# Patient Record
Sex: FEMALE | Race: WHITE | Employment: FULL TIME | URBAN - METROPOLITAN AREA
[De-identification: names, ages, dates, MRNs, and addresses within clinical notes are randomized per-mention and may not be internally consistent; named-entity substitution may affect disease eponyms.]

---

## 2018-04-04 ENCOUNTER — HOSPITAL ENCOUNTER (EMERGENCY)
Age: 28
Discharge: HOME | End: 2018-04-04
Attending: EMERGENCY MEDICINE
Payer: COMMERCIAL

## 2018-04-04 VITALS
BODY MASS INDEX: 33.2 KG/M2 | OXYGEN SATURATION: 100 % | RESPIRATION RATE: 14 BRPM | SYSTOLIC BLOOD PRESSURE: 127 MMHG | WEIGHT: 187.39 LBS | TEMPERATURE: 97.9 F | HEIGHT: 63 IN | DIASTOLIC BLOOD PRESSURE: 78 MMHG | HEART RATE: 69 BPM

## 2018-04-04 DIAGNOSIS — K29.90 GASTRITIS AND DUODENITIS: Primary | ICD-10-CM

## 2018-04-04 LAB
ALBUMIN SERPL-MCNC: 4.3 G/DL (ref 3.5–5)
ALBUMIN/GLOB SERPL: 1.2 {RATIO} (ref 1.1–2.2)
ALP SERPL-CCNC: 74 U/L (ref 45–117)
ALT SERPL-CCNC: 57 U/L (ref 12–78)
ANION GAP SERPL CALC-SCNC: 4 MMOL/L (ref 5–15)
APPEARANCE UR: ABNORMAL
AST SERPL-CCNC: 28 U/L (ref 15–37)
BACTERIA URNS QL MICRO: ABNORMAL /HPF
BASOPHILS # BLD: 0.1 K/UL (ref 0–0.1)
BASOPHILS NFR BLD: 1 % (ref 0–1)
BILIRUB SERPL-MCNC: 0.5 MG/DL (ref 0.2–1)
BILIRUB UR QL: NEGATIVE
BUN SERPL-MCNC: 10 MG/DL (ref 6–20)
BUN/CREAT SERPL: 12 (ref 12–20)
CALCIUM SERPL-MCNC: 9 MG/DL (ref 8.5–10.1)
CHLORIDE SERPL-SCNC: 109 MMOL/L (ref 97–108)
CO2 SERPL-SCNC: 28 MMOL/L (ref 21–32)
COLOR UR: ABNORMAL
CREAT SERPL-MCNC: 0.82 MG/DL (ref 0.55–1.02)
DIFFERENTIAL METHOD BLD: NORMAL
EOSINOPHIL # BLD: 0.2 K/UL (ref 0–0.4)
EOSINOPHIL NFR BLD: 3 % (ref 0–7)
EPITH CASTS URNS QL MICRO: ABNORMAL /LPF
ERYTHROCYTE [DISTWIDTH] IN BLOOD BY AUTOMATED COUNT: 11.8 % (ref 11.5–14.5)
GLOBULIN SER CALC-MCNC: 3.5 G/DL (ref 2–4)
GLUCOSE SERPL-MCNC: 97 MG/DL (ref 65–100)
GLUCOSE UR STRIP.AUTO-MCNC: NEGATIVE MG/DL
HCG UR QL: NEGATIVE
HCT VFR BLD AUTO: 41.3 % (ref 35–47)
HGB BLD-MCNC: 13.9 G/DL (ref 11.5–16)
HGB UR QL STRIP: NEGATIVE
HYALINE CASTS URNS QL MICRO: ABNORMAL /LPF (ref 0–5)
IMM GRANULOCYTES # BLD: 0 K/UL (ref 0–0.04)
IMM GRANULOCYTES NFR BLD AUTO: 0 % (ref 0–0.5)
KETONES UR QL STRIP.AUTO: NEGATIVE MG/DL
LACTATE SERPL-SCNC: 0.8 MMOL/L (ref 0.4–2)
LEUKOCYTE ESTERASE UR QL STRIP.AUTO: NEGATIVE
LIPASE SERPL-CCNC: 161 U/L (ref 73–393)
LYMPHOCYTES # BLD: 2.1 K/UL (ref 0.8–3.5)
LYMPHOCYTES NFR BLD: 32 % (ref 12–49)
MCH RBC QN AUTO: 31.5 PG (ref 26–34)
MCHC RBC AUTO-ENTMCNC: 33.7 G/DL (ref 30–36.5)
MCV RBC AUTO: 93.7 FL (ref 80–99)
MONOCYTES # BLD: 0.5 K/UL (ref 0–1)
MONOCYTES NFR BLD: 7 % (ref 5–13)
NEUTS SEG # BLD: 3.8 K/UL (ref 1.8–8)
NEUTS SEG NFR BLD: 57 % (ref 32–75)
NITRITE UR QL STRIP.AUTO: NEGATIVE
NRBC # BLD: 0 K/UL (ref 0–0.01)
NRBC BLD-RTO: 0 PER 100 WBC
PH UR STRIP: 7.5 [PH] (ref 5–8)
PLATELET # BLD AUTO: 276 K/UL (ref 150–400)
PMV BLD AUTO: 11 FL (ref 8.9–12.9)
POTASSIUM SERPL-SCNC: 4 MMOL/L (ref 3.5–5.1)
PROT SERPL-MCNC: 7.8 G/DL (ref 6.4–8.2)
PROT UR STRIP-MCNC: NEGATIVE MG/DL
RBC # BLD AUTO: 4.41 M/UL (ref 3.8–5.2)
RBC #/AREA URNS HPF: ABNORMAL /HPF (ref 0–5)
SODIUM SERPL-SCNC: 141 MMOL/L (ref 136–145)
SP GR UR REFRACTOMETRY: 1.02 (ref 1–1.03)
UA: UC IF INDICATED,UAUC: ABNORMAL
UROBILINOGEN UR QL STRIP.AUTO: 0.2 EU/DL (ref 0.2–1)
WBC # BLD AUTO: 6.7 K/UL (ref 3.6–11)
WBC URNS QL MICRO: ABNORMAL /HPF (ref 0–4)

## 2018-04-04 PROCEDURE — 96374 THER/PROPH/DIAG INJ IV PUSH: CPT

## 2018-04-04 PROCEDURE — 96376 TX/PRO/DX INJ SAME DRUG ADON: CPT

## 2018-04-04 PROCEDURE — 74011250637 HC RX REV CODE- 250/637: Performed by: PHYSICIAN ASSISTANT

## 2018-04-04 PROCEDURE — 74011250636 HC RX REV CODE- 250/636: Performed by: EMERGENCY MEDICINE

## 2018-04-04 PROCEDURE — 85025 COMPLETE CBC W/AUTO DIFF WBC: CPT | Performed by: PHYSICIAN ASSISTANT

## 2018-04-04 PROCEDURE — 74011250637 HC RX REV CODE- 250/637: Performed by: EMERGENCY MEDICINE

## 2018-04-04 PROCEDURE — 80053 COMPREHEN METABOLIC PANEL: CPT | Performed by: PHYSICIAN ASSISTANT

## 2018-04-04 PROCEDURE — 99284 EMERGENCY DEPT VISIT MOD MDM: CPT

## 2018-04-04 PROCEDURE — 83605 ASSAY OF LACTIC ACID: CPT | Performed by: PHYSICIAN ASSISTANT

## 2018-04-04 PROCEDURE — 96361 HYDRATE IV INFUSION ADD-ON: CPT

## 2018-04-04 PROCEDURE — 87086 URINE CULTURE/COLONY COUNT: CPT | Performed by: PHYSICIAN ASSISTANT

## 2018-04-04 PROCEDURE — 74011000250 HC RX REV CODE- 250: Performed by: EMERGENCY MEDICINE

## 2018-04-04 PROCEDURE — 81001 URINALYSIS AUTO W/SCOPE: CPT | Performed by: PHYSICIAN ASSISTANT

## 2018-04-04 PROCEDURE — 81025 URINE PREGNANCY TEST: CPT | Performed by: PHYSICIAN ASSISTANT

## 2018-04-04 PROCEDURE — 36415 COLL VENOUS BLD VENIPUNCTURE: CPT | Performed by: PHYSICIAN ASSISTANT

## 2018-04-04 PROCEDURE — 96375 TX/PRO/DX INJ NEW DRUG ADDON: CPT

## 2018-04-04 PROCEDURE — 83690 ASSAY OF LIPASE: CPT | Performed by: PHYSICIAN ASSISTANT

## 2018-04-04 RX ORDER — HYDROCODONE BITARTRATE AND ACETAMINOPHEN 5; 325 MG/1; MG/1
1 TABLET ORAL
Qty: 8 TAB | Refills: 0 | Status: SHIPPED | OUTPATIENT
Start: 2018-04-04 | End: 2019-02-22

## 2018-04-04 RX ORDER — FENTANYL CITRATE 50 UG/ML
50 INJECTION, SOLUTION INTRAMUSCULAR; INTRAVENOUS
Status: COMPLETED | OUTPATIENT
Start: 2018-04-04 | End: 2018-04-04

## 2018-04-04 RX ORDER — ONDANSETRON 2 MG/ML
4 INJECTION INTRAMUSCULAR; INTRAVENOUS
Status: COMPLETED | OUTPATIENT
Start: 2018-04-04 | End: 2018-04-04

## 2018-04-04 RX ORDER — ONDANSETRON 4 MG/1
4 TABLET, ORALLY DISINTEGRATING ORAL
Status: COMPLETED | OUTPATIENT
Start: 2018-04-04 | End: 2018-04-04

## 2018-04-04 RX ADMIN — LIDOCAINE HYDROCHLORIDE 40 ML: 20 SOLUTION ORAL; TOPICAL at 14:06

## 2018-04-04 RX ADMIN — FENTANYL CITRATE 50 MCG: 50 INJECTION, SOLUTION INTRAMUSCULAR; INTRAVENOUS at 14:39

## 2018-04-04 RX ADMIN — FENTANYL CITRATE 50 MCG: 50 INJECTION, SOLUTION INTRAMUSCULAR; INTRAVENOUS at 15:42

## 2018-04-04 RX ADMIN — ONDANSETRON 4 MG: 4 TABLET, ORALLY DISINTEGRATING ORAL at 14:06

## 2018-04-04 RX ADMIN — SODIUM CHLORIDE 1000 ML: 900 INJECTION, SOLUTION INTRAVENOUS at 14:37

## 2018-04-04 RX ADMIN — ONDANSETRON 4 MG: 2 INJECTION INTRAMUSCULAR; INTRAVENOUS at 14:40

## 2018-04-04 NOTE — DISCHARGE INSTRUCTIONS
Gastritis: Care Instructions  Your Care Instructions    Gastritis is a sore and upset stomach. It happens when something irritates the stomach lining. Many things can cause it. These include an infection such as the flu or something you ate or drank. Medicines or a sore on the lining of the stomach (ulcer) also can cause it. Your belly may bloat and ache. You may belch, vomit, and feel sick to your stomach. You should be able to relieve the problem by taking medicine. And it may help to change your diet. If gastritis lasts, your doctor may prescribe medicine. Follow-up care is a key part of your treatment and safety. Be sure to make and go to all appointments, and call your doctor if you are having problems. It's also a good idea to know your test results and keep a list of the medicines you take. How can you care for yourself at home? · If your doctor prescribed antibiotics, take them as directed. Do not stop taking them just because you feel better. You need to take the full course of antibiotics. · Be safe with medicines. If your doctor prescribed medicine to decrease stomach acid, take it as directed. Call your doctor if you think you are having a problem with your medicine. · Do not take any other medicine, including over-the-counter pain relievers, without talking to your doctor first.  · If your doctor recommends over-the-counter medicine to reduce stomach acid, such as Pepcid AC, Prilosec, Tagamet HB, or Zantac 75, follow the directions on the label. · Drink plenty of fluids (enough so that your urine is light yellow or clear like water) to prevent dehydration. Choose water and other caffeine-free clear liquids. If you have kidney, heart, or liver disease and have to limit fluids, talk with your doctor before you increase the amount of fluids you drink. · Limit how much alcohol you drink. · Avoid coffee, tea, cola drinks, chocolate, and other foods with caffeine.  They increase stomach acid.  When should you call for help? Call 911 anytime you think you may need emergency care. For example, call if:  ? · You vomit blood or what looks like coffee grounds. ? · You pass maroon or very bloody stools. ?Call your doctor now or seek immediate medical care if:  ? · You start breathing fast and have not produced urine in the last 8 hours. ? · You cannot keep fluids down. ? Watch closely for changes in your health, and be sure to contact your doctor if:  ? · You do not get better as expected. Where can you learn more? Go to http://marcelino-jordin.info/. Enter [id number] in the search box to learn more about \"Gastritis: Care Instructions. \"  Current as of: May 12, 2017  Content Version: 11.4  © 7404-3575 [employer]. Care instructions adapted under license by [employer] (which disclaims liability or warranty for this information). If you have questions about a medical condition or this instruction, always ask your healthcare professional. [address] any warranty or liability for your use of this information.

## 2018-04-04 NOTE — ED PROVIDER NOTES
EMERGENCY DEPARTMENT HISTORY AND PHYSICAL EXAM      Date: 4/4/2018  Patient Name: Omega Ocampo     I have seen and evaluated this patient in the [facility] portion of triage for ABD pain and NV. Upper GI scope scheduled for tomorrow. The patients care will begin now and orders have been placed. This patient will be seen and provided further care in the Emergency Room. Written by Bakari Staton, a scribe for [facility].    History of Presenting Illness     Chief Complaint   Patient presents with    Abdominal Pain     mid upper abdominal pain onset 10 am ; pt reports vomiting x1 ; pt reports she is a patient of MD Janeth Gupta and is scheduled for an upper GI scope tommorrow w/ a history of gastritis        History Provided By: Patient    HPI: Omega Ocampo, 33 y.o. female with PMHx significant for PCOS, GERD, and gastritis, presents ambulatory to the ED with cc of gradually worsening 10/10 mid-upper abdominal pain with nausea and one episode of emesis onset 1000 after eating. Pt states she felt asymptomatic upon waking this morning and took her Carafate as usual. She reports her pain is consistent with gastritis and is scheduled for an EGD tomorrow. Pt denies any recent dietary changes or spicy foods. Pt specifically denies any diarrhea, constipation, fever, and chest pain. PCP: Renetta Bernard MD   GI: Richard Juan MD    There are no other complaints, changes, or physical findings at this time. Current Outpatient Prescriptions   Medication Sig Dispense Refill    HYDROcodone-acetaminophen (NORCO) 5-325 mg per tablet Take 1 Tab by mouth every six (6) hours as needed for Pain. Max Daily Amount: 4 Tabs. 8 Tab 0    rizatriptan (MAXALT-MLT) 10 mg disintegrating tablet Take 1 Tab by mouth once as needed for Migraine for up to 1 dose. 9 Tab 5    SUMAtriptan (IMITREX) 100 mg tablet Take 1 Tab by mouth once as needed for headache . Max daily dose 2 9 Tab 5    loratadine (CLARITIN) 10 mg tablet Take 10 mg by mouth daily.  pantoprazole (PROTONIX) 40 mg tablet Take 40 mg by mouth daily.  sucralfate (CARAFATE) 100 mg/mL suspension Take  by mouth four (4) times daily.  butalbital-acetaminophen-caffeine (FIORICET, ESGIC) -40 mg per tablet Take 1 Tab by mouth every six (6) hours as needed for Pain. Max Daily Amount: 4 Tabs. 20 Tab 5    albuterol (PROVENTIL VENTOLIN) 2.5 mg /3 mL (0.083 %) nebulizer solution 2.5 mg by Nebulization route once.  mometasone (NASONEX) 50 mcg/actuation nasal spray 2 Sprays daily. Past History     Past Medical History:  Past Medical History:   Diagnosis Date    Acid reflux disease with ulcer     Constipation     Headache     Other ill-defined conditions(889.89)     PCOS    PCOS (polycystic ovarian syndrome)     Seasonal asthma     Stomach pain     Vertigo        Past Surgical History:  Past Surgical History:   Procedure Laterality Date    HX ENDOSCOPY         Family History:  History reviewed. No pertinent family history. Social History:  Social History   Substance Use Topics    Smoking status: Never Smoker    Smokeless tobacco: Never Used    Alcohol use Yes      Comment: Occ       Allergies:  No Known Allergies      Review of Systems   Review of Systems   Constitutional: Negative for chills and fever. Respiratory: Negative for cough and shortness of breath. Cardiovascular: Negative for chest pain. Gastrointestinal: Positive for abdominal pain, nausea and vomiting. Negative for constipation and diarrhea. Neurological: Negative for weakness and numbness. All other systems reviewed and are negative. Physical Exam   Physical Exam   Constitutional: She is oriented to person, place, and time. She appears well-developed and well-nourished. HENT:   Head: Normocephalic and atraumatic. Eyes: Conjunctivae and EOM are normal.   Neck: Normal range of motion. Neck supple. Cardiovascular: Normal rate and regular rhythm. Pulmonary/Chest: Effort normal and breath sounds normal. No respiratory distress. Abdominal: Soft. She exhibits no distension. There is tenderness in the epigastric area. Musculoskeletal: Normal range of motion. Neurological: She is alert and oriented to person, place, and time. Skin: Skin is warm and dry. Psychiatric: She has a normal mood and affect. Nursing note and vitals reviewed. Diagnostic Study Results     Labs -     Recent Results (from the past 12 hour(s))   CBC WITH AUTOMATED DIFF    Collection Time: 04/04/18 12:50 PM   Result Value Ref Range    WBC 6.7 3.6 - 11.0 K/uL    RBC 4.41 3.80 - 5.20 M/uL    HGB 13.9 11.5 - 16.0 g/dL    HCT 41.3 35.0 - 47.0 %    MCV 93.7 80.0 - 99.0 FL    MCH 31.5 26.0 - 34.0 PG    MCHC 33.7 30.0 - 36.5 g/dL    RDW 11.8 11.5 - 14.5 %    PLATELET 131 519 - 885 K/uL    MPV 11.0 8.9 - 12.9 FL    NRBC 0.0 0  WBC    ABSOLUTE NRBC 0.00 0.00 - 0.01 K/uL    NEUTROPHILS 57 32 - 75 %    LYMPHOCYTES 32 12 - 49 %    MONOCYTES 7 5 - 13 %    EOSINOPHILS 3 0 - 7 %    BASOPHILS 1 0 - 1 %    IMMATURE GRANULOCYTES 0 0.0 - 0.5 %    ABS. NEUTROPHILS 3.8 1.8 - 8.0 K/UL    ABS. LYMPHOCYTES 2.1 0.8 - 3.5 K/UL    ABS. MONOCYTES 0.5 0.0 - 1.0 K/UL    ABS. EOSINOPHILS 0.2 0.0 - 0.4 K/UL    ABS. BASOPHILS 0.1 0.0 - 0.1 K/UL    ABS. IMM. GRANS. 0.0 0.00 - 0.04 K/UL    DF AUTOMATED     METABOLIC PANEL, COMPREHENSIVE    Collection Time: 04/04/18 12:50 PM   Result Value Ref Range    Sodium 141 136 - 145 mmol/L    Potassium 4.0 3.5 - 5.1 mmol/L    Chloride 109 (H) 97 - 108 mmol/L    CO2 28 21 - 32 mmol/L    Anion gap 4 (L) 5 - 15 mmol/L    Glucose 97 65 - 100 mg/dL    BUN 10 6 - 20 MG/DL    Creatinine 0.82 0.55 - 1.02 MG/DL    BUN/Creatinine ratio 12 12 - 20      GFR est AA >60 >60 ml/min/1.73m2    GFR est non-AA >60 >60 ml/min/1.73m2    Calcium 9.0 8.5 - 10.1 MG/DL    Bilirubin, total 0.5 0.2 - 1.0 MG/DL    ALT (SGPT) 57 12 - 78 U/L    AST (SGOT) 28 15 - 37 U/L    Alk.  phosphatase 74 45 - 117 U/L    Protein, total 7.8 6.4 - 8.2 g/dL    Albumin 4.3 3.5 - 5.0 g/dL    Globulin 3.5 2.0 - 4.0 g/dL    A-G Ratio 1.2 1.1 - 2.2     LIPASE    Collection Time: 04/04/18 12:50 PM   Result Value Ref Range    Lipase 161 73 - 393 U/L   LACTIC ACID    Collection Time: 04/04/18 12:50 PM   Result Value Ref Range    Lactic acid 0.8 0.4 - 2.0 MMOL/L   URINALYSIS W/ REFLEX CULTURE    Collection Time: 04/04/18 12:51 PM   Result Value Ref Range    Color YELLOW/STRAW      Appearance CLOUDY (A) CLEAR      Specific gravity 1.023 1.003 - 1.030      pH (UA) 7.5 5.0 - 8.0      Protein NEGATIVE  NEG mg/dL    Glucose NEGATIVE  NEG mg/dL    Ketone NEGATIVE  NEG mg/dL    Bilirubin NEGATIVE  NEG      Blood NEGATIVE  NEG      Urobilinogen 0.2 0.2 - 1.0 EU/dL    Nitrites NEGATIVE  NEG      Leukocyte Esterase NEGATIVE  NEG      WBC 0-4 0 - 4 /hpf    RBC 0-5 0 - 5 /hpf    Epithelial cells MANY (A) FEW /lpf    Bacteria 4+ (A) NEG /hpf    UA:UC IF INDICATED URINE CULTURE ORDERED (A) CNI      Hyaline cast 2-5 0 - 5 /lpf   HCG URINE, QL    Collection Time: 04/04/18 12:51 PM   Result Value Ref Range    HCG urine, QL NEGATIVE  NEG           Medical Decision Making   I am the first provider for this patient. I reviewed the vital signs, available nursing notes, past medical history, past surgical history, family history and social history. Vital Signs-Reviewed the patient's vital signs. Patient Vitals for the past 12 hrs:   Temp Pulse Resp BP SpO2   04/04/18 1552 97.9 °F (36.6 °C) 69 14 127/78 100 %   04/04/18 1244 98 °F (36.7 °C) 81 16 126/85 100 %       Records Reviewed: Nursing Notes and Old Medical Records    Provider Notes (Medical Decision Making):   Patient presents with epigastric abdominal pain. Most likely gastritis. Differential includes gastritis, pancreatitis cholelithiasis, cholecystitis, hepatitis, muscular strain, renal pathology, gastroenteritis. Less likely ACS. Will obtain labs and possibly US.  Will give fluids, analgesics and antiemetics PRN. ED Course:   Initial assessment performed. The patients presenting problems have been discussed, and they are in agreement with the care plan formulated and outlined with them. I have encouraged them to ask questions as they arise throughout their visit. PROGRESS NOTE:  2:28 PM  Pt reports she is feeling worse after the GI cocktail. Written by Henry Castro ED Scribe, as dictated by Gutierrez Newsome M.D. PROGRESS NOTE:  3:24 PM  Pt reports feeling better after Zofran and Fetanyl. She states liquid Carafate used to work better for her, however due to her insurance she had to switch to the oral form. She is currently taking Protonix and Zantac. Given that pt is already on proper management for gastritis with EGD tomorrow, will prescribe a couple of pain medications until she can f/u tomorrow. Advised pt to avoid anything that can trigger her gastritis. Written by Henry Castro ED Scribe, as dictated by Gutierrez Newsome M.D. Disposition:    DISCHARGE NOTE:  3:26 PM  The patient is ready for discharge. The patients signs, symptoms, diagnosis, and instructions for discharge have been discussed and the pt has conveyed their understanding. The patient is to follow up as recommended with GI or return to the ER should their symptoms worsen. Plan has been discussed and patient has conveyed their agreement. PLAN:  1. Discharge home   Discharge Medication List as of 4/4/2018  3:25 PM      START taking these medications    Details   HYDROcodone-acetaminophen (NORCO) 5-325 mg per tablet Take 1 Tab by mouth every six (6) hours as needed for Pain.  Max Daily Amount: 4 Tabs., Print, Disp-8 Tab, R-0         CONTINUE these medications which have NOT CHANGED    Details   rizatriptan (MAXALT-MLT) 10 mg disintegrating tablet Take 1 Tab by mouth once as needed for Migraine for up to 1 dose., Normal, Disp-9 Tab, R-5      SUMAtriptan (IMITREX) 100 mg tablet Take 1 Tab by mouth once as needed for headache . Max daily dose 2, Normal, Disp-9 Tab, R-5      loratadine (CLARITIN) 10 mg tablet Take 10 mg by mouth daily. , Historical Med      pantoprazole (PROTONIX) 40 mg tablet Take 40 mg by mouth daily. , Historical Med      sucralfate (CARAFATE) 100 mg/mL suspension Take  by mouth four (4) times daily. , Historical Med      butalbital-acetaminophen-caffeine (FIORICET, ESGIC) -40 mg per tablet Take 1 Tab by mouth every six (6) hours as needed for Pain. Max Daily Amount: 4 Tabs., Print, Disp-20 Tab, R-5      albuterol (PROVENTIL VENTOLIN) 2.5 mg /3 mL (0.083 %) nebulizer solution 2.5 mg by Nebulization route once., Historical Med      mometasone (NASONEX) 50 mcg/actuation nasal spray 2 Sprays daily. , Historical Med           2. Follow-up Information     Follow up With Details Comments Contact Info    Mica Fisher MD In 1 day  [address] [place] [address] Rd  [phone]          Return to ED if worse     Diagnosis     Clinical Impression:   1. Gastritis and duodenitis        Attestations: This note is prepared by Gregor Mendoza, acting as Scribe for Danika Holbrook M.D. Danika Holbrook M.D: The scribe's documentation has been prepared under my direction and personally reviewed by me in its entirety. I confirm that the note above accurately reflects all work, treatment, procedures, and medical decision making performed by me. This note will not be viewable in [address]

## 2018-04-04 NOTE — ED TRIAGE NOTES
Assumed care of pt ambulatory from triage. Pt reports CC of mid-epigastric pain since 1000 this morning. Pt has a hx of gastritis and is scheduled for a Endo tomorrow. Pt reports N with vomiting earlier that has resolved. Pain remains. Pt sitting on side of stretcher in POC with family at bedside.

## 2018-04-04 NOTE — ED NOTES
Bedside and Verbal shift change report given to Tiffany Huynh RN (oncoming nurse) by Hermenia Galeazzi, RN (offgoing nurse). Report included the following information SBAR, Kardex, ED Summary, [facility] and Recent Results.

## 2018-04-04 NOTE — ED NOTES
Bedside and Verbal shift change report given to [address] (oncoming nurse) by Alberto Mary (offgoing nurse). Report included the following information SBAR, ED Summary and Recent Results.

## 2018-04-04 NOTE — ED NOTES
Pt discharged by MD Lois Gaspar. Pt provided with discharge instructions Rx and instructions on follow up care. Pt out of ED in stable condition accompanied by .

## 2018-04-04 NOTE — ED NOTES
Pt reports the medicine we just gave her has made the pain worse. Pt hunched over and tearful at this time.   Notified MD

## 2018-04-04 NOTE — LETTER
[facility] EM[facility] 
[address] [place] [address] [address] [id number] [phone] Work/School Note Date: 4/4/2018 To Whom It May concern: 
 
Ildefonso Monroe was seen and treated today in the emergency room by the following provider(s): 
Attending Provider: Jake Lion MD.   
 
Ildefonso Monroe may return to work on 4/6/18. Sincerely, Nicolas Castro RN

## 2018-04-06 LAB
BACTERIA SPEC CULT: NORMAL
CC UR VC: NORMAL
SERVICE CMNT-IMP: NORMAL

## 2018-05-07 ENCOUNTER — APPOINTMENT (OUTPATIENT)
Dept: ULTRASOUND IMAGING | Age: 28
End: 2018-05-07
Attending: EMERGENCY MEDICINE
Payer: COMMERCIAL

## 2018-05-07 ENCOUNTER — APPOINTMENT (OUTPATIENT)
Dept: CT IMAGING | Age: 28
End: 2018-05-07
Attending: EMERGENCY MEDICINE
Payer: COMMERCIAL

## 2018-05-07 ENCOUNTER — APPOINTMENT (OUTPATIENT)
Dept: CT IMAGING | Age: 28
End: 2018-05-07
Attending: PHYSICIAN ASSISTANT
Payer: COMMERCIAL

## 2018-05-07 ENCOUNTER — HOSPITAL ENCOUNTER (EMERGENCY)
Age: 28
Discharge: HOME | End: 2018-05-07
Attending: EMERGENCY MEDICINE
Payer: COMMERCIAL

## 2018-05-07 VITALS
WEIGHT: 185.19 LBS | DIASTOLIC BLOOD PRESSURE: 95 MMHG | BODY MASS INDEX: 32.81 KG/M2 | HEIGHT: 63 IN | SYSTOLIC BLOOD PRESSURE: 138 MMHG | HEART RATE: 77 BPM | TEMPERATURE: 98.3 F | RESPIRATION RATE: 18 BRPM | OXYGEN SATURATION: 100 %

## 2018-05-07 DIAGNOSIS — R10.84 ABDOMINAL PAIN, GENERALIZED: Primary | ICD-10-CM

## 2018-05-07 LAB
ALBUMIN SERPL-MCNC: 4.1 G/DL (ref 3.5–5)
ALBUMIN/GLOB SERPL: 1.2 {RATIO} (ref 1.1–2.2)
ALP SERPL-CCNC: 75 U/L (ref 45–117)
ALT SERPL-CCNC: 57 U/L (ref 12–78)
ANION GAP SERPL CALC-SCNC: 7 MMOL/L (ref 5–15)
APPEARANCE UR: CLEAR
AST SERPL-CCNC: 26 U/L (ref 15–37)
BACTERIA URNS QL MICRO: NEGATIVE /HPF
BASOPHILS # BLD: 0 K/UL (ref 0–0.1)
BASOPHILS NFR BLD: 1 % (ref 0–1)
BILIRUB SERPL-MCNC: 0.6 MG/DL (ref 0.2–1)
BILIRUB UR QL: NEGATIVE
BUN SERPL-MCNC: 9 MG/DL (ref 6–20)
BUN/CREAT SERPL: 11 (ref 12–20)
CALCIUM SERPL-MCNC: 9.2 MG/DL (ref 8.5–10.1)
CHLORIDE SERPL-SCNC: 106 MMOL/L (ref 97–108)
CO2 SERPL-SCNC: 26 MMOL/L (ref 21–32)
COLOR UR: NORMAL
CREAT SERPL-MCNC: 0.8 MG/DL (ref 0.55–1.02)
DIFFERENTIAL METHOD BLD: ABNORMAL
EOSINOPHIL # BLD: 0.2 K/UL (ref 0–0.4)
EOSINOPHIL NFR BLD: 2 % (ref 0–7)
EPITH CASTS URNS QL MICRO: NORMAL /LPF
ERYTHROCYTE [DISTWIDTH] IN BLOOD BY AUTOMATED COUNT: 11.4 % (ref 11.5–14.5)
GLOBULIN SER CALC-MCNC: 3.3 G/DL (ref 2–4)
GLUCOSE SERPL-MCNC: 85 MG/DL (ref 65–100)
GLUCOSE UR STRIP.AUTO-MCNC: NEGATIVE MG/DL
HCG UR QL: NEGATIVE
HCT VFR BLD AUTO: 39.8 % (ref 35–47)
HGB BLD-MCNC: 13.9 G/DL (ref 11.5–16)
HGB UR QL STRIP: NEGATIVE
HYALINE CASTS URNS QL MICRO: NORMAL /LPF (ref 0–5)
IMM GRANULOCYTES # BLD: 0 K/UL (ref 0–0.04)
IMM GRANULOCYTES NFR BLD AUTO: 0 % (ref 0–0.5)
KETONES UR QL STRIP.AUTO: NEGATIVE MG/DL
LEUKOCYTE ESTERASE UR QL STRIP.AUTO: NEGATIVE
LIPASE SERPL-CCNC: 129 U/L (ref 73–393)
LYMPHOCYTES # BLD: 2.2 K/UL (ref 0.8–3.5)
LYMPHOCYTES NFR BLD: 27 % (ref 12–49)
MCH RBC QN AUTO: 32 PG (ref 26–34)
MCHC RBC AUTO-ENTMCNC: 34.9 G/DL (ref 30–36.5)
MCV RBC AUTO: 91.5 FL (ref 80–99)
MONOCYTES # BLD: 0.5 K/UL (ref 0–1)
MONOCYTES NFR BLD: 6 % (ref 5–13)
NEUTS SEG # BLD: 5.3 K/UL (ref 1.8–8)
NEUTS SEG NFR BLD: 65 % (ref 32–75)
NITRITE UR QL STRIP.AUTO: NEGATIVE
NRBC # BLD: 0 K/UL (ref 0–0.01)
NRBC BLD-RTO: 0 PER 100 WBC
PH UR STRIP: 6.5 [PH] (ref 5–8)
PLATELET # BLD AUTO: 234 K/UL (ref 150–400)
PMV BLD AUTO: 11.3 FL (ref 8.9–12.9)
POTASSIUM SERPL-SCNC: 3.7 MMOL/L (ref 3.5–5.1)
PROT SERPL-MCNC: 7.4 G/DL (ref 6.4–8.2)
PROT UR STRIP-MCNC: NEGATIVE MG/DL
RBC # BLD AUTO: 4.35 M/UL (ref 3.8–5.2)
RBC #/AREA URNS HPF: NORMAL /HPF (ref 0–5)
SODIUM SERPL-SCNC: 139 MMOL/L (ref 136–145)
SP GR UR REFRACTOMETRY: 1.01 (ref 1–1.03)
UA: UC IF INDICATED,UAUC: NORMAL
UROBILINOGEN UR QL STRIP.AUTO: 0.2 EU/DL (ref 0.2–1)
WBC # BLD AUTO: 8.2 K/UL (ref 3.6–11)
WBC URNS QL MICRO: NORMAL /HPF (ref 0–4)

## 2018-05-07 PROCEDURE — 36415 COLL VENOUS BLD VENIPUNCTURE: CPT | Performed by: PHYSICIAN ASSISTANT

## 2018-05-07 PROCEDURE — 81001 URINALYSIS AUTO W/SCOPE: CPT | Performed by: EMERGENCY MEDICINE

## 2018-05-07 PROCEDURE — 96375 TX/PRO/DX INJ NEW DRUG ADDON: CPT

## 2018-05-07 PROCEDURE — 74011250636 HC RX REV CODE- 250/636: Performed by: EMERGENCY MEDICINE

## 2018-05-07 PROCEDURE — 96374 THER/PROPH/DIAG INJ IV PUSH: CPT

## 2018-05-07 PROCEDURE — 74177 CT ABD & PELVIS W/CONTRAST: CPT

## 2018-05-07 PROCEDURE — 96361 HYDRATE IV INFUSION ADD-ON: CPT

## 2018-05-07 PROCEDURE — 83690 ASSAY OF LIPASE: CPT | Performed by: PHYSICIAN ASSISTANT

## 2018-05-07 PROCEDURE — 74011250636 HC RX REV CODE- 250/636: Performed by: PHYSICIAN ASSISTANT

## 2018-05-07 PROCEDURE — 99283 EMERGENCY DEPT VISIT LOW MDM: CPT

## 2018-05-07 PROCEDURE — 81025 URINE PREGNANCY TEST: CPT | Performed by: EMERGENCY MEDICINE

## 2018-05-07 PROCEDURE — 74011636320 HC RX REV CODE- 636/320: Performed by: PHYSICIAN ASSISTANT

## 2018-05-07 PROCEDURE — 80053 COMPREHEN METABOLIC PANEL: CPT | Performed by: PHYSICIAN ASSISTANT

## 2018-05-07 PROCEDURE — 85025 COMPLETE CBC W/AUTO DIFF WBC: CPT | Performed by: PHYSICIAN ASSISTANT

## 2018-05-07 PROCEDURE — 74011636320 HC RX REV CODE- 636/320: Performed by: EMERGENCY MEDICINE

## 2018-05-07 PROCEDURE — 76705 ECHO EXAM OF ABDOMEN: CPT

## 2018-05-07 RX ORDER — SODIUM CHLORIDE 9 MG/ML
50 INJECTION, SOLUTION INTRAVENOUS
Status: COMPLETED | OUTPATIENT
Start: 2018-05-07 | End: 2018-05-07

## 2018-05-07 RX ORDER — ONDANSETRON 2 MG/ML
4 INJECTION INTRAMUSCULAR; INTRAVENOUS
Status: COMPLETED | OUTPATIENT
Start: 2018-05-07 | End: 2018-05-07

## 2018-05-07 RX ORDER — DICYCLOMINE HYDROCHLORIDE 10 MG/1
10 CAPSULE ORAL 4 TIMES DAILY
Qty: 20 CAP | Refills: 0 | Status: SHIPPED | OUTPATIENT
Start: 2018-05-07 | End: 2018-05-12

## 2018-05-07 RX ORDER — MORPHINE SULFATE 4 MG/ML
4 INJECTION INTRAVENOUS ONCE
Status: COMPLETED | OUTPATIENT
Start: 2018-05-07 | End: 2018-05-07

## 2018-05-07 RX ORDER — SODIUM CHLORIDE 0.9 % (FLUSH) 0.9 %
10 SYRINGE (ML) INJECTION
Status: COMPLETED | OUTPATIENT
Start: 2018-05-07 | End: 2018-05-07

## 2018-05-07 RX ADMIN — Medication 10 ML: at 19:22

## 2018-05-07 RX ADMIN — MORPHINE SULFATE 4 MG: 4 INJECTION INTRAVENOUS at 17:27

## 2018-05-07 RX ADMIN — SODIUM CHLORIDE 1000 ML: 900 INJECTION, SOLUTION INTRAVENOUS at 17:28

## 2018-05-07 RX ADMIN — DIATRIZOATE MEGLUMINE AND DIATRIZOATE SODIUM 30 ML: 660; 100 LIQUID ORAL; RECTAL at 17:27

## 2018-05-07 RX ADMIN — SODIUM CHLORIDE 50 ML/HR: 900 INJECTION, SOLUTION INTRAVENOUS at 19:22

## 2018-05-07 RX ADMIN — IOPAMIDOL 100 ML: 755 INJECTION, SOLUTION INTRAVENOUS at 19:22

## 2018-05-07 RX ADMIN — ONDANSETRON 4 MG: 2 INJECTION INTRAMUSCULAR; INTRAVENOUS at 17:28

## 2018-05-07 NOTE — LETTER
[address] [facility] 
[facility] EMERGENCY DEPT 
[address] [place] [address] [address] [id number] [phone] Work/School Note Date: 5/7/2018 To Whom It May concern: 
 
Edilberto Akins was seen and treated today in the emergency room by the following provider(s): 
Attending Provider: Mya Pierce MD 
Resident: Jackie Catalan MD.   
 
Edilberto Akins may return to school on 05/09/2018. Sincerely, Jackie Catalan MD

## 2018-05-07 NOTE — ED NOTES
Spoke with Dr. Apple Baltazar. Patient to have total of 2L fluid at this time. Pt. Has now completed 2L. 3rd L not give.

## 2018-05-07 NOTE — ED PROVIDER NOTES
I have seen and evaluated this patient in the Express [facility] portion of triage for abd pain. Pt was sent here after seeing Dr. Makayla Murillo earlier today for CT. The patients care will begin now and orders have been placed. This patient will be seen and provided further care in the Emergency Room. Written by Lucrecia Navas, a scribe for [facility].    3:46 PM  Spoke with representative from Dr. Frannie Garcia office wants CT IV NPO     EMERGENCY DEPARTMENT HISTORY AND PHYSICAL EXAM      Date: 5/7/2018  Patient Name: Valinda Dubin    History of Presenting Illness     Chief Complaint   Patient presents with    Abdominal Pain     diffuse mid abdominal pain x two days with nausea and diarrhea; sent by GI to [facility]. Patient came from outpatient lab. Family member states insurance won't approve a CT scan for three days       History Provided By: Patient with mother at bedside    HPI: Valinda Dubin, 33 y.o. female with PMHx significant for GERD (followed by GI) and PCOS, presents via private vehicle to the ED with cc of acute on chronic abdominal pain. Patient recently seen by Dr. Makayla Murillo with GI for GERD like symptoms and EGD performed; per patient report only had signs of reflux that was well healing; unable to find EGD report at this time. Patient presents today due to the above abdominal pain which she reports is 8/10, acute on chronic, not responding to carafate as it typically has int he past, pain reportedly across the upper abdomen from the right to the middle. Endorsing nausea but no vomiting, no diarrhea, making appropriate BMs most recently yesterday without any blood. Not taking any new medications, denies ETOH, tobacco, or illicit drug use.     Chief Complaint: Abdominal Pain  Duration: 6 Hours  Timing:  Gradual  Location: Abdomen  Quality: Burning  Severity: 8 out of 10  Modifying Factors: Not responding to carafate  Associated Symptoms: Nausea    There are no other complaints, changes, or physical findings at this time. PCP: Belinda Dickson MD    Current Facility-Administered Medications   Medication Dose Route Frequency Provider Last Rate Last Dose    diatrizoate meglumine-d.sodium (MD-GASTROVIEW,GASTROGRAFIN) 66-10 % contrast solution 30 mL  30 mL Oral NOW JERE Villalobos        sodium chloride 0.9 % bolus infusion 1,000 mL  1,000 mL IntraVENous NOW JERE Villalobos         Current Outpatient Prescriptions   Medication Sig Dispense Refill    HYDROcodone-acetaminophen (NORCO) 5-325 mg per tablet Take 1 Tab by mouth every six (6) hours as needed for Pain. Max Daily Amount: 4 Tabs. 8 Tab 0    rizatriptan (MAXALT-MLT) 10 mg disintegrating tablet Take 1 Tab by mouth once as needed for Migraine for up to 1 dose. 9 Tab 5    SUMAtriptan (IMITREX) 100 mg tablet Take 1 Tab by mouth once as needed for headache . Max daily dose 2 9 Tab 5    loratadine (CLARITIN) 10 mg tablet Take 10 mg by mouth daily.  pantoprazole (PROTONIX) 40 mg tablet Take 40 mg by mouth daily.  sucralfate (CARAFATE) 100 mg/mL suspension Take  by mouth four (4) times daily.  butalbital-acetaminophen-caffeine (FIORICET, ESGIC) -40 mg per tablet Take 1 Tab by mouth every six (6) hours as needed for Pain. Max Daily Amount: 4 Tabs. 20 Tab 5    albuterol (PROVENTIL VENTOLIN) 2.5 mg /3 mL (0.083 %) nebulizer solution 2.5 mg by Nebulization route once.  mometasone (NASONEX) 50 mcg/actuation nasal spray 2 Sprays daily. Past History     Past Medical History:  Past Medical History:   Diagnosis Date    Acid reflux disease with ulcer     Constipation     Headache     Other ill-defined conditions(982.29)     PCOS    PCOS (polycystic ovarian syndrome)     Seasonal asthma     Stomach pain     Vertigo        Past Surgical History:  Past Surgical History:   Procedure Laterality Date    HX ENDOSCOPY         Family History:  No family history on file.     Social History:  Social History   Substance Use Topics    Smoking status: Never Smoker    Smokeless tobacco: Never Used    Alcohol use Yes      Comment: Occ       Allergies:  No Known Allergies      Review of Systems   Review of Systems   Constitutional: Negative for activity change, chills and fever. HENT: Negative for congestion and sore throat. Eyes: Negative for pain and redness. Respiratory: Negative for cough, chest tightness and shortness of breath. Cardiovascular: Negative for chest pain and palpitations. Gastrointestinal: Positive for abdominal distention, abdominal pain and nausea. Negative for blood in stool, diarrhea and vomiting. Genitourinary: Positive for menstrual problem. Negative for difficulty urinating, dysuria, flank pain, frequency, genital sores, pelvic pain, urgency, vaginal bleeding, vaginal discharge and vaginal pain. Musculoskeletal: Negative for back pain and neck pain. Skin: Negative for rash. Neurological: Negative for syncope, light-headedness and headaches. Psychiatric/Behavioral: Negative for confusion. All other systems reviewed and are negative. Physical Exam   Physical Exam   Constitutional: She is oriented to person, place, and time. She appears well-developed. No distress. HENT:   Head: Normocephalic. Eyes: Pupils are equal, round, and reactive to light. Neck: Neck supple. Cardiovascular: Normal rate and intact distal pulses. Pulmonary/Chest: Effort normal and breath sounds normal.   Abdominal: Soft. Bowel sounds are normal. She exhibits no distension and no mass. There is tenderness (Diffuse tenderness, non focal). There is guarding (voluntary). There is no rebound. Musculoskeletal: Normal range of motion. She exhibits no tenderness. Lymphadenopathy:     She has no cervical adenopathy (No suprclavicular adenopathy). Neurological: She is alert and oriented to person, place, and time. She has normal reflexes. Skin: Skin is warm and dry. No rash noted.    Vitals reviewed. Diagnostic Study Results     Labs -   No results found for this or any previous visit (from the past 12 hour(s)). Radiologic Studies -   CT ABD PELV W CONT    (Results Pending)     CT Results  (Last 48 hours)    None        CXR Results  (Last 48 hours)    None            Medical Decision Making   I am the first provider for this patient. I reviewed the vital signs, available nursing notes, past medical history, past surgical history, family history and social history. Vital Signs-Reviewed the patient's vital signs. Patient Vitals for the past 12 hrs:   Temp Pulse Resp BP SpO2   05/07/18 1451 98.3 °F (36.8 °C) 77 18 (!) 138/95 100 %       Pulse Oximetry Analysis - 100% on RA    Cardiac Monitor:   Rate: 77 bpm  Rhythm: Normal Sinus Rhythm        Records Reviewed: Nursing Notes    Provider Notes (Medical Decision Making): This is a 33year old female with the above history and physical exam findings who presents to the ER with acute on chronic abdominal pain. Patient followed by GI here; recent EGD with reported GE junction ulcer and possible eosinophilic esophagitis. PE largely benign without any peritoneal signs, history consistent with chronic pain but did not respond to Carafate today as it typically does. Broad differential due to non specific symptomotology but consists of pain from known ulcer vs. Eosinophilic esophagitis, continued GERD, possible gastric or duodenal ulcer, ZE syndrome (if positive must consider MEN), large stool burden, cancer, hepatobiliary disease, pancreatitis. Will gain labs, imaging, and provide pain relief in way of morphine/zofran for nausea and continue to evaluate. ED Course:   Initial assessment performed. The patients presenting problems have been discussed, and they are in agreement with the care plan formulated and outlined with them. I have encouraged them to ask questions as they arise throughout their visit.     Imaging and labs without any concerning findings this time; will continue forward with CT abd/pelvis with IV contrast only due to low suspicion for obstruction. 8:41 PM - Imaging and labs completed without any concerning findings. Patient now with minimal abdominal pain but no N/V/D since arrival.  Discussed discharge instruction and return precautions all of which patient verbally agreeable and understanding. I personally saw and examined the patient. I found the following on physical exam:    Recurrent abdominal closely followed by GI. No surgical signs. IF CT and U/S normal follow up Dr Balwinder Perrin    I have reviewed and agree with the Resident's findings, including all diagnostic interpretations, and plans as written. I was present during the key portions of separately billed procedures. Dusty Ormond, MD      Critical Care Time:       Disposition:  Discharge    PLAN:  1. Current Discharge Medication List        2. Follow-up Information     None        Return to ED if worse     Diagnosis     Clinical Impression:   1.  Abdominal pain, generalized

## 2018-05-08 NOTE — DISCHARGE INSTRUCTIONS
Abdominal Pain: Care Instructions  Your Care Instructions    Abdominal pain has many possible causes. Some aren't serious and get better on their own in a few days. Others need more testing and treatment. If your pain continues or gets worse, you need to be rechecked and may need more tests to find out what is wrong. You may need surgery to correct the problem. Don't ignore new symptoms, such as fever, nausea and vomiting, urination problems, pain that gets worse, and dizziness. These may be signs of a more serious problem. Your doctor may have recommended a follow-up visit in the next 8 to 12 hours. If you are not getting better, you may need more tests or treatment. The doctor has checked you carefully, but problems can develop later. If you notice any problems or new symptoms, get medical treatment right away. Follow-up care is a key part of your treatment and safety. Be sure to make and go to all appointments, and call your doctor if you are having problems. It's also a good idea to know your test results and keep a list of the medicines you take. How can you care for yourself at home? · Rest until you feel better. · To prevent dehydration, drink plenty of fluids, enough so that your urine is light yellow or clear like water. Choose water and other caffeine-free clear liquids until you feel better. If you have kidney, heart, or liver disease and have to limit fluids, talk with your doctor before you increase the amount of fluids you drink. · If your stomach is upset, eat mild foods, such as rice, dry toast or crackers, bananas, and applesauce. Try eating several small meals instead of two or three large ones. · Wait until 48 hours after all symptoms have gone away before you have spicy foods, alcohol, and drinks that contain caffeine. · Do not eat foods that are high in fat. · Avoid anti-inflammatory medicines such as aspirin, ibuprofen (Advil, Motrin), and naproxen (Aleve).  These can cause stomach upset. Talk to your doctor if you take daily aspirin for another health problem. When should you call for help? Call 911 anytime you think you may need emergency care. For example, call if:  ? · You passed out (lost consciousness). ? · You pass maroon or very bloody stools. ? · You vomit blood or what looks like coffee grounds. ? · You have new, severe belly pain. ?Call your doctor now or seek immediate medical care if:  ? · Your pain gets worse, especially if it becomes focused in one area of your belly. ? · You have a new or higher fever. ? · Your stools are black and look like tar, or they have streaks of blood. ? · You have unexpected vaginal bleeding. ? · You have symptoms of a urinary tract infection. These may include:  ¨ Pain when you urinate. ¨ Urinating more often than usual.  ¨ Blood in your urine. ? · You are dizzy or lightheaded, or you feel like you may faint. ? Watch closely for changes in your health, and be sure to contact your doctor if:  ? · You are not getting better after 1 day (24 hours). Where can you learn more? Go to http://marcelino-jordin.info/. Enter U714 in the search box to learn more about \"Abdominal Pain: Care Instructions. \"  Current as of: March 20, 2017  Content Version: 11.4  © 7742-9756 [employer]. Care instructions adapted under license by [employer] (which disclaims liability or warranty for this information). If you have questions about a medical condition or this instruction, always ask your healthcare professional. [place] [address] any warranty or liability for your use of this information.

## 2018-05-16 ENCOUNTER — HOSPITAL ENCOUNTER (OUTPATIENT)
Dept: NUCLEAR MEDICINE | Age: 28
Discharge: HOME | End: 2018-05-16
Attending: PHYSICIAN ASSISTANT
Payer: COMMERCIAL

## 2018-05-16 DIAGNOSIS — R10.826 EPIGASTRIC REBOUND ABDOMINAL TENDERNESS: ICD-10-CM

## 2018-05-16 PROCEDURE — 78264 GASTRIC EMPTYING IMG STUDY: CPT

## 2019-02-17 ENCOUNTER — APPOINTMENT (OUTPATIENT)
Dept: CT IMAGING | Age: 29
End: 2019-02-17
Attending: EMERGENCY MEDICINE
Payer: COMMERCIAL

## 2019-02-17 ENCOUNTER — HOSPITAL ENCOUNTER (OUTPATIENT)
Age: 29
Setting detail: OBSERVATION
Discharge: HOME | End: 2019-02-18
Attending: EMERGENCY MEDICINE | Admitting: UROLOGY
Payer: COMMERCIAL

## 2019-02-17 ENCOUNTER — APPOINTMENT (OUTPATIENT)
Dept: ULTRASOUND IMAGING | Age: 29
End: 2019-02-17
Attending: EMERGENCY MEDICINE
Payer: COMMERCIAL

## 2019-02-17 DIAGNOSIS — N23 RENAL COLIC: Primary | ICD-10-CM

## 2019-02-17 DIAGNOSIS — R52 INTRACTABLE PAIN: ICD-10-CM

## 2019-02-17 LAB
ALBUMIN SERPL-MCNC: 4.3 G/DL (ref 3.5–5)
ALBUMIN/GLOB SERPL: 1.2 {RATIO} (ref 1.1–2.2)
ALP SERPL-CCNC: 78 U/L (ref 45–117)
ALT SERPL-CCNC: 83 U/L (ref 12–78)
ANION GAP BLD CALC-SCNC: 16 MMOL/L (ref 10–20)
ANION GAP SERPL CALC-SCNC: 8 MMOL/L (ref 5–15)
APPEARANCE UR: CLEAR
AST SERPL-CCNC: 37 U/L (ref 15–37)
BACTERIA URNS QL MICRO: NEGATIVE /HPF
BASOPHILS # BLD: 0 K/UL (ref 0–0.1)
BASOPHILS NFR BLD: 1 % (ref 0–1)
BILIRUB SERPL-MCNC: 0.4 MG/DL (ref 0.2–1)
BILIRUB UR QL: NEGATIVE
BUN BLD-MCNC: 14 MG/DL (ref 9–20)
BUN SERPL-MCNC: 13 MG/DL (ref 6–20)
BUN/CREAT SERPL: 13 (ref 12–20)
CA-I BLD-MCNC: 1.1 MMOL/L (ref 1.12–1.32)
CALCIUM SERPL-MCNC: 9.1 MG/DL (ref 8.5–10.1)
CHLORIDE BLD-SCNC: 104 MMOL/L (ref 98–107)
CHLORIDE SERPL-SCNC: 107 MMOL/L (ref 97–108)
CO2 BLD-SCNC: 25 MMOL/L (ref 21–32)
CO2 SERPL-SCNC: 24 MMOL/L (ref 21–32)
COLOR UR: ABNORMAL
CREAT BLD-MCNC: 0.9 MG/DL (ref 0.6–1.3)
CREAT SERPL-MCNC: 0.98 MG/DL (ref 0.55–1.02)
DIFFERENTIAL METHOD BLD: NORMAL
EOSINOPHIL # BLD: 0.2 K/UL (ref 0–0.4)
EOSINOPHIL NFR BLD: 3 % (ref 0–7)
EPITH CASTS URNS QL MICRO: ABNORMAL /LPF
ERYTHROCYTE [DISTWIDTH] IN BLOOD BY AUTOMATED COUNT: 11.8 % (ref 11.5–14.5)
GLOBULIN SER CALC-MCNC: 3.7 G/DL (ref 2–4)
GLUCOSE BLD-MCNC: 110 MG/DL (ref 65–100)
GLUCOSE SERPL-MCNC: 102 MG/DL (ref 65–100)
GLUCOSE UR STRIP.AUTO-MCNC: NEGATIVE MG/DL
HCT VFR BLD AUTO: 42.2 % (ref 35–47)
HCT VFR BLD CALC: 39 % (ref 35–47)
HGB BLD-MCNC: 14.4 G/DL (ref 11.5–16)
HGB UR QL STRIP: ABNORMAL
HYALINE CASTS URNS QL MICRO: ABNORMAL /LPF (ref 0–5)
IMM GRANULOCYTES # BLD AUTO: 0 K/UL (ref 0–0.04)
IMM GRANULOCYTES NFR BLD AUTO: 0 % (ref 0–0.5)
KETONES UR QL STRIP.AUTO: NEGATIVE MG/DL
LEUKOCYTE ESTERASE UR QL STRIP.AUTO: NEGATIVE
LYMPHOCYTES # BLD: 1.9 K/UL (ref 0.8–3.5)
LYMPHOCYTES NFR BLD: 22 % (ref 12–49)
MCH RBC QN AUTO: 31.6 PG (ref 26–34)
MCHC RBC AUTO-ENTMCNC: 34.1 G/DL (ref 30–36.5)
MCV RBC AUTO: 92.5 FL (ref 80–99)
MONOCYTES # BLD: 0.5 K/UL (ref 0–1)
MONOCYTES NFR BLD: 6 % (ref 5–13)
NEUTS SEG # BLD: 5.9 K/UL (ref 1.8–8)
NEUTS SEG NFR BLD: 69 % (ref 32–75)
NITRITE UR QL STRIP.AUTO: NEGATIVE
NRBC # BLD: 0 K/UL (ref 0–0.01)
NRBC BLD-RTO: 0 PER 100 WBC
PH UR STRIP: 5.5 [PH] (ref 5–8)
PLATELET # BLD AUTO: 278 K/UL (ref 150–400)
PMV BLD AUTO: 11.1 FL (ref 8.9–12.9)
POTASSIUM BLD-SCNC: 4.1 MMOL/L (ref 3.5–5.1)
POTASSIUM SERPL-SCNC: 4 MMOL/L (ref 3.5–5.1)
PROT SERPL-MCNC: 8 G/DL (ref 6.4–8.2)
PROT UR STRIP-MCNC: NEGATIVE MG/DL
RBC # BLD AUTO: 4.56 M/UL (ref 3.8–5.2)
RBC #/AREA URNS HPF: ABNORMAL /HPF (ref 0–5)
SERVICE CMNT-IMP: ABNORMAL
SODIUM BLD-SCNC: 140 MMOL/L (ref 136–145)
SODIUM SERPL-SCNC: 139 MMOL/L (ref 136–145)
SP GR UR REFRACTOMETRY: 1.02 (ref 1–1.03)
UROBILINOGEN UR QL STRIP.AUTO: 0.2 EU/DL (ref 0.2–1)
WBC # BLD AUTO: 8.5 K/UL (ref 3.6–11)
WBC URNS QL MICRO: ABNORMAL /HPF (ref 0–4)

## 2019-02-17 PROCEDURE — 81001 URINALYSIS AUTO W/SCOPE: CPT

## 2019-02-17 PROCEDURE — 96376 TX/PRO/DX INJ SAME DRUG ADON: CPT

## 2019-02-17 PROCEDURE — 96361 HYDRATE IV INFUSION ADD-ON: CPT

## 2019-02-17 PROCEDURE — 96375 TX/PRO/DX INJ NEW DRUG ADDON: CPT

## 2019-02-17 PROCEDURE — 74011000250 HC RX REV CODE- 250: Performed by: EMERGENCY MEDICINE

## 2019-02-17 PROCEDURE — 74011250636 HC RX REV CODE- 250/636: Performed by: UROLOGY

## 2019-02-17 PROCEDURE — 80047 BASIC METABLC PNL IONIZED CA: CPT

## 2019-02-17 PROCEDURE — 74011250636 HC RX REV CODE- 250/636

## 2019-02-17 PROCEDURE — 74011250637 HC RX REV CODE- 250/637: Performed by: EMERGENCY MEDICINE

## 2019-02-17 PROCEDURE — 74177 CT ABD & PELVIS W/CONTRAST: CPT

## 2019-02-17 PROCEDURE — 99284 EMERGENCY DEPT VISIT MOD MDM: CPT

## 2019-02-17 PROCEDURE — 74011250636 HC RX REV CODE- 250/636: Performed by: EMERGENCY MEDICINE

## 2019-02-17 PROCEDURE — 99218 HC RM OBSERVATION: CPT

## 2019-02-17 PROCEDURE — 85025 COMPLETE CBC W/AUTO DIFF WBC: CPT

## 2019-02-17 PROCEDURE — 96374 THER/PROPH/DIAG INJ IV PUSH: CPT

## 2019-02-17 PROCEDURE — 80053 COMPREHEN METABOLIC PANEL: CPT

## 2019-02-17 PROCEDURE — 36415 COLL VENOUS BLD VENIPUNCTURE: CPT

## 2019-02-17 PROCEDURE — 74011636320 HC RX REV CODE- 636/320: Performed by: EMERGENCY MEDICINE

## 2019-02-17 RX ORDER — SUMATRIPTAN 25 MG/1
100 TABLET, FILM COATED ORAL
Status: DISPENSED | OUTPATIENT
Start: 2019-02-17 | End: 2019-02-18

## 2019-02-17 RX ORDER — KETOROLAC TROMETHAMINE 30 MG/ML
15 INJECTION, SOLUTION INTRAMUSCULAR; INTRAVENOUS
Status: COMPLETED | OUTPATIENT
Start: 2019-02-17 | End: 2019-02-17

## 2019-02-17 RX ORDER — MORPHINE SULFATE IN 0.9 % NACL 150MG/30ML
PATIENT CONTROLLED ANALGESIA SYRINGE INTRAVENOUS CONTINUOUS
Status: DISCONTINUED | OUTPATIENT
Start: 2019-02-17 | End: 2019-02-18 | Stop reason: HOSPADM

## 2019-02-17 RX ORDER — SODIUM CHLORIDE 0.9 % (FLUSH) 0.9 %
10 SYRINGE (ML) INJECTION
Status: COMPLETED | OUTPATIENT
Start: 2019-02-17 | End: 2019-02-17

## 2019-02-17 RX ORDER — CEFAZOLIN SODIUM/WATER 2 G/20 ML
2 SYRINGE (ML) INTRAVENOUS
Status: DISCONTINUED | OUTPATIENT
Start: 2019-02-17 | End: 2019-02-17

## 2019-02-17 RX ORDER — MORPHINE SULFATE 4 MG/ML
4 INJECTION, SOLUTION INTRAMUSCULAR; INTRAVENOUS ONCE
Status: COMPLETED | OUTPATIENT
Start: 2019-02-17 | End: 2019-02-17

## 2019-02-17 RX ORDER — SODIUM CHLORIDE 0.9 % (FLUSH) 0.9 %
5-40 SYRINGE (ML) INJECTION AS NEEDED
Status: DISCONTINUED | OUTPATIENT
Start: 2019-02-17 | End: 2019-02-18 | Stop reason: HOSPADM

## 2019-02-17 RX ORDER — LORATADINE 10 MG/1
10 TABLET ORAL
Status: DISCONTINUED | OUTPATIENT
Start: 2019-02-17 | End: 2019-02-18 | Stop reason: HOSPADM

## 2019-02-17 RX ORDER — SODIUM CHLORIDE 0.9 % (FLUSH) 0.9 %
5-40 SYRINGE (ML) INJECTION EVERY 8 HOURS
Status: DISCONTINUED | OUTPATIENT
Start: 2019-02-17 | End: 2019-02-18 | Stop reason: HOSPADM

## 2019-02-17 RX ORDER — OXYCODONE AND ACETAMINOPHEN 5; 325 MG/1; MG/1
1-2 TABLET ORAL
Status: DISCONTINUED | OUTPATIENT
Start: 2019-02-17 | End: 2019-02-18 | Stop reason: HOSPADM

## 2019-02-17 RX ORDER — PANTOPRAZOLE SODIUM 40 MG/1
40 TABLET, DELAYED RELEASE ORAL DAILY
Status: DISCONTINUED | OUTPATIENT
Start: 2019-02-18 | End: 2019-02-18 | Stop reason: HOSPADM

## 2019-02-17 RX ORDER — MORPHINE SULFATE 4 MG/ML
INJECTION INTRAVENOUS
Status: COMPLETED
Start: 2019-02-17 | End: 2019-02-17

## 2019-02-17 RX ORDER — RIZATRIPTAN BENZOATE 10 MG/1
10 TABLET, ORALLY DISINTEGRATING ORAL
Status: DISCONTINUED | OUTPATIENT
Start: 2019-02-17 | End: 2019-02-17

## 2019-02-17 RX ORDER — SODIUM CHLORIDE, SODIUM LACTATE, POTASSIUM CHLORIDE, CALCIUM CHLORIDE 600; 310; 30; 20 MG/100ML; MG/100ML; MG/100ML; MG/100ML
150 INJECTION, SOLUTION INTRAVENOUS CONTINUOUS
Status: DISCONTINUED | OUTPATIENT
Start: 2019-02-17 | End: 2019-02-18 | Stop reason: HOSPADM

## 2019-02-17 RX ORDER — TAMSULOSIN HYDROCHLORIDE 0.4 MG/1
0.4 CAPSULE ORAL
Status: COMPLETED | OUTPATIENT
Start: 2019-02-17 | End: 2019-02-17

## 2019-02-17 RX ORDER — FENTANYL CITRATE 50 UG/ML
INJECTION, SOLUTION INTRAMUSCULAR; INTRAVENOUS
Status: COMPLETED
Start: 2019-02-17 | End: 2019-02-17

## 2019-02-17 RX ORDER — TAMSULOSIN HYDROCHLORIDE 0.4 MG/1
0.4 CAPSULE ORAL
Status: DISCONTINUED | OUTPATIENT
Start: 2019-02-18 | End: 2019-02-17

## 2019-02-17 RX ORDER — SUCRALFATE 1 G/1
1 TABLET ORAL
Status: DISCONTINUED | OUTPATIENT
Start: 2019-02-17 | End: 2019-02-18 | Stop reason: HOSPADM

## 2019-02-17 RX ORDER — ONDANSETRON 2 MG/ML
4 INJECTION INTRAMUSCULAR; INTRAVENOUS
Status: COMPLETED | OUTPATIENT
Start: 2019-02-17 | End: 2019-02-17

## 2019-02-17 RX ORDER — TAMSULOSIN HYDROCHLORIDE 0.4 MG/1
0.4 CAPSULE ORAL DAILY
Status: DISCONTINUED | OUTPATIENT
Start: 2019-02-18 | End: 2019-02-18 | Stop reason: HOSPADM

## 2019-02-17 RX ORDER — IPRATROPIUM BROMIDE AND ALBUTEROL SULFATE 2.5; .5 MG/3ML; MG/3ML
3 SOLUTION RESPIRATORY (INHALATION)
Status: DISCONTINUED | OUTPATIENT
Start: 2019-02-17 | End: 2019-02-18 | Stop reason: HOSPADM

## 2019-02-17 RX ORDER — RIZATRIPTAN BENZOATE 10 MG/1
10 TABLET ORAL
Status: DISCONTINUED | OUTPATIENT
Start: 2019-02-17 | End: 2019-02-18 | Stop reason: HOSPADM

## 2019-02-17 RX ORDER — KETOROLAC TROMETHAMINE 30 MG/ML
30 INJECTION, SOLUTION INTRAMUSCULAR; INTRAVENOUS
Status: DISCONTINUED | OUTPATIENT
Start: 2019-02-17 | End: 2019-02-18 | Stop reason: HOSPADM

## 2019-02-17 RX ORDER — ONDANSETRON 2 MG/ML
INJECTION INTRAMUSCULAR; INTRAVENOUS
Status: COMPLETED
Start: 2019-02-17 | End: 2019-02-17

## 2019-02-17 RX ORDER — FENTANYL CITRATE 50 UG/ML
75 INJECTION, SOLUTION INTRAMUSCULAR; INTRAVENOUS
Status: COMPLETED | OUTPATIENT
Start: 2019-02-17 | End: 2019-02-17

## 2019-02-17 RX ORDER — KETAMINE HYDROCHLORIDE 50 MG/ML
20 INJECTION, SOLUTION INTRAMUSCULAR; INTRAVENOUS ONCE
Status: COMPLETED | OUTPATIENT
Start: 2019-02-17 | End: 2019-02-17

## 2019-02-17 RX ORDER — KETOROLAC TROMETHAMINE 30 MG/ML
10 INJECTION, SOLUTION INTRAMUSCULAR; INTRAVENOUS
Status: COMPLETED | OUTPATIENT
Start: 2019-02-17 | End: 2019-02-17

## 2019-02-17 RX ADMIN — FENTANYL CITRATE 75 MCG: 50 INJECTION, SOLUTION INTRAMUSCULAR; INTRAVENOUS at 19:14

## 2019-02-17 RX ADMIN — ONDANSETRON 4 MG: 2 INJECTION INTRAMUSCULAR; INTRAVENOUS at 15:39

## 2019-02-17 RX ADMIN — KETOROLAC TROMETHAMINE 15 MG: 30 INJECTION, SOLUTION INTRAMUSCULAR at 16:00

## 2019-02-17 RX ADMIN — MORPHINE SULFATE 4 MG: 4 INJECTION INTRAVENOUS at 16:26

## 2019-02-17 RX ADMIN — FENTANYL CITRATE 75 MCG: 50 INJECTION, SOLUTION INTRAMUSCULAR; INTRAVENOUS at 15:39

## 2019-02-17 RX ADMIN — SODIUM CHLORIDE, SODIUM LACTATE, POTASSIUM CHLORIDE, AND CALCIUM CHLORIDE 150 ML/HR: 600; 310; 30; 20 INJECTION, SOLUTION INTRAVENOUS at 23:46

## 2019-02-17 RX ADMIN — IOPAMIDOL 100 ML: 755 INJECTION, SOLUTION INTRAVENOUS at 19:01

## 2019-02-17 RX ADMIN — KETAMINE HYDROCHLORIDE 20 MG: 50 INJECTION INTRAMUSCULAR; INTRAVENOUS at 17:14

## 2019-02-17 RX ADMIN — MORPHINE SULFATE 4 MG: 4 INJECTION, SOLUTION INTRAMUSCULAR; INTRAVENOUS at 16:26

## 2019-02-17 RX ADMIN — Medication 10 ML: at 19:01

## 2019-02-17 RX ADMIN — TAMSULOSIN HYDROCHLORIDE 0.4 MG: 0.4 CAPSULE ORAL at 22:40

## 2019-02-17 RX ADMIN — KETOROLAC TROMETHAMINE 10 MG: 30 INJECTION, SOLUTION INTRAMUSCULAR at 19:01

## 2019-02-17 NOTE — ED NOTES
Patient rang out stating that she felt as though her heart was racing and her bilateral hands were tingling. Patient's HR 88 at that time.

## 2019-02-17 NOTE — ED PROVIDER NOTES
EMERGENCY DEPARTMENT HISTORY AND PHYSICAL EXAM 
 
 
Date: 2/17/2019 Patient Name: Prei Coon History of Presenting Illness Chief Complaint Patient presents with  Kidney Stone The patient presents to the ED with complaints of left flank pain, states that she was seen last night at [facility] and diagnosed with a 2mm kidney stone. States that pain in unbearable. History Provided By: Patient and Patient's Mother HPI: Peri Coon, 33 y.o. female with PMHx significant for PCOS, reflux, presents ambulatory to the ED with cc of a severe L flank pain at 2 PM today. She reports associated symptom of nausea and notes she also has dysuria, but was dx'd with a UTI. She was seen at [facility] yesterday and dx'd with a 2 mm kidney stone, to which mother states the pt had thought she had passed. At work today, she had a sudden L flank pain at 2 PM and had taken her hydrocodone without any relief. She endorses feeling better after she had left the hospital yesterday. Mother denies the pt having a h/o kidney stones. Pt denies any vomiting or diarrhea. FHx: kidney stone There are no other complaints, changes, or physical findings at this time. PCP: Johana Ellison MD 
 
No current facility-administered medications on file prior to encounter. Current Outpatient Medications on File Prior to Encounter Medication Sig Dispense Refill  
 HYDROcodone-acetaminophen (NORCO) 5-325 mg per tablet Take 1 Tab by mouth every six (6) hours as needed for Pain. Max Daily Amount: 4 Tabs. 8 Tab 0  
 rizatriptan (MAXALT-MLT) 10 mg disintegrating tablet Take 1 Tab by mouth once as needed for Migraine for up to 1 dose. 9 Tab 5  SUMAtriptan (IMITREX) 100 mg tablet Take 1 Tab by mouth once as needed for headache . Max daily dose 2 9 Tab 5  loratadine (CLARITIN) 10 mg tablet Take 10 mg by mouth daily.  pantoprazole (PROTONIX) 40 mg tablet Take 40 mg by mouth daily.  sucralfate (CARAFATE) 100 mg/mL suspension Take  by mouth four (4) times daily.  butalbital-acetaminophen-caffeine (FIORICET, ESGIC) -40 mg per tablet Take 1 Tab by mouth every six (6) hours as needed for Pain. Max Daily Amount: 4 Tabs. 20 Tab 5  
 albuterol (PROVENTIL VENTOLIN) 2.5 mg /3 mL (0.083 %) nebulizer solution 2.5 mg by Nebulization route once.  mometasone (NASONEX) 50 mcg/actuation nasal spray 2 Sprays daily. Past History Past Medical History: 
Past Medical History:  
Diagnosis Date  Acid reflux disease with ulcer  Constipation  Headache  Other ill-defined conditions(619.89) PCOS  PCOS (polycystic ovarian syndrome)  Seasonal asthma  Stomach pain  Vertigo Past Surgical History: 
Past Surgical History:  
Procedure Laterality Date  HX ENDOSCOPY Family History: No family history on file. Social History: 
Social History Tobacco Use  Smoking status: Never Smoker  Smokeless tobacco: Never Used Substance Use Topics  Alcohol use: Yes Comment: Occ  Drug use: No  
 
 
Allergies: 
No Known Allergies Review of Systems Review of Systems Constitutional: Negative for chills and fever. HENT: Negative for congestion. Eyes: Negative for visual disturbance. Respiratory: Negative for chest tightness. Cardiovascular: Negative for chest pain and leg swelling. Gastrointestinal: Positive for nausea. Negative for abdominal pain, diarrhea and vomiting. Endocrine: Negative for polyuria. Genitourinary: Positive for dysuria and flank pain. Negative for frequency. Musculoskeletal: Negative for myalgias. Skin: Negative for color change. Allergic/Immunologic: Negative for immunocompromised state. Neurological: Negative for numbness. Physical Exam  
Physical Exam  
Nursing note and vitals reviewed. General appearance: non-toxic, distressed, crying, uncomfortable Eyes: PERRL, EOMI, conjunctiva normal, anicteric sclera HEENT: mucous membranes moist, oropharynx is clear Pulmonary: clear to auscultation bilaterally Cardiac: normal rate and regular rhythm, no murmurs, gallops, or rubs, 2+DP pulses, 2+ radial pulses Abdomen: soft, TTP LLQ and L flank, no rebound, no guarding, nondistended, bowel sounds present MSK: no pre-tibial edema Neuro: Alert, answers questions appropriately Skin: capillary refill brisk Diagnostic Study Results Labs - Recent Results (from the past 12 hour(s)) URINALYSIS W/ RFLX MICROSCOPIC Collection Time: 02/17/19  3:28 PM  
Result Value Ref Range Color YELLOW/STRAW Appearance CLEAR CLEAR Specific gravity 1.021 1.003 - 1.030    
 pH (UA) 5.5 5.0 - 8.0 Protein NEGATIVE  NEG mg/dL Glucose NEGATIVE  NEG mg/dL Ketone NEGATIVE  NEG mg/dL Bilirubin NEGATIVE  NEG Blood TRACE (A) NEG Urobilinogen 0.2 0.2 - 1.0 EU/dL Nitrites NEGATIVE  NEG Leukocyte Esterase NEGATIVE  NEG    
 WBC 0-4 0 - 4 /hpf  
 RBC 10-20 0 - 5 /hpf Epithelial cells FEW FEW /lpf Bacteria NEGATIVE  NEG /hpf Hyaline cast 0-2 0 - 5 /lpf  
CBC WITH AUTOMATED DIFF Collection Time: 02/17/19  3:34 PM  
Result Value Ref Range WBC 8.5 3.6 - 11.0 K/uL  
 RBC 4.56 3.80 - 5.20 M/uL  
 HGB 14.4 11.5 - 16.0 g/dL HCT 42.2 35.0 - 47.0 % MCV 92.5 80.0 - 99.0 FL  
 MCH 31.6 26.0 - 34.0 PG  
 MCHC 34.1 30.0 - 36.5 g/dL  
 RDW 11.8 11.5 - 14.5 % PLATELET 154 273 - 326 K/uL MPV 11.1 8.9 - 12.9 FL  
 NRBC 0.0 0  WBC ABSOLUTE NRBC 0.00 0.00 - 0.01 K/uL NEUTROPHILS 69 32 - 75 % LYMPHOCYTES 22 12 - 49 % MONOCYTES 6 5 - 13 % EOSINOPHILS 3 0 - 7 % BASOPHILS 1 0 - 1 % IMMATURE GRANULOCYTES 0 0.0 - 0.5 % ABS. NEUTROPHILS 5.9 1.8 - 8.0 K/UL  
 ABS. LYMPHOCYTES 1.9 0.8 - 3.5 K/UL  
 ABS. MONOCYTES 0.5 0.0 - 1.0 K/UL  
 ABS. EOSINOPHILS 0.2 0.0 - 0.4 K/UL  
 ABS. BASOPHILS 0.0 0.0 - 0.1 K/UL ABS. IMM. GRANS. 0.0 0.00 - 0.04 K/UL  
 DF AUTOMATED METABOLIC PANEL, COMPREHENSIVE Collection Time: 02/17/19  3:34 PM  
Result Value Ref Range Sodium 139 136 - 145 mmol/L Potassium 4.0 3.5 - 5.1 mmol/L Chloride 107 97 - 108 mmol/L  
 CO2 24 21 - 32 mmol/L Anion gap 8 5 - 15 mmol/L Glucose 102 (H) 65 - 100 mg/dL BUN 13 6 - 20 MG/DL Creatinine 0.98 0.55 - 1.02 MG/DL  
 BUN/Creatinine ratio 13 12 - 20 GFR est AA >60 >60 ml/min/1.73m2 GFR est non-AA >60 >60 ml/min/1.73m2 Calcium 9.1 8.5 - 10.1 MG/DL Bilirubin, total 0.4 0.2 - 1.0 MG/DL  
 ALT (SGPT) 83 (H) 12 - 78 U/L  
 AST (SGOT) 37 15 - 37 U/L Alk. phosphatase 78 45 - 117 U/L Protein, total 8.0 6.4 - 8.2 g/dL Albumin 4.3 3.5 - 5.0 g/dL Globulin 3.7 2.0 - 4.0 g/dL A-G Ratio 1.2 1.1 - 2.2 POC CHEM8 Collection Time: 02/17/19  4:05 PM  
Result Value Ref Range Calcium, ionized (POC) 1.10 (L) 1.12 - 1.32 mmol/L Sodium (POC) 140 136 - 145 mmol/L Potassium (POC) 4.1 3.5 - 5.1 mmol/L Chloride (POC) 104 98 - 107 mmol/L  
 CO2 (POC) 25 21 - 32 mmol/L Anion gap (POC) 16 10 - 20 mmol/L Glucose (POC) 110 (H) 65 - 100 mg/dL BUN (POC) 14 9 - 20 mg/dL Creatinine (POC) 0.9 0.6 - 1.3 mg/dL GFRAA, POC >60 >60 ml/min/1.73m2 GFRNA, POC >60 >60 ml/min/1.73m2 Hematocrit (POC) 39 35.0 - 47.0 % Comment Comment Not Indicated. Radiologic Studies -  
CT Results  (Last 48 hours) 02/17/19 1901  CT ABD PELV W CONT Final result Impression:  IMPRESSION:  
   
1. Obstruction of the left ureter at the ureterovesical junction by a 4.5 mm  
calculus. Tiny calcification in the right mid kidney without obstruction. 2. Generalized fatty infiltration of the liver. Narrative:  INDICATION: Abdominal pain; LLQ/L flank pain; renal stone vs cyst vs  
pyelonephritis COMPARISON: 5/7/2018 TECHNIQUE:   
 Following the uneventful intravenous administration of 100 cc Isovue-370, thin  
axial images were obtained through the abdomen and pelvis. Coronal and sagittal  
reconstructions were generated. Oral contrast was not administered. CT dose  
reduction was achieved through use of a standardized protocol tailored for this  
examination and automatic exposure control for dose modulation. FINDINGS:   
LUNG BASES: Clear. LIVER: Generalized fatty infiltration of the liver is noted without focal  
abnormality or biliary dilatation. GALLBLADDER: Unremarkable. SPLEEN: No mass. PANCREAS: No mass or ductal dilatation. ADRENALS: Unremarkable. KIDNEYS: The right kidney demonstrates no hydronephrosis. There is a tiny 1-2 mm  
calculus in the central portion of the right kidney which is nonobstructing. The  
left kidney demonstrates decrease in cortical opacification when compared to the  
right kidney, however no focal abnormality is identified within the left kidney. There is obstruction of the left ureter at the ureterovesical junction by a 4.5  
mm calculus. Moderate hydronephrosis and hydroureter noted. GI: No dilatation or wall thickening. APPENDIX: Unremarkable. PERITONEUM: No ascites or pneumoperitoneum. RETROPERITONEUM: No lymphadenopathy or aortic aneurysm. URINARY BLADDER: No mass or calculus. PELVIS: Prominent follicle noted in the right ovary. BONES: No destructive bone lesion. ADDITIONAL COMMENTS: N/A Medical Decision Making I am the first provider for this patient. I reviewed the vital signs, available nursing notes, past medical history, past surgical history, family history and social history. Vital Signs-Reviewed the patient's vital signs. Patient Vitals for the past 12 hrs: 
 Temp Pulse Resp BP SpO2  
02/17/19 1800  83 17 130/80 100 % 02/17/19 1754  81 18 130/85 100 % 02/17/19 1550    150/82 94 % 02/17/19 1545    132/58 96 % 02/17/19 1530    (!) 143/100 99 % 02/17/19 1430 97.5 °F (36.4 °C) 97 18 (!) 144/122 98 % Pulse Oximetry Analysis - 95% on RA Cardiac Monitor:  
Rate: 102 bpm 
Rhythm: Sinus Tachycardia Records Reviewed: Nursing Notes, Old Medical Records, Previous Radiology Studies and Previous Laboratory Studies Provider Notes (Medical Decision Making): Reviewed OSH records with CT showing 2x3 mm stone on left. She had a negative Hcg at [facility] yesterday. Suspect uncontrolled pain in setting of renal colic, really doubt lower GI pathology, or ovarian cyst. 
 
ED Course:  
Initial assessment performed. The patients presenting problems have been discussed, and they are in agreement with the care plan formulated and outlined with them. I have encouraged them to ask questions as they arise throughout their visit. PROGRESS NOTE: 
4:05 PM 
RN states the pt is requesting for another pain medication. PROGRESS NOTE: 
4:21 PM 
CT from yesterday shows R ovarian cyst, no cyst present on the left. PROGRESS NOTE: 
4:30 PM 
RN informs pt's pain had returned and is requesting for more pain medication. PROGRESS NOTE: 
4:57 PM 
RN informs the pt is complaining of pain once more. PROGRESS NOTE: 
6:00 PM 
Pt has been re-evaluated. She states she is feeling better after the ketamine. CONSULT NOTE: 
7:55 PM 
Tejal Hackett MD spoke with Dr. Herberth Camacho, Specialty: Urology Discussed patient's hx, disposition, and available diagnostic and imaging results. Reviewed care plans. Consultant agrees with plans as outlined. He states he will admit the pt. Critical Care Time:  
none Disposition: PLAN: 
1. Admit ADMIT NOTE: 
7:55 PM 
Patient is being admitted to the hospital by Dr. Herberth Camacho. The results of their tests and reasons for their admission have been discussed with them and/or available family.   They convey agreement and understanding for the need to be admitted and for their admission diagnosis. Consultation has been made with the inpatient physician specialist for hospitalization. Diagnosis Clinical Impression: 1. Renal colic 2. Intractable pain Attestations: This note is prepared by Romy Haddad, acting as Scribe for [employer]. Meghana Soto MD. [employer]. Meghana Soto MD: The scribe's documentation has been prepared under my direction and personally reviewed by me in its entirety. I confirm that the note above accurately reflects all work, treatment, procedures, and medical decision making performed by me. This note will not be viewable in [address]

## 2019-02-18 ENCOUNTER — ANESTHESIA EVENT (OUTPATIENT)
Dept: SURGERY | Age: 29
End: 2019-02-18
Payer: COMMERCIAL

## 2019-02-18 ENCOUNTER — ANESTHESIA (OUTPATIENT)
Dept: SURGERY | Age: 29
End: 2019-02-18
Payer: COMMERCIAL

## 2019-02-18 ENCOUNTER — APPOINTMENT (OUTPATIENT)
Dept: GENERAL RADIOLOGY | Age: 29
End: 2019-02-18
Attending: UROLOGY
Payer: COMMERCIAL

## 2019-02-18 VITALS
HEIGHT: 64 IN | TEMPERATURE: 98.2 F | HEART RATE: 75 BPM | OXYGEN SATURATION: 96 % | DIASTOLIC BLOOD PRESSURE: 78 MMHG | RESPIRATION RATE: 16 BRPM | BODY MASS INDEX: 33.12 KG/M2 | SYSTOLIC BLOOD PRESSURE: 118 MMHG | WEIGHT: 194 LBS

## 2019-02-18 LAB — HCG UR QL: NEGATIVE

## 2019-02-18 PROCEDURE — 77030019948 HC FBR LSR HOLM DISP OCOA -E: Performed by: UROLOGY

## 2019-02-18 PROCEDURE — C1769 GUIDE WIRE: HCPCS | Performed by: UROLOGY

## 2019-02-18 PROCEDURE — 74011250636 HC RX REV CODE- 250/636

## 2019-02-18 PROCEDURE — 74011250636 HC RX REV CODE- 250/636: Performed by: UROLOGY

## 2019-02-18 PROCEDURE — 76210000016 HC OR PH I REC 1 TO 1.5 HR: Performed by: UROLOGY

## 2019-02-18 PROCEDURE — C2617 STENT, NON-COR, TEM W/O DEL: HCPCS | Performed by: UROLOGY

## 2019-02-18 PROCEDURE — 99218 HC RM OBSERVATION: CPT

## 2019-02-18 PROCEDURE — C1758 CATHETER, URETERAL: HCPCS | Performed by: UROLOGY

## 2019-02-18 PROCEDURE — 77030012961 HC IRR KT CYSTO/TUR ICUM -A: Performed by: UROLOGY

## 2019-02-18 PROCEDURE — 74011000250 HC RX REV CODE- 250

## 2019-02-18 PROCEDURE — 81025 URINE PREGNANCY TEST: CPT

## 2019-02-18 PROCEDURE — 74011250636 HC RX REV CODE- 250/636: Performed by: ANESTHESIOLOGY

## 2019-02-18 PROCEDURE — 77030018832 HC SOL IRR H20 ICUM -A: Performed by: UROLOGY

## 2019-02-18 PROCEDURE — 77030019908 HC STETH ESOPH SIMS -A: Performed by: NURSE ANESTHETIST, CERTIFIED REGISTERED

## 2019-02-18 PROCEDURE — 74011250637 HC RX REV CODE- 250/637: Performed by: UROLOGY

## 2019-02-18 PROCEDURE — 77030026438 HC STYL ET INTUB CARD -A: Performed by: NURSE ANESTHETIST, CERTIFIED REGISTERED

## 2019-02-18 PROCEDURE — 96361 HYDRATE IV INFUSION ADD-ON: CPT

## 2019-02-18 PROCEDURE — 76000 FLUOROSCOPY <1 HR PHYS/QHP: CPT

## 2019-02-18 PROCEDURE — 74011000250 HC RX REV CODE- 250: Performed by: UROLOGY

## 2019-02-18 PROCEDURE — 76010000161 HC OR TIME 1 TO 1.5 HR INTENSV-TIER 1: Performed by: UROLOGY

## 2019-02-18 PROCEDURE — 77030005537 HC CATH URETH BARD -A: Performed by: UROLOGY

## 2019-02-18 PROCEDURE — 77030018846 HC SOL IRR STRL H20 ICUM -A: Performed by: UROLOGY

## 2019-02-18 PROCEDURE — 77030008684 HC TU ET CUF COVD -B: Performed by: NURSE ANESTHETIST, CERTIFIED REGISTERED

## 2019-02-18 PROCEDURE — 77030032490 HC SLV COMPR SCD KNE COVD -B: Performed by: UROLOGY

## 2019-02-18 PROCEDURE — 74420 UROGRAPHY RTRGR +-KUB: CPT

## 2019-02-18 PROCEDURE — 76060000033 HC ANESTHESIA 1 TO 1.5 HR: Performed by: UROLOGY

## 2019-02-18 DEVICE — URETERAL STENT
Type: IMPLANTABLE DEVICE | Site: URETER | Status: FUNCTIONAL
Brand: POLARIS™ ULTRA

## 2019-02-18 RX ORDER — SODIUM CHLORIDE 0.9 % (FLUSH) 0.9 %
5-40 SYRINGE (ML) INJECTION AS NEEDED
Status: DISCONTINUED | OUTPATIENT
Start: 2019-02-18 | End: 2019-02-18 | Stop reason: HOSPADM

## 2019-02-18 RX ORDER — DIPHENHYDRAMINE HYDROCHLORIDE 50 MG/ML
12.5 INJECTION, SOLUTION INTRAMUSCULAR; INTRAVENOUS
Status: DISCONTINUED | OUTPATIENT
Start: 2019-02-18 | End: 2019-02-18 | Stop reason: HOSPADM

## 2019-02-18 RX ORDER — DEXAMETHASONE SODIUM PHOSPHATE 4 MG/ML
INJECTION, SOLUTION INTRA-ARTICULAR; INTRALESIONAL; INTRAMUSCULAR; INTRAVENOUS; SOFT TISSUE AS NEEDED
Status: DISCONTINUED | OUTPATIENT
Start: 2019-02-18 | End: 2019-02-18 | Stop reason: HOSPADM

## 2019-02-18 RX ORDER — FENTANYL CITRATE 50 UG/ML
25 INJECTION, SOLUTION INTRAMUSCULAR; INTRAVENOUS
Status: DISCONTINUED | OUTPATIENT
Start: 2019-02-18 | End: 2019-02-18 | Stop reason: HOSPADM

## 2019-02-18 RX ORDER — SODIUM CHLORIDE, SODIUM LACTATE, POTASSIUM CHLORIDE, CALCIUM CHLORIDE 600; 310; 30; 20 MG/100ML; MG/100ML; MG/100ML; MG/100ML
25 INJECTION, SOLUTION INTRAVENOUS CONTINUOUS
Status: DISCONTINUED | OUTPATIENT
Start: 2019-02-18 | End: 2019-02-18 | Stop reason: HOSPADM

## 2019-02-18 RX ORDER — LIDOCAINE HYDROCHLORIDE 10 MG/ML
0.1 INJECTION, SOLUTION EPIDURAL; INFILTRATION; INTRACAUDAL; PERINEURAL AS NEEDED
Status: DISCONTINUED | OUTPATIENT
Start: 2019-02-18 | End: 2019-02-18 | Stop reason: HOSPADM

## 2019-02-18 RX ORDER — MORPHINE SULFATE 10 MG/ML
2 INJECTION, SOLUTION INTRAMUSCULAR; INTRAVENOUS
Status: DISCONTINUED | OUTPATIENT
Start: 2019-02-18 | End: 2019-02-18 | Stop reason: HOSPADM

## 2019-02-18 RX ORDER — SODIUM CHLORIDE 0.9 % (FLUSH) 0.9 %
5-40 SYRINGE (ML) INJECTION EVERY 8 HOURS
Status: DISCONTINUED | OUTPATIENT
Start: 2019-02-18 | End: 2019-02-18 | Stop reason: HOSPADM

## 2019-02-18 RX ORDER — MIDAZOLAM HYDROCHLORIDE 1 MG/ML
INJECTION, SOLUTION INTRAMUSCULAR; INTRAVENOUS AS NEEDED
Status: DISCONTINUED | OUTPATIENT
Start: 2019-02-18 | End: 2019-02-18 | Stop reason: HOSPADM

## 2019-02-18 RX ORDER — NEOSTIGMINE METHYLSULFATE 1 MG/ML
INJECTION INTRAVENOUS AS NEEDED
Status: DISCONTINUED | OUTPATIENT
Start: 2019-02-18 | End: 2019-02-18 | Stop reason: HOSPADM

## 2019-02-18 RX ORDER — LIDOCAINE HYDROCHLORIDE 20 MG/ML
INJECTION, SOLUTION EPIDURAL; INFILTRATION; INTRACAUDAL; PERINEURAL AS NEEDED
Status: DISCONTINUED | OUTPATIENT
Start: 2019-02-18 | End: 2019-02-18 | Stop reason: HOSPADM

## 2019-02-18 RX ORDER — SUCCINYLCHOLINE CHLORIDE 20 MG/ML
INJECTION INTRAMUSCULAR; INTRAVENOUS AS NEEDED
Status: DISCONTINUED | OUTPATIENT
Start: 2019-02-18 | End: 2019-02-18 | Stop reason: HOSPADM

## 2019-02-18 RX ORDER — CEFAZOLIN SODIUM/WATER 2 G/20 ML
2 SYRINGE (ML) INTRAVENOUS
Status: CANCELLED | OUTPATIENT
Start: 2019-02-18 | End: 2019-02-18

## 2019-02-18 RX ORDER — CEFAZOLIN SODIUM/WATER 2 G/20 ML
2 SYRINGE (ML) INTRAVENOUS
Status: DISCONTINUED | OUTPATIENT
Start: 2019-02-18 | End: 2019-02-18

## 2019-02-18 RX ORDER — ONDANSETRON 2 MG/ML
INJECTION INTRAMUSCULAR; INTRAVENOUS AS NEEDED
Status: DISCONTINUED | OUTPATIENT
Start: 2019-02-18 | End: 2019-02-18 | Stop reason: HOSPADM

## 2019-02-18 RX ORDER — DIPHENHYDRAMINE HYDROCHLORIDE 50 MG/ML
12.5 INJECTION, SOLUTION INTRAMUSCULAR; INTRAVENOUS AS NEEDED
Status: DISCONTINUED | OUTPATIENT
Start: 2019-02-18 | End: 2019-02-18 | Stop reason: HOSPADM

## 2019-02-18 RX ORDER — FENTANYL CITRATE 50 UG/ML
INJECTION, SOLUTION INTRAMUSCULAR; INTRAVENOUS AS NEEDED
Status: DISCONTINUED | OUTPATIENT
Start: 2019-02-18 | End: 2019-02-18 | Stop reason: HOSPADM

## 2019-02-18 RX ORDER — CEFAZOLIN SODIUM 1 G/3ML
INJECTION, POWDER, FOR SOLUTION INTRAMUSCULAR; INTRAVENOUS AS NEEDED
Status: DISCONTINUED | OUTPATIENT
Start: 2019-02-18 | End: 2019-02-18 | Stop reason: HOSPADM

## 2019-02-18 RX ORDER — ROCURONIUM BROMIDE 10 MG/ML
INJECTION, SOLUTION INTRAVENOUS AS NEEDED
Status: DISCONTINUED | OUTPATIENT
Start: 2019-02-18 | End: 2019-02-18 | Stop reason: HOSPADM

## 2019-02-18 RX ORDER — PROPOFOL 10 MG/ML
INJECTION, EMULSION INTRAVENOUS AS NEEDED
Status: DISCONTINUED | OUTPATIENT
Start: 2019-02-18 | End: 2019-02-18 | Stop reason: HOSPADM

## 2019-02-18 RX ORDER — ONDANSETRON 2 MG/ML
4 INJECTION INTRAMUSCULAR; INTRAVENOUS AS NEEDED
Status: DISCONTINUED | OUTPATIENT
Start: 2019-02-18 | End: 2019-02-18 | Stop reason: HOSPADM

## 2019-02-18 RX ORDER — GLYCOPYRROLATE 0.2 MG/ML
INJECTION INTRAMUSCULAR; INTRAVENOUS AS NEEDED
Status: DISCONTINUED | OUTPATIENT
Start: 2019-02-18 | End: 2019-02-18 | Stop reason: HOSPADM

## 2019-02-18 RX ORDER — HYDROMORPHONE HYDROCHLORIDE 1 MG/ML
0.2 INJECTION, SOLUTION INTRAMUSCULAR; INTRAVENOUS; SUBCUTANEOUS
Status: DISCONTINUED | OUTPATIENT
Start: 2019-02-18 | End: 2019-02-18 | Stop reason: HOSPADM

## 2019-02-18 RX ADMIN — PROCHLORPERAZINE EDISYLATE 5 MG: 5 INJECTION INTRAMUSCULAR; INTRAVENOUS at 11:35

## 2019-02-18 RX ADMIN — Medication 10 ML: at 00:32

## 2019-02-18 RX ADMIN — FENTANYL CITRATE 50 MCG: 50 INJECTION, SOLUTION INTRAMUSCULAR; INTRAVENOUS at 13:16

## 2019-02-18 RX ADMIN — Medication: at 00:30

## 2019-02-18 RX ADMIN — MIDAZOLAM HYDROCHLORIDE 3 MG: 1 INJECTION, SOLUTION INTRAMUSCULAR; INTRAVENOUS at 13:08

## 2019-02-18 RX ADMIN — Medication 10 ML: at 11:40

## 2019-02-18 RX ADMIN — FENTANYL CITRATE 50 MCG: 50 INJECTION, SOLUTION INTRAMUSCULAR; INTRAVENOUS at 13:27

## 2019-02-18 RX ADMIN — LIDOCAINE HYDROCHLORIDE 60 MG: 20 INJECTION, SOLUTION EPIDURAL; INFILTRATION; INTRACAUDAL; PERINEURAL at 13:16

## 2019-02-18 RX ADMIN — ROCURONIUM BROMIDE 10 MG: 10 INJECTION, SOLUTION INTRAVENOUS at 13:16

## 2019-02-18 RX ADMIN — ONDANSETRON 4 MG: 2 INJECTION INTRAMUSCULAR; INTRAVENOUS at 13:40

## 2019-02-18 RX ADMIN — KETOROLAC TROMETHAMINE 30 MG: 30 INJECTION, SOLUTION INTRAMUSCULAR at 14:45

## 2019-02-18 RX ADMIN — PROPOFOL 200 MG: 10 INJECTION, EMULSION INTRAVENOUS at 13:16

## 2019-02-18 RX ADMIN — SODIUM CHLORIDE, SODIUM LACTATE, POTASSIUM CHLORIDE, AND CALCIUM CHLORIDE 150 ML/HR: 600; 310; 30; 20 INJECTION, SOLUTION INTRAVENOUS at 00:30

## 2019-02-18 RX ADMIN — GLYCOPYRROLATE 0.2 MG: 0.2 INJECTION INTRAMUSCULAR; INTRAVENOUS at 13:56

## 2019-02-18 RX ADMIN — SODIUM CHLORIDE, SODIUM LACTATE, POTASSIUM CHLORIDE, AND CALCIUM CHLORIDE 25 ML/HR: 600; 310; 30; 20 INJECTION, SOLUTION INTRAVENOUS at 12:34

## 2019-02-18 RX ADMIN — MEPERIDINE HYDROCHLORIDE 12.5 MG: 25 INJECTION, SOLUTION INTRAMUSCULAR; INTRAVENOUS; SUBCUTANEOUS at 14:40

## 2019-02-18 RX ADMIN — SODIUM CHLORIDE, SODIUM LACTATE, POTASSIUM CHLORIDE, AND CALCIUM CHLORIDE 150 ML/HR: 600; 310; 30; 20 INJECTION, SOLUTION INTRAVENOUS at 06:33

## 2019-02-18 RX ADMIN — DEXAMETHASONE SODIUM PHOSPHATE 8 MG: 4 INJECTION, SOLUTION INTRA-ARTICULAR; INTRALESIONAL; INTRAMUSCULAR; INTRAVENOUS; SOFT TISSUE at 13:40

## 2019-02-18 RX ADMIN — SUCCINYLCHOLINE CHLORIDE 180 MG: 20 INJECTION INTRAMUSCULAR; INTRAVENOUS at 13:16

## 2019-02-18 RX ADMIN — NEOSTIGMINE METHYLSULFATE 2 MG: 1 INJECTION INTRAVENOUS at 13:56

## 2019-02-18 RX ADMIN — CEFAZOLIN SODIUM 2 G: 1 INJECTION, POWDER, FOR SOLUTION INTRAMUSCULAR; INTRAVENOUS at 13:24

## 2019-02-18 RX ADMIN — ROCURONIUM BROMIDE 10 MG: 10 INJECTION, SOLUTION INTRAVENOUS at 13:25

## 2019-02-18 RX ADMIN — PANTOPRAZOLE SODIUM 40 MG: 40 TABLET, DELAYED RELEASE ORAL at 08:41

## 2019-02-18 NOTE — PROGRESS NOTES
I have reviewed discharge instructions with the patient and spouse. The patient and spouse verbalized understanding. Patient left with all her belongings.

## 2019-02-18 NOTE — ANESTHESIA PREPROCEDURE EVALUATION
Anesthetic History No history of anesthetic complications Review of Systems / Medical History Patient summary reviewed, nursing notes reviewed and pertinent labs reviewed Pulmonary Asthma : well controlled Comments: Seasonal asthma Neuro/Psych Headaches Comments: Vertigo Cardiovascular Within defined limits Exercise tolerance: >4 METS 
  
GI/Hepatic/Renal 
  
GERD: well controlled Renal disease: stones PUD Endo/Other Obesity Other Findings Comments: PCOS (polycystic ovarian syndrome) Renal colic on left side 
 
hcg - Physical Exam 
 
Airway Mallampati: III 
TM Distance: 4 - 6 cm Neck ROM: normal range of motion, short neck Mouth opening: Normal 
 
 Cardiovascular Rhythm: regular Rate: normal 
 
 
 
 Dental 
 
Dentition: Upper dentition intact and Lower dentition intact Pulmonary Breath sounds clear to auscultation Abdominal 
GI exam deferred Other Findings Anesthetic Plan ASA: 2 Anesthesia type: general 
 
Monitoring Plan: BIS Induction: Intravenous Anesthetic plan and risks discussed with: Patient Patient is tearful and exceedingly anxious

## 2019-02-18 NOTE — H&P
[address] HISTORY AND PHYSICAL Name:  Milton Casanova 
MR#:  [id number] :  1990 ACCOUNT #:  [de-identified] ADMIT DATE:  2019 CHIEF COMPLAINT:  Left ureteral stone. HISTORY OF PRESENT ILLNESS:  This is a 20-year-old white female admitted through the 
ER last night with 1 week history of left renal colic. She was seen at [facility] on  and discharged on MET but did not take her Flomax and quit 
taking her pain medications because she saw something in her urine strainer which was 
mushy and thought she had passed a stone. She presented to the ER here with 
significant colic and nausea with dysuria after taking her pain medications but not 
achieving relief. She had no vomiting or fever. She was found to have a 4.5 mm left UVJ stone with hydronephrosis. In the emergency room, her pain was very difficult to 
control, although Toradol IV 10 mg only was used. She was also given Fentanyl and 
eventually ketamine with good control of her pain and has had very good control on a 
basal rate of 1 mg per hour without need for bolus PCA overnight. PAST MEDICAL HISTORY:  Esophagitis, GERD, migraine headaches, polycystic ovarian 
syndrome, seasonal asthma. ALLERGIES:  SHE HAS NO KNOWN ALLERGIES. CURRENT MEDICATIONS:  Medications are on the chart and were reviewed. They include 
albuterol inhaler, Nasonex, Fioricet p.r.n., Carafate, Imitrex, Claritin, Protonix 
and Maxalt. SOCIAL HISTORY:  Includes nonsmoker, occasional alcohol. FAMILY HISTORY:  Noncontributory. REVIEW OF SYSTEMS:  As above. PHYSICAL EXAMINATION: 
GENERAL:  She is alert and oriented x3. No apparent distress. SKIN:  Cool to touch. No jaundice. NECK:  Supple. LUNGS:  Breathing easily. ABDOMEN:  Obese with minimal left lower quadrant tenderness, no CVAT. RECTAL AND GENITALIA:  Not examined. EXTREMITIES:   Without edema.  
NEUROLOGIC:  Grossly, nonfocal. 
 PSYCHIATRIC:  Normal. 
 
IMAGING:  As above. LABORATORY DATA:  Including white count and renal function are normal.  Her 
urinalysis does have 10-20 red cells and no evidence of infection. IMPRESSION:  A 4.5 mm left ureterovesical junction stone with inadequate pain control 
as an outpatient, on suboptimal management schedule. Now well-controlled, no 
evidence of infection. PLAN:  I thoroughly discussed her choices including continued medical expulsive 
therapy which is MET, short-term as an inpatient progressing to outpatient if 
symptoms controlled versus left CRULS this morning or tomorrow. She is not a good 
candidate for lithotripsy because of the stone location and logistics. All of this 
and more was completely discussed. She will make a discussion ASAP and will find me 
through the office with contact number given. Ros Solano MD 
 
 
[id number] 
D:  02/18/2019 7:52 
T:  02/18/2019 13:44 JOB #:  [id number]

## 2019-02-18 NOTE — PERIOP NOTES
1416-Handoff Report from Operating Room to PACU Report received from [address] [place] [address] Ave and Oc Alvarado CRNA regarding Kang Das. Surgeon(s): 
Markus Salazar MD  And Procedure(s) (LRB): LEFT CYSTOSCOPY WITH RETROGRADES URETEROSCOPY LASER (Left)  confirmed  
with allergies and dressings discussed. Anesthesia type, drugs, patient history, complications, estimated blood loss, vital signs, intake and output, and last pain medication, lines and temperature were reviewed. 1420- Report given to Larisa Morrow RN for lunch relief. 1450- Resumed pt care. 1520- Family updated. 1528- TRANSFER - OUT REPORT: 
 
Verbal report given to Allie Nelson RN(name) on Kang Das  being transferred to gen surg(unit) for routine post - op Report consisted of patients Situation, Background, Assessment and  
Recommendations(SBAR). Information from the following report(s) SBAR, Kardex, OR Summary, Procedure Summary, Intake/Output, MAR and Recent Results was reviewed with the receiving nurse. Opportunity for questions and clarification was provided. Patient transported with: 
 O2 @ 2 liters Tech

## 2019-02-18 NOTE — ANESTHESIA POSTPROCEDURE EVALUATION
Procedure(s): LEFT CYSTOSCOPY WITH RETROGRADES URETEROSCOPY LASER. Anesthesia Post Evaluation Patient location during evaluation: PACU Note status: Adequate. Level of consciousness: responsive to verbal stimuli and sleepy but conscious Pain management: satisfactory to patient Airway patency: patent Anesthetic complications: no 
Cardiovascular status: acceptable Respiratory status: acceptable Hydration status: acceptable Comments: +Post-Anesthesia Evaluation and Assessment Patient: Edilberto Akins MRN: [id number]  SSN: [id number] YOB: 1990  Age: 33 y.o. Sex: female Cardiovascular Function/Vital Signs /59   Pulse 64   Temp 36.8 °C (98.3 °F)   Resp 19   Ht 5' 3.5\" (1.613 m)   Wt 88 kg (194 lb)   SpO2 98%   BMI 33.83 kg/m² Patient is status post Procedure(s): LEFT CYSTOSCOPY WITH RETROGRADES URETEROSCOPY LASER. Nausea/Vomiting: Controlled. Postoperative hydration reviewed and adequate. Pain: 
Pain Scale 1: Numeric (0 - 10) (02/18/19 1445) Pain Intensity 1: 5 (02/18/19 1445) Managed. Neurological Status:  
Neuro (WDL): Exceptions to WDL (02/18/19 1425) At baseline. Mental Status and Level of Consciousness: Arousable. Pulmonary Status:  
O2 Device: Nasal cannula (02/18/19 1425) Adequate oxygenation and airway patent. Complications related to anesthesia: None Post-anesthesia assessment completed. No concerns. Signed By: Dhiraj Forbes MD  
 2/18/2019 Post anesthesia nausea and vomiting:  controlled Visit Vitals /59 Pulse 64 Temp 36.8 °C (98.3 °F) Resp 19 Ht 5' 3.5\" (1.613 m) Wt 88 kg (194 lb) SpO2 98% BMI 33.83 kg/m²

## 2019-02-18 NOTE — PROGRESS NOTES
Oemga Ocampo was under the care of Dr. Elvira Melo 2/17/19-2/18/19. She should not return to work before Wednesday, 2/20/19.   
 
 
Brandy Laguna RN

## 2019-02-18 NOTE — PERIOP NOTES
TRANSFER - IN REPORT: 
 
[facility]  being received from 2115 for ordered procedure Report consisted of patients Situation, Background, Assessment and  
Recommendations(SBAR). Information from the following report(s) SBAR, Kardex, Intake/Output, MAR and Recent Results was reviewed with the receiving nurse. Opportunity for questions and clarification was provided. Assessment completed upon patients arrival to unit and care assumed. 1230:  Dr. Mery Quinteros at bedside to discus anesthesia plan of care

## 2019-02-18 NOTE — PROGRESS NOTES
Bedside shift change report given to Latrell Alston RN (oncoming nurse) by Adore Ellison RN (offgoing nurse). Report included the following information SBAR, Kardex, ED Summary, Intake/Output, MAR and Recent Results.

## 2019-02-18 NOTE — H&P
To be seen in AM 
Left 4.5 mm ureteral stone with poor pain control and no evidence of infection. Will admit for symptom control-see orders. NPO in case Add:  H&P dictated. She is trying to decide on op vs observation

## 2019-02-18 NOTE — ED NOTES
TRANSFER - OUT REPORT: 
 
Verbal report given to Doctor Burton 89 (name) on John Reyna  being transferred to Gen Surg (unit) for routine progression of care Report consisted of patients Situation, Background, Assessment and  
Recommendations(SBAR). Information from the following report(s) SBAR, ED Summary, [facility] and Recent Results was reviewed with the receiving nurse. Lines:  
Peripheral IV 02/17/19 Left Antecubital (Active) Site Assessment Clean, dry, & intact 2/17/2019  3:30 PM  
Phlebitis Assessment 0 2/17/2019  3:30 PM  
Infiltration Assessment 0 2/17/2019  3:30 PM  
Dressing Status Clean, dry, & intact 2/17/2019  3:30 PM  
Hub Color/Line Status Pink 2/17/2019  3:30 PM  
  
 
Opportunity for questions and clarification was provided. Patient transported with: 
 wireLawyer

## 2019-02-18 NOTE — PROGRESS NOTES
Reached out to Dr. Pastor Bundy as pt was requesting to eat and drink, but still ordered NPO. Per Dr. Gaona Hogan should have been discharged. \"  Per Dr. Pastor Bundy, pt already has prescriptions and she may eat and then be discharged. Pt updated, Regular diet order entered

## 2019-02-19 NOTE — OP NOTES
[address]  OPERATIVE REPORT    Name:  Darwin Rondon  MR#:  [id number]  :  1990  ACCOUNT #:  [de-identified]  DATE OF SERVICE:  2019    PREOPERATIVE DIAGNOSIS:  Left ureteral stone. POSTOPERATIVE DIAGNOSIS:  Left ureteral stone. PROCEDURE PERFORMED:  Left CRULS    SURGEON:  Vijay Hair MD    ANESTHESIA:  General.    IMPLANTS:  6 x 24 stent. COMPLICATIONS:  None. ESTIMATED BLOOD LOSS:  None. SPECIMENS REMOVED:  None. INDICATIONS:  A 43-year-old white female with symptomatic left UVJ stone measuring  4.5 cm and poorly controlled pain. She did receive preoperative IV Ancef. PROCEDURE IN DETAIL:  She underwent a general anesthetic and was placed in dorsal  lithotomy position. She was prepped and draped in a sterile fashion. A time-out was  called confirming the planned procedure. The 21-Estonian cystoscope with 30-degree  lens and video camera was used. The bladder was noted to be normal.  The left  ureteral orifice was intubated with TigerTail catheter and contrast was injected  demonstrating a filling defect in the lower ureter with small hydronephrosis above. A #35 Glidewire was placed through the cystoscope, passed the stone up into the left  renal pelvis under fluoroscopic guidance and a semi-rigid ureteroscope with a #25  wire and high-pressure irrigant was used to intubate the left ureteral orifice and  advanced the second wire up to the kidney. The intramural ureter was tight, then was  scope dilated, then the stone was encountered. The #25 wire was taken out and 400  micron holmium laser fiber was then used at mostly settings 8/8, see laser log for  detail. The stone was somewhat movable and migrated upwards a little bit, but below  the vessels. It was medium hard and fragmented fairly easily into sub-0.5 mm pieces  on complete inspection.   Because of the tightness of the intramural ureter and the  dilation required, I did decide to leave the stent as previously discussed. The #35  wire was left in. A cystoscope was backloaded onto the wire and a 6-Australian 24 cm  double J stent with a long string was placed through the cystoscope over the wire  under fluoroscopy, confirmed to be in good position both fluoroscopically and  cystoscopically. The bladder was emptied, the scope was removed, and the string was  tucked into the vagina. Operative findings were discussed with her . She was  stable on my departure. POSTOPERATIVE PLAN:  Plan is to let her go home. She has antibiotics already as well  as pain medicine and these were standard.   She will follow up in the office in  approximately 4 days for string and stent removal.      Donta Huang MD      [id number]  D:  02/18/2019 14:17  T:  02/19/2019 2:33  JOB #:  [id number]

## 2019-02-21 ENCOUNTER — APPOINTMENT (OUTPATIENT)
Dept: CT IMAGING | Age: 29
End: 2019-02-21
Attending: EMERGENCY MEDICINE
Payer: COMMERCIAL

## 2019-02-21 ENCOUNTER — HOSPITAL ENCOUNTER (EMERGENCY)
Age: 29
Discharge: HOME | End: 2019-02-21
Attending: EMERGENCY MEDICINE
Payer: COMMERCIAL

## 2019-02-21 VITALS
BODY MASS INDEX: 34.55 KG/M2 | RESPIRATION RATE: 20 BRPM | DIASTOLIC BLOOD PRESSURE: 94 MMHG | HEIGHT: 63 IN | OXYGEN SATURATION: 96 % | TEMPERATURE: 98 F | HEART RATE: 103 BPM | WEIGHT: 195 LBS | SYSTOLIC BLOOD PRESSURE: 121 MMHG

## 2019-02-21 DIAGNOSIS — N23 RENAL COLIC: Primary | ICD-10-CM

## 2019-02-21 LAB
ALBUMIN SERPL-MCNC: 4.2 G/DL (ref 3.5–5)
ALBUMIN/GLOB SERPL: 1.1 {RATIO} (ref 1.1–2.2)
ALP SERPL-CCNC: 68 U/L (ref 45–117)
ALT SERPL-CCNC: 57 U/L (ref 12–78)
ANION GAP SERPL CALC-SCNC: 7 MMOL/L (ref 5–15)
APPEARANCE UR: CLEAR
AST SERPL-CCNC: 25 U/L (ref 15–37)
BACTERIA URNS QL MICRO: ABNORMAL /HPF
BASOPHILS # BLD: 0.1 K/UL (ref 0–0.1)
BASOPHILS NFR BLD: 1 % (ref 0–1)
BILIRUB SERPL-MCNC: 0.5 MG/DL (ref 0.2–1)
BILIRUB UR QL: NEGATIVE
BUN SERPL-MCNC: 14 MG/DL (ref 6–20)
BUN/CREAT SERPL: 17 (ref 12–20)
CALCIUM SERPL-MCNC: 9 MG/DL (ref 8.5–10.1)
CHLORIDE SERPL-SCNC: 103 MMOL/L (ref 97–108)
CO2 SERPL-SCNC: 27 MMOL/L (ref 21–32)
COLOR UR: ABNORMAL
CREAT SERPL-MCNC: 0.82 MG/DL (ref 0.55–1.02)
DIFFERENTIAL METHOD BLD: ABNORMAL
EOSINOPHIL # BLD: 0.1 K/UL (ref 0–0.4)
EOSINOPHIL NFR BLD: 1 % (ref 0–7)
EPITH CASTS URNS QL MICRO: ABNORMAL /LPF
ERYTHROCYTE [DISTWIDTH] IN BLOOD BY AUTOMATED COUNT: 11.7 % (ref 11.5–14.5)
GLOBULIN SER CALC-MCNC: 3.7 G/DL (ref 2–4)
GLUCOSE SERPL-MCNC: 98 MG/DL (ref 65–100)
GLUCOSE UR STRIP.AUTO-MCNC: NEGATIVE MG/DL
HCT VFR BLD AUTO: 39.8 % (ref 35–47)
HGB BLD-MCNC: 13.6 G/DL (ref 11.5–16)
HGB UR QL STRIP: ABNORMAL
HYALINE CASTS URNS QL MICRO: ABNORMAL /LPF (ref 0–5)
IMM GRANULOCYTES # BLD AUTO: 0.1 K/UL (ref 0–0.04)
IMM GRANULOCYTES NFR BLD AUTO: 1 % (ref 0–0.5)
KETONES UR QL STRIP.AUTO: ABNORMAL MG/DL
LEUKOCYTE ESTERASE UR QL STRIP.AUTO: ABNORMAL
LYMPHOCYTES # BLD: 1.5 K/UL (ref 0.8–3.5)
LYMPHOCYTES NFR BLD: 13 % (ref 12–49)
MCH RBC QN AUTO: 31.6 PG (ref 26–34)
MCHC RBC AUTO-ENTMCNC: 34.2 G/DL (ref 30–36.5)
MCV RBC AUTO: 92.6 FL (ref 80–99)
MONOCYTES # BLD: 0.6 K/UL (ref 0–1)
MONOCYTES NFR BLD: 6 % (ref 5–13)
NEUTS SEG # BLD: 8.7 K/UL (ref 1.8–8)
NEUTS SEG NFR BLD: 79 % (ref 32–75)
NITRITE UR QL STRIP.AUTO: POSITIVE
NRBC # BLD: 0 K/UL (ref 0–0.01)
NRBC BLD-RTO: 0 PER 100 WBC
PH UR STRIP: 6 [PH] (ref 5–8)
PLATELET # BLD AUTO: 252 K/UL (ref 150–400)
PMV BLD AUTO: 11.2 FL (ref 8.9–12.9)
POTASSIUM SERPL-SCNC: 4 MMOL/L (ref 3.5–5.1)
PROT SERPL-MCNC: 7.9 G/DL (ref 6.4–8.2)
PROT UR STRIP-MCNC: 30 MG/DL
RBC # BLD AUTO: 4.3 M/UL (ref 3.8–5.2)
RBC #/AREA URNS HPF: ABNORMAL /HPF (ref 0–5)
SODIUM SERPL-SCNC: 137 MMOL/L (ref 136–145)
SP GR UR REFRACTOMETRY: 1.01 (ref 1–1.03)
UR CULT HOLD, URHOLD: NORMAL
UROBILINOGEN UR QL STRIP.AUTO: 1 EU/DL (ref 0.2–1)
WBC # BLD AUTO: 11.1 K/UL (ref 3.6–11)
WBC URNS QL MICRO: ABNORMAL /HPF (ref 0–4)

## 2019-02-21 PROCEDURE — 96375 TX/PRO/DX INJ NEW DRUG ADDON: CPT

## 2019-02-21 PROCEDURE — 80053 COMPREHEN METABOLIC PANEL: CPT

## 2019-02-21 PROCEDURE — 74176 CT ABD & PELVIS W/O CONTRAST: CPT

## 2019-02-21 PROCEDURE — 81001 URINALYSIS AUTO W/SCOPE: CPT

## 2019-02-21 PROCEDURE — 36415 COLL VENOUS BLD VENIPUNCTURE: CPT

## 2019-02-21 PROCEDURE — 96374 THER/PROPH/DIAG INJ IV PUSH: CPT

## 2019-02-21 PROCEDURE — 74011250636 HC RX REV CODE- 250/636: Performed by: EMERGENCY MEDICINE

## 2019-02-21 PROCEDURE — 99283 EMERGENCY DEPT VISIT LOW MDM: CPT

## 2019-02-21 PROCEDURE — 85025 COMPLETE CBC W/AUTO DIFF WBC: CPT

## 2019-02-21 RX ORDER — MORPHINE SULFATE 2 MG/ML
4 INJECTION, SOLUTION INTRAMUSCULAR; INTRAVENOUS
Status: COMPLETED | OUTPATIENT
Start: 2019-02-21 | End: 2019-02-21

## 2019-02-21 RX ORDER — MORPHINE SULFATE 2 MG/ML
4 INJECTION, SOLUTION INTRAMUSCULAR; INTRAVENOUS
Status: DISCONTINUED | OUTPATIENT
Start: 2019-02-21 | End: 2019-02-21

## 2019-02-21 RX ORDER — LORAZEPAM 2 MG/ML
0.5 INJECTION INTRAMUSCULAR
Status: COMPLETED | OUTPATIENT
Start: 2019-02-21 | End: 2019-02-21

## 2019-02-21 RX ORDER — DIAZEPAM 5 MG/1
5 TABLET ORAL
Qty: 15 TAB | Refills: 0 | Status: SHIPPED | OUTPATIENT
Start: 2019-02-21 | End: 2019-02-22

## 2019-02-21 RX ORDER — ONDANSETRON 2 MG/ML
4 INJECTION INTRAMUSCULAR; INTRAVENOUS
Status: COMPLETED | OUTPATIENT
Start: 2019-02-21 | End: 2019-02-21

## 2019-02-21 RX ADMIN — LORAZEPAM 0.5 MG: 2 INJECTION INTRAMUSCULAR; INTRAVENOUS at 18:07

## 2019-02-21 RX ADMIN — MORPHINE SULFATE 4 MG: 2 INJECTION, SOLUTION INTRAMUSCULAR; INTRAVENOUS at 16:44

## 2019-02-21 RX ADMIN — ONDANSETRON HYDROCHLORIDE 4 MG: 2 INJECTION, SOLUTION INTRAMUSCULAR; INTRAVENOUS at 16:44

## 2019-02-21 NOTE — ED PROVIDER NOTES
EMERGENCY DEPARTMENT HISTORY AND PHYSICAL EXAM 
 
 
Date: 2/21/2019 Patient Name: Te Flower History of Presenting Illness Chief Complaint Patient presents with  Flank Pain  
  pt stated she had kidney stone balsted on monday and a stent removed on tuesday, pt stated she has been having constant pain since and was told to come to the ER by her doctor History Provided By: Patient HPI: Te Flower, 33 y.o. female with PMHx significant for asthma, PCOS, HA, vertigo, presents via private vehicle to the ED with cc of sharp L sided flank pain x 3 days. Pt endorses associated chills and nausea. Pt describes her pain as 10/10 Pt notes she had a kidney stone blasted on Monday, a stent removed on Tuesday, and has been having constant pain since. She called her doctor and he referred her to the ER for evaluation. She specifically denies any fevers, vomiting, chest pain, shortness of breath, headache, rash, diarrhea, sweating or weight loss. There are no other complaints, changes, or physical findings at this time. PCP: Kevyn Jimenez MD 
 
No current facility-administered medications on file prior to encounter. Current Outpatient Medications on File Prior to Encounter Medication Sig Dispense Refill  
 HYDROcodone-acetaminophen (NORCO) 5-325 mg per tablet Take 1 Tab by mouth every six (6) hours as needed for Pain. Max Daily Amount: 4 Tabs. 8 Tab 0  
 rizatriptan (MAXALT-MLT) 10 mg disintegrating tablet Take 1 Tab by mouth once as needed for Migraine for up to 1 dose. 9 Tab 5  SUMAtriptan (IMITREX) 100 mg tablet Take 1 Tab by mouth once as needed for headache . Max daily dose 2 9 Tab 5  loratadine (CLARITIN) 10 mg tablet Take 10 mg by mouth daily.  pantoprazole (PROTONIX) 40 mg tablet Take 40 mg by mouth daily.  sucralfate (CARAFATE) 100 mg/mL suspension Take  by mouth four (4) times daily.     
 butalbital-acetaminophen-caffeine (FIORICET, ESGIC) -40 mg per tablet Take 1 Tab by mouth every six (6) hours as needed for Pain. Max Daily Amount: 4 Tabs. 20 Tab 5  
 albuterol (PROVENTIL VENTOLIN) 2.5 mg /3 mL (0.083 %) nebulizer solution 2.5 mg by Nebulization route once.  mometasone (NASONEX) 50 mcg/actuation nasal spray 2 Sprays daily. Past History Past Medical History: 
Past Medical History:  
Diagnosis Date  Acid reflux disease with ulcer  Constipation  Headache  PCOS (polycystic ovarian syndrome)  Seasonal asthma  Stomach pain  Vertigo Past Surgical History: 
Past Surgical History:  
Procedure Laterality Date  HX ENDOSCOPY Family History: 
History reviewed. No pertinent family history. Social History: 
Social History Tobacco Use  Smoking status: Never Smoker  Smokeless tobacco: Never Used Substance Use Topics  Alcohol use: No  
  Frequency: Never  Drug use: No  
 
 
Allergies: 
No Known Allergies Review of Systems Review of Systems Constitutional: Positive for chills. Negative for activity change, appetite change, fatigue, fever and unexpected weight change. HENT: Negative. Negative for congestion, hearing loss, rhinorrhea, sneezing and voice change. Eyes: Negative. Negative for pain and visual disturbance. Respiratory: Negative. Negative for apnea, cough, choking, chest tightness and shortness of breath. Cardiovascular: Negative. Negative for chest pain and palpitations. Gastrointestinal: Positive for nausea. Negative for abdominal distention, abdominal pain, blood in stool, diarrhea and vomiting. Genitourinary: Positive for flank pain (Left). Negative for difficulty urinating, frequency and urgency. No discharge Musculoskeletal: Negative for arthralgias, back pain, myalgias and neck stiffness. Skin: Negative. Negative for color change and rash.   
Neurological: Negative for dizziness, seizures, syncope, speech difficulty, weakness, numbness and headaches. Hematological: Negative for adenopathy. Psychiatric/Behavioral: Negative. Negative for agitation, behavioral problems, dysphoric mood and suicidal ideas. The patient is not nervous/anxious. Physical Exam  
Physical Exam  
Constitutional: She is oriented to person, place, and time. She appears well-developed and well-nourished. No distress. HENT:  
Head: Normocephalic and atraumatic. Mouth/Throat: Oropharynx is clear and moist. No oropharyngeal exudate. Eyes: Conjunctivae and EOM are normal. Pupils are equal, round, and reactive to light. Right eye exhibits no discharge. Left eye exhibits no discharge. Neck: Normal range of motion. Neck supple. Cardiovascular: Normal rate, regular rhythm and intact distal pulses. Exam reveals no gallop and no friction rub. No murmur heard. Pulmonary/Chest: Effort normal and breath sounds normal. No respiratory distress. She has no wheezes. She has no rales. She exhibits no tenderness. Abdominal: Soft. Bowel sounds are normal. She exhibits no distension and no mass. There is no tenderness. There is no rebound and no guarding. Musculoskeletal: Normal range of motion. She exhibits no edema. Lymphadenopathy:  
  She has no cervical adenopathy. Neurological: She is alert and oriented to person, place, and time. No cranial nerve deficit. Coordination normal.  
Skin: Skin is warm and dry. No rash noted. No erythema. Psychiatric: She has a normal mood and affect. Nursing note and vitals reviewed. Diagnostic Study Results Labs - Recent Results (from the past 12 hour(s)) URINALYSIS W/MICROSCOPIC Collection Time: 02/21/19  2:13 PM  
Result Value Ref Range Color ORANGE Appearance CLEAR CLEAR Specific gravity 1.013 1.003 - 1.030    
 pH (UA) 6.0 5.0 - 8.0 Protein 30 (A) NEG mg/dL Glucose NEGATIVE  NEG mg/dL Ketone TRACE (A) NEG mg/dL  Bilirubin NEGATIVE  NEG    
 Blood TRACE (A) NEG Urobilinogen 1.0 0.2 - 1.0 EU/dL Nitrites POSITIVE (A) NEG Leukocyte Esterase TRACE (A) NEG    
 WBC 0-4 0 - 4 /hpf  
 RBC 5-10 0 - 5 /hpf Epithelial cells FEW FEW /lpf Bacteria 1+ (A) NEG /hpf Hyaline cast 0-2 0 - 5 /lpf URINE CULTURE HOLD SAMPLE Collection Time: 02/21/19  2:13 PM  
Result Value Ref Range Urine culture hold URINE ON HOLD IN MICROBIOLOGY DEPT FOR 3 DAYS. IF UNPRESERVED URINE IS SUBMITTED, IT CANNOT BE USED FOR ADDITIONAL TESTING AFTER 24 HRS, RECOLLECTION WILL BE REQUIRED. CBC WITH AUTOMATED DIFF Collection Time: 02/21/19  5:05 PM  
Result Value Ref Range WBC 11.1 (H) 3.6 - 11.0 K/uL  
 RBC 4.30 3.80 - 5.20 M/uL  
 HGB 13.6 11.5 - 16.0 g/dL HCT 39.8 35.0 - 47.0 % MCV 92.6 80.0 - 99.0 FL  
 MCH 31.6 26.0 - 34.0 PG  
 MCHC 34.2 30.0 - 36.5 g/dL  
 RDW 11.7 11.5 - 14.5 % PLATELET 318 763 - 786 K/uL MPV 11.2 8.9 - 12.9 FL  
 NRBC 0.0 0  WBC ABSOLUTE NRBC 0.00 0.00 - 0.01 K/uL NEUTROPHILS 79 (H) 32 - 75 % LYMPHOCYTES 13 12 - 49 % MONOCYTES 6 5 - 13 % EOSINOPHILS 1 0 - 7 % BASOPHILS 1 0 - 1 % IMMATURE GRANULOCYTES 1 (H) 0.0 - 0.5 % ABS. NEUTROPHILS 8.7 (H) 1.8 - 8.0 K/UL  
 ABS. LYMPHOCYTES 1.5 0.8 - 3.5 K/UL  
 ABS. MONOCYTES 0.6 0.0 - 1.0 K/UL  
 ABS. EOSINOPHILS 0.1 0.0 - 0.4 K/UL  
 ABS. BASOPHILS 0.1 0.0 - 0.1 K/UL  
 ABS. IMM. GRANS. 0.1 (H) 0.00 - 0.04 K/UL  
 DF AUTOMATED METABOLIC PANEL, COMPREHENSIVE Collection Time: 02/21/19  5:05 PM  
Result Value Ref Range Sodium 137 136 - 145 mmol/L Potassium 4.0 3.5 - 5.1 mmol/L Chloride 103 97 - 108 mmol/L  
 CO2 27 21 - 32 mmol/L Anion gap 7 5 - 15 mmol/L Glucose 98 65 - 100 mg/dL BUN 14 6 - 20 MG/DL Creatinine 0.82 0.55 - 1.02 MG/DL  
 BUN/Creatinine ratio 17 12 - 20 GFR est AA >60 >60 ml/min/1.73m2 GFR est non-AA >60 >60 ml/min/1.73m2 Calcium 9.0 8.5 - 10.1 MG/DL  Bilirubin, total 0.5 0.2 - 1.0 MG/DL  
 ALT (SGPT) 57 12 - 78 U/L  
 AST (SGOT) 25 15 - 37 U/L Alk. phosphatase 68 45 - 117 U/L Protein, total 7.9 6.4 - 8.2 g/dL Albumin 4.2 3.5 - 5.0 g/dL Globulin 3.7 2.0 - 4.0 g/dL A-G Ratio 1.1 1.1 - 2.2 Radiologic Studies -  
CT ABD PELV WO CONT Final Result IMPRESSION:  
  
1. There is no further evidence of a ureteral stone, and there are no bladder  
calculi. There is also no left hydroureter. 2. There is mild left hydronephrosis, similar to prior study. 3. Single punctate nonobstructing right renal stone. CT Results  (Last 48 hours) 02/21/19 1722  CT ABD PELV WO CONT Final result Impression:  IMPRESSION:  
   
1. There is no further evidence of a ureteral stone, and there are no bladder  
calculi. There is also no left hydroureter. 2. There is mild left hydronephrosis, similar to prior study. 3. Single punctate nonobstructing right renal stone. Narrative:  EXAM: CT ABD PELV WO CONT INDICATION: Flank pain, history of renal stone treatment on Monday, stent  
removed on Tuesday, constant pain COMPARISON: CT 2/17/2019 CONTRAST:  None. TECHNIQUE:   
Thin axial images were obtained through the abdomen and pelvis. Coronal and  
sagittal reconstructions were generated. Oral contrast was not administered. CT  
dose reduction was achieved through use of a standardized protocol tailored for  
this examination and automatic exposure control for dose modulation. The absence of intravenous contrast material reduces the sensitivity for  
evaluation of the solid parenchymal organs of the abdomen. FINDINGS:   
LUNG BASES: Clear. INCIDENTALLY IMAGED HEART AND MEDIASTINUM: Unremarkable. LIVER: No mass or biliary dilatation. GALLBLADDER: Unremarkable. SPLEEN: No mass. PANCREAS: No mass or ductal dilatation. ADRENALS: Unremarkable. KIDNEYS/URETERS: There is mild left hydronephrosis, similar to prior study. There is no right hydronephrosis. No intrarenal left calculi are noted. There is  
a punctate intrarenal right stone. STOMACH: There is a small hiatal hernia. SMALL BOWEL: No dilatation or wall thickening. COLON: No dilatation or wall thickening. APPENDIX: Unremarkable. PERITONEUM: No ascites or pneumoperitoneum. RETROPERITONEUM: No lymphadenopathy or aortic aneurysm. REPRODUCTIVE ORGANS: The uterus is noted. URINARY BLADDER: There is no evidence of ureteral or bladder stone. There is  
also no hydroureter appreciated. BONES: No destructive bone lesion. ADDITIONAL COMMENTS: N/A Medical Decision Making I am the first provider for this patient. I reviewed the vital signs, available nursing notes, past medical history, past surgical history, family history and social history. Vital Signs-Reviewed the patient's vital signs. Patient Vitals for the past 12 hrs: 
 Temp Pulse Resp BP SpO2  
02/21/19 1404 98 °F (36.7 °C) (!) 103 20 (!) 121/94 96 % Pulse Oximetry Analysis - 96% on RA Cardiac Monitor:  
Rate: 103 bpm 
 
Records Reviewed: Nursing Notes, Old Medical Records, Previous electrocardiograms, Previous Radiology Studies and Previous Laboratory Studies Provider Notes (Medical Decision Making): DDx: UTI, Pyelo, Ureter obstruction ED Course:  
Initial assessment performed. The patients presenting problems have been discussed, and they are in agreement with the care plan formulated and outlined with them. I have encouraged them to ask questions as they arise throughout their visit. ED Course as of Feb 21 1927 Thu Feb 21, 2019  
1742 5:42 PM 
The patient states that their symptoms have resolved and they feel much better. There are no other new complaints at this time. Her questions have been answered. We are awaiting final results and those will be reviewed with them when they become available.  [AR] ED Course User Index [AR] Tmamy Helm  
 
 Critical Care Time:  
0 Disposition: 
7:27 PM 
Dina Both  results have been reviewed with her. She has been counseled regarding her diagnosis. She verbally conveys understanding and agreement of the signs, symptoms, diagnosis, treatment and prognosis and additionally agrees to follow up as recommended with Dr. Neyda Akins MD in 24 - 48 hours. She also agrees with the care-plan and conveys that all of her questions have been answered. I have also put together some discharge instructions for her that include: 1) educational information regarding their diagnosis, 2) how to care for their diagnosis at home, as well a 3) list of reasons why they would want to return to the ED prior to their follow-up appointment, should their condition change. PLAN: 
1. Current Discharge Medication List  
  
START taking these medications Details  
diazePAM (VALIUM) 5 mg tablet Take 1 Tab by mouth every eight (8) hours as needed (spasm). Max Daily Amount: 15 mg. 
Qty: 15 Tab, Refills: 0 Associated Diagnoses: Renal colic 2. Follow-up Information None Return to ED if worse Diagnosis Clinical Impression: 1. Renal colic Attestations: This note is prepared by Shaun Escalona, acting as Scribe for [employer] Ngozi Jennings 79 Declan Mann MD: The scribe's documentation has been prepared under my direction and personally reviewed by me in its entirety. I confirm that the note above accurately reflects all work, treatment, procedures, and medical decision making performed by me.

## 2019-02-22 ENCOUNTER — HOSPITAL ENCOUNTER (EMERGENCY)
Age: 29
Discharge: HOME | End: 2019-02-22
Attending: EMERGENCY MEDICINE
Payer: COMMERCIAL

## 2019-02-22 VITALS
BODY MASS INDEX: 34.57 KG/M2 | SYSTOLIC BLOOD PRESSURE: 109 MMHG | TEMPERATURE: 98 F | HEART RATE: 75 BPM | DIASTOLIC BLOOD PRESSURE: 72 MMHG | WEIGHT: 195.11 LBS | HEIGHT: 63 IN | OXYGEN SATURATION: 96 % | RESPIRATION RATE: 18 BRPM

## 2019-02-22 DIAGNOSIS — R10.9 ACUTE LEFT FLANK PAIN: Primary | ICD-10-CM

## 2019-02-22 DIAGNOSIS — N39.0 URINARY TRACT INFECTION WITHOUT HEMATURIA, SITE UNSPECIFIED: ICD-10-CM

## 2019-02-22 DIAGNOSIS — R11.0 NAUSEA WITHOUT VOMITING: ICD-10-CM

## 2019-02-22 DIAGNOSIS — N23 RENAL COLIC ON LEFT SIDE: ICD-10-CM

## 2019-02-22 PROCEDURE — 96374 THER/PROPH/DIAG INJ IV PUSH: CPT

## 2019-02-22 PROCEDURE — 74011250637 HC RX REV CODE- 250/637: Performed by: EMERGENCY MEDICINE

## 2019-02-22 PROCEDURE — 74011250636 HC RX REV CODE- 250/636: Performed by: EMERGENCY MEDICINE

## 2019-02-22 PROCEDURE — 99283 EMERGENCY DEPT VISIT LOW MDM: CPT

## 2019-02-22 PROCEDURE — 74011250636 HC RX REV CODE- 250/636

## 2019-02-22 PROCEDURE — 96375 TX/PRO/DX INJ NEW DRUG ADDON: CPT

## 2019-02-22 RX ORDER — KETOROLAC TROMETHAMINE 10 MG/1
10 TABLET, FILM COATED ORAL
Qty: 20 TAB | Refills: 0 | Status: SHIPPED | OUTPATIENT
Start: 2019-02-22 | End: 2021-05-04 | Stop reason: CLARIF

## 2019-02-22 RX ORDER — MORPHINE SULFATE 2 MG/ML
4 INJECTION, SOLUTION INTRAMUSCULAR; INTRAVENOUS
Status: COMPLETED | OUTPATIENT
Start: 2019-02-22 | End: 2019-02-22

## 2019-02-22 RX ORDER — LORAZEPAM 2 MG/ML
1 INJECTION INTRAMUSCULAR ONCE
Status: COMPLETED | OUTPATIENT
Start: 2019-02-22 | End: 2019-02-22

## 2019-02-22 RX ORDER — TRAMADOL HYDROCHLORIDE 50 MG/1
50 TABLET ORAL
Qty: 10 TAB | Refills: 0 | Status: SHIPPED | OUTPATIENT
Start: 2019-02-22 | End: 2021-04-16

## 2019-02-22 RX ORDER — TRAMADOL HYDROCHLORIDE 50 MG/1
50 TABLET ORAL
Status: COMPLETED | OUTPATIENT
Start: 2019-02-22 | End: 2019-02-22

## 2019-02-22 RX ORDER — NITROFURANTOIN 25; 75 MG/1; MG/1
100 CAPSULE ORAL 2 TIMES DAILY
Qty: 14 CAP | Refills: 0 | Status: SHIPPED | OUTPATIENT
Start: 2019-02-22 | End: 2019-03-01

## 2019-02-22 RX ORDER — KETOROLAC TROMETHAMINE 30 MG/ML
30 INJECTION, SOLUTION INTRAMUSCULAR; INTRAVENOUS
Status: COMPLETED | OUTPATIENT
Start: 2019-02-22 | End: 2019-02-22

## 2019-02-22 RX ORDER — CEPHALEXIN 250 MG/1
500 CAPSULE ORAL
Status: COMPLETED | OUTPATIENT
Start: 2019-02-22 | End: 2019-02-22

## 2019-02-22 RX ORDER — MORPHINE SULFATE 4 MG/ML
INJECTION INTRAVENOUS
Status: DISCONTINUED
Start: 2019-02-22 | End: 2019-02-22 | Stop reason: HOSPADM

## 2019-02-22 RX ADMIN — MORPHINE SULFATE 4 MG: 2 INJECTION, SOLUTION INTRAMUSCULAR; INTRAVENOUS at 00:54

## 2019-02-22 RX ADMIN — CEPHALEXIN 500 MG: 250 CAPSULE ORAL at 01:01

## 2019-02-22 RX ADMIN — KETOROLAC TROMETHAMINE 30 MG: 30 INJECTION, SOLUTION INTRAMUSCULAR; INTRAVENOUS at 01:00

## 2019-02-22 RX ADMIN — TRAMADOL HYDROCHLORIDE 50 MG: 50 TABLET, FILM COATED ORAL at 01:01

## 2019-02-22 RX ADMIN — LORAZEPAM 1 MG: 2 INJECTION INTRAMUSCULAR; INTRAVENOUS at 01:01

## 2019-02-22 NOTE — DISCHARGE INSTRUCTIONS
Thank you for allowing us to take care of you today! We hope we addressed all of your concerns and needs. We strive to provide excellent quality care in the Emergency Department. You will receive a survey after your visit to evaluate the care you were provided. Should you receive a survey from us, we invite you to share your experience and tell us what made it excellent. It was a pleasure serving you, we invite you to share your experience with us, in our pursuit for excellence, should you be selected to receive a survey. The exam and treatment you received in the Emergency Department were for an urgent problem and are not intended as complete care. It is important that you follow up with a doctor, nurse practitioner, or physician assistant for ongoing care. If your symptoms become worse or you do not improve as expected and you are unable to reach your usual health care provider, you should return to the Emergency Department. We are available 24 hours a day. Please take your discharge instructions with you when you go to your follow-up appointment. If you have any problem arranging a follow-up appointment, contact the Emergency Department immediately. If a prescription has been provided, please have it filled as soon as possible to prevent a delay in treatment. Read the entire medication instruction sheet provided to you by the pharmacy. If you have any questions or reservations about taking the medication due to side effects or interactions with other medications, please call your primary care physician or contact the ER to speak with the charge nurse. Make an appointment with your family doctor or the physician you were referred to for follow-up of this visit as instructed on your discharge paperwork, as this is mandatory follow-up. Return to the ER if you are unable to be seen or if you are unable to be seen in a timely manner.     If you have any problem arranging the follow-up visit, contact the Emergency Department immediately. I hope you feel better and thank you again for allow us to provide you with excellent care today at [address]! Warmest regards,    Caryle Marines, MD  Emergency Medicine Physician  [address]              Patient Education        Abdominal Pain: Care Instructions  Your Care Instructions    Abdominal pain has many possible causes. Some aren't serious and get better on their own in a few days. Others need more testing and treatment. If your pain continues or gets worse, you need to be rechecked and may need more tests to find out what is wrong. You may need surgery to correct the problem. Don't ignore new symptoms, such as fever, nausea and vomiting, urination problems, pain that gets worse, and dizziness. These may be signs of a more serious problem. Your doctor may have recommended a follow-up visit in the next 8 to 12 hours. If you are not getting better, you may need more tests or treatment. The doctor has checked you carefully, but problems can develop later. If you notice any problems or new symptoms, get medical treatment right away. Follow-up care is a key part of your treatment and safety. Be sure to make and go to all appointments, and call your doctor if you are having problems. It's also a good idea to know your test results and keep a list of the medicines you take. How can you care for yourself at home? · Rest until you feel better. · To prevent dehydration, drink plenty of fluids, enough so that your urine is light yellow or clear like water. Choose water and other caffeine-free clear liquids until you feel better. If you have kidney, heart, or liver disease and have to limit fluids, talk with your doctor before you increase the amount of fluids you drink. · If your stomach is upset, eat mild foods, such as rice, dry toast or crackers, bananas, and applesauce.  Try eating several small meals instead of two or three large ones. · Wait until 48 hours after all symptoms have gone away before you have spicy foods, alcohol, and drinks that contain caffeine. · Do not eat foods that are high in fat. · Avoid anti-inflammatory medicines such as aspirin, ibuprofen (Advil, Motrin), and naproxen (Aleve). These can cause stomach upset. Talk to your doctor if you take daily aspirin for another health problem. When should you call for help? Call 911 anytime you think you may need emergency care. For example, call if:    · You passed out (lost consciousness).     · You pass maroon or very bloody stools.     · You vomit blood or what looks like coffee grounds.     · You have new, severe belly pain.    Call your doctor now or seek immediate medical care if:    · Your pain gets worse, especially if it becomes focused in one area of your belly.     · You have a new or higher fever.     · Your stools are black and look like tar, or they have streaks of blood.     · You have unexpected vaginal bleeding.     · You have symptoms of a urinary tract infection. These may include:  ? Pain when you urinate. ? Urinating more often than usual.  ? Blood in your urine.     · You are dizzy or lightheaded, or you feel like you may faint.    Watch closely for changes in your health, and be sure to contact your doctor if:    · You are not getting better after 1 day (24 hours). Where can you learn more? Go to http://marcelino-jordin.info/. Enter N557 in the search box to learn more about \"Abdominal Pain: Care Instructions. \"  Current as of: September 23, 2018  Content Version: 11.9  © 0027-0455 "[employer]. Care instructions adapted under license by [employer] (which disclaims liability or warranty for this information).  If you have questions about a medical condition or this instruction, always ask your healthcare professional. Sara Sheffield any warranty or liability for your use of this information.

## 2019-02-22 NOTE — ED PROVIDER NOTES
EMERGENCY DEPARTMENT HISTORY AND PHYSICAL EXAM 
 
 
Date: 2/22/2019 Patient Name: Raciel Morales History of Presenting Illness Chief Complaint Patient presents with  Flank Pain  
  left flank pain since lithotripsy and stent removal on tuesday. seen earlier given morphine but unable to handle pain at home. History Provided By: Patient and Patient's Mother HPI: Raciel Morales, 33 y.o. female with PMHx significant for asthma, PCOS, vertigo, and acid reflux, presents ambulatory to the ED with cc of worsening L flank pain x 1 day with associated suprapubic abd pain. Pts mother notes that she had a lithotripsy and stent removal 3 days ago. She notes that the pt was prescribed 5 mg Valium that was taken at 2030 with no relief of sxs. Pts mother states that the pt took a shower and got in bed when sxs began worsening. Pt states that she has a follow-up with urology in 6 weeks. She notes that she has recently taken keflex for treatment of UTI. Pt states that she visited the ED 1 day ago for similar sxs. She denies any modifying factors. Additionally, pt specifically denies any recent fever, chills, headache, nausea, vomiting, diarrhea, CP, SOB, lightheadedness, dizziness, numbness, weakness, tingling, BLE swelling, heart palpitations, urinary sxs, changes in BM, changes in PO intake, melena, hematochezia, cough, or congestion. PCP: Padmaja Sims MD  
Urologist: Dr. Charley Rizzo PMHx: Significant for asthma, PCOS, vertigo, and acid reflux PSHx: Significant for n/a Social Hx: -tobacco, -EtOH, -Illicit Drugs There are no other complaints, changes or physical findings at this time. Past History Past Medical History: 
Past Medical History:  
Diagnosis Date  Acid reflux disease with ulcer  Constipation  Headache  PCOS (polycystic ovarian syndrome)  Seasonal asthma  Stomach pain  Vertigo Past Surgical History: 
Past Surgical History: Procedure Laterality Date  HX ENDOSCOPY Family History: 
History reviewed. No pertinent family history. Social History: 
Social History Tobacco Use  Smoking status: Never Smoker  Smokeless tobacco: Never Used Substance Use Topics  Alcohol use: No  
  Frequency: Never  Drug use: No  
 
 
Allergies: 
No Known Allergies Medications: No current facility-administered medications on file prior to encounter. Current Outpatient Medications on File Prior to Encounter Medication Sig Dispense Refill  loratadine (CLARITIN) 10 mg tablet Take 10 mg by mouth daily.  pantoprazole (PROTONIX) 40 mg tablet Take 40 mg by mouth daily.  sucralfate (CARAFATE) 100 mg/mL suspension Take  by mouth four (4) times daily.  albuterol (PROVENTIL VENTOLIN) 2.5 mg /3 mL (0.083 %) nebulizer solution 2.5 mg by Nebulization route once.  mometasone (NASONEX) 50 mcg/actuation nasal spray 2 Sprays daily. Review of Systems Review of Systems Constitutional: Negative. Negative for chills and fever. HENT: Negative. Negative for congestion, facial swelling, rhinorrhea, sore throat, trouble swallowing and voice change. Eyes: Negative. Respiratory: Negative. Negative for apnea, cough, chest tightness, shortness of breath and wheezing. Cardiovascular: Negative. Negative for chest pain, palpitations and leg swelling. Gastrointestinal: Positive for abdominal pain (Suprapubic, L flank). Negative for abdominal distention, blood in stool, constipation, diarrhea, nausea and vomiting. Endocrine: Negative. Negative for cold intolerance, heat intolerance and polyuria. Genitourinary: Negative. Negative for difficulty urinating, dysuria, flank pain, frequency, hematuria and urgency. Musculoskeletal: Negative. Negative for arthralgias, back pain, myalgias, neck pain and neck stiffness. Skin: Negative. Negative for color change and rash. Neurological: Negative. Negative for dizziness, syncope, facial asymmetry, speech difficulty, weakness, light-headedness, numbness and headaches. Hematological: Negative. Does not bruise/bleed easily. Psychiatric/Behavioral: Negative. Negative for confusion and self-injury. The patient is not nervous/anxious. Physical Exam  
Physical Exam  
Constitutional: She is oriented to person, place, and time. She appears well-developed and well-nourished. No distress. HENT:  
Head: Normocephalic and atraumatic. Mouth/Throat: Oropharynx is clear and moist. No oropharyngeal exudate. Eyes: Conjunctivae and EOM are normal. Pupils are equal, round, and reactive to light. Neck: Normal range of motion. Cardiovascular: Normal rate, regular rhythm and normal heart sounds. Exam reveals no gallop and no friction rub. No murmur heard. Pulmonary/Chest: Effort normal and breath sounds normal. No respiratory distress. She has no wheezes. She has no rales. She exhibits no tenderness. Abdominal: Soft. Bowel sounds are normal. She exhibits no distension and no mass. There is tenderness (L flank). There is no rebound and no guarding. Musculoskeletal: Normal range of motion. She exhibits no edema, tenderness or deformity. Neurological: She is alert and oriented to person, place, and time. She displays normal reflexes. No cranial nerve deficit. She exhibits normal muscle tone. Coordination normal.  
Skin: Skin is warm. No rash noted. She is not diaphoretic. Psychiatric: She has a normal mood and affect. Nursing note and vitals reviewed. Diagnostic Study Results Labs - No results found for this or any previous visit (from the past 24 hour(s)). Radiologic Studies -  
CT Results  (Last 48 hours) 02/21/19 1722  CT ABD PELV WO CONT Final result Impression:  IMPRESSION:  
   
1. There is no further evidence of a ureteral stone, and there are no bladder calculi. There is also no left hydroureter. 2. There is mild left hydronephrosis, similar to prior study. 3. Single punctate nonobstructing right renal stone. Narrative:  EXAM: CT ABD PELV WO CONT INDICATION: Flank pain, history of renal stone treatment on Monday, stent  
removed on Tuesday, constant pain COMPARISON: CT 2/17/2019 CONTRAST:  None. TECHNIQUE:   
Thin axial images were obtained through the abdomen and pelvis. Coronal and  
sagittal reconstructions were generated. Oral contrast was not administered. CT  
dose reduction was achieved through use of a standardized protocol tailored for  
this examination and automatic exposure control for dose modulation. The absence of intravenous contrast material reduces the sensitivity for  
evaluation of the solid parenchymal organs of the abdomen. FINDINGS:   
LUNG BASES: Clear. INCIDENTALLY IMAGED HEART AND MEDIASTINUM: Unremarkable. LIVER: No mass or biliary dilatation. GALLBLADDER: Unremarkable. SPLEEN: No mass. PANCREAS: No mass or ductal dilatation. ADRENALS: Unremarkable. KIDNEYS/URETERS: There is mild left hydronephrosis, similar to prior study. There is no right hydronephrosis. No intrarenal left calculi are noted. There is  
a punctate intrarenal right stone. STOMACH: There is a small hiatal hernia. SMALL BOWEL: No dilatation or wall thickening. COLON: No dilatation or wall thickening. APPENDIX: Unremarkable. PERITONEUM: No ascites or pneumoperitoneum. RETROPERITONEUM: No lymphadenopathy or aortic aneurysm. REPRODUCTIVE ORGANS: The uterus is noted. URINARY BLADDER: There is no evidence of ureteral or bladder stone. There is  
also no hydroureter appreciated. BONES: No destructive bone lesion. ADDITIONAL COMMENTS: N/A Medical Decision Making I am the first provider for this patient.  
 
I reviewed the vital signs, available nursing notes, past medical history, past surgical history, family history and social history. Vital Signs-Reviewed the patient's vital signs. Patient Vitals for the past 24 hrs: 
 Temp Pulse Resp BP SpO2  
02/22/19 0245 98 °F (36.7 °C) 75 18 109/72 96 % 02/22/19 0037    153/85 92 % 02/22/19 0027 98.5 °F (36.9 °C)  14 128/87 98 % Pulse Oximetry Analysis - 98% on RA Cardiac Monitor:  
Rate: 75 bpm 
Rhythm: Normal Sinus Rhythm Records Reviewed: Nursing Notes, Old Medical Records, Previous electrocardiograms, Previous Radiology Studies and Previous Laboratory Studies Provider Notes (Medical Decision Making): Pt presents with acute abdominal pain; vital signs stable with currently a non-peritoneal exam; DDx includes: Gastroenteritis, hepatitis, pancreatitis, obstruction, appendicitis, viral illness, IBD, diverticulitis, mesenteric ischemia, AAA or descending dissection, ACS, kidney stone. Labs and CT imaging reviewed from earlier; will treat appropriately and PO challenge. ED Course:  
Initial assessment performed. The patients presenting problems have been discussed, and they are in agreement with the care plan formulated and outlined with them. I have encouraged them to ask questions as they arise throughout their visit. I reviewed our electronic medical record system for any past medical records that were available that may contribute to the patient's current condition, the nursing notes and vital signs from today's visit. Gustavo Perrin MD 
Medications Administered During ED Course: 
Medications  
traMADol (ULTRAM) tablet 50 mg (50 mg Oral Given 2/22/19 0101) cephALEXin (KEFLEX) capsule 500 mg (500 mg Oral Given 2/22/19 0101)  
ketorolac (TORADOL) injection 30 mg (30 mg IntraVENous Given 2/22/19 0100) LORazepam (ATIVAN) injection 1 mg (1 mg IntraVENous Given 2/22/19 0101) morphine injection 4 mg (4 mg IntraVENous Given 2/22/19 0054) Progress Note: 
3:05 AM 
 Patient has been reassessed and reports feeling better and symptoms have improved after ED treatment. German Dyer is able to tolerate PO and ambulate per baseline. Ingrid Clemons's final labs and imaging have been reviewed with her. She has been counseled regarding her diagnosis. She verbally conveys understanding and agreement of the signs, symptoms, diagnosis, treatment and prognosis and additionally agrees to follow up as recommended with Dr. Gui Delacruz MD in 24 - 48 hours. She also agrees with the care-plan and conveys that all of her questions have been answered. I have also put together some discharge instructions for her that include: 1) educational information regarding their diagnosis, 2) how to care for their diagnosis at home, as well a 3) list of reasons why they would want to return to the ED prior to their follow-up appointment, should their condition change. I have answered all questions to patient's satisfaction. She both understood and agreed with plan as discussed above. Vital signs stable for discharge. Critical Care Time:  
0 minutes. Disposition: 
DISCHARGE NOTE: 
3:05 AM 
The patient is ready for discharge. The patients signs, symptoms, diagnosis, and instructions for discharge have been discussed and the pt has conveyed their understanding. The patient is to follow up as recommended with PCP or return to the ER should their symptoms worsen. Plan has been discussed and patient has conveyed their agreement. PLAN: 
1. Discharge home. Discharge Medication List as of 2/22/2019  2:42 AM  
  
CONTINUE these medications which have NOT CHANGED Details  
loratadine (CLARITIN) 10 mg tablet Take 10 mg by mouth daily. , Historical Med  
  
pantoprazole (PROTONIX) 40 mg tablet Take 40 mg by mouth daily. , Historical Med  
  
sucralfate (CARAFATE) 100 mg/mL suspension Take  by mouth four (4) times daily. , Historical Med  
  
 albuterol (PROVENTIL VENTOLIN) 2.5 mg /3 mL (0.083 %) nebulizer solution 2.5 mg by Nebulization route once., Historical Med  
  
mometasone (NASONEX) 50 mcg/actuation nasal spray 2 Sprays daily. , Historical Med 2. Follow-up Information Follow up With Specialties Details Why Contact Info Noam Lim MD [facility]   [address] [place] [address] 
[phone] [facility] EMERGENCY DEPT Emergency Medicine  As needed, If symptoms worsen [address] [facility] 
[phone] Thelda Merlin, MD Urology In 1 day  [address] [facility] 
[phone] Return to ED if worse Diagnosis Clinical Impression: 1. Acute left flank pain 2. Renal colic on left side 3. Urinary tract infection without hematuria, site unspecified 4. Nausea without vomiting Attestations This note is prepared by Norberto Murcia, acting as Scribe for Teryl Saint, MD Teryl Saint, MD : The scribe's documentation has been prepared under my direction and personally reviewed by me in its entirety. I confirm that the note above accurately reflects all work, treatment, procedures, and medical decision making performed by me. 
 
: 
This note will not be viewable in [address] Karen Galvan

## 2019-02-22 NOTE — DISCHARGE INSTRUCTIONS
Patient Education        Kidney Stone: Care Instructions  Your Care Instructions    Kidney stones are formed when salts, minerals, and other substances normally found in the urine clump together. They can be as small as grains of sand or, rarely, as large as golf balls. While the stone is traveling through the ureter, which is the tube that carries urine from the kidney to the bladder, you will probably feel pain. The pain may be mild or very severe. You may also have some blood in your urine. As soon as the stone reaches the bladder, any intense pain should go away. If a stone is too large to pass on its own, you may need a medical procedure to help you pass the stone. The doctor has checked you carefully, but problems can develop later. If you notice any problems or new symptoms, get medical treatment right away. Follow-up care is a key part of your treatment and safety. Be sure to make and go to all appointments, and call your doctor if you are having problems. It's also a good idea to know your test results and keep a list of the medicines you take. How can you care for yourself at home? · Drink plenty of fluids, enough so that your urine is light yellow or clear like water. If you have kidney, heart, or liver disease and have to limit fluids, talk with your doctor before you increase the amount of fluids you drink. · Take pain medicines exactly as directed. Call your doctor if you think you are having a problem with your medicine. ? If the doctor gave you a prescription medicine for pain, take it as prescribed. ? If you are not taking a prescription pain medicine, ask your doctor if you can take an over-the-counter medicine. Read and follow all instructions on the label. · Your doctor may ask you to strain your urine so that you can collect your kidney stone when it passes. You can use a kitchen strainer or a tea strainer to catch the stone.  Store it in a plastic bag until you see your doctor again.  Preventing future kidney stones  Some changes in your diet may help prevent kidney stones. Depending on the cause of your stones, your doctor may recommend that you:  · Drink plenty of fluids, enough so that your urine is light yellow or clear like water. If you have kidney, heart, or liver disease and have to limit fluids, talk with your doctor before you increase the amount of fluids you drink. · Limit coffee, tea, and alcohol. Also avoid grapefruit juice. · Do not take more than the recommended daily dose of vitamins C and D.  · Avoid antacids such as Gaviscon, Maalox, Mylanta, or Tums. · Limit the amount of salt (sodium) in your diet. · Eat a balanced diet that is not too high in protein. · Limit foods that are high in a substance called oxalate, which can cause kidney stones. These foods include dark green vegetables, rhubarb, chocolate, wheat bran, nuts, cranberries, and beans. When should you call for help? Call your doctor now or seek immediate medical care if:    · You cannot keep down fluids.     · Your pain gets worse.     · You have a fever or chills.     · You have new or worse pain in your back just below your rib cage (the flank area).     · You have new or more blood in your urine.    Watch closely for changes in your health, and be sure to contact your doctor if:    · You do not get better as expected. Where can you learn more? Go to http://marcelino-jordin.info/. Enter P999 in the search box to learn more about \"Kidney Stone: Care Instructions. \"  Current as of: March 14, 2018  Content Version: 11.9  © 9893-2833 [employer]. Care instructions adapted under license by [employer] (which disclaims liability or warranty for this information).  If you have questions about a medical condition or this instruction, always ask your healthcare professional. [address] any warranty or liability for your use of this information.

## 2019-06-21 ENCOUNTER — APPOINTMENT (OUTPATIENT)
Dept: CT IMAGING | Age: 29
End: 2019-06-21
Attending: EMERGENCY MEDICINE
Payer: COMMERCIAL

## 2019-06-21 ENCOUNTER — HOSPITAL ENCOUNTER (EMERGENCY)
Age: 29
Discharge: HOME | End: 2019-06-22
Attending: EMERGENCY MEDICINE
Payer: COMMERCIAL

## 2019-06-21 DIAGNOSIS — R10.9 ACUTE LEFT FLANK PAIN: Primary | ICD-10-CM

## 2019-06-21 LAB
ALBUMIN SERPL-MCNC: 4.3 G/DL (ref 3.5–5)
ALBUMIN/GLOB SERPL: 1.3 {RATIO} (ref 1.1–2.2)
ALP SERPL-CCNC: 89 U/L (ref 45–117)
ALT SERPL-CCNC: 76 U/L (ref 12–78)
ANION GAP SERPL CALC-SCNC: 5 MMOL/L (ref 5–15)
APPEARANCE UR: ABNORMAL
AST SERPL-CCNC: 31 U/L (ref 15–37)
BACTERIA URNS QL MICRO: NEGATIVE /HPF
BASOPHILS # BLD: 0.1 K/UL (ref 0–0.1)
BASOPHILS NFR BLD: 1 % (ref 0–1)
BILIRUB SERPL-MCNC: 0.3 MG/DL (ref 0.2–1)
BILIRUB UR QL: NEGATIVE
BUN SERPL-MCNC: 13 MG/DL (ref 6–20)
BUN/CREAT SERPL: 13 (ref 12–20)
CALCIUM SERPL-MCNC: 8.5 MG/DL (ref 8.5–10.1)
CHLORIDE SERPL-SCNC: 108 MMOL/L (ref 97–108)
CO2 SERPL-SCNC: 28 MMOL/L (ref 21–32)
COLOR UR: ABNORMAL
CREAT SERPL-MCNC: 0.98 MG/DL (ref 0.55–1.02)
DIFFERENTIAL METHOD BLD: NORMAL
EOSINOPHIL # BLD: 0.3 K/UL (ref 0–0.4)
EOSINOPHIL NFR BLD: 3 % (ref 0–7)
EPITH CASTS URNS QL MICRO: ABNORMAL /LPF
ERYTHROCYTE [DISTWIDTH] IN BLOOD BY AUTOMATED COUNT: 11.8 % (ref 11.5–14.5)
GLOBULIN SER CALC-MCNC: 3.3 G/DL (ref 2–4)
GLUCOSE SERPL-MCNC: 83 MG/DL (ref 65–100)
GLUCOSE UR STRIP.AUTO-MCNC: NEGATIVE MG/DL
HCG UR QL: NEGATIVE
HCT VFR BLD AUTO: 41.4 % (ref 35–47)
HGB BLD-MCNC: 14 G/DL (ref 11.5–16)
HGB UR QL STRIP: NEGATIVE
HYALINE CASTS URNS QL MICRO: ABNORMAL /LPF (ref 0–5)
IMM GRANULOCYTES # BLD AUTO: 0 K/UL (ref 0–0.04)
IMM GRANULOCYTES NFR BLD AUTO: 0 % (ref 0–0.5)
KETONES UR QL STRIP.AUTO: NEGATIVE MG/DL
LEUKOCYTE ESTERASE UR QL STRIP.AUTO: NEGATIVE
LYMPHOCYTES # BLD: 2.6 K/UL (ref 0.8–3.5)
LYMPHOCYTES NFR BLD: 30 % (ref 12–49)
MCH RBC QN AUTO: 31.5 PG (ref 26–34)
MCHC RBC AUTO-ENTMCNC: 33.8 G/DL (ref 30–36.5)
MCV RBC AUTO: 93.2 FL (ref 80–99)
MONOCYTES # BLD: 0.7 K/UL (ref 0–1)
MONOCYTES NFR BLD: 8 % (ref 5–13)
NEUTS SEG # BLD: 4.8 K/UL (ref 1.8–8)
NEUTS SEG NFR BLD: 58 % (ref 32–75)
NITRITE UR QL STRIP.AUTO: NEGATIVE
NRBC # BLD: 0 K/UL (ref 0–0.01)
NRBC BLD-RTO: 0 PER 100 WBC
PH UR STRIP: 8 [PH] (ref 5–8)
PLATELET # BLD AUTO: 239 K/UL (ref 150–400)
PMV BLD AUTO: 11.6 FL (ref 8.9–12.9)
POTASSIUM SERPL-SCNC: 3.9 MMOL/L (ref 3.5–5.1)
PROT SERPL-MCNC: 7.6 G/DL (ref 6.4–8.2)
PROT UR STRIP-MCNC: NEGATIVE MG/DL
RBC # BLD AUTO: 4.44 M/UL (ref 3.8–5.2)
RBC #/AREA URNS HPF: ABNORMAL /HPF (ref 0–5)
SODIUM SERPL-SCNC: 141 MMOL/L (ref 136–145)
SP GR UR REFRACTOMETRY: 1.02 (ref 1–1.03)
UA: UC IF INDICATED,UAUC: ABNORMAL
UROBILINOGEN UR QL STRIP.AUTO: 1 EU/DL (ref 0.2–1)
WBC # BLD AUTO: 8.4 K/UL (ref 3.6–11)
WBC URNS QL MICRO: ABNORMAL /HPF (ref 0–4)

## 2019-06-21 PROCEDURE — 99283 EMERGENCY DEPT VISIT LOW MDM: CPT

## 2019-06-21 PROCEDURE — 96375 TX/PRO/DX INJ NEW DRUG ADDON: CPT

## 2019-06-21 PROCEDURE — 81025 URINE PREGNANCY TEST: CPT

## 2019-06-21 PROCEDURE — 81001 URINALYSIS AUTO W/SCOPE: CPT

## 2019-06-21 PROCEDURE — 36415 COLL VENOUS BLD VENIPUNCTURE: CPT

## 2019-06-21 PROCEDURE — 74176 CT ABD & PELVIS W/O CONTRAST: CPT

## 2019-06-21 PROCEDURE — 74011250636 HC RX REV CODE- 250/636: Performed by: EMERGENCY MEDICINE

## 2019-06-21 PROCEDURE — 96361 HYDRATE IV INFUSION ADD-ON: CPT

## 2019-06-21 PROCEDURE — 85025 COMPLETE CBC W/AUTO DIFF WBC: CPT

## 2019-06-21 PROCEDURE — 96374 THER/PROPH/DIAG INJ IV PUSH: CPT

## 2019-06-21 PROCEDURE — 80053 COMPREHEN METABOLIC PANEL: CPT

## 2019-06-21 RX ORDER — ONDANSETRON 2 MG/ML
4 INJECTION INTRAMUSCULAR; INTRAVENOUS
Status: COMPLETED | OUTPATIENT
Start: 2019-06-21 | End: 2019-06-21

## 2019-06-21 RX ORDER — FENTANYL CITRATE 50 UG/ML
50 INJECTION, SOLUTION INTRAMUSCULAR; INTRAVENOUS
Status: COMPLETED | OUTPATIENT
Start: 2019-06-21 | End: 2019-06-21

## 2019-06-21 RX ORDER — KETOROLAC TROMETHAMINE 30 MG/ML
15 INJECTION, SOLUTION INTRAMUSCULAR; INTRAVENOUS
Status: COMPLETED | OUTPATIENT
Start: 2019-06-21 | End: 2019-06-21

## 2019-06-21 RX ADMIN — FENTANYL CITRATE 50 MCG: 50 INJECTION, SOLUTION INTRAMUSCULAR; INTRAVENOUS at 23:00

## 2019-06-21 RX ADMIN — KETOROLAC TROMETHAMINE 15 MG: 30 INJECTION, SOLUTION INTRAMUSCULAR at 21:27

## 2019-06-21 RX ADMIN — SODIUM CHLORIDE 1000 ML: 900 INJECTION, SOLUTION INTRAVENOUS at 21:28

## 2019-06-21 RX ADMIN — ONDANSETRON 4 MG: 2 INJECTION INTRAMUSCULAR; INTRAVENOUS at 23:00

## 2019-06-21 NOTE — LETTER
[facility] EMERGENCY DEPT 
[address] [place] [address] [address] [id number] [phone] Work/School Note Date: 6/21/2019 To Whom It May concern: 
 
Efren Finch was seen and treated today in the emergency room by the following provider(s): 
Attending Provider: Tiny Byrd MD.   
 
Efren Finch may return to work on 6/24/19.  
 
Sincerely, 
 
 
 
 
Dalila Hall MD

## 2019-06-22 VITALS
OXYGEN SATURATION: 98 % | TEMPERATURE: 98 F | RESPIRATION RATE: 18 BRPM | BODY MASS INDEX: 34.65 KG/M2 | HEIGHT: 63 IN | SYSTOLIC BLOOD PRESSURE: 117 MMHG | HEART RATE: 94 BPM | WEIGHT: 195.55 LBS | DIASTOLIC BLOOD PRESSURE: 79 MMHG

## 2019-06-22 LAB — HCG UR QL: NEGATIVE

## 2019-06-22 PROCEDURE — 74011250636 HC RX REV CODE- 250/636

## 2019-06-22 PROCEDURE — 96375 TX/PRO/DX INJ NEW DRUG ADDON: CPT

## 2019-06-22 RX ORDER — MORPHINE SULFATE 2 MG/ML
INJECTION, SOLUTION INTRAMUSCULAR; INTRAVENOUS
Status: COMPLETED
Start: 2019-06-22 | End: 2019-06-22

## 2019-06-22 RX ORDER — ONDANSETRON 4 MG/1
4 TABLET, ORALLY DISINTEGRATING ORAL
Qty: 10 TAB | Refills: 0 | Status: SHIPPED | OUTPATIENT
Start: 2019-06-22 | End: 2021-05-04 | Stop reason: SDUPTHER

## 2019-06-22 RX ORDER — MORPHINE SULFATE 2 MG/ML
4 INJECTION, SOLUTION INTRAMUSCULAR; INTRAVENOUS
Status: COMPLETED | OUTPATIENT
Start: 2019-06-22 | End: 2019-06-22

## 2019-06-22 RX ORDER — OXYCODONE AND ACETAMINOPHEN 5; 325 MG/1; MG/1
1 TABLET ORAL
Qty: 10 TAB | Refills: 0 | Status: SHIPPED | OUTPATIENT
Start: 2019-06-22 | End: 2019-06-25

## 2019-06-22 RX ORDER — DICYCLOMINE HYDROCHLORIDE 10 MG/1
10 CAPSULE ORAL 4 TIMES DAILY
Qty: 20 CAP | Refills: 0 | Status: SHIPPED | OUTPATIENT
Start: 2019-06-22 | End: 2019-06-27

## 2019-06-22 RX ADMIN — MORPHINE SULFATE 4 MG: 2 INJECTION, SOLUTION INTRAMUSCULAR; INTRAVENOUS at 00:24

## 2019-06-22 NOTE — ED PROVIDER NOTES
EMERGENCY DEPARTMENT HISTORY AND PHYSICAL EXAM           Date: 6/21/2019  Patient Name: Christy Carlson    History of Presenting Illness     Chief Complaint   Patient presents with    Flank Pain     patient is here with left flank pain that started about 2 days ago patient reports having a kidney stone in april and she states that the pain feels the same        History Provided By:  Patient and family    HPI: Christy Carlson is a 30 y.o. female, with significant pmhx of kidney stones, gastritis,  who presents ambulatory to the ED with c/o 2 days of intermittent left-sided jabbing type pain radiating from her back to her left flank. Patient reports tonight while at work her pain became more intense and constant. Patient reports having similar symptoms with prior kidney stones. Patient is followed by Dr. Rosio Curtis of [facility]. Patient reports having taking a single Aleve earlier this evening when her pain started which only minimally relieved her symptoms. Patient denies associated nausea,vomiting, diarrhea or urinary symptoms. PCP: Chago Ramos MD    Social Hx: denies tobacco  Denies EtOH , denies Illicit Drugs    There are no other complaints, changes, or physical findings at this time. No Known Allergies      Current Outpatient Medications   Medication Sig Dispense Refill    oxyCODONE-acetaminophen (PERCOCET) 5-325 mg per tablet Take 1 Tab by mouth every eight (8) hours as needed for Pain for up to 3 days. Max Daily Amount: 3 Tabs. Indications: Pain 10 Tab 0    ondansetron (ZOFRAN ODT) 4 mg disintegrating tablet Take 1 Tab by mouth every eight (8) hours as needed for Nausea. 10 Tab 0    dicyclomine (BENTYL) 10 mg capsule Take 1 Cap by mouth four (4) times daily for 5 days. 20 Cap 0    ketorolac (TORADOL) 10 mg tablet Take 1 Tab by mouth every six (6) hours as needed for Pain. 20 Tab 0    traMADol (ULTRAM) 50 mg tablet Take 1 Tab by mouth every six (6) hours as needed for Pain.  Max Daily Amount: 200 mg. 10 Tab 0    loratadine (CLARITIN) 10 mg tablet Take 10 mg by mouth daily.  pantoprazole (PROTONIX) 40 mg tablet Take 40 mg by mouth daily.  sucralfate (CARAFATE) 100 mg/mL suspension Take  by mouth four (4) times daily.  albuterol (PROVENTIL VENTOLIN) 2.5 mg /3 mL (0.083 %) nebulizer solution 2.5 mg by Nebulization route once.  mometasone (NASONEX) 50 mcg/actuation nasal spray 2 Sprays daily. Past History     Past Medical History:  Past Medical History:   Diagnosis Date    Acid reflux disease with ulcer     Constipation     Headache     PCOS (polycystic ovarian syndrome)     Seasonal asthma     Stomach pain     Vertigo        Past Surgical History:  Past Surgical History:   Procedure Laterality Date    HX ENDOSCOPY         Family History:  No family history on file. Social History:  Social History     Tobacco Use    Smoking status: Never Smoker    Smokeless tobacco: Never Used   Substance Use Topics    Alcohol use: No     Frequency: Never    Drug use: No       Allergies:  No Known Allergies      Review of Systems   Review of Systems   Constitutional: Negative. Negative for fever. Eyes: Negative. Respiratory: Negative. Negative for shortness of breath. Cardiovascular: Negative for chest pain. Gastrointestinal: Positive for abdominal pain (L flank). Negative for nausea and vomiting. Endocrine: Negative. Genitourinary: Negative. Negative for difficulty urinating, dysuria and hematuria. Musculoskeletal: Negative. Skin: Negative. Neurological: Negative. Psychiatric/Behavioral: Negative for suicidal ideas. All other systems reviewed and are negative. Physical Exam   Physical Exam   Constitutional: She is oriented to person, place, and time. She appears well-developed and well-nourished. No distress. HENT:   Head: Normocephalic and atraumatic. Nose: Nose normal.   Eyes: Conjunctivae and EOM are normal. No scleral icterus. Neck: Normal range of motion. No tracheal deviation present. Cardiovascular: Normal rate, regular rhythm, normal heart sounds and intact distal pulses. Exam reveals no friction rub. No murmur heard. Pulmonary/Chest: Effort normal and breath sounds normal. No stridor. No respiratory distress. She has no wheezes. She has no rales. Abdominal: Soft. Bowel sounds are normal. She exhibits no distension. There is no tenderness. There is CVA tenderness (L). There is no rigidity, no rebound and no guarding. Musculoskeletal: Normal range of motion. She exhibits no tenderness. Neurological: She is alert and oriented to person, place, and time. No cranial nerve deficit. Skin: Skin is warm and dry. No rash noted. She is not diaphoretic. Psychiatric: She has a normal mood and affect. Her speech is normal and behavior is normal. Judgment and thought content normal. Cognition and memory are normal.   Nursing note and vitals reviewed. Diagnostic Study Results     Labs -     No results found for this or any previous visit (from the past 12 hour(s)). Radiologic Studies -   CT ABD PELV WO CONT   Final Result   IMPRESSION:      1. Hepatic steatosis. 2. There is a 2 mm nonobstructing calculus right kidney. No   hydroureteronephrosis or ureteral calculi are identified. CT Results  (Last 48 hours)               06/21/19 2246  CT ABD PELV WO CONT Final result    Impression:  IMPRESSION:       1. Hepatic steatosis. 2. There is a 2 mm nonobstructing calculus right kidney. No   hydroureteronephrosis or ureteral calculi are identified. Narrative:  EXAM: CT ABD PELV WO CONT       INDICATION: Flank pain, stone disease suspected       COMPARISON: 2/21/2019       CONTRAST:  None. TECHNIQUE:    Thin axial images were obtained through the abdomen and pelvis. Coronal and   sagittal reconstructions were generated. Oral contrast was not administered.  CT   dose reduction was achieved through use of a standardized protocol tailored for   this examination and automatic exposure control for dose modulation. The absence of intravenous contrast material reduces the sensitivity for   evaluation of the solid parenchymal organs of the abdomen. FINDINGS:    LUNG BASES: Clear. There is a small hiatal hernia   INCIDENTALLY IMAGED HEART AND MEDIASTINUM: Unremarkable. LIVER: No mass or biliary dilatation. There is hepatic steatosis   GALLBLADDER: Unremarkable. SPLEEN: No mass. PANCREAS: No mass or ductal dilatation. ADRENALS: Unremarkable. KIDNEYS/URETERS: There is a tiny nonobstructing calculus in the right kidney. No   hydroureteronephrosis identified. STOMACH: Unremarkable. SMALL BOWEL: No dilatation or wall thickening. COLON: No dilatation or wall thickening. APPENDIX: Unremarkable. PERITONEUM: No ascites or pneumoperitoneum. RETROPERITONEUM: No lymphadenopathy or aortic aneurysm. REPRODUCTIVE ORGANS: Normal   URINARY BLADDER: No mass or calculus. BONES: No destructive bone lesion. ADDITIONAL COMMENTS: N/A               CXR Results  (Last 48 hours)    None            Medical Decision Making   I am the first provider for this patient. I reviewed the vital signs, available nursing notes, past medical history, past surgical history, family history and social history. Vital Signs-Reviewed the patient's vital signs.   Patient Vitals for the past 12 hrs:   BP SpO2   06/22/19 0130 117/79    06/22/19 0100 107/69    06/22/19 0030 108/72    06/21/19 2330 110/74 98 %   06/21/19 2305 124/74        Pulse Oximetry Analysis - 96% on RA    Cardiac Monitor:   Rate: 94 bpm  Rhythm: nsr    Records Reviewed: Nursing Notes, Old Medical Records, Previous Radiology Studies and Previous Laboratory Studies    Provider Notes (Medical Decision Making):     DDX:  UTI, pyelonephritis, kidney stone, ureteral colic    Plan:  UA, labs, analgesic, IV fluid, CT scan    Impression:  Flank pain    ED Course:   Initial assessment performed. The patients presenting problems have been discussed, and they are in agreement with the care plan formulated and outlined with them. I have encouraged them to ask questions as they arise throughout their visit. I reviewed our electronic medical record system for any past medical records that were available that may contribute to the patients current condition, the nursing notes and and vital signs from today's visit    Nursing notes will be reviewed as they become available in realtime while the pt has been in the ED. Emanuel oCok MD    I personally reviewed pt's imaging. Official read by radiology noted above. Emanuel Cook MD    PROGRESS NOTE:  0030  Pt noted to have continued pain despite multiple rounds of medications. CT scan without findings of ureteral stone. UA clean. Will discuss with patient and family and plan for GI follow-up. Emanuel Cook MD         1:52 AM  Progress note:  Pt noted to be feeling better, ready for discharge. Discussed lab and imaging findings with pt and/or family, specifically noting negative ct findings. Pt will follow up with primary care and Dr. Timothy Polo as instructed. All questions have been answered, pt voiced understanding and agreement with plan. It is at this time the patient reports she was recently started on metformin for her PCO S and correlates the timeline of starting her metformin with to be onset of her abdominal pain. Joseph with her following up with GYN for further options on PCOS management. Also discussed trial off of metformin for a few days to see if her symptoms improve. Specific return precautions provided in addition to instructions for pt to return to the ED immediately should sx worsen at any time. Emanuel Cook MD      Critical Care Time:     none      Diagnosis     Clinical Impression:   1. Acute left flank pain        PLAN:  1.    Discharge Medication List as of 6/22/2019  1:52 AM      START taking these medications    Details   oxyCODONE-acetaminophen (PERCOCET) 5-325 mg per tablet Take 1 Tab by mouth every eight (8) hours as needed for Pain for up to 3 days. Max Daily Amount: 3 Tabs. Indications: Pain, Print, Disp-10 Tab, R-0      ondansetron (ZOFRAN ODT) 4 mg disintegrating tablet Take 1 Tab by mouth every eight (8) hours as needed for Nausea. , Print, Disp-10 Tab, R-0      dicyclomine (BENTYL) 10 mg capsule Take 1 Cap by mouth four (4) times daily for 5 days. , Print, Disp-20 Cap, R-0         CONTINUE these medications which have NOT CHANGED    Details   ketorolac (TORADOL) 10 mg tablet Take 1 Tab by mouth every six (6) hours as needed for Pain., Print, Disp-20 Tab, R-0      traMADol (ULTRAM) 50 mg tablet Take 1 Tab by mouth every six (6) hours as needed for Pain. Max Daily Amount: 200 mg., Print, Disp-10 Tab, R-0      loratadine (CLARITIN) 10 mg tablet Take 10 mg by mouth daily. , Historical Med      pantoprazole (PROTONIX) 40 mg tablet Take 40 mg by mouth daily. , Historical Med      sucralfate (CARAFATE) 100 mg/mL suspension Take  by mouth four (4) times daily. , Historical Med      albuterol (PROVENTIL VENTOLIN) 2.5 mg /3 mL (0.083 %) nebulizer solution 2.5 mg by Nebulization route once., Historical Med      mometasone (NASONEX) 50 mcg/actuation nasal spray 2 Sprays daily. , Historical Med           2. Follow-up Information     Follow up With Specialties Details Why Contact Info    Latesha Dutta MD Family Practice Schedule an appointment as soon as possible for a visit in 2 days  [place] [phone]      Rah Mahoney MD Gastroenterology Schedule an appointment as soon as possible for a visit in 2 days  [address] [place] [address] Rd  [phone]          Return to ED if worse     Disposition:  1:52 AM  The patient's results have been reviewed with family and/or caregiver.  They verbally convey their understanding and agreement of the patient's signs, symptoms, diagnosis, treatment and prognosis and additionally agree to follow up as recommended in the discharge instructions or to return to the Emergency Room should the patient's condition change prior to their follow-up appointment. The family and/or caregiver verbally agrees with the care-plan and all of their questions have been answered. The discharge instructions have also been provided to the them with educational information regarding the patient's diagnosis as well a list of reasons why the patient would want to return to the ER prior to their follow-up appointment should their condition change. Alyson Ly MD          Please note that this dictation was completed with "[employer] the computer voice recognition software. Quite often unanticipated grammatical, syntax, homophones, and other interpretive errors are inadvertently transcribed by the computer software. Please disregard these errors. Please excuse any errors that have escaped final proofreading    This note will not be viewable in [facility].

## 2019-06-22 NOTE — DISCHARGE INSTRUCTIONS

## 2020-01-24 ENCOUNTER — HOSPITAL ENCOUNTER (OUTPATIENT)
Dept: NUCLEAR MEDICINE | Age: 30
Discharge: HOME | End: 2020-01-24
Attending: PHYSICIAN ASSISTANT
Payer: COMMERCIAL

## 2020-01-24 VITALS — WEIGHT: 194 LBS | BODY MASS INDEX: 34.37 KG/M2

## 2020-01-24 DIAGNOSIS — K82.9 DISEASE OF GALLBLADDER: ICD-10-CM

## 2020-01-24 DIAGNOSIS — R10.9 PAIN, ABDOMINAL: ICD-10-CM

## 2020-01-24 PROCEDURE — 74011250636 HC RX REV CODE- 250/636: Performed by: PHYSICIAN ASSISTANT

## 2020-01-24 PROCEDURE — 78227 HEPATOBIL SYST IMAGE W/DRUG: CPT

## 2020-01-24 RX ADMIN — SINCALIDE 1.76 MCG: 5 INJECTION, POWDER, LYOPHILIZED, FOR SOLUTION INTRAVENOUS at 09:38

## 2020-01-28 ENCOUNTER — OFFICE VISIT (OUTPATIENT)
Dept: SURGERY | Age: 30
End: 2020-01-28

## 2020-01-28 VITALS
RESPIRATION RATE: 16 BRPM | WEIGHT: 188.4 LBS | HEART RATE: 93 BPM | DIASTOLIC BLOOD PRESSURE: 57 MMHG | BODY MASS INDEX: 33.38 KG/M2 | TEMPERATURE: 98.3 F | SYSTOLIC BLOOD PRESSURE: 119 MMHG | OXYGEN SATURATION: 97 % | HEIGHT: 63 IN

## 2020-01-28 DIAGNOSIS — K82.8 BILIARY DYSKINESIA: Primary | ICD-10-CM

## 2020-01-28 RX ORDER — MEDROXYPROGESTERONE ACETATE 10 MG/1
10 TABLET ORAL 3 TIMES DAILY
COMMUNITY

## 2020-01-28 RX ORDER — FLUTICASONE PROPIONATE 50 MCG
2 SPRAY, SUSPENSION (ML) NASAL DAILY
COMMUNITY

## 2020-01-28 NOTE — LETTER
NOTIFICATION OF RETURN TO WORK / SCHOOL 
 
1/28/2020 4:41 PM 
 
Ms. Cecilia Escobar [address] [facility] Beverley Marino To Whom It May Concern: 
 
Cecilia Escobar was under the care of [address]rand from 01/28/20 to present. She will be having surgery on 01/29/2020, she will be out of work approximately two weeks. If there are questions or concerns please have the patient contact our office.  
 
Sincerely, 
 
 
Adenkie Rutledge MD

## 2020-01-28 NOTE — PROGRESS NOTES
To: Randy Cohen, [facility]  CC:  Frantz Balderas MD      From: MD Ro Salcedo -- Thank you for sending Xiomara Leung to see us. Encounter Date: 1/28/2020  History and Physical    Assessment:   Biliary dyskinesia, likely chronic cholecystis. Body mass index is 33.37 kg/m². Comorbid GERD, asthma. No known h/o heart disease or stroke. S/p no prior abd ops. Good functional status. No aspirin or other anticoagulants. Plan/Recommendations/Medical Decision Making:   Cholecystectomy. Discussed alternatives, risk and benefits of surgery. Risks include infection, hematoma, bleeding, bile duct or bowel injury, recurrence or persistence of symptoms, conversion to an open operation, retained CBD stones, possible MATIAS drain, and the risks of general anesthetic. All questions were answered to the patient's satisfaction. HPI:   Patient is a 30 y.o. female who is seen in consultation at the request of Randy Cohen for RUQ pain with GB EF of 6% on recent HIDA. She has had several weeks of worsening RUQ pain. Feels like a spasm. Went to [facility] last Wednesday with intense pain, nausea and bloating. Vomited once. Says the same thing happened about a year ago. Has had a few milder attacks as well. Pain is worse after eating. Saw Randy Cohen 1/24/20. Patient had had US previously and this did not show stones so HIDA was ordered. No history of jaundice, zehra urine, acholic stools.       Past Medical History:   Diagnosis Date    Acid reflux disease with ulcer     Calculus of kidney     Constipation     GERD (gastroesophageal reflux disease)     Headache     PCOS (polycystic ovarian syndrome)     Seasonal asthma     Stomach pain     Vertigo       Past Surgical History:   Procedure Laterality Date    HX ENDOSCOPY      HX UROLOGICAL Left 02/18/2019    [facility]/Dr Shahida Ervin     Social History     Tobacco Use    Smoking status: Never Smoker    Smokeless tobacco: Never Used   Substance Use Topics    Alcohol use: No     Frequency: Never      Family History   Problem Relation Age of Onset    Diabetes Mother     Hypertension Mother     Diabetes Father     Heart Disease Maternal Grandmother     Diabetes Maternal Grandmother     Hypertension Maternal Grandmother     Cancer Maternal Grandmother         lymphoma    Heart Disease Maternal Grandfather     Diabetes Maternal Grandfather     Cancer Maternal Grandfather         prostate/bone    Heart Disease Paternal Grandmother     Diabetes Paternal Grandmother     Heart Disease Paternal Grandfather     Diabetes Paternal Grandfather     Cancer Paternal Cousin         T-cell leukemia    Cancer Paternal Cousin         kidney    Cancer Maternal Great Grandmother         ovarian       Current Outpatient Medications   Medication Sig    medroxyPROGESTERone (PROVERA) 10 mg tablet Take 10 mg by mouth three (3) times daily.  dicyclomine HCl (BENTYL PO) Take  by mouth four (4) times daily. Indications: As needed for pain    fluticasone propionate (FLONASE) 50 mcg/actuation nasal spray 2 Sprays by Both Nostrils route daily. Indications: As needed for allergies    multivitamin (MULTIPLE VITAMIN PO) Take  by mouth. Indications: Teresita fusion multi vitamin/folate 50 mg (2 gummies) by gyn dr Valenzuela Base traMADol Kathe Cushing) 50 mg tablet Take 1 Tab by mouth every six (6) hours as needed for Pain. Max Daily Amount: 200 mg.  loratadine (CLARITIN) 10 mg tablet Take 10 mg by mouth daily.  pantoprazole (PROTONIX) 40 mg tablet Take 40 mg by mouth daily.  albuterol (PROVENTIL VENTOLIN) 2.5 mg /3 mL (0.083 %) nebulizer solution 2.5 mg by Nebulization route once.  ondansetron (ZOFRAN ODT) 4 mg disintegrating tablet Take 1 Tab by mouth every eight (8) hours as needed for Nausea.  ketorolac (TORADOL) 10 mg tablet Take 1 Tab by mouth every six (6) hours as needed for Pain.     sucralfate (CARAFATE) 100 mg/mL suspension Take  by mouth four (4) times daily.  mometasone (NASONEX) 50 mcg/actuation nasal spray 2 Sprays daily. No current facility-administered medications for this visit. Allergies:  No Known Allergies     Review of Systems:  10 systems reviewed. See scanned sheet in \"Media\" section. See HPI for pertinent positives and negatives. Objective:     Visit Vitals  /57 (BP 1 Location: Right arm, BP Patient Position: Sitting)   Pulse 93   Temp 98.3 °F (36.8 °C)   Resp 16   Ht 5' 3\" (1.6 m)   Wt 85.5 kg (188 lb 6.4 oz)   SpO2 97%   BMI 33.37 kg/m²     General appearance  Alert, cooperative, no distress, appears stated age   HEENT  Anicteric   Neck Supple   Back   No CVA tenderness   Lungs   CTAB   Heart  Regular rate and rhythm. Abdomen   Soft. Bowel sounds normal. No palpable masses. TTP in the RUQ. No Ding sign. Extremities No CCE.    Pulses 2+ right radial   Skin Skin color, texture, turgor normal.        Neurologic Without overt sensory or motor deficit     Imaging and Lab Review:   images and reports reviewed    Signed By: Stephania Rodriguez MD     January 28, 2020

## 2020-01-28 NOTE — H&P (VIEW-ONLY)
To: Sukhwinder Layton, [facility] 
CC:  Mendel Bhagat MD   
 
From: MD Ria Ibarra -- Thank you for sending Augustine Louis to see us. Encounter Date: 1/28/2020 History and Physical 
 
Assessment:  
Biliary dyskinesia, likely chronic cholecystis. Body mass index is 33.37 kg/m². Comorbid GERD, asthma. No known h/o heart disease or stroke. S/p no prior abd ops. Good functional status. No aspirin or other anticoagulants. Plan/Recommendations/Medical Decision Making:  
Cholecystectomy. Discussed alternatives, risk and benefits of surgery. Risks include infection, hematoma, bleeding, bile duct or bowel injury, recurrence or persistence of symptoms, conversion to an open operation, retained CBD stones, possible MATIAS drain, and the risks of general anesthetic. All questions were answered to the patient's satisfaction. HPI:  
Patient is a 30 y.o. female who is seen in consultation at the request of Sukhwinder Layton for RUQ pain with GB EF of 6% on recent HIDA. She has had several weeks of worsening RUQ pain. Feels like a spasm. Went to [facility] last Wednesday with intense pain, nausea and bloating. Vomited once. Says the same thing happened about a year ago. Has had a few milder attacks as well. Pain is worse after eating. Saw Sukhwinder Layton 1/24/20. Patient had had US previously and this did not show stones so HIDA was ordered. No history of jaundice, zehra urine, acholic stools. Past Medical History:  
Diagnosis Date  Acid reflux disease with ulcer  Calculus of kidney  Constipation  GERD (gastroesophageal reflux disease)  Headache  PCOS (polycystic ovarian syndrome)  Seasonal asthma  Stomach pain  Vertigo Past Surgical History:  
Procedure Laterality Date  HX ENDOSCOPY    
 HX UROLOGICAL Left 02/18/2019 [facility]/Dr Patricia Shaikh Social History Tobacco Use  Smoking status: Never Smoker  Smokeless tobacco: Never Used Substance Use Topics  Alcohol use: No  
  Frequency: Never Family History Problem Relation Age of Onset  Diabetes Mother  Hypertension Mother  Diabetes Father  Heart Disease Maternal Grandmother  Diabetes Maternal Grandmother  Hypertension Maternal Grandmother  Cancer Maternal Grandmother   
     lymphoma  Heart Disease Maternal Grandfather  Diabetes Maternal Grandfather  Cancer Maternal Grandfather   
     prostate/bone  Heart Disease Paternal Grandmother  Diabetes Paternal Grandmother  Heart Disease Paternal Grandfather  Diabetes Paternal Grandfather  Cancer Paternal Cousin T-cell leukemia  Cancer Paternal Cousin   
     kidney  Cancer Maternal Great Grandmother   
     ovarian Current Outpatient Medications Medication Sig  
 medroxyPROGESTERone (PROVERA) 10 mg tablet Take 10 mg by mouth three (3) times daily.  dicyclomine HCl (BENTYL PO) Take  by mouth four (4) times daily. Indications: As needed for pain  fluticasone propionate (FLONASE) 50 mcg/actuation nasal spray 2 Sprays by Both Nostrils route daily. Indications: As needed for allergies  multivitamin (MULTIPLE VITAMIN PO) Take  by mouth. Indications: Teresita fusion multi vitamin/folate 50 mg (2 gummies) by gyn dr Shabnam Kumar traMADol Aneta Carolina) 50 mg tablet Take 1 Tab by mouth every six (6) hours as needed for Pain. Max Daily Amount: 200 mg.  loratadine (CLARITIN) 10 mg tablet Take 10 mg by mouth daily.  pantoprazole (PROTONIX) 40 mg tablet Take 40 mg by mouth daily.  albuterol (PROVENTIL VENTOLIN) 2.5 mg /3 mL (0.083 %) nebulizer solution 2.5 mg by Nebulization route once.  ondansetron (ZOFRAN ODT) 4 mg disintegrating tablet Take 1 Tab by mouth every eight (8) hours as needed for Nausea.  ketorolac (TORADOL) 10 mg tablet Take 1 Tab by mouth every six (6) hours as needed for Pain.  sucralfate (CARAFATE) 100 mg/mL suspension Take  by mouth four (4) times daily.  mometasone (NASONEX) 50 mcg/actuation nasal spray 2 Sprays daily. No current facility-administered medications for this visit. Allergies: 
No Known Allergies Review of Systems:  10 systems reviewed. See scanned sheet in \"Media\" section. See HPI for pertinent positives and negatives. Objective:  
 
Visit Vitals /57 (BP 1 Location: Right arm, BP Patient Position: Sitting) Pulse 93 Temp 98.3 °F (36.8 °C) Resp 16 Ht 5' 3\" (1.6 m) Wt 85.5 kg (188 lb 6.4 oz) SpO2 97% BMI 33.37 kg/m² General appearance  Alert, cooperative, no distress, appears stated age HEENT  Anicteric Neck Supple Back   No CVA tenderness Lungs   CTAB Heart  Regular rate and rhythm. Abdomen   Soft. Bowel sounds normal. No palpable masses. TTP in the RUQ. No Ding sign. Extremities No CCE. Pulses 2+ right radial  
Skin Skin color, texture, turgor normal.   
   
Neurologic Without overt sensory or motor deficit Imaging and Lab Review:  
images and reports reviewed Signed By: Miranda Musa MD   
 January 28, 2020

## 2020-01-28 NOTE — PATIENT INSTRUCTIONS
Surgery Instruction Sheet    You have been scheduled for surgery on 01/29/2020 at 4:00 pm at [facility] (/[facility]). Please report to the Surgery Center at 2:00 pm, this is approximately 2 hours prior to your surgery time. The Surgery Center is located on the [address] [place] [address] Street side of the hospitals, just next to the Emergency Room. Reserved parking is available and  parking if lot is full. You will not need to have a pre-op visit before surgery, but the Pre-op Nurse will call you before surgery. The Pre-op nurse will review your medical history, medications and give you additional instructions. They will also confirm your arrival time. Call your physician immediately if you notice a change in your health between the time you saw your physician and the day of surgery. If you take a blood thinner, please let us know. Call your ordering Doctor to make sure you can stop taking it prior to your surgery. STOP YOUR ASPIRIN 10 DAYS PRIOR TO SURGERY. DO NOT TAKE  IBUPROFEN, ADVIL, MOTRIN, ALEVE, EXCEDRIN, BC POWDER, GOODIES, FISH OIL OR ANY MEDICATION CONTAINING ASPIRIN 10 DAYS PRIOR TO YOUR SURGERY. MAY TAKE TYLENOL. Eat a light dinner the evening before your surgery. DO NOT EAT OR DRINK ANYTHING AFTER MIDNIGHT THE NIGHT BEFORE YOUR SURGERY. This includes water, chewing gum, lifesavers, etc.  The Pre op nurse will check with you about any medication that you may need to take the morning of surgery. Shower with a new bar of anti-bacterial soap (Dial, Safeguard) or solution given to you by Pre-op, the night before surgery. Do not use lotion, powder, deodorant on the skin after showering.   Wear loose, comfortable clothing the day of surgery and bring a container to store your contacts, eyeglasses, dentures, hearing aid, etc.  Do not bring money, valuables, jewelry, etc. to the hospital.      If you are having outpatient surgery, someone must come with you the morning of surgery to drive you home. You can not drive for 24 hours after any anesthesia. Sometimes it is necessary to stay overnight and leave the next morning. This is still considered outpatient for most insurance deductibles. Someone will still need to drive you home. If you have questions or concerns, please feel free to call Dr Radha Shukla at [phone]. If you need to cancel your surgery, please call as soon as possible.

## 2020-01-28 NOTE — Clinical Note
1/28/20 Patient: Amaury Brunner YOB: 1990 Date of Visit: 1/28/2020 [address] [address] VIA In Basket Dear 130 Bella Serrano MD, Thank you for referring Ms. Amaury Brunner to  [facility] for evaluation. My notes for this consultation are attached. If you have questions, please do not hesitate to call me. I look forward to following your patient along with you.  
 
 
Sincerely, 
 
Osmin Mcconnell MD

## 2020-01-29 ENCOUNTER — HOSPITAL ENCOUNTER (OUTPATIENT)
Age: 30
Setting detail: OBSERVATION
Discharge: HOME | End: 2020-01-31
Attending: SURGERY | Admitting: SURGERY
Payer: COMMERCIAL

## 2020-01-29 ENCOUNTER — ANESTHESIA EVENT (OUTPATIENT)
Dept: SURGERY | Age: 30
End: 2020-01-29
Payer: COMMERCIAL

## 2020-01-29 ENCOUNTER — ANESTHESIA (OUTPATIENT)
Dept: SURGERY | Age: 30
End: 2020-01-29
Payer: COMMERCIAL

## 2020-01-29 ENCOUNTER — APPOINTMENT (OUTPATIENT)
Dept: GENERAL RADIOLOGY | Age: 30
End: 2020-01-29
Attending: SURGERY
Payer: COMMERCIAL

## 2020-01-29 DIAGNOSIS — K82.8 BILIARY DYSKINESIA: Primary | ICD-10-CM

## 2020-01-29 PROBLEM — R09.02 OXYGEN DESATURATION: Status: ACTIVE | Noted: 2020-01-29

## 2020-01-29 LAB — HCG UR QL: NEGATIVE

## 2020-01-29 PROCEDURE — 77030009851 HC PCH RTVR ENDOSC AMR -B: Performed by: SURGERY

## 2020-01-29 PROCEDURE — 76010000149 HC OR TIME 1 TO 1.5 HR: Performed by: SURGERY

## 2020-01-29 PROCEDURE — 76210000019 HC OR PH I REC 4 TO 4.5 HR: Performed by: SURGERY

## 2020-01-29 PROCEDURE — 88304 TISSUE EXAM BY PATHOLOGIST: CPT

## 2020-01-29 PROCEDURE — 77030018836 HC SOL IRR NACL ICUM -A: Performed by: SURGERY

## 2020-01-29 PROCEDURE — 77030040922 HC BLNKT HYPOTHRM STRY -A

## 2020-01-29 PROCEDURE — 77030002916 HC SUT ETHLN J&J -A: Performed by: SURGERY

## 2020-01-29 PROCEDURE — 74011000250 HC RX REV CODE- 250: Performed by: NURSE ANESTHETIST, CERTIFIED REGISTERED

## 2020-01-29 PROCEDURE — 74011250636 HC RX REV CODE- 250/636: Performed by: SURGERY

## 2020-01-29 PROCEDURE — 74011000250 HC RX REV CODE- 250: Performed by: SURGERY

## 2020-01-29 PROCEDURE — 77030008771 HC TU NG SALEM SUMP -A: Performed by: NURSE ANESTHETIST, CERTIFIED REGISTERED

## 2020-01-29 PROCEDURE — 77030008684 HC TU ET CUF COVD -B: Performed by: NURSE ANESTHETIST, CERTIFIED REGISTERED

## 2020-01-29 PROCEDURE — 77030018875 HC APPL CLP LIG4 J&J -B: Performed by: SURGERY

## 2020-01-29 PROCEDURE — 74011250636 HC RX REV CODE- 250/636: Performed by: NURSE ANESTHETIST, CERTIFIED REGISTERED

## 2020-01-29 PROCEDURE — 77030020256 HC SOL INJ NACL 0.9%  500ML: Performed by: SURGERY

## 2020-01-29 PROCEDURE — 77030008606 HC TRCR ENDOSC KII AMR -B: Performed by: SURGERY

## 2020-01-29 PROCEDURE — 74011636320 HC RX REV CODE- 636/320: Performed by: SURGERY

## 2020-01-29 PROCEDURE — 77030011640 HC PAD GRND REM COVD -A: Performed by: SURGERY

## 2020-01-29 PROCEDURE — 77030010507 HC ADH SKN DERMBND J&J -B: Performed by: SURGERY

## 2020-01-29 PROCEDURE — 77030040361 HC SLV COMPR DVT MDII -B: Performed by: SURGERY

## 2020-01-29 PROCEDURE — 77030021678 HC GLIDESCP STAT DISP VERT -B: Performed by: NURSE ANESTHETIST, CERTIFIED REGISTERED

## 2020-01-29 PROCEDURE — 77030002933 HC SUT MCRYL J&J -A: Performed by: SURGERY

## 2020-01-29 PROCEDURE — 77030008756 HC TU IRR SUC STRY -B: Performed by: SURGERY

## 2020-01-29 PROCEDURE — 77030040506 HC DRN WND MDII -A: Performed by: SURGERY

## 2020-01-29 PROCEDURE — 99218 HC RM OBSERVATION: CPT

## 2020-01-29 PROCEDURE — 77030010837 HC CATH CHOL INTRO TELE -B: Performed by: SURGERY

## 2020-01-29 PROCEDURE — 77030020053 HC ELECTRD LAPSCP COVD -B: Performed by: SURGERY

## 2020-01-29 PROCEDURE — 77030037032 HC INSRT SCIS CLICKLLINE DISP STOR -B: Performed by: SURGERY

## 2020-01-29 PROCEDURE — 77030013079 HC BLNKT BAIR HGGR 3M -A: Performed by: NURSE ANESTHETIST, CERTIFIED REGISTERED

## 2020-01-29 PROCEDURE — 77030020829: Performed by: SURGERY

## 2020-01-29 PROCEDURE — 74011250636 HC RX REV CODE- 250/636: Performed by: ANESTHESIOLOGY

## 2020-01-29 PROCEDURE — 76060000033 HC ANESTHESIA 1 TO 1.5 HR: Performed by: SURGERY

## 2020-01-29 PROCEDURE — 77030026438 HC STYL ET INTUB CARD -A: Performed by: NURSE ANESTHETIST, CERTIFIED REGISTERED

## 2020-01-29 PROCEDURE — 74011250637 HC RX REV CODE- 250/637: Performed by: SURGERY

## 2020-01-29 PROCEDURE — 74011250636 HC RX REV CODE- 250/636

## 2020-01-29 PROCEDURE — 77030012770 HC TRCR OPT FX AMR -B: Performed by: SURGERY

## 2020-01-29 PROCEDURE — 81025 URINE PREGNANCY TEST: CPT

## 2020-01-29 PROCEDURE — 74300 X-RAY BILE DUCTS/PANCREAS: CPT

## 2020-01-29 PROCEDURE — 77030031139 HC SUT VCRL2 J&J -A: Performed by: SURGERY

## 2020-01-29 RX ORDER — MORPHINE SULFATE 10 MG/ML
2 INJECTION, SOLUTION INTRAMUSCULAR; INTRAVENOUS
Status: DISCONTINUED | OUTPATIENT
Start: 2020-01-29 | End: 2020-01-29 | Stop reason: HOSPADM

## 2020-01-29 RX ORDER — DEXAMETHASONE SODIUM PHOSPHATE 4 MG/ML
INJECTION, SOLUTION INTRA-ARTICULAR; INTRALESIONAL; INTRAMUSCULAR; INTRAVENOUS; SOFT TISSUE AS NEEDED
Status: DISCONTINUED | OUTPATIENT
Start: 2020-01-29 | End: 2020-01-29 | Stop reason: HOSPADM

## 2020-01-29 RX ORDER — ONDANSETRON 2 MG/ML
4 INJECTION INTRAMUSCULAR; INTRAVENOUS AS NEEDED
Status: DISCONTINUED | OUTPATIENT
Start: 2020-01-29 | End: 2020-01-29 | Stop reason: HOSPADM

## 2020-01-29 RX ORDER — KETOROLAC TROMETHAMINE 10 MG/1
10 TABLET, FILM COATED ORAL
Status: DISCONTINUED | OUTPATIENT
Start: 2020-01-29 | End: 2020-01-31 | Stop reason: HOSPADM

## 2020-01-29 RX ORDER — NALOXONE HYDROCHLORIDE 0.4 MG/ML
0.4 INJECTION, SOLUTION INTRAMUSCULAR; INTRAVENOUS; SUBCUTANEOUS AS NEEDED
Status: DISCONTINUED | OUTPATIENT
Start: 2020-01-29 | End: 2020-01-31 | Stop reason: HOSPADM

## 2020-01-29 RX ORDER — FAMOTIDINE 10 MG/ML
INJECTION INTRAVENOUS AS NEEDED
Status: DISCONTINUED | OUTPATIENT
Start: 2020-01-29 | End: 2020-01-29 | Stop reason: HOSPADM

## 2020-01-29 RX ORDER — SODIUM CHLORIDE 0.9 % (FLUSH) 0.9 %
5-40 SYRINGE (ML) INJECTION EVERY 8 HOURS
Status: DISCONTINUED | OUTPATIENT
Start: 2020-01-29 | End: 2020-01-29 | Stop reason: HOSPADM

## 2020-01-29 RX ORDER — ONDANSETRON 2 MG/ML
INJECTION INTRAMUSCULAR; INTRAVENOUS AS NEEDED
Status: DISCONTINUED | OUTPATIENT
Start: 2020-01-29 | End: 2020-01-29 | Stop reason: HOSPADM

## 2020-01-29 RX ORDER — SODIUM CHLORIDE 0.9 % (FLUSH) 0.9 %
5-40 SYRINGE (ML) INJECTION EVERY 8 HOURS
Status: DISCONTINUED | OUTPATIENT
Start: 2020-01-29 | End: 2020-01-31 | Stop reason: HOSPADM

## 2020-01-29 RX ORDER — HYDROCODONE BITARTRATE AND ACETAMINOPHEN 5; 325 MG/1; MG/1
1-2 TABLET ORAL
Qty: 30 TAB | Refills: 0 | Status: SHIPPED | OUTPATIENT
Start: 2020-01-29 | End: 2020-02-03

## 2020-01-29 RX ORDER — LIDOCAINE HYDROCHLORIDE 20 MG/ML
INJECTION, SOLUTION EPIDURAL; INFILTRATION; INTRACAUDAL; PERINEURAL AS NEEDED
Status: DISCONTINUED | OUTPATIENT
Start: 2020-01-29 | End: 2020-01-29 | Stop reason: HOSPADM

## 2020-01-29 RX ORDER — SUCCINYLCHOLINE CHLORIDE 20 MG/ML
INJECTION INTRAMUSCULAR; INTRAVENOUS AS NEEDED
Status: DISCONTINUED | OUTPATIENT
Start: 2020-01-29 | End: 2020-01-29 | Stop reason: HOSPADM

## 2020-01-29 RX ORDER — MIDAZOLAM HYDROCHLORIDE 1 MG/ML
INJECTION, SOLUTION INTRAMUSCULAR; INTRAVENOUS AS NEEDED
Status: DISCONTINUED | OUTPATIENT
Start: 2020-01-29 | End: 2020-01-29 | Stop reason: HOSPADM

## 2020-01-29 RX ORDER — SODIUM CHLORIDE 0.9 % (FLUSH) 0.9 %
5-40 SYRINGE (ML) INJECTION AS NEEDED
Status: DISCONTINUED | OUTPATIENT
Start: 2020-01-29 | End: 2020-01-29 | Stop reason: HOSPADM

## 2020-01-29 RX ORDER — DIPHENHYDRAMINE HYDROCHLORIDE 50 MG/ML
12.5 INJECTION, SOLUTION INTRAMUSCULAR; INTRAVENOUS
Status: DISCONTINUED | OUTPATIENT
Start: 2020-01-29 | End: 2020-01-29 | Stop reason: HOSPADM

## 2020-01-29 RX ORDER — POLYETHYLENE GLYCOL 3350 17 G/17G
17 POWDER, FOR SOLUTION ORAL DAILY
Qty: 510 G | Refills: 0 | Status: SHIPPED | OUTPATIENT
Start: 2020-01-29

## 2020-01-29 RX ORDER — HYDROCODONE BITARTRATE AND ACETAMINOPHEN 5; 325 MG/1; MG/1
1-2 TABLET ORAL
Status: DISCONTINUED | OUTPATIENT
Start: 2020-01-29 | End: 2020-01-31 | Stop reason: HOSPADM

## 2020-01-29 RX ORDER — HYDROMORPHONE HYDROCHLORIDE 1 MG/ML
.5-1 INJECTION, SOLUTION INTRAMUSCULAR; INTRAVENOUS; SUBCUTANEOUS
Status: DISCONTINUED | OUTPATIENT
Start: 2020-01-29 | End: 2020-01-31 | Stop reason: HOSPADM

## 2020-01-29 RX ORDER — GLYCOPYRROLATE 0.2 MG/ML
INJECTION INTRAMUSCULAR; INTRAVENOUS AS NEEDED
Status: DISCONTINUED | OUTPATIENT
Start: 2020-01-29 | End: 2020-01-29 | Stop reason: HOSPADM

## 2020-01-29 RX ORDER — HYDROMORPHONE HYDROCHLORIDE 2 MG/ML
INJECTION, SOLUTION INTRAMUSCULAR; INTRAVENOUS; SUBCUTANEOUS AS NEEDED
Status: DISCONTINUED | OUTPATIENT
Start: 2020-01-29 | End: 2020-01-29 | Stop reason: HOSPADM

## 2020-01-29 RX ORDER — HYDROCODONE BITARTRATE AND ACETAMINOPHEN 5; 325 MG/1; MG/1
2 TABLET ORAL
Status: COMPLETED | OUTPATIENT
Start: 2020-01-29 | End: 2020-01-29

## 2020-01-29 RX ORDER — SODIUM CHLORIDE, SODIUM LACTATE, POTASSIUM CHLORIDE, CALCIUM CHLORIDE 600; 310; 30; 20 MG/100ML; MG/100ML; MG/100ML; MG/100ML
25 INJECTION, SOLUTION INTRAVENOUS CONTINUOUS
Status: DISCONTINUED | OUTPATIENT
Start: 2020-01-29 | End: 2020-01-29 | Stop reason: HOSPADM

## 2020-01-29 RX ORDER — CHOLECALCIFEROL (VITAMIN D3) 125 MCG
CAPSULE ORAL
COMMUNITY

## 2020-01-29 RX ORDER — IBUPROFEN 600 MG/1
600 TABLET ORAL
Qty: 21 TAB | Refills: 0 | Status: SHIPPED | OUTPATIENT
Start: 2020-01-29

## 2020-01-29 RX ORDER — ONDANSETRON 4 MG/1
4 TABLET, ORALLY DISINTEGRATING ORAL
Status: DISCONTINUED | OUTPATIENT
Start: 2020-01-29 | End: 2020-01-31 | Stop reason: HOSPADM

## 2020-01-29 RX ORDER — HYDROMORPHONE HYDROCHLORIDE 1 MG/ML
INJECTION, SOLUTION INTRAMUSCULAR; INTRAVENOUS; SUBCUTANEOUS
Status: COMPLETED
Start: 2020-01-29 | End: 2020-01-29

## 2020-01-29 RX ORDER — SODIUM CHLORIDE 9 MG/ML
75 INJECTION, SOLUTION INTRAVENOUS CONTINUOUS
Status: DISCONTINUED | OUTPATIENT
Start: 2020-01-29 | End: 2020-01-31 | Stop reason: HOSPADM

## 2020-01-29 RX ORDER — BUPIVACAINE HYDROCHLORIDE AND EPINEPHRINE 5; 5 MG/ML; UG/ML
INJECTION, SOLUTION EPIDURAL; INTRACAUDAL; PERINEURAL AS NEEDED
Status: DISCONTINUED | OUTPATIENT
Start: 2020-01-29 | End: 2020-01-29 | Stop reason: HOSPADM

## 2020-01-29 RX ORDER — ROCURONIUM BROMIDE 10 MG/ML
INJECTION, SOLUTION INTRAVENOUS AS NEEDED
Status: DISCONTINUED | OUTPATIENT
Start: 2020-01-29 | End: 2020-01-29 | Stop reason: HOSPADM

## 2020-01-29 RX ORDER — SODIUM CHLORIDE 0.9 % (FLUSH) 0.9 %
5-40 SYRINGE (ML) INJECTION AS NEEDED
Status: DISCONTINUED | OUTPATIENT
Start: 2020-01-29 | End: 2020-01-31 | Stop reason: HOSPADM

## 2020-01-29 RX ORDER — FAMOTIDINE 10 MG/ML
INJECTION INTRAVENOUS
Status: COMPLETED
Start: 2020-01-29 | End: 2020-01-29

## 2020-01-29 RX ORDER — KETOROLAC TROMETHAMINE 30 MG/ML
INJECTION, SOLUTION INTRAMUSCULAR; INTRAVENOUS
Status: DISPENSED
Start: 2020-01-29 | End: 2020-01-30

## 2020-01-29 RX ORDER — ONDANSETRON 2 MG/ML
4 INJECTION INTRAMUSCULAR; INTRAVENOUS
Status: DISCONTINUED | OUTPATIENT
Start: 2020-01-29 | End: 2020-01-31 | Stop reason: HOSPADM

## 2020-01-29 RX ORDER — PROPOFOL 10 MG/ML
INJECTION, EMULSION INTRAVENOUS AS NEEDED
Status: DISCONTINUED | OUTPATIENT
Start: 2020-01-29 | End: 2020-01-29 | Stop reason: HOSPADM

## 2020-01-29 RX ORDER — HYDROMORPHONE HYDROCHLORIDE 1 MG/ML
1 INJECTION, SOLUTION INTRAMUSCULAR; INTRAVENOUS; SUBCUTANEOUS ONCE
Status: COMPLETED | OUTPATIENT
Start: 2020-01-29 | End: 2020-01-29

## 2020-01-29 RX ORDER — NALOXONE HYDROCHLORIDE 4 MG/.1ML
SPRAY NASAL
Qty: 2 EACH | Refills: 0 | Status: SHIPPED | OUTPATIENT
Start: 2020-01-29 | End: 2022-04-05

## 2020-01-29 RX ORDER — FENTANYL CITRATE 50 UG/ML
25 INJECTION, SOLUTION INTRAMUSCULAR; INTRAVENOUS
Status: COMPLETED | OUTPATIENT
Start: 2020-01-29 | End: 2020-01-29

## 2020-01-29 RX ORDER — ENOXAPARIN SODIUM 100 MG/ML
40 INJECTION SUBCUTANEOUS EVERY 24 HOURS
Status: DISCONTINUED | OUTPATIENT
Start: 2020-01-30 | End: 2020-01-31 | Stop reason: HOSPADM

## 2020-01-29 RX ORDER — POLYETHYLENE GLYCOL 3350 17 G/17G
17 POWDER, FOR SOLUTION ORAL DAILY
Status: DISCONTINUED | OUTPATIENT
Start: 2020-01-30 | End: 2020-01-31 | Stop reason: HOSPADM

## 2020-01-29 RX ORDER — FENTANYL CITRATE 50 UG/ML
INJECTION, SOLUTION INTRAMUSCULAR; INTRAVENOUS AS NEEDED
Status: DISCONTINUED | OUTPATIENT
Start: 2020-01-29 | End: 2020-01-29 | Stop reason: HOSPADM

## 2020-01-29 RX ORDER — ALBUTEROL SULFATE 0.83 MG/ML
2.5 SOLUTION RESPIRATORY (INHALATION) ONCE
Status: ACTIVE | OUTPATIENT
Start: 2020-01-29 | End: 2020-01-30

## 2020-01-29 RX ORDER — KETOROLAC TROMETHAMINE 30 MG/ML
30 INJECTION, SOLUTION INTRAMUSCULAR; INTRAVENOUS
Status: COMPLETED | OUTPATIENT
Start: 2020-01-29 | End: 2020-01-29

## 2020-01-29 RX ORDER — NEOSTIGMINE METHYLSULFATE 1 MG/ML
INJECTION INTRAVENOUS AS NEEDED
Status: DISCONTINUED | OUTPATIENT
Start: 2020-01-29 | End: 2020-01-29 | Stop reason: HOSPADM

## 2020-01-29 RX ORDER — LIDOCAINE HYDROCHLORIDE 10 MG/ML
0.1 INJECTION, SOLUTION EPIDURAL; INFILTRATION; INTRACAUDAL; PERINEURAL AS NEEDED
Status: DISCONTINUED | OUTPATIENT
Start: 2020-01-29 | End: 2020-01-29 | Stop reason: HOSPADM

## 2020-01-29 RX ORDER — PANTOPRAZOLE SODIUM 40 MG/1
40 TABLET, DELAYED RELEASE ORAL
Status: DISCONTINUED | OUTPATIENT
Start: 2020-01-30 | End: 2020-01-31 | Stop reason: HOSPADM

## 2020-01-29 RX ADMIN — FAMOTIDINE 20 MG: 10 INJECTION INTRAVENOUS at 16:01

## 2020-01-29 RX ADMIN — FENTANYL CITRATE 25 MCG: 50 INJECTION INTRAMUSCULAR; INTRAVENOUS at 17:00

## 2020-01-29 RX ADMIN — SUCCINYLCHOLINE CHLORIDE 180 MG: 20 INJECTION, SOLUTION INTRAMUSCULAR; INTRAVENOUS at 16:08

## 2020-01-29 RX ADMIN — SODIUM CHLORIDE 75 ML/HR: 900 INJECTION, SOLUTION INTRAVENOUS at 20:14

## 2020-01-29 RX ADMIN — KETOROLAC TROMETHAMINE 30 MG: 30 INJECTION, SOLUTION INTRAMUSCULAR at 18:38

## 2020-01-29 RX ADMIN — MORPHINE SULFATE 2 MG: 10 INJECTION INTRAVENOUS at 20:22

## 2020-01-29 RX ADMIN — FENTANYL CITRATE 50 MCG: 50 INJECTION INTRAMUSCULAR; INTRAVENOUS at 17:07

## 2020-01-29 RX ADMIN — FENTANYL CITRATE 25 MCG: 50 INJECTION, SOLUTION INTRAMUSCULAR; INTRAVENOUS at 17:33

## 2020-01-29 RX ADMIN — HYDROMORPHONE HYDROCHLORIDE 1 MG: 1 INJECTION, SOLUTION INTRAMUSCULAR; INTRAVENOUS; SUBCUTANEOUS at 18:21

## 2020-01-29 RX ADMIN — FENTANYL CITRATE 25 MCG: 50 INJECTION, SOLUTION INTRAMUSCULAR; INTRAVENOUS at 17:28

## 2020-01-29 RX ADMIN — NEOSTIGMINE METHYLSULFATE 5 MG: 1 INJECTION INTRAVENOUS at 16:51

## 2020-01-29 RX ADMIN — FENTANYL CITRATE 25 MCG: 50 INJECTION, SOLUTION INTRAMUSCULAR; INTRAVENOUS at 17:51

## 2020-01-29 RX ADMIN — LIDOCAINE HYDROCHLORIDE 100 MG: 20 INJECTION, SOLUTION INTRAVENOUS at 16:08

## 2020-01-29 RX ADMIN — FENTANYL CITRATE 25 MCG: 50 INJECTION, SOLUTION INTRAMUSCULAR; INTRAVENOUS at 17:40

## 2020-01-29 RX ADMIN — HYDROCODONE BITARTRATE AND ACETAMINOPHEN 2 TABLET: 5; 325 TABLET ORAL at 19:07

## 2020-01-29 RX ADMIN — MORPHINE SULFATE 2 MG: 10 INJECTION INTRAVENOUS at 20:49

## 2020-01-29 RX ADMIN — DEXAMETHASONE SODIUM PHOSPHATE 8 MG: 4 INJECTION, SOLUTION INTRAMUSCULAR; INTRAVENOUS at 16:29

## 2020-01-29 RX ADMIN — FENTANYL CITRATE 25 MCG: 50 INJECTION, SOLUTION INTRAMUSCULAR; INTRAVENOUS at 18:09

## 2020-01-29 RX ADMIN — Medication 10 ML: at 22:37

## 2020-01-29 RX ADMIN — FENTANYL CITRATE 100 MCG: 50 INJECTION INTRAMUSCULAR; INTRAVENOUS at 16:08

## 2020-01-29 RX ADMIN — FENTANYL CITRATE 25 MCG: 50 INJECTION, SOLUTION INTRAMUSCULAR; INTRAVENOUS at 17:58

## 2020-01-29 RX ADMIN — SODIUM CHLORIDE, SODIUM LACTATE, POTASSIUM CHLORIDE, AND CALCIUM CHLORIDE 25 ML/HR: 600; 310; 30; 20 INJECTION, SOLUTION INTRAVENOUS at 15:41

## 2020-01-29 RX ADMIN — WATER 2 G: 1 INJECTION INTRAMUSCULAR; INTRAVENOUS; SUBCUTANEOUS at 16:10

## 2020-01-29 RX ADMIN — MEPERIDINE HYDROCHLORIDE 12.5 MG: 25 INJECTION INTRAMUSCULAR; INTRAVENOUS; SUBCUTANEOUS at 17:47

## 2020-01-29 RX ADMIN — MEPERIDINE HYDROCHLORIDE 12.5 MG: 25 INJECTION INTRAMUSCULAR; INTRAVENOUS; SUBCUTANEOUS at 18:05

## 2020-01-29 RX ADMIN — ROCURONIUM BROMIDE 25 MG: 10 INJECTION INTRAVENOUS at 16:18

## 2020-01-29 RX ADMIN — FENTANYL CITRATE 25 MCG: 50 INJECTION, SOLUTION INTRAMUSCULAR; INTRAVENOUS at 17:43

## 2020-01-29 RX ADMIN — GLYCOPYRROLATE 0.6 MG: 0.2 INJECTION, SOLUTION INTRAMUSCULAR; INTRAVENOUS at 16:51

## 2020-01-29 RX ADMIN — HYDROMORPHONE HYDROCHLORIDE 0.4 MG: 2 INJECTION, SOLUTION INTRAMUSCULAR; INTRAVENOUS; SUBCUTANEOUS at 16:51

## 2020-01-29 RX ADMIN — Medication 3 AMPULE: at 16:00

## 2020-01-29 RX ADMIN — FENTANYL CITRATE 25 MCG: 50 INJECTION INTRAMUSCULAR; INTRAVENOUS at 16:50

## 2020-01-29 RX ADMIN — HYDROMORPHONE HYDROCHLORIDE 0.4 MG: 2 INJECTION, SOLUTION INTRAMUSCULAR; INTRAVENOUS; SUBCUTANEOUS at 17:14

## 2020-01-29 RX ADMIN — HYDROMORPHONE HYDROCHLORIDE 1 MG: 1 INJECTION, SOLUTION INTRAMUSCULAR; INTRAVENOUS; SUBCUTANEOUS at 22:36

## 2020-01-29 RX ADMIN — ONDANSETRON HYDROCHLORIDE 4 MG: 2 INJECTION, SOLUTION INTRAMUSCULAR; INTRAVENOUS at 16:01

## 2020-01-29 RX ADMIN — FENTANYL CITRATE 25 MCG: 50 INJECTION, SOLUTION INTRAMUSCULAR; INTRAVENOUS at 18:03

## 2020-01-29 RX ADMIN — PROPOFOL 200 MG: 10 INJECTION, EMULSION INTRAVENOUS at 16:08

## 2020-01-29 RX ADMIN — ROCURONIUM BROMIDE 5 MG: 10 INJECTION INTRAVENOUS at 16:08

## 2020-01-29 RX ADMIN — MIDAZOLAM HYDROCHLORIDE 4 MG: 1 INJECTION INTRAMUSCULAR; INTRAVENOUS at 16:01

## 2020-01-29 RX ADMIN — FENTANYL CITRATE 50 MCG: 50 INJECTION INTRAMUSCULAR; INTRAVENOUS at 16:25

## 2020-01-29 NOTE — INTERVAL H&P NOTE
H&P Update:  Xiomara Leung was seen and examined. History and physical has been reviewed. The patient has been examined.  There have been no significant clinical changes since the completion of the originally dated History and Physical.

## 2020-01-29 NOTE — BRIEF OP NOTE
BRIEF OPERATIVE NOTE    Date of Procedure: 1/29/2020   Preoperative Diagnosis: BILIARY DYSKINESIA  Postoperative Diagnosis: BILIARY DYSKINESIA    Procedure(s):  CHOLECYSTECTOMY LAPAROSCOPIC WITH GRAMS  Surgeon(s) and Role:     * Lydia Olvera MD - Primary         Surgical Assistant: staff    Surgical Staff:  Circ-1: Cristel Esquivel RN  Scrub Tech-1: Shakir Brink  Surg Asst-1: Joslyn Weaver  Event Time In Time Out   Incision Start 1621    Incision Close       Anesthesia: General   Estimated Blood Loss: min  Specimens:   ID Type Source Tests Collected by Time Destination   1 : Galbladder Preservative Gallbladder  Lydia Olvera MD 1/29/2020 1625 Pathology

## 2020-01-29 NOTE — ANESTHESIA PREPROCEDURE EVALUATION
Anesthetic History   No history of anesthetic complications            Review of Systems / Medical History  Patient summary reviewed, nursing notes reviewed and pertinent labs reviewed    Pulmonary            Asthma : well controlled    Comments: Seasonal asthma   Neuro/Psych         Headaches    Comments: Vertigo Cardiovascular  Within defined limits                Exercise tolerance: >4 METS     GI/Hepatic/Renal     GERD: well controlled    Renal disease: stones  PUD     Endo/Other        Obesity     Other Findings   Comments: PCOS (polycystic ovarian syndrome)    Renal colic on left side    Biliary dyskinesia           Physical Exam    Airway  Mallampati: I  TM Distance: 4 - 6 cm  Neck ROM: normal range of motion   Mouth opening: Normal     Cardiovascular    Rhythm: regular  Rate: normal         Dental    Dentition: Upper dentition intact and Lower dentition intact     Pulmonary  Breath sounds clear to auscultation               Abdominal  GI exam deferred       Other Findings            Anesthetic Plan    ASA: 2  Anesthesia type: general    Monitoring Plan: BIS      Induction: Intravenous and RSI  Anesthetic plan and risks discussed with: Patient      Previous Desir intubation here for cystoscopy.   Hcg-  Has had some reflux last night and this morning even though she is NPO

## 2020-01-29 NOTE — PERIOP NOTES
Tru Jara from Operating Room to PACU    Report received from [address] [facility] and Juan Che CRNA regarding Levell Madhu. Surgeon(s):  Samantha Mcdaniels MD  And Procedure(s) (LRB):  CHOLECYSTECTOMY LAPAROSCOPIC WITH GRAMS (N/A)  confirmed   with allergies, drains and dressings discussed. Anesthesia type, drugs, patient history, complications, estimated blood loss, vital signs, intake and output, and last pain medication, lines, reversal medications and temperature were reviewed. 1815 Updated Dr Rosalia Khan on patient's uncontrolled pain. Patient is stating pain medicines \"have not touched the pain\". Still rating pain 8-10/10. Order received for one time dose 1mg IV dialudid. Via [facility] 67 Dr Fito Burdick of patient's uncontrolled pain. Patient still stating pain medicines \"have not touched the pain\". Order received for one dose 30mg IV toradol and 2 Norco tablets. 1845 Family updated. Dr Rosalia Khan in to see patient. 5652 Patient dosing off and on but when awake patient moaning stating that \"it hurts\" rating pain 8-10/10.     1945 Spoke with Dr Fito Burdick and made him aware of patient's uncontrolled pain and inability to wean off oxygen. Plan to admit overnight, patient placement made aware, family made aware. 2005 Updated Dr Rosalia Khan on plan to admit patient overnight. 2045 Family updated on bed status pending. 2115 TRANSFER - OUT REPORT:    Verbal report given to Tiff Cruz RN(name) on Levell Madhu  being transferred to [facility] for routine post - op       Report consisted of patients Situation, Background, Assessment and   Recommendations(SBAR). Information from the following report(s) SBAR, Kardex, OR Summary, Procedure Summary, Intake/Output and MAR was reviewed with the receiving nurse. Opportunity for questions and clarification was provided.       Patient transported with:   O2 @ 2 liters  Registered Nurse   Patient belongings  Patient Chart    2120 Family made aware of bed assignment 2110.

## 2020-01-30 PROCEDURE — 74011250637 HC RX REV CODE- 250/637: Performed by: NURSE PRACTITIONER

## 2020-01-30 PROCEDURE — 96374 THER/PROPH/DIAG INJ IV PUSH: CPT

## 2020-01-30 PROCEDURE — 99218 HC RM OBSERVATION: CPT

## 2020-01-30 PROCEDURE — 74011250636 HC RX REV CODE- 250/636: Performed by: SURGERY

## 2020-01-30 PROCEDURE — 74011250637 HC RX REV CODE- 250/637: Performed by: SURGERY

## 2020-01-30 PROCEDURE — 94760 N-INVAS EAR/PLS OXIMETRY 1: CPT

## 2020-01-30 PROCEDURE — 77010033678 HC OXYGEN DAILY

## 2020-01-30 RX ORDER — SCOLOPAMINE TRANSDERMAL SYSTEM 1 MG/1
1 PATCH, EXTENDED RELEASE TRANSDERMAL ONCE
Status: DISCONTINUED | OUTPATIENT
Start: 2020-01-30 | End: 2020-01-31 | Stop reason: HOSPADM

## 2020-01-30 RX ADMIN — Medication 10 ML: at 20:11

## 2020-01-30 RX ADMIN — SODIUM CHLORIDE 75 ML/HR: 900 INJECTION, SOLUTION INTRAVENOUS at 09:08

## 2020-01-30 RX ADMIN — HYDROMORPHONE HYDROCHLORIDE 1 MG: 1 INJECTION, SOLUTION INTRAMUSCULAR; INTRAVENOUS; SUBCUTANEOUS at 06:09

## 2020-01-30 RX ADMIN — POLYETHYLENE GLYCOL 3350 17 G: 17 POWDER, FOR SOLUTION ORAL at 20:10

## 2020-01-30 RX ADMIN — HYDROMORPHONE HYDROCHLORIDE 1 MG: 1 INJECTION, SOLUTION INTRAMUSCULAR; INTRAVENOUS; SUBCUTANEOUS at 04:09

## 2020-01-30 RX ADMIN — ONDANSETRON 4 MG: 2 INJECTION INTRAMUSCULAR; INTRAVENOUS at 09:08

## 2020-01-30 RX ADMIN — HYDROMORPHONE HYDROCHLORIDE 0.5 MG: 1 INJECTION, SOLUTION INTRAMUSCULAR; INTRAVENOUS; SUBCUTANEOUS at 20:11

## 2020-01-30 RX ADMIN — KETOROLAC TROMETHAMINE 10 MG: 10 TABLET, FILM COATED ORAL at 14:09

## 2020-01-30 RX ADMIN — SODIUM CHLORIDE 75 ML/HR: 900 INJECTION, SOLUTION INTRAVENOUS at 21:00

## 2020-01-30 RX ADMIN — ENOXAPARIN SODIUM 40 MG: 40 INJECTION SUBCUTANEOUS at 06:09

## 2020-01-30 RX ADMIN — ONDANSETRON 4 MG: 2 INJECTION INTRAMUSCULAR; INTRAVENOUS at 03:42

## 2020-01-30 RX ADMIN — HYDROMORPHONE HYDROCHLORIDE 1 MG: 1 INJECTION, SOLUTION INTRAMUSCULAR; INTRAVENOUS; SUBCUTANEOUS at 23:26

## 2020-01-30 RX ADMIN — Medication 10 ML: at 14:09

## 2020-01-30 RX ADMIN — HYDROMORPHONE HYDROCHLORIDE 1 MG: 1 INJECTION, SOLUTION INTRAMUSCULAR; INTRAVENOUS; SUBCUTANEOUS at 00:42

## 2020-01-30 NOTE — PROGRESS NOTES
Plan:  -Home with family   -Family transport at d/c       4:16PM  CM in to see pt. Pt in obs status. State notice provided, explained, signed, and placed on chart. Attempt to schedule PCP f/u appt but office closed. Family at bedside to transport. Plan for d/c after supper if able to tolerate diet. Pt ready for d/c from CM perspective. CM available for any additional needs.        Shanell Enriquez MSW  Care Manager

## 2020-01-30 NOTE — OP NOTES
DATE OF PROCEDURE:  1/29/2020     PREOPERATIVE DIAGNOSIS:   Biliary dyskinesia    POSTOPERATIVE DIAGNOSIS:   Same    OPERATIVE PROCEDURE:  Laparoscopic cholecystectomy with cholangiogram, interpretation of cholangiogram (CPT 95470 [id number])    SURGEON:  Donalee Leventhal, MD    ANESTHESIA:  General endotracheal.    EBL: minimal    DRAINS: 19 Fr. Round Hemoduct drain    SPECIMENS:   ID Type Source Tests Collected by Time Destination   1 : Galbladder Preservative Gallbladder  Eileen Pollock MD 1/29/2020 9984 Pathology        FINDINGS:  Chronic inflammation of the gallbladder. No CBD filling defect on cholangiogram.    INDICATIONS:  RUQ and epigastric pain. Abnormal gallbladder EF on HIDA with exact reproduction of post-prandial pain with CCK administration. DESCRIPTION OF PROCEDURE:  After obtaining informed consent, the patient was taken to the operating room and placed supine on the operating table. An operative time-out was performed, and general endotracheal anesthesia was induced. Preoperative antibiotics were administered, and the abdomen was prepped and draped in the usual sterile fashion. The abdomen was then entered just above the umbilicus using a 5-mm optical trocar. The abdomen was the insufflated without incident and was visually explored. There was no other visible pathology noted. Two right upper quadrant 5-mm ports were placed under direct vision, followed by a 12-mm port in the subxiphoid position. Local anesthetic was infiltrated at the port sites prior to placement. The gallbladder fundus was then grasped and retracted cephalad over the liver. The triangle of Calot was exposed, and the cystic duct and artery were skeletonized using a combination of blunt dissection with a Maryland dissector and hook electrocautery. The cystic artery was then divided between clips with 2 clips left on the patient side.   The cystic duct was then traced from the gallbladder infundibulum into its insertion into the portal triad. The cystic duct was swept with a grasper up into the gallbladder. A clip was placed on the gallbladder infundibulum and an incision in the cystic duct adjacent to the gallbladder infundibulum was made with meghan and the cholangiogram catheter was inserted and the cholangiogram was performed with the above noted findings. The cystic duct was then divided between clips directly adjacent to the gallbladder infundibulum, with 3 clips left on the patient's side. The gallbladder was then removed from the liver bed using hook electrocautery. It was removed from the abdominal cavity using a bag through the subxiphoid incision. The liver bed was inspected for hemostasis, and excellent hemostasis was achieved with minimal electrocautery. The clips on the cystic duct and artery were again visualized and were intact. The area below and lateral to the liver was suction irrigated until clear. A 19 Fr round Hemoduct drain was left below the liver and made to leave the peritoneum via the lateral-most insicion. It was sutured to the skin with a Nylon suture. The right upper quadrant ports were removed under direct vision and both were hemostatic. The abdomen was then desufflated through the subxiphoid port under direct vision via the umbilical port. These ports were subsequently removed and the fascial defect at the 12-mm incision was closed with an interrupted 0 Vicryl suture. The incisions were irrigated with sterile saline and made hemostatic with minimal electrocautery. They were closed with buried interrupted 4-0 Monocryl sutures, and dressed with sterile dressing. The patient was recovered from general anesthesia and taken to the recovery area in satisfactory condition. All instrument, sponge, and needle counts were reported as correct.     Crystal Brown MD

## 2020-01-30 NOTE — PROGRESS NOTES
2118  TRANSFER - IN REPORT:    Verbal report received from Susy(name) on Abel Grewal  being received from PACU(unit) for routine progression of care      Report consisted of patients Situation, Background, Assessment and   Recommendations(SBAR). Information from the following report(s) SBAR, OR Summary, Intake/Output and MAR was reviewed with the receiving nurse. Opportunity for questions and clarification was provided. 2139 patient arrived to unit and care assumed. 2230 called pharmacy to verify medications    2236 Assessment completed, PRN dilaudid given for pt pain 10 out of 10.    0038 pt complains of 8 out of 10 pain, pt states she feels nausea at this time, PRN dilaudid given    0337 pt ambulated to bathroom to void, pt willing to try oral pain medication for 7 out of 10 pain, prn zofran given and crackers given prior to oral pain medication. 0403 pt able to eat one cracker, oral pain medication given to pt, but once in pt hand pt vomited into emesis bag, oral pain medications wasted with second RN    0409 PRN Dilaudid given. 0745 Bedside and Verbal shift change report given to Neil Valerio (oncoming nurse) by Virginia Rincon (offgoing nurse). Report included the following information SBAR, OR Summary, Intake/Output and MAR. Problem: Falls - Risk of  Goal: *Absence of Falls  Description  Document Hans Felix Fall Risk and appropriate interventions in the flowsheet.   Outcome: Progressing Towards Goal  Note: Fall Risk Interventions:    Mentation Interventions: Door open when patient unattended    Medication Interventions: Teach patient to arise slowly    Elimination Interventions: Patient to call for help with toileting needs    History of Falls Interventions: Door open when patient unattended

## 2020-01-30 NOTE — ANESTHESIA POSTPROCEDURE EVALUATION
Procedure(s):  CHOLECYSTECTOMY LAPAROSCOPIC WITH GRAMS. general    Anesthesia Post Evaluation        Patient location during evaluation: PACU  Note status: Adequate. Level of consciousness: responsive to verbal stimuli and sleepy but conscious  Pain management: satisfactory to patient  Airway patency: patent  Anesthetic complications: no  Cardiovascular status: acceptable  Respiratory status: acceptable  Hydration status: acceptable  Comments: +Post-Anesthesia Evaluation and Assessment    Patient: Gordon Oliver MRN: [id number]  SSN: [id number]   YOB: 1990  Age: 30 y.o. Sex: female      Cardiovascular Function/Vital Signs    /69   Pulse 85   Temp 36.6 °C (97.9 °F)   Resp 14   Ht 5' 3\" (1.6 m)   Wt 84.5 kg (186 lb 4.6 oz)   SpO2 98%   BMI 33.00 kg/m²     Patient is status post Procedure(s):  CHOLECYSTECTOMY LAPAROSCOPIC WITH GRAMS. Nausea/Vomiting: Controlled. Postoperative hydration reviewed and adequate. Pain:  Pain Scale 1: Numeric (0 - 10) (01/29/20 1900)  Pain Intensity 1: 8 (01/29/20 1900)   Managed. Neurological Status:   Neuro (WDL): Exceptions to WDL (01/29/20 1708)   At baseline. Mental Status and Level of Consciousness: Arousable. Pulmonary Status:   O2 Device: Room air (01/29/20 1900)   Adequate oxygenation and airway patent. Complications related to anesthesia: None    Post-anesthesia assessment completed. No concerns. Signed By: Alton Quinteros MD    1/29/2020  Post anesthesia nausea and vomiting:  controlled      Vitals Value Taken Time   /73 1/29/2020  7:45 PM   Temp 36.6 °C (97.9 °F) 1/29/2020  5:13 PM   Pulse 88 1/29/2020  7:54 PM   Resp 14 1/29/2020  7:54 PM   SpO2 94 % 1/29/2020  7:54 PM   Vitals shown include unvalidated device data.

## 2020-01-31 VITALS
OXYGEN SATURATION: 98 % | HEIGHT: 63 IN | DIASTOLIC BLOOD PRESSURE: 71 MMHG | RESPIRATION RATE: 18 BRPM | WEIGHT: 187.39 LBS | BODY MASS INDEX: 33.2 KG/M2 | TEMPERATURE: 97.9 F | SYSTOLIC BLOOD PRESSURE: 113 MMHG | HEART RATE: 78 BPM

## 2020-01-31 LAB
ALBUMIN SERPL-MCNC: 3.1 G/DL (ref 3.5–5)
ALBUMIN/GLOB SERPL: 1.1 {RATIO} (ref 1.1–2.2)
ALP SERPL-CCNC: 63 U/L (ref 45–117)
ALT SERPL-CCNC: 67 U/L (ref 12–78)
ANION GAP SERPL CALC-SCNC: 2 MMOL/L (ref 5–15)
AST SERPL-CCNC: 31 U/L (ref 15–37)
BASOPHILS # BLD: 0 K/UL (ref 0–0.1)
BASOPHILS NFR BLD: 1 % (ref 0–1)
BILIRUB SERPL-MCNC: 0.3 MG/DL (ref 0.2–1)
BUN SERPL-MCNC: 13 MG/DL (ref 6–20)
BUN/CREAT SERPL: 14 (ref 12–20)
CALCIUM SERPL-MCNC: 7.6 MG/DL (ref 8.5–10.1)
CHLORIDE SERPL-SCNC: 110 MMOL/L (ref 97–108)
CO2 SERPL-SCNC: 29 MMOL/L (ref 21–32)
CREAT SERPL-MCNC: 0.93 MG/DL (ref 0.55–1.02)
DIFFERENTIAL METHOD BLD: ABNORMAL
EOSINOPHIL # BLD: 0.1 K/UL (ref 0–0.4)
EOSINOPHIL NFR BLD: 2 % (ref 0–7)
ERYTHROCYTE [DISTWIDTH] IN BLOOD BY AUTOMATED COUNT: 11.8 % (ref 11.5–14.5)
GLOBULIN SER CALC-MCNC: 2.7 G/DL (ref 2–4)
GLUCOSE SERPL-MCNC: 101 MG/DL (ref 65–100)
HCT VFR BLD AUTO: 34.9 % (ref 35–47)
HGB BLD-MCNC: 11.8 G/DL (ref 11.5–16)
IMM GRANULOCYTES # BLD AUTO: 0 K/UL (ref 0–0.04)
IMM GRANULOCYTES NFR BLD AUTO: 0 % (ref 0–0.5)
LIPASE SERPL-CCNC: 138 U/L (ref 73–393)
LYMPHOCYTES # BLD: 2.8 K/UL (ref 0.8–3.5)
LYMPHOCYTES NFR BLD: 41 % (ref 12–49)
MCH RBC QN AUTO: 31.4 PG (ref 26–34)
MCHC RBC AUTO-ENTMCNC: 33.8 G/DL (ref 30–36.5)
MCV RBC AUTO: 92.8 FL (ref 80–99)
MONOCYTES # BLD: 0.4 K/UL (ref 0–1)
MONOCYTES NFR BLD: 5 % (ref 5–13)
NEUTS SEG # BLD: 3.6 K/UL (ref 1.8–8)
NEUTS SEG NFR BLD: 51 % (ref 32–75)
NRBC # BLD: 0 K/UL (ref 0–0.01)
NRBC BLD-RTO: 0 PER 100 WBC
PLATELET # BLD AUTO: 194 K/UL (ref 150–400)
PMV BLD AUTO: 11.6 FL (ref 8.9–12.9)
POTASSIUM SERPL-SCNC: 4 MMOL/L (ref 3.5–5.1)
PROT SERPL-MCNC: 5.8 G/DL (ref 6.4–8.2)
RBC # BLD AUTO: 3.76 M/UL (ref 3.8–5.2)
SODIUM SERPL-SCNC: 141 MMOL/L (ref 136–145)
WBC # BLD AUTO: 7 K/UL (ref 3.6–11)

## 2020-01-31 PROCEDURE — 74011250636 HC RX REV CODE- 250/636: Performed by: SURGERY

## 2020-01-31 PROCEDURE — 83690 ASSAY OF LIPASE: CPT

## 2020-01-31 PROCEDURE — 99218 HC RM OBSERVATION: CPT

## 2020-01-31 PROCEDURE — 94760 N-INVAS EAR/PLS OXIMETRY 1: CPT

## 2020-01-31 PROCEDURE — 85025 COMPLETE CBC W/AUTO DIFF WBC: CPT

## 2020-01-31 PROCEDURE — 74011250637 HC RX REV CODE- 250/637: Performed by: SURGERY

## 2020-01-31 PROCEDURE — 96376 TX/PRO/DX INJ SAME DRUG ADON: CPT

## 2020-01-31 PROCEDURE — 36415 COLL VENOUS BLD VENIPUNCTURE: CPT

## 2020-01-31 PROCEDURE — 80053 COMPREHEN METABOLIC PANEL: CPT

## 2020-01-31 RX ORDER — ONDANSETRON 4 MG/1
4 TABLET, ORALLY DISINTEGRATING ORAL
Qty: 20 TAB | Refills: 0 | Status: SHIPPED | OUTPATIENT
Start: 2020-01-31 | End: 2020-12-14 | Stop reason: SDUPTHER

## 2020-01-31 RX ADMIN — HYDROCODONE BITARTRATE AND ACETAMINOPHEN 2 TABLET: 5; 325 TABLET ORAL at 08:49

## 2020-01-31 RX ADMIN — HYDROMORPHONE HYDROCHLORIDE 1 MG: 1 INJECTION, SOLUTION INTRAMUSCULAR; INTRAVENOUS; SUBCUTANEOUS at 04:06

## 2020-01-31 RX ADMIN — PANTOPRAZOLE SODIUM 40 MG: 40 TABLET, DELAYED RELEASE ORAL at 06:51

## 2020-01-31 RX ADMIN — POLYETHYLENE GLYCOL 3350 17 G: 17 POWDER, FOR SOLUTION ORAL at 08:44

## 2020-01-31 RX ADMIN — Medication 10 ML: at 06:51

## 2020-01-31 RX ADMIN — HYDROMORPHONE HYDROCHLORIDE 1 MG: 1 INJECTION, SOLUTION INTRAMUSCULAR; INTRAVENOUS; SUBCUTANEOUS at 06:51

## 2020-01-31 NOTE — PROGRESS NOTES
I have reviewed discharge instructions with the patient. The patient verbalized understanding. Patient was given information on follow-up appointments and where to  prescriptions. Patient was given a post-op cleansing kit and instructions on its use.

## 2020-01-31 NOTE — PROGRESS NOTES
1/31/2020      RE: Kathie Osler      To Whom it May Concern: This is to certify that Kathie Osler may may return to work on 2/12/20 with light duty (no lifting >10 pounds) restriction for four weeks post discharge. Please feel free to contact Dr. Hawkins's office if you have any questions or concerns. Thank you for your assistance in this matter.  [phone]    Sincerely,      Noam Chni NP

## 2020-01-31 NOTE — DISCHARGE INSTRUCTIONS
Discharge Instructions:  Laparoscopic Cholecystectomy (Gallbladder Removal)  Dr. Collette Heimlich    Activity:  Walk regularly - can walk today as tolerated, but make yourself walk at least 50 yards at least 6 times per day starting tomorrow. No lifting more than 10 -15 pounds for 4 weeks. You may resume driving in 5-7 days unless still requiring narcotics for pain. Work:  You may return to work in 1 or 2 weeks to light activity. No lifting more than 10 pounds (=\"light duty\") for four weeks. If your employer can not accommodate \"light duty,\" your employer will need to provide any necessary paperwork to our office. This document with serve as the initial \"note\" to your employer. Diet:  You may resume normal diet after 24 hours. Fatty foods may still cause some stomach upset. Avoid fatty/greasy foods for 1 month, then add back slowly. Wound Care:  Dermabond glue dressing will fall off over the next couple of weeks. It is waterproof. You may shower, but no swimming or baths for 2 weeks. Your incisions may ooze for a few days. Do not worry about this. If you experience a lot of drainage, develop redness around the wound, or a fever over 101 F occurs please call the office. Medications:  See attached \"Medication Reconciliation. \"    Resume home medications as indicated on the Medical Reconciliation form. Do not use blood thinners (such as Aspirin, Coumadin, or Plavix) until 3 days after surgery. Pain medications:  Non steroidal antiinflammatories (ibuprofen -- Advil or Motrin) seem to work best for post surgical pain. Try these first.  A narcotic prescription will also be given for breakthrough pain. Do not use Tylenol while taking the narcotic because there is Tylenol in the narcotic pill, and too much Tylenol can injure your liver. PUT ICE ON YOUR INCISIONS 20 MINUTES EVERY HOUR WHILE THEY REMAIN SORE. PLACE A CLOTH BETWEEN YOUR SKIN THE THE ICE BAG.     Colace or Miralax should be used twice daily to prevent constipation while on narcotics. If you are still having trouble having a BM after 1-2 days, try milk of magnesia. If this does not work within 24 hours, try a bottle of magnesium citrate. If this does not work, call us. Narcotics and anesthesia sometimes cause nausea and vomiting. If persistent please call the office. Do not hesitate to call with questions or concerns. Eris Nguyen MD  [facility]. Tel. [phone]  Fax [phone]       Discharge brochure provided; Reviewed DC instructions with patient. Opportunity for questions and clarifications was provided; verbalized understanding. Follow-up appointments reviewed/written for patient. MAR reviewed with patient to clarify medications given during hospital stay, today. Pt stable and ambulatory for discharge; *** to provide transportation to ***. Room checked and all belongings sent with patient. IV removed (without difficulty/pt tolerated well) Telemetry discontinued. A common side effect of anesthesia following surgery is nausea and/or vomiting. In order to decrease symptoms, it is wise to avoid foods that are high in fat, greasy foods, milk products, and spicy foods for the first 24 hours. Acceptable foods for the first 24 hours following surgery include but are not limited to:     soup   broth    toast    crackers    applesauce    bananas    mashed potatoes,   soft or scrambled eggs   oatmeal    jello    It is important to eat when taking your pain medication. This will help to prevent nausea. If possible, please try to time your meals with your medications. It is very important to stay hydrated following surgery. Sip fluids frequently while awake. Avoid acidic drinks such as citrus juices and soda for 24 hours. Carbonated beverages may cause bloating and gas.  Acceptable fluids include:    - water (flavor packets may add variety)  - coffee or tea (in moderation)  - Gatorade  - Sergio-aid  - apple juice  - cranberry juice    You are encouraged to cough and deep breathe every hour when awake. This will help to prevent respiratory complications following anesthesia. You may want to hug a pillow when coughing and sneezing to add additional support to the surgical area and to decrease discomfort if you had abdominal or chest surgery. If you are discharged home with support stockings, you may remove them after 24 hours. Support stockings are used to help prevent blood clots in the legs following surgery. Please take time to review all of your Home Care Instructions and Medication Information sheets provided in your discharge packet. If you have any questions, please contact your surgeons office. Thank you. TO PREVENT AN INFECTION      1. [address] YOUR HANDS     To prevent infection, good handwashing is the most important thing you or your caregiver can do.  Wash your hands with soap and water or use the hand  we gave you before you touch any wounds. 2. SHOWER     Use the antibacterial soap we gave you when you take a shower.  Shower with this soap until your wounds are healed.  To reach all areas of your body, you may need someone to help you.  Dont forget to clean your belly button with every shower. 3.  USE CLEAN SHEETS     Use freshly cleaned sheets on your bed after surgery.  To keep the surgery site clean, do not allow pets to sleep with you while your wound is still healing. 4. STOP SMOKING     Stop smoking, or at least cut back on smoking     Smoking slows your healing. 5.  CONTROL YOUR BLOOD SUGAR     High blood sugars slow wound healing.  If you are diabetic, control your blood sugar levels before and after your surgery.

## 2020-01-31 NOTE — PROGRESS NOTES
End of shift    Uneventful shift  Pnt ambulated from chair to bed and from bed to bathroom without difficulty  Requested dose of Miralax  Pnt able to pass gas  Nausea controlled  Able to tolerate juice and crackers  Medicated for pain as needed; tolerated well  Pnt stable  NADN

## 2020-01-31 NOTE — PROGRESS NOTES
Problem: Falls - Risk of  Goal: *Absence of Falls  Description  Document Cruz Estrada Fall Risk and appropriate interventions in the flowsheet.   Outcome: Resolved/Met  Note: Fall Risk Interventions:  Mobility Interventions: Patient to call before getting OOB    Mentation Interventions: Door open when patient unattended    Medication Interventions: Teach patient to arise slowly, Patient to call before getting OOB    Elimination Interventions: Call light in reach    History of Falls Interventions: Door open when patient unattended         Problem: Patient Education: Go to Patient Education Activity  Goal: Patient/Family Education  Outcome: Resolved/Met

## 2020-02-03 ENCOUNTER — HOSPITAL ENCOUNTER (EMERGENCY)
Age: 30
Discharge: HOME | End: 2020-02-03
Attending: EMERGENCY MEDICINE
Payer: COMMERCIAL

## 2020-02-03 ENCOUNTER — APPOINTMENT (OUTPATIENT)
Dept: GENERAL RADIOLOGY | Age: 30
End: 2020-02-03
Attending: EMERGENCY MEDICINE
Payer: COMMERCIAL

## 2020-02-03 VITALS
RESPIRATION RATE: 16 BRPM | TEMPERATURE: 98 F | HEIGHT: 63 IN | HEART RATE: 82 BPM | SYSTOLIC BLOOD PRESSURE: 113 MMHG | DIASTOLIC BLOOD PRESSURE: 99 MMHG | WEIGHT: 188 LBS | OXYGEN SATURATION: 95 % | BODY MASS INDEX: 33.31 KG/M2

## 2020-02-03 DIAGNOSIS — R07.9 CHEST PAIN, UNSPECIFIED TYPE: ICD-10-CM

## 2020-02-03 DIAGNOSIS — S46.812A STRAIN OF LEFT TRAPEZIUS MUSCLE, INITIAL ENCOUNTER: Primary | ICD-10-CM

## 2020-02-03 LAB
ALBUMIN SERPL-MCNC: 4.3 G/DL (ref 3.5–5)
ALBUMIN/GLOB SERPL: 1.1 {RATIO} (ref 1.1–2.2)
ALP SERPL-CCNC: 89 U/L (ref 45–117)
ALT SERPL-CCNC: 68 U/L (ref 12–78)
ANION GAP SERPL CALC-SCNC: 5 MMOL/L (ref 5–15)
APPEARANCE UR: CLEAR
AST SERPL-CCNC: 29 U/L (ref 15–37)
ATRIAL RATE: 79 BPM
BACTERIA URNS QL MICRO: NEGATIVE /HPF
BASOPHILS # BLD: 0.1 K/UL (ref 0–0.1)
BASOPHILS NFR BLD: 1 % (ref 0–1)
BILIRUB SERPL-MCNC: 0.5 MG/DL (ref 0.2–1)
BILIRUB UR QL: NEGATIVE
BUN SERPL-MCNC: 12 MG/DL (ref 6–20)
BUN/CREAT SERPL: 10 (ref 12–20)
CALCIUM SERPL-MCNC: 9.5 MG/DL (ref 8.5–10.1)
CALCULATED P AXIS, ECG09: 36 DEGREES
CALCULATED R AXIS, ECG10: 15 DEGREES
CALCULATED T AXIS, ECG11: 36 DEGREES
CHLORIDE SERPL-SCNC: 106 MMOL/L (ref 97–108)
CK SERPL-CCNC: 55 U/L (ref 26–192)
CO2 SERPL-SCNC: 27 MMOL/L (ref 21–32)
COLOR UR: NORMAL
CREAT SERPL-MCNC: 1.2 MG/DL (ref 0.55–1.02)
DIAGNOSIS, 93000: NORMAL
DIFFERENTIAL METHOD BLD: NORMAL
EOSINOPHIL # BLD: 0.3 K/UL (ref 0–0.4)
EOSINOPHIL NFR BLD: 4 % (ref 0–7)
EPITH CASTS URNS QL MICRO: NORMAL /LPF
ERYTHROCYTE [DISTWIDTH] IN BLOOD BY AUTOMATED COUNT: 11.5 % (ref 11.5–14.5)
GLOBULIN SER CALC-MCNC: 4 G/DL (ref 2–4)
GLUCOSE SERPL-MCNC: 109 MG/DL (ref 65–100)
GLUCOSE UR STRIP.AUTO-MCNC: NEGATIVE MG/DL
HCG SERPL-ACNC: <1 MIU/ML (ref 0–6)
HCT VFR BLD AUTO: 45.9 % (ref 35–47)
HGB BLD-MCNC: 15.7 G/DL (ref 11.5–16)
HGB UR QL STRIP: NEGATIVE
HYALINE CASTS URNS QL MICRO: NORMAL /LPF (ref 0–5)
IMM GRANULOCYTES # BLD AUTO: 0 K/UL (ref 0–0.04)
IMM GRANULOCYTES NFR BLD AUTO: 0 % (ref 0–0.5)
KETONES UR QL STRIP.AUTO: NEGATIVE MG/DL
LEUKOCYTE ESTERASE UR QL STRIP.AUTO: NEGATIVE
LYMPHOCYTES # BLD: 1.7 K/UL (ref 0.8–3.5)
LYMPHOCYTES NFR BLD: 22 % (ref 12–49)
MCH RBC QN AUTO: 31 PG (ref 26–34)
MCHC RBC AUTO-ENTMCNC: 34.2 G/DL (ref 30–36.5)
MCV RBC AUTO: 90.7 FL (ref 80–99)
MONOCYTES # BLD: 0.4 K/UL (ref 0–1)
MONOCYTES NFR BLD: 6 % (ref 5–13)
NEUTS SEG # BLD: 5.2 K/UL (ref 1.8–8)
NEUTS SEG NFR BLD: 68 % (ref 32–75)
NITRITE UR QL STRIP.AUTO: NEGATIVE
NRBC # BLD: 0 K/UL (ref 0–0.01)
NRBC BLD-RTO: 0 PER 100 WBC
P-R INTERVAL, ECG05: 126 MS
PH UR STRIP: 6.5 [PH] (ref 5–8)
PLATELET # BLD AUTO: 308 K/UL (ref 150–400)
PMV BLD AUTO: 11 FL (ref 8.9–12.9)
POTASSIUM SERPL-SCNC: 4.3 MMOL/L (ref 3.5–5.1)
PROT SERPL-MCNC: 8.3 G/DL (ref 6.4–8.2)
PROT UR STRIP-MCNC: NEGATIVE MG/DL
Q-T INTERVAL, ECG07: 364 MS
QRS DURATION, ECG06: 74 MS
QTC CALCULATION (BEZET), ECG08: 417 MS
RBC # BLD AUTO: 5.06 M/UL (ref 3.8–5.2)
RBC #/AREA URNS HPF: NORMAL /HPF (ref 0–5)
SODIUM SERPL-SCNC: 138 MMOL/L (ref 136–145)
SP GR UR REFRACTOMETRY: 1.01 (ref 1–1.03)
TROPONIN I SERPL-MCNC: <0.05 NG/ML
UA: UC IF INDICATED,UAUC: NORMAL
UROBILINOGEN UR QL STRIP.AUTO: 0.2 EU/DL (ref 0.2–1)
VENTRICULAR RATE, ECG03: 79 BPM
WBC # BLD AUTO: 7.6 K/UL (ref 3.6–11)
WBC URNS QL MICRO: NORMAL /HPF (ref 0–4)

## 2020-02-03 PROCEDURE — 81001 URINALYSIS AUTO W/SCOPE: CPT

## 2020-02-03 PROCEDURE — 85025 COMPLETE CBC W/AUTO DIFF WBC: CPT

## 2020-02-03 PROCEDURE — 74011000250 HC RX REV CODE- 250: Performed by: EMERGENCY MEDICINE

## 2020-02-03 PROCEDURE — 84702 CHORIONIC GONADOTROPIN TEST: CPT

## 2020-02-03 PROCEDURE — 74011250637 HC RX REV CODE- 250/637: Performed by: EMERGENCY MEDICINE

## 2020-02-03 PROCEDURE — 82550 ASSAY OF CK (CPK): CPT

## 2020-02-03 PROCEDURE — 96374 THER/PROPH/DIAG INJ IV PUSH: CPT

## 2020-02-03 PROCEDURE — 84484 ASSAY OF TROPONIN QUANT: CPT

## 2020-02-03 PROCEDURE — 96375 TX/PRO/DX INJ NEW DRUG ADDON: CPT

## 2020-02-03 PROCEDURE — 80053 COMPREHEN METABOLIC PANEL: CPT

## 2020-02-03 PROCEDURE — 74011250636 HC RX REV CODE- 250/636: Performed by: EMERGENCY MEDICINE

## 2020-02-03 PROCEDURE — 36415 COLL VENOUS BLD VENIPUNCTURE: CPT

## 2020-02-03 PROCEDURE — 99285 EMERGENCY DEPT VISIT HI MDM: CPT

## 2020-02-03 PROCEDURE — 71045 X-RAY EXAM CHEST 1 VIEW: CPT

## 2020-02-03 PROCEDURE — 93005 ELECTROCARDIOGRAM TRACING: CPT

## 2020-02-03 RX ORDER — LIDOCAINE 4 G/100G
1 PATCH TOPICAL EVERY 24 HOURS
Status: DISCONTINUED | OUTPATIENT
Start: 2020-02-03 | End: 2020-02-04 | Stop reason: HOSPADM

## 2020-02-03 RX ORDER — MECLIZINE HCL 12.5 MG 12.5 MG/1
25 TABLET ORAL
Status: COMPLETED | OUTPATIENT
Start: 2020-02-03 | End: 2020-02-03

## 2020-02-03 RX ORDER — MECLIZINE HYDROCHLORIDE 25 MG/1
25 TABLET ORAL
Qty: 20 TAB | Refills: 0 | Status: SHIPPED | OUTPATIENT
Start: 2020-02-03 | End: 2020-02-13

## 2020-02-03 RX ORDER — KETOROLAC TROMETHAMINE 30 MG/ML
15 INJECTION, SOLUTION INTRAMUSCULAR; INTRAVENOUS
Status: COMPLETED | OUTPATIENT
Start: 2020-02-03 | End: 2020-02-03

## 2020-02-03 RX ADMIN — KETOROLAC TROMETHAMINE 15 MG: 30 INJECTION, SOLUTION INTRAMUSCULAR at 19:38

## 2020-02-03 RX ADMIN — LIDOCAINE HYDROCHLORIDE 40 ML: 20 SOLUTION ORAL; TOPICAL at 19:18

## 2020-02-03 RX ADMIN — SODIUM CHLORIDE 1000 ML: 900 INJECTION, SOLUTION INTRAVENOUS at 19:35

## 2020-02-03 RX ADMIN — FAMOTIDINE 20 MG: 10 INJECTION, SOLUTION INTRAVENOUS at 19:36

## 2020-02-03 RX ADMIN — MECLIZINE 25 MG: 12.5 TABLET ORAL at 19:18

## 2020-02-03 NOTE — ED PROVIDER NOTES
EMERGENCY DEPARTMENT HISTORY AND PHYSICAL EXAM      Date: 2/3/2020  Patient Name: Erick Rowley    History of Presenting Illness     Chief Complaint   Patient presents with    Chest Pain     Pt reports left ear pain, neck, and left shoulder pain since waking up this morning       History Provided By: Patient    HPI: Erick Rowley, 30 y.o. female presents to the ED with cc of left trapezius pain, chest pain. Symptoms started today. Symptoms unfamiliar to the patient. Symptoms have been constant since onset around noon and then spontaneously resolved around 4 PM prior to arrival.  She complains of a an intermittent sharp and achiness sensation to the middle of her chest as well as between her shoulder blades on the left trapezius area. Does endorse some mild shortness of breath. Denies any nausea, vomiting. She does endorse that symptoms feel similar to her prior history of GERD. No recent injury or trauma. There are no other complaints, changes, or physical findings at this time. PCP: Jerrod Walker MD    No current facility-administered medications on file prior to encounter. Current Outpatient Medications on File Prior to Encounter   Medication Sig Dispense Refill    ondansetron (ZOFRAN ODT) 4 mg disintegrating tablet Take 1 Tab by mouth every eight (8) hours as needed for Nausea. 20 Tab 0    naproxen sodium (ALEVE) 220 mg cap Take  by mouth.  HYDROcodone-acetaminophen (NORCO) 5-325 mg per tablet Take 1-2 Tabs by mouth every six (6) hours as needed for Pain for up to 5 days. Max Daily Amount: 8 Tabs. 30 Tab 0    polyethylene glycol (MIRALAX) 17 gram/dose powder Take 17 g by mouth daily. 510 g 0    ibuprofen (MOTRIN) 600 mg tablet Take 1 Tab by mouth three (3) times daily (with meals). 21 Tab 0    naloxone (NARCAN) 4 mg/actuation nasal spray Use 1 spray intranasally, then discard. Repeat with new spray every 2 min as needed for opioid overdose symptoms, alternating nostrils.  2 Each 0    medroxyPROGESTERone (PROVERA) 10 mg tablet Take 10 mg by mouth three (3) times daily.  dicyclomine HCl (BENTYL PO) Take  by mouth four (4) times daily. Indications: As needed for pain      fluticasone propionate (FLONASE) 50 mcg/actuation nasal spray 2 Sprays by Both Nostrils route daily. Indications: As needed for allergies      multivitamin (MULTIPLE VITAMIN PO) Take  by mouth. Indications: Teresita fusion multi vitamin/folate 50 mg (2 gummies) by gyn dr Chen ondansetron (ZOFRAN ODT) 4 mg disintegrating tablet Take 1 Tab by mouth every eight (8) hours as needed for Nausea. 10 Tab 0    ketorolac (TORADOL) 10 mg tablet Take 1 Tab by mouth every six (6) hours as needed for Pain. 20 Tab 0    traMADol (ULTRAM) 50 mg tablet Take 1 Tab by mouth every six (6) hours as needed for Pain. Max Daily Amount: 200 mg. 10 Tab 0    loratadine (CLARITIN) 10 mg tablet Take 10 mg by mouth daily.  pantoprazole (PROTONIX) 40 mg tablet Take 40 mg by mouth daily.  sucralfate (CARAFATE) 100 mg/mL suspension Take  by mouth four (4) times daily.  albuterol (PROVENTIL VENTOLIN) 2.5 mg /3 mL (0.083 %) nebulizer solution 2.5 mg by Nebulization route once.  mometasone (NASONEX) 50 mcg/actuation nasal spray 2 Sprays daily.          Past History     Past Medical History:  Past Medical History:   Diagnosis Date    Acid reflux disease with ulcer     Calculus of kidney     Constipation     GERD (gastroesophageal reflux disease)     Headache     PCOS (polycystic ovarian syndrome)     Seasonal asthma     Stomach pain     Vertigo        Past Surgical History:  Past Surgical History:   Procedure Laterality Date    HX ENDOSCOPY      HX UROLOGICAL Left 02/18/2019    [facility]/Dr Gali Aguilar       Family History:  Family History   Problem Relation Age of Onset    Diabetes Mother     Hypertension Mother     Diabetes Father     Heart Disease Maternal Grandmother     Diabetes Maternal Grandmother     Hypertension Maternal Grandmother     Cancer Maternal Grandmother         lymphoma    Heart Disease Maternal Grandfather     Diabetes Maternal Grandfather     Cancer Maternal Grandfather         prostate/bone    Heart Disease Paternal Grandmother     Diabetes Paternal Grandmother     Heart Disease Paternal Grandfather     Diabetes Paternal Grandfather     Cancer Paternal Cousin         T-cell leukemia    Cancer Paternal Cousin         kidney    Cancer Maternal Great Grandmother         ovarian       Social History:  Social History     Tobacco Use    Smoking status: Never Smoker    Smokeless tobacco: Never Used   Substance Use Topics    Alcohol use: No     Frequency: Never    Drug use: Yes     Types: Prescription, OTC       Allergies:  No Known Allergies      Review of Systems   Review of Systems   Constitutional: Negative for chills and fever. HENT: Negative. Eyes: Negative for visual disturbance. Respiratory: Positive for shortness of breath. Negative for cough. Cardiovascular: Positive for chest pain. Negative for leg swelling. Gastrointestinal: Negative for abdominal pain, nausea and vomiting. Genitourinary: Negative. Musculoskeletal: Positive for back pain. Negative for gait problem. Skin: Negative for color change and rash. Neurological: Negative for dizziness, weakness, light-headedness and headaches. Hematological: Does not bruise/bleed easily. All other systems reviewed and are negative. Physical Exam   Physical Exam  Vitals signs and nursing note reviewed. Constitutional:       General: She is not in acute distress. Appearance: Normal appearance. She is not ill-appearing, toxic-appearing or diaphoretic. HENT:      Head: Normocephalic and atraumatic. Cardiovascular:      Rate and Rhythm: Normal rate and regular rhythm. Heart sounds: Normal heart sounds. No murmur. Pulmonary:      Effort: Pulmonary effort is normal. No respiratory distress.       Breath sounds: Normal breath sounds. No wheezing. Abdominal:      Palpations: Abdomen is soft. Tenderness: There is no abdominal tenderness. There is no guarding or rebound. Musculoskeletal:      Comments: There is reproducible left trapezius tenderness to palpation which completely reproduces her pain   Skin:     General: Skin is warm and dry. Findings: No erythema or rash. Neurological:      General: No focal deficit present. Mental Status: She is alert and oriented to person, place, and time. Diagnostic Study Results     Labs -     Recent Results (from the past 12 hour(s))   EKG, 12 LEAD, INITIAL    Collection Time: 02/03/20  2:14 PM   Result Value Ref Range    Ventricular Rate 79 BPM    Atrial Rate 79 BPM    P-R Interval 126 ms    QRS Duration 74 ms    Q-T Interval 364 ms    QTC Calculation (Bezet) 417 ms    Calculated P Axis 36 degrees    Calculated R Axis 15 degrees    Calculated T Axis 36 degrees    Diagnosis       Normal sinus rhythm  Cannot rule out Anterior infarct , age undetermined  No previous ECGs available  Confirmed by KARY Irvin ([id number]) on 2/3/2020 10:08:48 PM     CBC WITH AUTOMATED DIFF    Collection Time: 02/03/20  2:41 PM   Result Value Ref Range    WBC 7.6 3.6 - 11.0 K/uL    RBC 5.06 3.80 - 5.20 M/uL    HGB 15.7 11.5 - 16.0 g/dL    HCT 45.9 35.0 - 47.0 %    MCV 90.7 80.0 - 99.0 FL    MCH 31.0 26.0 - 34.0 PG    MCHC 34.2 30.0 - 36.5 g/dL    RDW 11.5 11.5 - 14.5 %    PLATELET 556 844 - 483 K/uL    MPV 11.0 8.9 - 12.9 FL    NRBC 0.0 0  WBC    ABSOLUTE NRBC 0.00 0.00 - 0.01 K/uL    NEUTROPHILS 68 32 - 75 %    LYMPHOCYTES 22 12 - 49 %    MONOCYTES 6 5 - 13 %    EOSINOPHILS 4 0 - 7 %    BASOPHILS 1 0 - 1 %    IMMATURE GRANULOCYTES 0 0.0 - 0.5 %    ABS. NEUTROPHILS 5.2 1.8 - 8.0 K/UL    ABS. LYMPHOCYTES 1.7 0.8 - 3.5 K/UL    ABS. MONOCYTES 0.4 0.0 - 1.0 K/UL    ABS. EOSINOPHILS 0.3 0.0 - 0.4 K/UL    ABS. BASOPHILS 0.1 0.0 - 0.1 K/UL    ABS. IMM.  GRANS. 0.0 0.00 - 0.04 K/UL DF AUTOMATED     METABOLIC PANEL, COMPREHENSIVE    Collection Time: 02/03/20  2:41 PM   Result Value Ref Range    Sodium 138 136 - 145 mmol/L    Potassium 4.3 3.5 - 5.1 mmol/L    Chloride 106 97 - 108 mmol/L    CO2 27 21 - 32 mmol/L    Anion gap 5 5 - 15 mmol/L    Glucose 109 (H) 65 - 100 mg/dL    BUN 12 6 - 20 MG/DL    Creatinine 1.20 (H) 0.55 - 1.02 MG/DL    BUN/Creatinine ratio 10 (L) 12 - 20      GFR est AA >60 >60 ml/min/1.73m2    GFR est non-AA 53 (L) >60 ml/min/1.73m2    Calcium 9.5 8.5 - 10.1 MG/DL    Bilirubin, total 0.5 0.2 - 1.0 MG/DL    ALT (SGPT) 68 12 - 78 U/L    AST (SGOT) 29 15 - 37 U/L    Alk. phosphatase 89 45 - 117 U/L    Protein, total 8.3 (H) 6.4 - 8.2 g/dL    Albumin 4.3 3.5 - 5.0 g/dL    Globulin 4.0 2.0 - 4.0 g/dL    A-G Ratio 1.1 1.1 - 2.2     CK W/ REFLX CKMB    Collection Time: 02/03/20  2:41 PM   Result Value Ref Range    CK 55 26 - 192 U/L   TROPONIN I    Collection Time: 02/03/20  2:41 PM   Result Value Ref Range    Troponin-I, Qt. <0.05 <0.05 ng/mL   BETA HCG, QT    Collection Time: 02/03/20  2:41 PM   Result Value Ref Range    Beta HCG, QT <1 0 - 6 MIU/ML   URINALYSIS W/ REFLEX CULTURE    Collection Time: 02/03/20  3:36 PM   Result Value Ref Range    Color YELLOW/STRAW      Appearance CLEAR CLEAR      Specific gravity 1.014 1.003 - 1.030      pH (UA) 6.5 5.0 - 8.0      Protein NEGATIVE  NEG mg/dL    Glucose NEGATIVE  NEG mg/dL    Ketone NEGATIVE  NEG mg/dL    Bilirubin NEGATIVE  NEG      Blood NEGATIVE  NEG      Urobilinogen 0.2 0.2 - 1.0 EU/dL    Nitrites NEGATIVE  NEG      Leukocyte Esterase NEGATIVE  NEG      WBC 0-4 0 - 4 /hpf    RBC 0-5 0 - 5 /hpf    Epithelial cells FEW FEW /lpf    Bacteria NEGATIVE  NEG /hpf    UA:UC IF INDICATED CULTURE NOT INDICATED BY UA RESULT CNI      Hyaline cast 0-2 0 - 5 /lpf       Radiologic Studies -   XR CHEST PORT   Final Result   IMPRESSION: No acute cardiopulmonary process.            CT Results  (Last 48 hours)    None        CXR Results  (Last 48 hours)               02/03/20 1520  XR CHEST PORT Final result    Impression:  IMPRESSION: No acute cardiopulmonary process. Narrative:  EXAM:  XR CHEST PORT       INDICATION:   chest pain       COMPARISON: None. FINDINGS: AP radiograph of the chest was obtained. No evidence of focal consolidation. No pleural effusion or pneumothorax. Heart,   grant, mediastinum are within normal limits. No acute osseous abnormalities. Cholecystectomy clips. Medical Decision Making   I am the first provider for this patient. I reviewed the vital signs, available nursing notes, past medical history, past surgical history, family history and social history. Vital Signs-Reviewed the patient's vital signs. Patient Vitals for the past 12 hrs:   Temp Pulse Resp BP SpO2   02/03/20 2200  82  (!) 113/99 95 %   02/03/20 2130  82  112/84 99 %   02/03/20 1947  88  115/77 97 %   02/03/20 1418 98 °F (36.7 °C) 92 16 129/78 96 %       Records Reviewed: Nursing Notes, Old Medical Records, Previous Radiology Studies and Previous Laboratory Studies    Provider Notes (Medical Decision Making): This is a 21-year-old female here with chest pain and left trapezius pain that is reproducible on my examination. She is afebrile vital signs stable through entire ED stay. Troponin negative, EKG nonischemic. Chest x-ray negative. Patient feeling better after symptomatic treatment. Chest pain likely GI in nature. She is PERC negative and low risk by heart score. I believe she is stable for discharge home with outpatient follow-up. Encouraged to follow-up closely with PCP and given strict return precautions. ED Course:   Initial assessment performed. The patients presenting problems have been discussed, and they are in agreement with the care plan formulated and outlined with them. I have encouraged them to ask questions as they arise throughout their visit.     ED Course as of Feb 03 2343   [facility] Feb 03, 2020 1935 EKG per my interpretation normal sinus rhythm, rate 79 bpm, normal axis, no acute ischemic changes. [AK]      ED Course User Index  [AK] Freya Chowdhury MD     Discharge Note:  The patient has been re-evaluated and is ready for discharge. Reviewed available results with patient. Counseled patient on diagnosis and care plan. Patient has expressed understanding, and all questions have been answered. Patient agrees with plan and agrees to follow up as recommended, or to return to the ED if their symptoms worsen. Discharge instructions have been provided and explained to the patient, along with reasons to return to the ED. Critical Care Time:   0    Disposition:  home    PLAN:  1. Discharge Medication List as of 2/3/2020  9:45 PM      CONTINUE these medications which have NOT CHANGED    Details   !! ondansetron (ZOFRAN ODT) 4 mg disintegrating tablet Take 1 Tab by mouth every eight (8) hours as needed for Nausea., Normal, Disp-20 Tab, R-0      naproxen sodium (ALEVE) 220 mg cap Take  by mouth., Historical Med      HYDROcodone-acetaminophen (NORCO) 5-325 mg per tablet Take 1-2 Tabs by mouth every six (6) hours as needed for Pain for up to 5 days. Max Daily Amount: 8 Tabs., Normal, Disp-30 Tab, R-0      polyethylene glycol (MIRALAX) 17 gram/dose powder Take 17 g by mouth daily. , Normal, Disp-510 g, R-0      ibuprofen (MOTRIN) 600 mg tablet Take 1 Tab by mouth three (3) times daily (with meals). , Normal, Disp-21 Tab, R-0      naloxone (NARCAN) 4 mg/actuation nasal spray Use 1 spray intranasally, then discard. Repeat with new spray every 2 min as needed for opioid overdose symptoms, alternating nostrils. , Normal, Disp-2 Each, R-0      medroxyPROGESTERone (PROVERA) 10 mg tablet Take 10 mg by mouth three (3) times daily. , Historical Med      dicyclomine HCl (BENTYL PO) Take  by mouth four (4) times daily.  Indications: As needed for pain, Historical Med      fluticasone propionate (FLONASE) 50 mcg/actuation nasal spray 2 Sprays by Both Nostrils route daily. Indications: As needed for allergies, Historical Med      multivitamin (MULTIPLE VITAMIN PO) Take  by mouth. Indications: Teresita fusion multi vitamin/folate 50 mg (2 gummies) by gyn dr, Historical Med      !! ondansetron (ZOFRAN ODT) 4 mg disintegrating tablet Take 1 Tab by mouth every eight (8) hours as needed for Nausea. , Print, Disp-10 Tab, R-0      ketorolac (TORADOL) 10 mg tablet Take 1 Tab by mouth every six (6) hours as needed for Pain., Print, Disp-20 Tab, R-0      traMADol (ULTRAM) 50 mg tablet Take 1 Tab by mouth every six (6) hours as needed for Pain. Max Daily Amount: 200 mg., Print, Disp-10 Tab, R-0      loratadine (CLARITIN) 10 mg tablet Take 10 mg by mouth daily. , Historical Med      pantoprazole (PROTONIX) 40 mg tablet Take 40 mg by mouth daily. , Historical Med      sucralfate (CARAFATE) 100 mg/mL suspension Take  by mouth four (4) times daily. , Historical Med      albuterol (PROVENTIL VENTOLIN) 2.5 mg /3 mL (0.083 %) nebulizer solution 2.5 mg by Nebulization route once., Historical Med      mometasone (NASONEX) 50 mcg/actuation nasal spray 2 Sprays daily. , Historical Med       !! - Potential duplicate medications found. Please discuss with provider. 2.   Follow-up Information     Follow up With Specialties Details Why Contact Info    Xiomara Jesus MD Family Practice Schedule an appointment as soon as possible for a visit   [place] [phone]          Return to ED if worse     Diagnosis     Clinical Impression:   1. Strain of left trapezius muscle, initial encounter    2. Chest pain, unspecified type        Attestations:    Dorian Ireland MD    Please note that this dictation was completed with [employer], the [employer] voice recognition software. Quite often unanticipated grammatical, syntax, homophones, and other interpretive errors are inadvertently transcribed by the computer software.   Please disregard these errors. Please excuse any errors that have escaped final proofreading. Thank you.

## 2020-02-04 NOTE — DISCHARGE INSTRUCTIONS
You were evaluated in the emergency department for chest pain and back pain. Your examination was reassuring as was your work-up including blood work, ekg, and chest xray. It will be important for you to follow-up with your primary care physician in 2-3 days. If you develop worsening symptoms such as worsening chest pain, fevers or shortness of breath, please return to the emergency department immediately.

## 2020-02-11 ENCOUNTER — DOCUMENTATION ONLY (OUTPATIENT)
Dept: SURGERY | Age: 30
End: 2020-02-11

## 2020-02-13 ENCOUNTER — TELEPHONE (OUTPATIENT)
Dept: SURGERY | Age: 30
End: 2020-02-13

## 2020-02-13 NOTE — TELEPHONE ENCOUNTER
Patient called stating her FMLA forms were faxed to the wrong number, the correct Fax # is [phone] and make it [phone] [phone].

## 2020-02-13 NOTE — TELEPHONE ENCOUNTER
FMLA formes were not faxed to wrong number was number provided. Second fax [phone]. Confirmation received.

## 2020-02-18 ENCOUNTER — OFFICE VISIT (OUTPATIENT)
Dept: SURGERY | Age: 30
End: 2020-02-18

## 2020-02-18 VITALS
BODY MASS INDEX: 33.08 KG/M2 | OXYGEN SATURATION: 98 % | DIASTOLIC BLOOD PRESSURE: 82 MMHG | RESPIRATION RATE: 20 BRPM | TEMPERATURE: 98.6 F | SYSTOLIC BLOOD PRESSURE: 115 MMHG | HEART RATE: 95 BPM | HEIGHT: 63 IN | WEIGHT: 186.7 LBS

## 2020-02-18 DIAGNOSIS — Z09 POSTOPERATIVE EXAMINATION: Primary | ICD-10-CM

## 2020-02-18 NOTE — Clinical Note
2/20/20 Patient: Eliana Flores YOB: 1990 Date of Visit: 2/18/2020 [address] [facility] [address] VIA In Basket Dear [address] Bella Serrano MD, Thank you for referring Ms. Eliana Flores to [address] for evaluation. My notes for this consultation are attached. If you have questions, please do not hesitate to call me. I look forward to following your patient along with you.  
 
 
Sincerely, 
 
Stephania Rodriguez MD

## 2020-02-20 NOTE — PROGRESS NOTES
To: Latesha Dutta MD\  From: Candice Leone MD    Thank you for referring Garfield Edge. Encounter Date: 2/18/2020    Subjective:      Garfield Edge is a 30 y.o. female presents for postop care. Has no complaints today. Preoperative symptoms are resolved. Eating a regular diet without difficulty. Bowel movements are regular. Objective:     General:  alert, cooperative, no distress, appears stated age   Abdomen: soft, bowel sounds active, non-tender   Incision(s):  healing well, no drainage, no erythema, no hernia, no seroma, no swelling, no dehiscence, incision well approximated. Assessment:     S/p laparoscopic cholecystectomy. Feels great. Doing well postoperatively. Plan:     Patient understands no further follow-up required. Told to call for any concerns.       Candice Leone MD

## 2020-08-07 ENCOUNTER — HOSPITAL ENCOUNTER (EMERGENCY)
Age: 30
Discharge: HOME | End: 2020-08-07
Attending: EMERGENCY MEDICINE
Payer: COMMERCIAL

## 2020-08-07 ENCOUNTER — APPOINTMENT (OUTPATIENT)
Dept: CT IMAGING | Age: 30
End: 2020-08-07
Attending: EMERGENCY MEDICINE
Payer: COMMERCIAL

## 2020-08-07 VITALS
DIASTOLIC BLOOD PRESSURE: 91 MMHG | HEIGHT: 63 IN | SYSTOLIC BLOOD PRESSURE: 132 MMHG | WEIGHT: 192.9 LBS | OXYGEN SATURATION: 96 % | RESPIRATION RATE: 25 BRPM | BODY MASS INDEX: 34.18 KG/M2 | HEART RATE: 96 BPM | TEMPERATURE: 98.2 F

## 2020-08-07 DIAGNOSIS — R06.00 DYSPNEA, UNSPECIFIED TYPE: ICD-10-CM

## 2020-08-07 DIAGNOSIS — J45.41 MODERATE PERSISTENT ASTHMA WITH ACUTE EXACERBATION: Primary | ICD-10-CM

## 2020-08-07 DIAGNOSIS — R07.9 CHEST PAIN, UNSPECIFIED TYPE: ICD-10-CM

## 2020-08-07 LAB
ALBUMIN SERPL-MCNC: 3.8 G/DL (ref 3.5–5)
ALBUMIN/GLOB SERPL: 1.1 {RATIO} (ref 1.1–2.2)
ALP SERPL-CCNC: 78 U/L (ref 45–117)
ALT SERPL-CCNC: 52 U/L (ref 12–78)
ANION GAP SERPL CALC-SCNC: 4 MMOL/L (ref 5–15)
AST SERPL-CCNC: 21 U/L (ref 15–37)
ATRIAL RATE: 76 BPM
BASOPHILS # BLD: 0.1 K/UL (ref 0–0.1)
BASOPHILS NFR BLD: 1 % (ref 0–1)
BILIRUB SERPL-MCNC: 0.2 MG/DL (ref 0.2–1)
BNP SERPL-MCNC: 37 PG/ML
BUN SERPL-MCNC: 9 MG/DL (ref 6–20)
BUN/CREAT SERPL: 10 (ref 12–20)
CALCIUM SERPL-MCNC: 8.8 MG/DL (ref 8.5–10.1)
CALCULATED P AXIS, ECG09: 23 DEGREES
CALCULATED R AXIS, ECG10: 9 DEGREES
CALCULATED T AXIS, ECG11: 15 DEGREES
CHLORIDE SERPL-SCNC: 109 MMOL/L (ref 97–108)
CO2 SERPL-SCNC: 28 MMOL/L (ref 21–32)
CREAT SERPL-MCNC: 0.89 MG/DL (ref 0.55–1.02)
DIAGNOSIS, 93000: NORMAL
DIFFERENTIAL METHOD BLD: NORMAL
EOSINOPHIL # BLD: 0.2 K/UL (ref 0–0.4)
EOSINOPHIL NFR BLD: 3 % (ref 0–7)
ERYTHROCYTE [DISTWIDTH] IN BLOOD BY AUTOMATED COUNT: 12 % (ref 11.5–14.5)
GLOBULIN SER CALC-MCNC: 3.5 G/DL (ref 2–4)
GLUCOSE SERPL-MCNC: 98 MG/DL (ref 65–100)
HCG UR QL: NEGATIVE
HCT VFR BLD AUTO: 39.5 % (ref 35–47)
HGB BLD-MCNC: 13.5 G/DL (ref 11.5–16)
IMM GRANULOCYTES # BLD AUTO: 0 K/UL (ref 0–0.04)
IMM GRANULOCYTES NFR BLD AUTO: 0 % (ref 0–0.5)
LYMPHOCYTES # BLD: 2.3 K/UL (ref 0.8–3.5)
LYMPHOCYTES NFR BLD: 31 % (ref 12–49)
MCH RBC QN AUTO: 31.6 PG (ref 26–34)
MCHC RBC AUTO-ENTMCNC: 34.2 G/DL (ref 30–36.5)
MCV RBC AUTO: 92.5 FL (ref 80–99)
MONOCYTES # BLD: 0.6 K/UL (ref 0–1)
MONOCYTES NFR BLD: 9 % (ref 5–13)
NEUTS SEG # BLD: 4.3 K/UL (ref 1.8–8)
NEUTS SEG NFR BLD: 56 % (ref 32–75)
NRBC # BLD: 0 K/UL (ref 0–0.01)
NRBC BLD-RTO: 0 PER 100 WBC
P-R INTERVAL, ECG05: 140 MS
PLATELET # BLD AUTO: 200 K/UL (ref 150–400)
PMV BLD AUTO: 11.5 FL (ref 8.9–12.9)
POTASSIUM SERPL-SCNC: 3.6 MMOL/L (ref 3.5–5.1)
PROT SERPL-MCNC: 7.3 G/DL (ref 6.4–8.2)
Q-T INTERVAL, ECG07: 358 MS
QRS DURATION, ECG06: 74 MS
QTC CALCULATION (BEZET), ECG08: 402 MS
RBC # BLD AUTO: 4.27 M/UL (ref 3.8–5.2)
SODIUM SERPL-SCNC: 141 MMOL/L (ref 136–145)
TROPONIN I SERPL-MCNC: <0.05 NG/ML
VENTRICULAR RATE, ECG03: 76 BPM
WBC # BLD AUTO: 7.5 K/UL (ref 3.6–11)

## 2020-08-07 PROCEDURE — 87635 SARS-COV-2 COVID-19 AMP PRB: CPT

## 2020-08-07 PROCEDURE — 93005 ELECTROCARDIOGRAM TRACING: CPT

## 2020-08-07 PROCEDURE — 81025 URINE PREGNANCY TEST: CPT

## 2020-08-07 PROCEDURE — 71275 CT ANGIOGRAPHY CHEST: CPT

## 2020-08-07 PROCEDURE — 94640 AIRWAY INHALATION TREATMENT: CPT

## 2020-08-07 PROCEDURE — 74011000250 HC RX REV CODE- 250: Performed by: EMERGENCY MEDICINE

## 2020-08-07 PROCEDURE — 80053 COMPREHEN METABOLIC PANEL: CPT

## 2020-08-07 PROCEDURE — 96374 THER/PROPH/DIAG INJ IV PUSH: CPT

## 2020-08-07 PROCEDURE — 84484 ASSAY OF TROPONIN QUANT: CPT

## 2020-08-07 PROCEDURE — 85025 COMPLETE CBC W/AUTO DIFF WBC: CPT

## 2020-08-07 PROCEDURE — 36415 COLL VENOUS BLD VENIPUNCTURE: CPT

## 2020-08-07 PROCEDURE — 99285 EMERGENCY DEPT VISIT HI MDM: CPT

## 2020-08-07 PROCEDURE — 74011636320 HC RX REV CODE- 636/320: Performed by: EMERGENCY MEDICINE

## 2020-08-07 PROCEDURE — 74011250636 HC RX REV CODE- 250/636: Performed by: EMERGENCY MEDICINE

## 2020-08-07 PROCEDURE — 83880 ASSAY OF NATRIURETIC PEPTIDE: CPT

## 2020-08-07 RX ORDER — ALBUTEROL SULFATE 2.5 MG/.5ML
10 SOLUTION RESPIRATORY (INHALATION) CONTINUOUS
Status: DISCONTINUED | OUTPATIENT
Start: 2020-08-07 | End: 2020-08-07 | Stop reason: HOSPADM

## 2020-08-07 RX ORDER — PREDNISONE 10 MG/1
TABLET ORAL
Qty: 21 TAB | Refills: 0 | Status: SHIPPED | OUTPATIENT
Start: 2020-08-07 | End: 2020-12-14 | Stop reason: ALTCHOICE

## 2020-08-07 RX ORDER — SODIUM CHLORIDE 0.9 % (FLUSH) 0.9 %
10 SYRINGE (ML) INJECTION
Status: COMPLETED | OUTPATIENT
Start: 2020-08-07 | End: 2020-08-07

## 2020-08-07 RX ADMIN — ALBUTEROL SULFATE 10 MG: 2.5 SOLUTION RESPIRATORY (INHALATION) at 05:04

## 2020-08-07 RX ADMIN — METHYLPREDNISOLONE SODIUM SUCCINATE 125 MG: 125 INJECTION, POWDER, FOR SOLUTION INTRAMUSCULAR; INTRAVENOUS at 05:04

## 2020-08-07 RX ADMIN — Medication 10 ML: at 06:19

## 2020-08-07 RX ADMIN — IOPAMIDOL 60 ML: 755 INJECTION, SOLUTION INTRAVENOUS at 06:19

## 2020-08-07 NOTE — LETTER
ADULT WORK/SCHOOL NOTE Kathlean Oppenheim [address] [address] 
 
 
08/07/20 To whom it may concern, Please be advised that Kathlean Oppenheim was evaluated and discharged from the Emergency Department on 08/07/20. Kathlean Oppenheim is excused from work/school now through 08/09/2020 Kathlean Oppenheim may return to work with the following restrictions: 
 
         none Should Kathlean Oppenheim require more time off or further clearance, she should follow up with her  own regular physician or specialist. 
 
 
Sincerely, Flores Zelaya

## 2020-08-07 NOTE — ED PROVIDER NOTES
EMERGENCY DEPARTMENT HISTORY AND PHYSICAL EXAM      Date: 8/7/2020  Patient Name: Qing Corado    History of Presenting Illness     Chief Complaint   Patient presents with    Cough     cold & congestion & notes had asthma attack x 2 hours ago & used meds but also vomited & meds haven't helped nuch       History Provided By: Patient    HPI: Qing Corado, 35 y.o. female  With past medical history of PCOS, asthma presenting today with shortness of breath. She states that she had an asthma attack yesterday, this resolved with her inhaler. She has been on steroids recently as well as antibiotics. She states that tonight she started having shortness of breath. She states that 2 hours ago was the onset of symptoms. She also states that she has been having some chest pain that radiates into her back. She denies any fevers or chills. Her cough is nonproductive. She used her nebulization treatment at home and this did not improve her symptoms. There are no other complaints, changes, or physical findings at this time. PCP: Mariam Rodríguez MD    No current facility-administered medications on file prior to encounter. Current Outpatient Medications on File Prior to Encounter   Medication Sig Dispense Refill    ondansetron (ZOFRAN ODT) 4 mg disintegrating tablet Take 1 Tab by mouth every eight (8) hours as needed for Nausea. 20 Tab 0    naproxen sodium (ALEVE) 220 mg cap Take  by mouth.  polyethylene glycol (MIRALAX) 17 gram/dose powder Take 17 g by mouth daily. 510 g 0    ibuprofen (MOTRIN) 600 mg tablet Take 1 Tab by mouth three (3) times daily (with meals). 21 Tab 0    naloxone (NARCAN) 4 mg/actuation nasal spray Use 1 spray intranasally, then discard. Repeat with new spray every 2 min as needed for opioid overdose symptoms, alternating nostrils. 2 Each 0    medroxyPROGESTERone (PROVERA) 10 mg tablet Take 10 mg by mouth three (3) times daily.       dicyclomine HCl (BENTYL PO) Take  by mouth four (4) times daily. Indications: As needed for pain      fluticasone propionate (FLONASE) 50 mcg/actuation nasal spray 2 Sprays by Both Nostrils route daily. Indications: As needed for allergies      multivitamin (MULTIPLE VITAMIN PO) Take  by mouth. Indications: Teresita fusion multi vitamin/folate 50 mg (2 gummies) by gyn dr Danii Banuelos ondansetron (ZOFRAN ODT) 4 mg disintegrating tablet Take 1 Tab by mouth every eight (8) hours as needed for Nausea. 10 Tab 0    ketorolac (TORADOL) 10 mg tablet Take 1 Tab by mouth every six (6) hours as needed for Pain. 20 Tab 0    traMADol (ULTRAM) 50 mg tablet Take 1 Tab by mouth every six (6) hours as needed for Pain. Max Daily Amount: 200 mg. 10 Tab 0    loratadine (CLARITIN) 10 mg tablet Take 10 mg by mouth daily.  pantoprazole (PROTONIX) 40 mg tablet Take 40 mg by mouth two (2) times a day.  sucralfate (CARAFATE) 100 mg/mL suspension Take  by mouth four (4) times daily.  albuterol (PROVENTIL VENTOLIN) 2.5 mg /3 mL (0.083 %) nebulizer solution 2.5 mg by Nebulization route once.  mometasone (NASONEX) 50 mcg/actuation nasal spray 2 Sprays daily.          Past History     Past Medical History:  Past Medical History:   Diagnosis Date    Acid reflux disease with ulcer     Calculus of kidney     Constipation     GERD (gastroesophageal reflux disease)     Headache     PCOS (polycystic ovarian syndrome)     Seasonal asthma     Stomach pain     Vertigo        Past Surgical History:  Past Surgical History:   Procedure Laterality Date    HX CHOLECYSTECTOMY  01/29/2020    Laparoscopic cholecystectomy w/cholangiogram    HX ENDOSCOPY      HX UROLOGICAL Left 02/18/2019    [facility]/Dr Daniella Luo       Family History:  Family History   Problem Relation Age of Onset    Diabetes Mother     Hypertension Mother     Diabetes Father     Heart Disease Maternal Grandmother     Diabetes Maternal Grandmother     Hypertension Maternal Grandmother     Cancer Maternal Grandmother         lymphoma    Heart Disease Maternal Grandfather     Diabetes Maternal Grandfather     Cancer Maternal Grandfather         prostate/bone    Heart Disease Paternal Grandmother     Diabetes Paternal Grandmother     Heart Disease Paternal Grandfather     Diabetes Paternal Grandfather     Cancer Paternal Cousin         T-cell leukemia    Cancer Paternal Cousin         kidney    Cancer Maternal Great Grandmother         ovarian       Social History:  Social History     Tobacco Use    Smoking status: Never Smoker    Smokeless tobacco: Never Used   Substance Use Topics    Alcohol use: No     Frequency: Never    Drug use: Yes     Types: Prescription, OTC       Allergies:  No Known Allergies      Review of Systems   Constitutional: No  fever  Skin: No  rash  HEENT: No  nasal congestion  Resp: + cough  CV: + chest pain  GI: No vomiting  : No dysuria  MSK: No joint pain  Neuro: No numbness  Psych: No suicidal      Physical Exam     Patient Vitals for the past 12 hrs:   Temp Pulse Resp BP SpO2   08/07/20 0630 98.4 °F (36.9 °C) 89 19 (!) 115/96 94 %   08/07/20 0600  85 20 131/79 97 %   08/07/20 0545  82 20 132/89 95 %   08/07/20 0530  84 14 133/88 95 %   08/07/20 0529     100 %   08/07/20 0515  76 15 (!) 131/93 99 %   08/07/20 0504  82  134/90    08/07/20 0500  84 18 134/90 97 %   08/07/20 0445  89 20 144/87 96 %   08/07/20 0409 98.6 °F (37 °C) 95 22 142/89 100 %     General: alert, mild distress  Eyes: EOMI, normal conjunctiva  ENT: moist mucous membranes. Neck: Active, full ROM of neck. Skin: No rashes. no jaundice              Lungs: Equal chest expansion. mild respiratory distress. clear to auscultation bilaterally using supraclavicular accessory muscles, mild tachypnea  Heart: regular rate     no peripheral edema   2+ radial pulses and DPs bilaterally  Abd:  non distended soft, nontender. No rebound tenderness. No guarding  Back: Full ROM  MSK: Full, active ROM in all 4 extremities. Neuro: Person, Place, Time and Situation; normal speech;   Psych: Cooperative with exam; Appropriate mood and affect             Diagnostic Study Results     Labs -     Recent Results (from the past 12 hour(s))   EKG, 12 LEAD, INITIAL    Collection Time: 08/07/20  4:22 AM   Result Value Ref Range    Ventricular Rate 76 BPM    Atrial Rate 76 BPM    P-R Interval 140 ms    QRS Duration 74 ms    Q-T Interval 358 ms    QTC Calculation (Bezet) 402 ms    Calculated P Axis 23 degrees    Calculated R Axis 9 degrees    Calculated T Axis 15 degrees    Diagnosis       Normal sinus rhythm  Cannot rule out Anterior infarct (cited on or before 03-FEB-2020)  When compared with ECG of 03-FEB-2020 14:14,  No significant change was found     CBC WITH AUTOMATED DIFF    Collection Time: 08/07/20  4:53 AM   Result Value Ref Range    WBC 7.5 3.6 - 11.0 K/uL    RBC 4.27 3.80 - 5.20 M/uL    HGB 13.5 11.5 - 16.0 g/dL    HCT 39.5 35.0 - 47.0 %    MCV 92.5 80.0 - 99.0 FL    MCH 31.6 26.0 - 34.0 PG    MCHC 34.2 30.0 - 36.5 g/dL    RDW 12.0 11.5 - 14.5 %    PLATELET 592 803 - 218 K/uL    MPV 11.5 8.9 - 12.9 FL    NRBC 0.0 0  WBC    ABSOLUTE NRBC 0.00 0.00 - 0.01 K/uL    NEUTROPHILS 56 32 - 75 %    LYMPHOCYTES 31 12 - 49 %    MONOCYTES 9 5 - 13 %    EOSINOPHILS 3 0 - 7 %    BASOPHILS 1 0 - 1 %    IMMATURE GRANULOCYTES 0 0.0 - 0.5 %    ABS. NEUTROPHILS 4.3 1.8 - 8.0 K/UL    ABS. LYMPHOCYTES 2.3 0.8 - 3.5 K/UL    ABS. MONOCYTES 0.6 0.0 - 1.0 K/UL    ABS. EOSINOPHILS 0.2 0.0 - 0.4 K/UL    ABS. BASOPHILS 0.1 0.0 - 0.1 K/UL    ABS. IMM.  GRANS. 0.0 0.00 - 0.04 K/UL    DF AUTOMATED     METABOLIC PANEL, COMPREHENSIVE    Collection Time: 08/07/20  4:53 AM   Result Value Ref Range    Sodium 141 136 - 145 mmol/L    Potassium 3.6 3.5 - 5.1 mmol/L    Chloride 109 (H) 97 - 108 mmol/L    CO2 28 21 - 32 mmol/L    Anion gap 4 (L) 5 - 15 mmol/L    Glucose 98 65 - 100 mg/dL    BUN 9 6 - 20 MG/DL    Creatinine 0.89 0.55 - 1.02 MG/DL BUN/Creatinine ratio 10 (L) 12 - 20      GFR est AA >60 >60 ml/min/1.73m2    GFR est non-AA >60 >60 ml/min/1.73m2    Calcium 8.8 8.5 - 10.1 MG/DL    Bilirubin, total 0.2 0.2 - 1.0 MG/DL    ALT (SGPT) 52 12 - 78 U/L    AST (SGOT) 21 15 - 37 U/L    Alk. phosphatase 78 45 - 117 U/L    Protein, total 7.3 6.4 - 8.2 g/dL    Albumin 3.8 3.5 - 5.0 g/dL    Globulin 3.5 2.0 - 4.0 g/dL    A-G Ratio 1.1 1.1 - 2.2     TROPONIN I    Collection Time: 08/07/20  4:53 AM   Result Value Ref Range    Troponin-I, Qt. <0.05 <0.05 ng/mL   NT-PRO BNP    Collection Time: 08/07/20  4:53 AM   Result Value Ref Range    NT pro-BNP 37 <125 PG/ML       Radiologic Studies -   CTA CHEST W OR W WO CONT   Final Result   IMPRESSION:    There is no pulmonary embolism. There is no aortic aneurysm or dissection. No acute intrathoracic process is identified. Incidental findings are as   described above. CT Results  (Last 48 hours)               08/07/20 0619  CTA CHEST W OR W WO CONT Final result    Impression:  IMPRESSION:    There is no pulmonary embolism. There is no aortic aneurysm or dissection. No acute intrathoracic process is identified. Incidental findings are as   described above. Narrative:  Clinical history: dyspnea   INDICATION:   dyspnea   COMPARISON: None       TECHNIQUE: CT of the chest with  IV contrast , Isovue-370 is performed. Axial   images from the thoracic inlet to the level of the upper abdomen are obtained. Manual post-processing of the images and coronal reformatting is also performed. CT dose reduction was achieved through use of a standardized protocol tailored   for this examination and automatic exposure control for dose modulation. Multiplanar reformatted imaging was performed. Sagittal and coronal reformatting. 3-D Postprocessing of imaging was performed. 3-D MIP reconstructed images were obtained in the coronal plane. FINDINGS:    There is no pulmonary embolism.    There is no aortic aneurysm or dissection. Hepatic steatosis. Postcholecystectomy. Small hiatal hernia. There is no pleural   or pericardial effusion. There is no mediastinal, axillary or hilar   lymphadenopathy. The aorta is normal in course and caliber. The proximal   pulmonary arteries are without evidence of filling defects. No lytic or blastic   lesions are identified. The remainder of the upper abdomen visualized is   unremarkable. CXR Results  (Last 48 hours)    None          Medical Decision Making   I am the first provider for this patient. I reviewed the vital signs, available nursing notes, past medical history, past surgical history, family history and social history. Records Reviewed: Last seen in the emergency department in February with a trapezius muscle strain    Vital Signs-Reviewed the patient's vital signs. Patient Vitals for the past 12 hrs:   Temp Pulse Resp BP SpO2   08/07/20 0630 98.4 °F (36.9 °C) 89 19 (!) 115/96 94 %   08/07/20 0600  85 20 131/79 97 %   08/07/20 0545  82 20 132/89 95 %   08/07/20 0530  84 14 133/88 95 %   08/07/20 0529     100 %   08/07/20 0515  76 15 (!) 131/93 99 %   08/07/20 0504  82  134/90    08/07/20 0500  84 18 134/90 97 %   08/07/20 0445  89 20 144/87 96 %   08/07/20 0409 98.6 °F (37 °C) 95 22 142/89 100 %       Pulse Oximetry Analysis - 100% on room air    Cardiac Monitor:   Rate: 95 bpm  Rhythm: Normal Sinus Rhythm      Provider Notes (Medical Decision Making):     Differential Diagnosis: Asthma exacerbation, PE, pneumonia, pneumothorax, electrolyte derangement    Initial Plan: We will treat the patient with continuous albuterol, Solu-Medrol, obtain CT angiography, and laboratory studies. The patient has no significant wheezing on lung exam, and I am concerned with her chest pain that this could be more representative of thromboembolic disease versus infection. Will reassess after work-up and treatment.     ED Course:   Initial assessment performed. The patients presenting problems have been discussed, and they are in agreement with the care plan formulated and outlined with them. I have encouraged them to ask questions as they arise throughout their visit. ED Course as of Aug 07 0639   [facility] Aug 07, 2020   6229 On my interpretation of the patient's EKG normal sinus rhythm at a rate of 76, QTc is 402, no ST elevation or depression.    [NW]   3539 On my interpretation of the patient's laboratory studies metabolic panel unremarkable, CBC unremarkable, troponin negative, cardiac BNP is negative. [NW]   3519 On my interpretation the patient CT no evidence of a pulmonary embolus or infection. [NW]   9511 Patient presents today with increase shortness of breath despite using nebulizer at home. She was treated with continuous albuterol as well as methylprednisolone. Her symptoms improved after this treatment. No evidence of PE on CT. Will discharge with a course of steroids and return precautions. Patient comfortable with this plan. [NW]      ED Course User Index  [NW] Cecilia Rodrigues MD       I, John Ramirez MD, am the attending of record for this patient encounter. Dispo: Discharged. The patient has been re-evaluated and is ready for discharge. Reviewed available results with patient. Counseled patient on diagnosis and care plan. Patient has expressed understanding, and all questions have been answered. Patient agrees with plan and agrees to follow up as recommended, or to return to the ED if their symptoms worsen. Discharge instructions have been provided and explained to the patient, along with reasons to return to the ED. PLAN:  Current Discharge Medication List      START taking these medications    Details   predniSONE (STERAPRED DS) 10 mg dose pack As per instructions on dosepack  Qty: 21 Tab, Refills: 0           2.      Follow-up Information     Follow up With Specialties Details Why Contact Info Mariam Rodríguez MD [place] [address]  [phone]          3. Return to ED if worse       Diagnosis     Clinical Impression:   1. Moderate persistent asthma with acute exacerbation    2. Dyspnea, unspecified type    3. Chest pain, unspecified type        Attestations:    Wolfgang Barillas MD    Please note that this dictation was completed with [employer], the computer voice recognition software. Quite often unanticipated grammatical, syntax, homophones, and other interpretive errors are inadvertently transcribed by the computer software. Please disregard these errors. Please excuse any errors that have escaped final proofreading. Thank you.

## 2020-08-07 NOTE — ED NOTES
Assumed care of pt from triage. Pt CC of SOB, CP and nausea after asthma attack at 2AM. Pt took her home asthma meds including her inhaler and nebs with no relief. Pt on monitor x3. VSS. Call bell in reach.

## 2020-08-08 ENCOUNTER — PATIENT OUTREACH (OUTPATIENT)
Dept: CASE MANAGEMENT | Age: 30
End: 2020-08-08

## 2020-08-08 LAB
HEALTH STATUS, XMCV2T: NORMAL
SARS-COV-2, COV2: NOT DETECTED
SOURCE, COVRS: NORMAL
SPECIMEN SOURCE, FCOV2M: NORMAL
SPECIMEN TYPE, XMCV1T: NORMAL

## 2020-08-08 NOTE — PROGRESS NOTES
Patient contacted regarding recent discharge and COVID-19 risk. Discussed COVID-19 related testing which was available at this time. Test results were negative. Patient informed of results, if available? yes    Care Transition Nurse/ Ambulatory Care Manager contacted the patient by telephone to perform post discharge assessment. Verified name and  with patient as identifiers. Patient has following risk factors of: no known risk factors. CTN/ACM reviewed discharge instructions, medical action plan and red flags related to discharge diagnosis. Reviewed and educated them on any new and changed medications related to discharge diagnosis. Advised obtaining a 90-day supply of all daily and as-needed medications. Advance Care Planning:   Does patient have an Advance Directive: not on file     Education provided regarding infection prevention, and signs and symptoms of COVID-19 and when to seek medical attention with patient who verbalized understanding. Discussed exposure protocols and quarantine from [address] you at higher risk for severe illness  and given an opportunity for questions and concerns. The patient agrees to contact the COVID-19 hotline [phone] or PCP office for questions related to their healthcare. CTN/ACM provided contact information for future reference. From CDC: Are you at higher risk for severe illness?  Wash your hands often.  Avoid close contact (6 feet, which is about two arm lengths) with people who are sick.  Put distance between yourself and other people if COVID-19 is spreading in your community.  Clean and disinfect frequently touched surfaces.  Avoid all cruise travel and non-essential air travel.  Call your healthcare professional if you have concerns about COVID-19 and your underlying condition or if you are sick.     For more information on steps you can take to protect yourself, see CDC's How to Protect Yourself      Patient/family/caregiver given information for GetWell Loop and agrees to enroll yes  Patient's preferred e-mail:  [email or web address]  Patient's preferred phone number: [phone]   Based on Loop alert triggers, patient will be contacted by nurse care manager for worsening symptoms. Pt will be further monitored by COVID Loop Team based on severity of symptoms and risk factors.

## 2020-08-10 ENCOUNTER — HOSPITAL ENCOUNTER (EMERGENCY)
Age: 30
Discharge: HOME | End: 2020-08-10
Attending: EMERGENCY MEDICINE | Admitting: EMERGENCY MEDICINE
Payer: COMMERCIAL

## 2020-08-10 ENCOUNTER — APPOINTMENT (OUTPATIENT)
Dept: GENERAL RADIOLOGY | Age: 30
End: 2020-08-10
Attending: EMERGENCY MEDICINE
Payer: COMMERCIAL

## 2020-08-10 VITALS
HEIGHT: 63 IN | WEIGHT: 192.46 LBS | RESPIRATION RATE: 20 BRPM | BODY MASS INDEX: 34.1 KG/M2 | OXYGEN SATURATION: 92 % | TEMPERATURE: 97.9 F | HEART RATE: 94 BPM | SYSTOLIC BLOOD PRESSURE: 116 MMHG | DIASTOLIC BLOOD PRESSURE: 75 MMHG

## 2020-08-10 DIAGNOSIS — J20.9 ACUTE BRONCHITIS, UNSPECIFIED ORGANISM: Primary | ICD-10-CM

## 2020-08-10 DIAGNOSIS — J45.21 MILD INTERMITTENT ASTHMA WITH ACUTE EXACERBATION: ICD-10-CM

## 2020-08-10 LAB
ALBUMIN SERPL-MCNC: 4.2 G/DL (ref 3.5–5)
ALBUMIN/GLOB SERPL: 1.1 {RATIO} (ref 1.1–2.2)
ALP SERPL-CCNC: 83 U/L (ref 45–117)
ALT SERPL-CCNC: 43 U/L (ref 12–78)
ANION GAP SERPL CALC-SCNC: 10 MMOL/L (ref 5–15)
APPEARANCE UR: CLEAR
ARTERIAL PATENCY WRIST A: ABNORMAL
AST SERPL-CCNC: 11 U/L (ref 15–37)
ATRIAL RATE: 130 BPM
BACTERIA URNS QL MICRO: NEGATIVE /HPF
BASE DEFICIT BLD-SCNC: 6 MMOL/L
BASOPHILS # BLD: 0 K/UL (ref 0–0.1)
BASOPHILS NFR BLD: 0 % (ref 0–1)
BDY SITE: ABNORMAL
BILIRUB SERPL-MCNC: 0.2 MG/DL (ref 0.2–1)
BILIRUB UR QL: NEGATIVE
BUN SERPL-MCNC: 12 MG/DL (ref 6–20)
BUN/CREAT SERPL: 12 (ref 12–20)
CA-I BLD-SCNC: 1.07 MMOL/L (ref 1.12–1.32)
CALCIUM SERPL-MCNC: 9.1 MG/DL (ref 8.5–10.1)
CALCULATED P AXIS, ECG09: 37 DEGREES
CALCULATED R AXIS, ECG10: 18 DEGREES
CALCULATED T AXIS, ECG11: 7 DEGREES
CHLORIDE SERPL-SCNC: 108 MMOL/L (ref 97–108)
CK MB CFR SERPL CALC: NORMAL % (ref 0–2.5)
CK MB SERPL-MCNC: <1 NG/ML (ref 5–25)
CK SERPL-CCNC: 113 U/L (ref 26–192)
CO2 SERPL-SCNC: 21 MMOL/L (ref 21–32)
COLOR UR: ABNORMAL
COMMENT, HOLDF: NORMAL
CREAT SERPL-MCNC: 1.04 MG/DL (ref 0.55–1.02)
DIAGNOSIS, 93000: NORMAL
DIFFERENTIAL METHOD BLD: ABNORMAL
EOSINOPHIL # BLD: 0 K/UL (ref 0–0.4)
EOSINOPHIL NFR BLD: 0 % (ref 0–7)
EPITH CASTS URNS QL MICRO: ABNORMAL /LPF
ERYTHROCYTE [DISTWIDTH] IN BLOOD BY AUTOMATED COUNT: 11.9 % (ref 11.5–14.5)
GAS FLOW.O2 O2 DELIVERY SYS: ABNORMAL L/MIN
GLOBULIN SER CALC-MCNC: 3.9 G/DL (ref 2–4)
GLUCOSE SERPL-MCNC: 145 MG/DL (ref 65–100)
GLUCOSE UR STRIP.AUTO-MCNC: NEGATIVE MG/DL
HCG UR QL: NEGATIVE
HCO3 BLD-SCNC: 18.8 MMOL/L (ref 22–26)
HCT VFR BLD AUTO: 43.6 % (ref 35–47)
HGB BLD-MCNC: 14.5 G/DL (ref 11.5–16)
HGB UR QL STRIP: ABNORMAL
IMM GRANULOCYTES # BLD AUTO: 0.1 K/UL (ref 0–0.04)
IMM GRANULOCYTES NFR BLD AUTO: 1 % (ref 0–0.5)
KETONES UR QL STRIP.AUTO: NEGATIVE MG/DL
LEUKOCYTE ESTERASE UR QL STRIP.AUTO: NEGATIVE
LIPASE SERPL-CCNC: 74 U/L (ref 73–393)
LYMPHOCYTES # BLD: 1.4 K/UL (ref 0.8–3.5)
LYMPHOCYTES NFR BLD: 9 % (ref 12–49)
MCH RBC QN AUTO: 31.6 PG (ref 26–34)
MCHC RBC AUTO-ENTMCNC: 33.3 G/DL (ref 30–36.5)
MCV RBC AUTO: 95 FL (ref 80–99)
MONOCYTES # BLD: 0.5 K/UL (ref 0–1)
MONOCYTES NFR BLD: 4 % (ref 5–13)
MUCOUS THREADS URNS QL MICRO: ABNORMAL /LPF
NEUTS SEG # BLD: 13.3 K/UL (ref 1.8–8)
NEUTS SEG NFR BLD: 86 % (ref 32–75)
NITRITE UR QL STRIP.AUTO: NEGATIVE
NRBC # BLD: 0 K/UL (ref 0–0.01)
NRBC BLD-RTO: 0 PER 100 WBC
P-R INTERVAL, ECG05: 114 MS
PCO2 BLD: 29.9 MMHG (ref 35–45)
PH BLD: 7.41 [PH] (ref 7.35–7.45)
PH UR STRIP: 5.5 [PH] (ref 5–8)
PLATELET # BLD AUTO: 285 K/UL (ref 150–400)
PMV BLD AUTO: 11.4 FL (ref 8.9–12.9)
PO2 BLD: 111 MMHG (ref 80–100)
POTASSIUM SERPL-SCNC: 3.6 MMOL/L (ref 3.5–5.1)
PROT SERPL-MCNC: 8.1 G/DL (ref 6.4–8.2)
PROT UR STRIP-MCNC: NEGATIVE MG/DL
Q-T INTERVAL, ECG07: 302 MS
QRS DURATION, ECG06: 72 MS
QTC CALCULATION (BEZET), ECG08: 444 MS
RBC # BLD AUTO: 4.59 M/UL (ref 3.8–5.2)
RBC #/AREA URNS HPF: ABNORMAL /HPF (ref 0–5)
SAMPLES BEING HELD,HOLD: NORMAL
SAO2 % BLD: 98 % (ref 92–97)
SODIUM SERPL-SCNC: 139 MMOL/L (ref 136–145)
SP GR UR REFRACTOMETRY: 1.02 (ref 1–1.03)
SPECIMEN TYPE: ABNORMAL
TROPONIN I SERPL-MCNC: <0.05 NG/ML
UA: UC IF INDICATED,UAUC: ABNORMAL
UROBILINOGEN UR QL STRIP.AUTO: 0.2 EU/DL (ref 0.2–1)
VENTRICULAR RATE, ECG03: 130 BPM
WBC # BLD AUTO: 15.4 K/UL (ref 3.6–11)
WBC URNS QL MICRO: ABNORMAL /HPF (ref 0–4)

## 2020-08-10 PROCEDURE — 80053 COMPREHEN METABOLIC PANEL: CPT

## 2020-08-10 PROCEDURE — 93005 ELECTROCARDIOGRAM TRACING: CPT

## 2020-08-10 PROCEDURE — 81001 URINALYSIS AUTO W/SCOPE: CPT

## 2020-08-10 PROCEDURE — 74011250637 HC RX REV CODE- 250/637: Performed by: EMERGENCY MEDICINE

## 2020-08-10 PROCEDURE — 84484 ASSAY OF TROPONIN QUANT: CPT

## 2020-08-10 PROCEDURE — 85025 COMPLETE CBC W/AUTO DIFF WBC: CPT

## 2020-08-10 PROCEDURE — 81025 URINE PREGNANCY TEST: CPT

## 2020-08-10 PROCEDURE — 96375 TX/PRO/DX INJ NEW DRUG ADDON: CPT

## 2020-08-10 PROCEDURE — 83690 ASSAY OF LIPASE: CPT

## 2020-08-10 PROCEDURE — 74011250636 HC RX REV CODE- 250/636: Performed by: EMERGENCY MEDICINE

## 2020-08-10 PROCEDURE — 96374 THER/PROPH/DIAG INJ IV PUSH: CPT

## 2020-08-10 PROCEDURE — 99285 EMERGENCY DEPT VISIT HI MDM: CPT

## 2020-08-10 PROCEDURE — 82550 ASSAY OF CK (CPK): CPT

## 2020-08-10 PROCEDURE — 36415 COLL VENOUS BLD VENIPUNCTURE: CPT

## 2020-08-10 PROCEDURE — 82803 BLOOD GASES ANY COMBINATION: CPT

## 2020-08-10 PROCEDURE — 71045 X-RAY EXAM CHEST 1 VIEW: CPT

## 2020-08-10 PROCEDURE — 74011000250 HC RX REV CODE- 250: Performed by: EMERGENCY MEDICINE

## 2020-08-10 RX ORDER — GUAIFENESIN 100 MG/5ML
200 SOLUTION ORAL
Status: DISCONTINUED | OUTPATIENT
Start: 2020-08-10 | End: 2020-08-10 | Stop reason: HOSPADM

## 2020-08-10 RX ORDER — HYDROCODONE POLISTIREX AND CHLORPHENIRAMINE POLISTIREX 10; 8 MG/5ML; MG/5ML
5 SUSPENSION, EXTENDED RELEASE ORAL
Status: COMPLETED | OUTPATIENT
Start: 2020-08-10 | End: 2020-08-10

## 2020-08-10 RX ORDER — LORAZEPAM 2 MG/ML
1 INJECTION INTRAMUSCULAR
Status: COMPLETED | OUTPATIENT
Start: 2020-08-10 | End: 2020-08-10

## 2020-08-10 RX ORDER — HYDROCODONE POLISTIREX AND CHLORPHENIRAMINE POLISTIREX 10; 8 MG/5ML; MG/5ML
5 SUSPENSION, EXTENDED RELEASE ORAL
Qty: 30 ML | Refills: 0 | Status: SHIPPED | OUTPATIENT
Start: 2020-08-10 | End: 2020-08-13

## 2020-08-10 RX ORDER — ALBUTEROL SULFATE 0.83 MG/ML
5 SOLUTION RESPIRATORY (INHALATION)
Status: COMPLETED | OUTPATIENT
Start: 2020-08-10 | End: 2020-08-10

## 2020-08-10 RX ORDER — FAMOTIDINE 10 MG/ML
20 INJECTION INTRAVENOUS
Status: COMPLETED | OUTPATIENT
Start: 2020-08-10 | End: 2020-08-10

## 2020-08-10 RX ORDER — BENZONATATE 100 MG/1
100 CAPSULE ORAL
Status: COMPLETED | OUTPATIENT
Start: 2020-08-10 | End: 2020-08-10

## 2020-08-10 RX ORDER — AZITHROMYCIN 250 MG/1
TABLET, FILM COATED ORAL
Qty: 6 TAB | Refills: 0 | Status: SHIPPED | OUTPATIENT
Start: 2020-08-10 | End: 2020-08-15

## 2020-08-10 RX ORDER — BENZONATATE 100 MG/1
100 CAPSULE ORAL
Qty: 15 CAP | Refills: 0 | Status: SHIPPED | OUTPATIENT
Start: 2020-08-10 | End: 2020-08-17

## 2020-08-10 RX ORDER — DIPHENHYDRAMINE HYDROCHLORIDE 50 MG/ML
50 INJECTION, SOLUTION INTRAMUSCULAR; INTRAVENOUS
Status: COMPLETED | OUTPATIENT
Start: 2020-08-10 | End: 2020-08-10

## 2020-08-10 RX ADMIN — FAMOTIDINE 20 MG: 10 INJECTION, SOLUTION INTRAVENOUS at 07:11

## 2020-08-10 RX ADMIN — HYDROCODONE POLISTIREX AND CHLORPHENIRAMINE POLISTIREX 5 ML: 10; 8 SUSPENSION, EXTENDED RELEASE ORAL at 03:39

## 2020-08-10 RX ADMIN — LORAZEPAM 1 MG: 2 INJECTION INTRAMUSCULAR; INTRAVENOUS at 06:56

## 2020-08-10 RX ADMIN — METHYLPREDNISOLONE SODIUM SUCCINATE 125 MG: 125 INJECTION, POWDER, FOR SOLUTION INTRAMUSCULAR; INTRAVENOUS at 07:11

## 2020-08-10 RX ADMIN — ALBUTEROL SULFATE 5 MG: 2.5 SOLUTION RESPIRATORY (INHALATION) at 03:42

## 2020-08-10 RX ADMIN — DIPHENHYDRAMINE HYDROCHLORIDE 50 MG: 50 INJECTION, SOLUTION INTRAMUSCULAR; INTRAVENOUS at 06:57

## 2020-08-10 RX ADMIN — BENZONATATE 100 MG: 100 CAPSULE ORAL at 03:39

## 2020-08-10 RX ADMIN — ALBUTEROL SULFATE 5 MG: 2.5 SOLUTION RESPIRATORY (INHALATION) at 05:02

## 2020-08-10 NOTE — ED NOTES
Pt presents ambulatory to the ed c/o cough and shortness of breath that has been going on since the end of June and was seen in this ED a few days ago. She has been prescribed a z pack and most recently was prescribed Symbicort and prednisone. On Friday at this ED she reports that she was given an IV steroid and had blood work/radiology/covid testing completed which she reports came back negative. She reports no alleviation of her sx. She reports generalized mild chest/back pain/rib secondary to coughing. She reports that she has been vomiting which she attributes to her excessive coughing. Pt has a hx of asthma    Pt denies abdominal pain. Pt given medications and nebulizer and placed on monitor x4.     3:56 AM: Pt reports some improvement in cough and shortness of breath.      Roland Monsalve MD at bedside for reeval

## 2020-08-10 NOTE — DISCHARGE INSTRUCTIONS
Patient Education        Asthma Attack: Care Instructions  Your Care Instructions     During an asthma attack, the airways swell and narrow. This makes it hard to breathe. Severe asthma attacks can be life-threatening, but you can help prevent them by keeping your asthma under control and treating symptoms before they get bad. Symptoms include being short of breath, having chest tightness, coughing, and wheezing. Noting and treating these symptoms can also help you avoid future trips to the emergency room. The doctor has checked you carefully, but problems can develop later. If you notice any problems or new symptoms, get medical treatment right away. Follow-up care is a key part of your treatment and safety. Be sure to make and go to all appointments, and call your doctor if you are having problems. It's also a good idea to know your test results and keep a list of the medicines you take. How can you care for yourself at home? · Follow your asthma action plan to prevent and treat attacks. If you don't have an asthma action plan, work with your doctor to create one. · Take your asthma medicines exactly as prescribed. Talk to your doctor right away if you have any questions about how to take them. ? Use your quick-relief medicine when you have symptoms of an attack. Quick-relief medicine is usually an albuterol inhaler. Some people need to use quick-relief medicine before they exercise. ? Take your controller medicine every day, not just when you have symptoms. Controller medicine is usually an inhaled corticosteroid. The goal is to prevent problems before they occur. Don't use your controller medicine to treat an attack that has already started. It doesn't work fast enough to help. ? If your doctor prescribed corticosteroid pills to use during an attack, take them exactly as prescribed. It may take hours for the pills to work, but they may make the episode shorter and help you breathe better. ?  Keep your quick-relief medicine with you at all times. · Talk to your doctor before using other medicines. Some medicines, such as aspirin, can cause asthma attacks in some people. · If you have a peak flow meter, use it to check how well you are breathing. This can help you predict when an asthma attack is going to occur. Then you can take medicine to prevent the asthma attack or make it less severe. · Do not smoke or allow others to smoke around you. Avoid smoky places. Smoking makes asthma worse. If you need help quitting, talk to your doctor about stop-smoking programs and medicines. These can increase your chances of quitting for good. · Learn what triggers an asthma attack for you, and avoid the triggers when you can. Common triggers include colds, smoke, air pollution, dust, pollen, mold, pets, cockroaches, stress, and cold air. · Avoid colds and the flu. Get a pneumococcal vaccine shot. If you have had one before, ask your doctor if you need a second dose. Get a flu vaccine every fall. If you must be around people with colds or the flu, wash your hands often. When should you call for help? [id number] anytime you think you may need emergency care. For example, call if:  · You have severe trouble breathing. Call your doctor now or seek immediate medical care if:  · Your symptoms do not get better after you have followed your asthma action plan. · You have new or worse trouble breathing. · Your coughing and wheezing get worse. · You cough up dark brown or bloody mucus (sputum). · You have a new or higher fever. Watch closely for changes in your health, and be sure to contact your doctor if:  · You need to use quick-relief medicine on more than 2 days a week (unless it is just for exercise). · You cough more deeply or more often, especially if you notice more mucus or a change in the color of your mucus. · You are not getting better as expected. Where can you learn more?   Go to http://marcelino-jordin.info/  Enter [id number] in the search box to learn more about \"Asthma Attack: Care Instructions. \"  Current as of: February 24, 2020               Content Version: 12.5  © 2006-2020 [employer]. Care instructions adapted under license by [employer] (which disclaims liability or warranty for this information). If you have questions about a medical condition or this instruction, always ask your healthcare professional. [place] [address] any warranty or liability for your use of this information. Patient Education        Bronchitis: Care Instructions  Your Care Instructions     Bronchitis is inflammation of the bronchial tubes, which carry air to the lungs. The tubes swell and produce mucus, or phlegm. The mucus and inflamed bronchial tubes make you cough. You may have trouble breathing. Most cases of bronchitis are caused by viruses like those that cause colds. Antibiotics usually do not help and they may be harmful. Bronchitis usually develops rapidly and lasts about 2 to 3 weeks in otherwise healthy people. Follow-up care is a key part of your treatment and safety. Be sure to make and go to all appointments, and call your doctor if you are having problems. It's also a good idea to know your test results and keep a list of the medicines you take. How can you care for yourself at home? · Take all medicines exactly as prescribed. Call your doctor if you think you are having a problem with your medicine. · Get some extra rest.  · Take an over-the-counter pain medicine, such as acetaminophen (Tylenol), ibuprofen (Advil, Motrin), or naproxen (Aleve) to reduce fever and relieve body aches. Read and follow all instructions on the label. · Do not take two or more pain medicines at the same time unless the doctor told you to. Many pain medicines have acetaminophen, which is Tylenol. Too much acetaminophen (Tylenol) can be harmful.   · Take an over-the-counter cough medicine that contains dextromethorphan to help quiet a dry, hacking cough so that you can sleep. Avoid cough medicines that have more than one active ingredient. Read and follow all instructions on the label. · Breathe moist air from a humidifier, hot shower, or sink filled with hot water. The heat and moisture will thin mucus so you can cough it out. · Do not smoke. Smoking can make bronchitis worse. If you need help quitting, talk to your doctor about stop-smoking programs and medicines. These can increase your chances of quitting for good. When should you call for help? [id number] anytime you think you may need emergency care. For example, call if:  · You have severe trouble breathing. Call your doctor now or seek immediate medical care if:  · You have new or worse trouble breathing. · You cough up dark brown or bloody mucus (sputum). · You have a new or higher fever. · You have a new rash. Watch closely for changes in your health, and be sure to contact your doctor if:  · You cough more deeply or more often, especially if you notice more mucus or a change in the color of your mucus. · You are not getting better as expected. Where can you learn more? Go to http://marcelino-jordin.info/  Enter H333 in the search box to learn more about \"Bronchitis: Care Instructions. \"  Current as of: February 24, 2020               Content Version: 12.5  © 1655-5929 [employer]. Care instructions adapted under license by [employer] (which disclaims liability or warranty for this information). If you have questions about a medical condition or this instruction, always ask your healthcare professional. [address] any warranty or liability for your use of this information.

## 2020-08-10 NOTE — ED NOTES
Candy Haq RN reviewed discharge instructions with the patient. The patient verbalized understanding. All questions and concerns were addressed. The patient declined a wheelchair and is discharged ambulatory in the care of family members with instructions and prescriptions in hand. Pt is alert and oriented x 4. Respirations are clear and unlabored.

## 2020-08-10 NOTE — ED PROVIDER NOTES
EMERGENCY DEPARTMENT HISTORY AND PHYSICAL EXAM      Date: 8/10/2020  Patient Name: Lawyer Dunn    History of Presenting Illness     Chief Complaint   Patient presents with    Cough     reports asthma attack unrelieved by home medications. was seen in ED for same on Friday. History Provided By: Patient    HPI: Lawyer Dunn, 35 y.o. female with PMHx significant for asthma and GERD presents to the ED with chief complaint of shortness of breath and cough. Patient's been having difficulty with her asthma for the past 2 to 3 weeks. She was seen here in the ED on August 7 and at that time had nebs, steroids, a CT of her chest which does not show any evidence of pulmonary embolism and a COVID test which resulted negative. She seemed to be getting a little bit better the next day, but then today started feeling worse again. Her cough is been persistent and she feels like she is unable to catch her breath because of her persistent cough. She is also had some streaks of blood when she coughs and has vomited a few times. She has been taken Robitussin DM for her cough without significant relief. She is a non-smoker. She reports chest tightness and pain with her coughing. PCP: Jas Rm MD    No current facility-administered medications on file prior to encounter. Current Outpatient Medications on File Prior to Encounter   Medication Sig Dispense Refill    predniSONE (STERAPRED DS) 10 mg dose pack As per instructions on dosepack 21 Tab 0    ondansetron (ZOFRAN ODT) 4 mg disintegrating tablet Take 1 Tab by mouth every eight (8) hours as needed for Nausea. 20 Tab 0    naproxen sodium (ALEVE) 220 mg cap Take  by mouth.  polyethylene glycol (MIRALAX) 17 gram/dose powder Take 17 g by mouth daily. 510 g 0    ibuprofen (MOTRIN) 600 mg tablet Take 1 Tab by mouth three (3) times daily (with meals).  21 Tab 0    naloxone (NARCAN) 4 mg/actuation nasal spray Use 1 spray intranasally, then discard. Repeat with new spray every 2 min as needed for opioid overdose symptoms, alternating nostrils. 2 Each 0    medroxyPROGESTERone (PROVERA) 10 mg tablet Take 10 mg by mouth three (3) times daily.  dicyclomine HCl (BENTYL PO) Take  by mouth four (4) times daily. Indications: As needed for pain      fluticasone propionate (FLONASE) 50 mcg/actuation nasal spray 2 Sprays by Both Nostrils route daily. Indications: As needed for allergies      multivitamin (MULTIPLE VITAMIN PO) Take  by mouth. Indications: Teresita fusion multi vitamin/folate 50 mg (2 gummies) by gyn      [address] [place] [address] Vin ondansetron (ZOFRAN ODT) 4 mg disintegrating tablet Take 1 Tab by mouth every eight (8) hours as needed for Nausea. 10 Tab 0    ketorolac (TORADOL) 10 mg tablet Take 1 Tab by mouth every six (6) hours as needed for Pain. 20 Tab 0    traMADol (ULTRAM) 50 mg tablet Take 1 Tab by mouth every six (6) hours as needed for Pain. Max Daily Amount: 200 mg. 10 Tab 0    loratadine (CLARITIN) 10 mg tablet Take 10 mg by mouth daily.  pantoprazole (PROTONIX) 40 mg tablet Take 40 mg by mouth two (2) times a day.  sucralfate (CARAFATE) 100 mg/mL suspension Take  by mouth four (4) times daily.  albuterol (PROVENTIL VENTOLIN) 2.5 mg /3 mL (0.083 %) nebulizer solution 2.5 mg by Nebulization route once.  mometasone (NASONEX) 50 mcg/actuation nasal spray 2 Sprays daily.          Past History     Past Medical History:  Past Medical History:   Diagnosis Date    Acid reflux disease with ulcer     Calculus of kidney     Constipation     GERD (gastroesophageal reflux disease)     Headache     PCOS (polycystic ovarian syndrome)     Seasonal asthma     Stomach pain     Vertigo        Past Surgical History:  Past Surgical History:   Procedure Laterality Date    HX CHOLECYSTECTOMY  01/29/2020    Laparoscopic cholecystectomy w/cholangiogram    HX CHOLECYSTECTOMY      HX ENDOSCOPY      HX UROLOGICAL Left 02/18/2019    STEFANY/Dr Jaya Murcia Family History:  Family History   Problem Relation Age of Onset    Diabetes Mother     Hypertension Mother     Diabetes Father     Heart Disease Maternal Grandmother     Diabetes Maternal Grandmother     Hypertension Maternal Grandmother     Cancer Maternal Grandmother         lymphoma    Heart Disease Maternal Grandfather     Diabetes Maternal Grandfather     Cancer Maternal Grandfather         prostate/bone    Heart Disease Paternal Grandmother     Diabetes Paternal Grandmother     Heart Disease Paternal Grandfather     Diabetes Paternal Grandfather     Cancer Paternal Cousin         T-cell leukemia    Cancer Paternal Cousin         kidney    Cancer Maternal Great Grandmother         ovarian       Social History:  Social History     Tobacco Use    Smoking status: Never Smoker    Smokeless tobacco: Never Used   Substance Use Topics    Alcohol use: No     Frequency: Never    Drug use: Yes     Types: Prescription, OTC       Allergies:  No Known Allergies      Review of Systems   Review of Systems   Constitutional: Negative for chills and fever. HENT: Negative for congestion, rhinorrhea and sore throat. Respiratory: Positive for cough, chest tightness, shortness of breath and wheezing. Cardiovascular: Positive for chest pain. Negative for palpitations and leg swelling. Gastrointestinal: Negative for abdominal pain, diarrhea, nausea and vomiting. Genitourinary: Negative for dysuria, flank pain and hematuria. Musculoskeletal: Negative for back pain, myalgias and neck pain. Skin: Negative for rash and wound. Neurological: Negative for dizziness, syncope, light-headedness and headaches. Psychiatric/Behavioral: Negative for confusion. The patient is not nervous/anxious. All other systems reviewed and are negative.         Physical Exam   General appearance - well nourished, well appearing, and in no distress, anxious  Eyes - pupils equal and reactive, extraocular eye movements intact  ENT - mucous membranes moist, pharynx normal without lesions  Neck - supple, no significant adenopathy; non-tender to palpation  Chest -near constant bronchospastic cough during the exam, no rales or rhonchi; occasional expiratory wheezes, tender to palpation anterior chest   heart - normal rate and regular rhythm, S1 and S2 normal, no murmurs noted  Abdomen - soft, nontender, nondistended, no masses or organomegaly  Musculoskeletal - no joint tenderness, deformity or swelling; normal ROM  Extremities - peripheral pulses normal, no pedal edema  Skin - normal coloration and turgor, no rashes  Neurological - alert, oriented x3, normal speech, no focal findings or movement disorder noted    Diagnostic Study Results     Labs -     Recent Results (from the past 200 hour(s))   CBC WITH AUTOMATED DIFF    Collection Time: 08/10/20  7:01 AM   Result Value Ref Range    WBC 15.4 (H) 3.6 - 11.0 K/uL    RBC 4.59 3.80 - 5.20 M/uL    HGB 14.5 11.5 - 16.0 g/dL    HCT 43.6 35.0 - 47.0 %    MCV 95.0 80.0 - 99.0 FL    MCH 31.6 26.0 - 34.0 PG    MCHC 33.3 30.0 - 36.5 g/dL    RDW 11.9 11.5 - 14.5 %    PLATELET 654 224 - 269 K/uL    MPV 11.4 8.9 - 12.9 FL    NRBC 0.0 0  WBC    ABSOLUTE NRBC 0.00 0.00 - 0.01 K/uL    NEUTROPHILS 86 (H) 32 - 75 %    LYMPHOCYTES 9 (L) 12 - 49 %    MONOCYTES 4 (L) 5 - 13 %    EOSINOPHILS 0 0 - 7 %    BASOPHILS 0 0 - 1 %    IMMATURE GRANULOCYTES 1 (H) 0.0 - 0.5 %    ABS. NEUTROPHILS 13.3 (H) 1.8 - 8.0 K/UL    ABS. LYMPHOCYTES 1.4 0.8 - 3.5 K/UL    ABS. MONOCYTES 0.5 0.0 - 1.0 K/UL    ABS. EOSINOPHILS 0.0 0.0 - 0.4 K/UL    ABS. BASOPHILS 0.0 0.0 - 0.1 K/UL    ABS. IMM.  GRANS. 0.1 (H) 0.00 - 0.04 K/UL    DF AUTOMATED     METABOLIC PANEL, COMPREHENSIVE    Collection Time: 08/10/20  7:01 AM   Result Value Ref Range    Sodium 139 136 - 145 mmol/L    Potassium 3.6 3.5 - 5.1 mmol/L    Chloride 108 97 - 108 mmol/L    CO2 21 21 - 32 mmol/L    Anion gap 10 5 - 15 mmol/L    Glucose 145 (H) 65 - 100 mg/dL    BUN 12 6 - 20 MG/DL    Creatinine 1.04 (H) 0.55 - 1.02 MG/DL    BUN/Creatinine ratio 12 12 - 20      GFR est AA >60 >60 ml/min/1.73m2    GFR est non-AA >60 >60 ml/min/1.73m2    Calcium 9.1 8.5 - 10.1 MG/DL    Bilirubin, total 0.2 0.2 - 1.0 MG/DL    ALT (SGPT) 43 12 - 78 U/L    AST (SGOT) 11 (L) 15 - 37 U/L    Alk. phosphatase 83 45 - 117 U/L    Protein, total 8.1 6.4 - 8.2 g/dL    Albumin 4.2 3.5 - 5.0 g/dL    Globulin 3.9 2.0 - 4.0 g/dL    A-G Ratio 1.1 1.1 - 2.2     LIPASE    Collection Time: 08/10/20  7:01 AM   Result Value Ref Range    Lipase 74 73 - 393 U/L   URINALYSIS W/ REFLEX CULTURE    Collection Time: 08/10/20  7:01 AM    Specimen: Urine   Result Value Ref Range    Color YELLOW/STRAW      Appearance CLEAR CLEAR      Specific gravity 1.025 1.003 - 1.030      pH (UA) 5.5 5.0 - 8.0      Protein Negative NEG mg/dL    Glucose Negative NEG mg/dL    Ketone Negative NEG mg/dL    Bilirubin Negative NEG      Blood SMALL (A) NEG      Urobilinogen 0.2 0.2 - 1.0 EU/dL    Nitrites Negative NEG      Leukocyte Esterase Negative NEG      WBC 0-4 0 - 4 /hpf    RBC 0-5 0 - 5 /hpf    Epithelial cells FEW FEW /lpf    Bacteria Negative NEG /hpf    UA:UC IF INDICATED CULTURE NOT INDICATED BY UA RESULT CNI      Mucus TRACE (A) NEG /lpf   SAMPLES BEING HELD    Collection Time: 08/10/20  7:01 AM   Result Value Ref Range    SAMPLES BEING HELD  RED     COMMENT        Add-on orders for these samples will be processed based on acceptable specimen integrity and analyte stability, which may vary by analyte.    CK W/ CKMB & INDEX    Collection Time: 08/10/20  7:01 AM   Result Value Ref Range    CK - MB <1.0 <3.6 NG/ML    CK-MB Index Cannot be calculated 0.0 - 2.5       26 - 192 U/L   TROPONIN I    Collection Time: 08/10/20  7:01 AM   Result Value Ref Range    Troponin-I, Qt. <0.05 <0.05 ng/mL   HCG URINE, QL. - POC    Collection Time: 08/10/20  7:05 AM   Result Value Ref Range    Pregnancy test,urine (POC) Negative NEG EKG, 12 LEAD, INITIAL    Collection Time: 08/10/20  7:11 AM   Result Value Ref Range    Ventricular Rate 130 BPM    Atrial Rate 130 BPM    P-R Interval 114 ms    QRS Duration 72 ms    Q-T Interval 302 ms    QTC Calculation (Bezet) 444 ms    Calculated P Axis 37 degrees    Calculated R Axis 18 degrees    Calculated T Axis 7 degrees    Diagnosis       Sinus tachycardia  Normal except for the rate  Confirmed by William Jiang ([id number]) on 8/10/2020 9:01:35 AM     POC EG7    Collection Time: 08/10/20  7:51 AM   Result Value Ref Range    Calcium, ionized (POC) 1.07 (L) 1.12 - 1.32 mmol/L    pH (POC) 7.41 7.35 - 7.45      pCO2 (POC) 29.9 (L) 35.0 - 45.0 MMHG    pO2 (POC) 111 (H) 80 - 100 MMHG    HCO3 (POC) 18.8 (L) 22 - 26 MMOL/L    Base deficit (POC) 6 mmol/L    sO2 (POC) 98 (H) 92 - 97 %    Site OTHER      Device: ROOM AIR      Allens test (POC) N/A      Specimen type (POC) VENOUS BLOOD         Radiologic Studies -   XR CHEST PORT   Final Result   IMPRESSION: No evidence of acute cardiopulmonary process. CT Results  (Last 48 hours)    None        CXR Results  (Last 48 hours)               08/10/20 0335  XR CHEST PORT Final result    Impression:  IMPRESSION: No evidence of acute cardiopulmonary process. Narrative:  INDICATION: Shortness of breath. Coughing. COMPARISON: February 3, 2020       FINDINGS: AP portable imaging of the chest performed at 3:24 AM demonstrates a   stable cardiomediastinal silhouette. The lungs are clear bilaterally. No   significant osseous abnormalities are seen. Medical Decision Making   I am the first provider for this patient. I reviewed the vital signs, available nursing notes, past medical history, past surgical history, family history and social history. Vital Signs-Reviewed the patient's vital signs.   Patient Vitals for the past 12 hrs:   Pulse Resp BP SpO2   08/10/20 0249 80 (!) 40 117/69 97 %       EKG at 7:11 AM on August 10, 2020: Sinus tachycardia, 130 bpm, normal axis, normal FL, QRS, QTc intervals, nonspecific ST changes    Records Reviewed: Nursing Notes and Old Medical Records    Provider Notes (Medical Decision Making):   Differential diagnosis: Pneumonia, bronchospasm, bronchitis, asthma exacerbation  We will check chest x-ray. Will treat with Tussionex, Tessalon Perles and albuterol. ED Course:   Initial assessment performed. The patients presenting problems have been discussed, and they are in agreement with the care plan formulated and outlined with them. I have encouraged them to ask questions as they arise throughout their visit. Progress Notes:  ED Course as of Aug 10 0719   Mon Aug 10, 2020   0403 Patient is feeling much better after Tessalon and Tussionex and is currently on her neb and not coughing.    [AO]   5466 Patient coughing some but is markedly improved since arrival.  Will discharge with PCP follow-up    [AO]   0707 Patient has signed her discharge papers, but then sat up on the edge of the bed and started hyperventilating and became very anxious, complaining of chest pain and abdominal pain. IV started, IV Benadryl and Ativan ordered, and labs and EKG ordered.    [AO]      ED Course User Index  [AO] Prem Fajardo MD     7:20 AM: Case discussed and care transferred to Dr. Carlos Lott in signout awaiting results of recently added lab work and reevaluation after Benadryl and Ativan. Disposition:  SC home    PLAN:  1. Discharge Medication List as of 8/10/2020  8:13 AM      START taking these medications    Details   chlorpheniramine-HYDROcodone (Tussionex Pennkinetic ER) 10-8 mg/5 mL suspension Take 5 mL by mouth every twelve (12) hours as needed for Cough for up to 3 days. Max Daily Amount: 10 mL., Normal, Disp-30 mL,R-0      benzonatate (Tessalon Perles) 100 mg capsule Take 1 Cap by mouth three (3) times daily as needed for Cough for up to 7 days. , Normal, Disp-15 Cap,R-0      azithromycin (Zithromax Z-Robby) 250 mg tablet As directed, Normal, Disp-6 Tab,R-0         CONTINUE these medications which have NOT CHANGED    Details   predniSONE (STERAPRED DS) 10 mg dose pack As per instructions on dosepack, Normal, Disp-21 Tab,R-0      !! ondansetron (ZOFRAN ODT) 4 mg disintegrating tablet Take 1 Tab by mouth every eight (8) hours as needed for Nausea., Normal, Disp-20 Tab, R-0      naproxen sodium (ALEVE) 220 mg cap Take  by mouth., Historical Med      polyethylene glycol (MIRALAX) 17 gram/dose powder Take 17 g by mouth daily. , Normal, Disp-510 g, R-0      ibuprofen (MOTRIN) 600 mg tablet Take 1 Tab by mouth three (3) times daily (with meals). , Normal, Disp-21 Tab, R-0      naloxone (NARCAN) 4 mg/actuation nasal spray Use 1 spray intranasally, then discard. Repeat with new spray every 2 min as needed for opioid overdose symptoms, alternating nostrils. , Normal, Disp-2 Each, R-0      medroxyPROGESTERone (PROVERA) 10 mg tablet Take 10 mg by mouth three (3) times daily. , Historical Med      dicyclomine HCl (BENTYL PO) Take  by mouth four (4) times daily. Indications: As needed for pain, Historical Med      fluticasone propionate (FLONASE) 50 mcg/actuation nasal spray 2 Sprays by Both Nostrils route daily. Indications: As needed for allergies, Historical Med      multivitamin (MULTIPLE VITAMIN PO) Take  by mouth. Indications: Teresita fusion multi vitamin/folate 50 mg (2 gummies) by gyn dr, Historical Med      !! ondansetron (ZOFRAN ODT) 4 mg disintegrating tablet Take 1 Tab by mouth every eight (8) hours as needed for Nausea. , Print, Disp-10 Tab, R-0      ketorolac (TORADOL) 10 mg tablet Take 1 Tab by mouth every six (6) hours as needed for Pain., Print, Disp-20 Tab, R-0      traMADol (ULTRAM) 50 mg tablet Take 1 Tab by mouth every six (6) hours as needed for Pain. Max Daily Amount: 200 mg., Print, Disp-10 Tab, R-0      loratadine (CLARITIN) 10 mg tablet Take 10 mg by mouth daily. , Historical Med      pantoprazole (PROTONIX) 40 mg tablet Take 40 mg by mouth two (2) times a day., Historical Med      sucralfate (CARAFATE) 100 mg/mL suspension Take  by mouth four (4) times daily. , Historical Med      albuterol (PROVENTIL VENTOLIN) 2.5 mg /3 mL (0.083 %) nebulizer solution 2.5 mg by Nebulization route once., Historical Med      mometasone (NASONEX) 50 mcg/actuation nasal spray 2 Sprays daily. , Historical Med       !! - Potential duplicate medications found. Please discuss with provider. 2.   Follow-up Information     Follow up With Specialties Details Why Contact Info    [facility] EMERGENCY DEPT Emergency Medicine  If symptoms worsen [address] [place] [address]  [phone]    Rafaela Cheng MD Family Medicine Schedule an appointment as soon as possible for a visit  [place] [phone]          Return to ED if worse     Diagnosis     Clinical Impression:   1. Acute bronchitis, unspecified organism    2.  Mild intermittent asthma with acute exacerbation

## 2020-08-10 NOTE — LETTER
[address] 
[facility] EMERGENCY DEPT 
[address] [place] [address] Viola Aparicio [id number] [phone] Work/School Note Date: 8/10/2020 To Whom It May concern: 
 
Te Cisse was seen and treated today in the emergency room by the following provider(s): 
Attending Provider: Sandee Steward MD.   
 
Te Cisse may return to work on August 13, 2020. Sincerely, Velvet Hernandez MD

## 2020-08-10 NOTE — ED NOTES
5477 - Bedside shift change report given to [facility] / Salima RN  (oncoming nurse) by Oscar Siddiqui / Alia Moreno RN (offgoing nurse). Report included the following information SBAR, Kardex, ED Summary, Intake/Output and MAR.

## 2020-08-10 NOTE — ED NOTES
Pt reports sudden 10/10 pain in central abdomen. Pt tachy on monitor at 147, o2 sat 98%, RR 28, /87. Pt has good pulses bilat upper lower extremities. Pt has clear lungs sounds bilat. Pt states she has had no similar sx in the past. She denies chance of being pregnant. She denies any fall or trauma to cause her sx. Dr Markus Meigs at bedside. 7:35 AM: Bedside shift change report given to Nery Moore RN (oncoming nurse) by Tami Moore RN (offgoing nurse). Report included the following information SBAR, Kardex, ED Summary, [facility] and Recent Results.

## 2020-08-11 ENCOUNTER — PATIENT OUTREACH (OUTPATIENT)
Dept: CASE MANAGEMENT | Age: 30
End: 2020-08-11

## 2020-08-12 NOTE — PROGRESS NOTES
Patient contacted regarding recent discharge and COVID-19 risk. Discussed COVID-19 related testing which was not done at this time. Test results were not done. Patient informed of results, if available? no    Care Transition Nurse/ Ambulatory Care Manager/ LPN Care Coordinator contacted the patient by telephone to perform post discharge assessment. Verified name and  with patient as identifiers. Patient has following risk factors of: no known risk factors. CTN/ACM/LPN reviewed discharge instructions, medical action plan and red flags related to discharge diagnosis. Reviewed and educated them on any new and changed medications related to discharge diagnosis. Advised obtaining a 90-day supply of all daily and as-needed medications. Advance Care Planning:   Does patient have an Advance Directive: not on file     Education provided regarding infection prevention, and signs and symptoms of COVID-19 and when to seek medical attention with patient who verbalized understanding. Discussed exposure protocols and quarantine from [address] you at higher risk for severe illness  and given an opportunity for questions and concerns. The patient agrees to contact the COVID-19 hotline [phone] or PCP office for questions related to their healthcare. CTN/ACM/LPN provided contact information for future reference. From CDC: Are you at higher risk for severe illness?  Wash your hands often.  Avoid close contact (6 feet, which is about two arm lengths) with people who are sick.  Put distance between yourself and other people if COVID-19 is spreading in your community.  Clean and disinfect frequently touched surfaces.  Avoid all cruise travel and non-essential air travel.  Call your healthcare professional if you have concerns about COVID-19 and your underlying condition or if you are sick.     For more information on steps you can take to protect yourself, see CDC's How to Protect Yourself Patient/family/caregiver given information for [employer] and agrees to enroll yes  Patient's preferred e-mail: [email or web address]  Patient's preferred phone number: [phone]   Based on Loop alert triggers, patient will be contacted by nurse care manager for worsening symptoms. Pt will be further monitored by COVID Loop Team based on severity of symptoms and risk factors.

## 2020-08-27 ENCOUNTER — HOSPITAL ENCOUNTER (OUTPATIENT)
Dept: GENERAL RADIOLOGY | Age: 30
Discharge: HOME | End: 2020-08-27
Payer: COMMERCIAL

## 2020-08-27 DIAGNOSIS — R10.9 RIGHT FLANK PAIN: ICD-10-CM

## 2020-08-27 DIAGNOSIS — R13.10 DYSPHAGIA: ICD-10-CM

## 2020-08-27 DIAGNOSIS — R10.31 ABDOMINAL PAIN, RIGHT LOWER QUADRANT: ICD-10-CM

## 2020-08-27 DIAGNOSIS — R14.0 BLOATING: ICD-10-CM

## 2020-08-27 PROCEDURE — 74018 RADEX ABDOMEN 1 VIEW: CPT

## 2020-11-24 ENCOUNTER — HOSPITAL ENCOUNTER (EMERGENCY)
Age: 30
Discharge: HOME | End: 2020-11-24
Attending: STUDENT IN AN ORGANIZED HEALTH CARE EDUCATION/TRAINING PROGRAM
Payer: COMMERCIAL

## 2020-11-24 ENCOUNTER — APPOINTMENT (OUTPATIENT)
Dept: CT IMAGING | Age: 30
End: 2020-11-24
Attending: STUDENT IN AN ORGANIZED HEALTH CARE EDUCATION/TRAINING PROGRAM
Payer: COMMERCIAL

## 2020-11-24 VITALS
HEIGHT: 63 IN | SYSTOLIC BLOOD PRESSURE: 141 MMHG | RESPIRATION RATE: 16 BRPM | HEART RATE: 83 BPM | DIASTOLIC BLOOD PRESSURE: 97 MMHG | BODY MASS INDEX: 33.66 KG/M2 | OXYGEN SATURATION: 100 % | WEIGHT: 190 LBS | TEMPERATURE: 98 F

## 2020-11-24 DIAGNOSIS — R20.0 NUMBNESS: Primary | ICD-10-CM

## 2020-11-24 LAB
ALBUMIN SERPL-MCNC: 4.3 G/DL (ref 3.5–5)
ALBUMIN/GLOB SERPL: 1.2 {RATIO} (ref 1.1–2.2)
ALP SERPL-CCNC: 83 U/L (ref 45–117)
ALT SERPL-CCNC: 75 U/L (ref 12–78)
ANION GAP SERPL CALC-SCNC: 5 MMOL/L (ref 5–15)
AST SERPL-CCNC: 30 U/L (ref 15–37)
BASOPHILS # BLD: 0.1 K/UL (ref 0–0.1)
BASOPHILS NFR BLD: 1 % (ref 0–1)
BILIRUB SERPL-MCNC: 0.5 MG/DL (ref 0.2–1)
BUN SERPL-MCNC: 12 MG/DL (ref 6–20)
BUN/CREAT SERPL: 13 (ref 12–20)
CALCIUM SERPL-MCNC: 9.2 MG/DL (ref 8.5–10.1)
CHLORIDE SERPL-SCNC: 109 MMOL/L (ref 97–108)
CO2 SERPL-SCNC: 25 MMOL/L (ref 21–32)
CREAT SERPL-MCNC: 0.9 MG/DL (ref 0.55–1.02)
DIFFERENTIAL METHOD BLD: NORMAL
EOSINOPHIL # BLD: 0.2 K/UL (ref 0–0.4)
EOSINOPHIL NFR BLD: 2 % (ref 0–7)
ERYTHROCYTE [DISTWIDTH] IN BLOOD BY AUTOMATED COUNT: 11.5 % (ref 11.5–14.5)
GLOBULIN SER CALC-MCNC: 3.5 G/DL (ref 2–4)
GLUCOSE SERPL-MCNC: 98 MG/DL (ref 65–100)
HCG UR QL: NEGATIVE
HCT VFR BLD AUTO: 40.9 % (ref 35–47)
HGB BLD-MCNC: 14.2 G/DL (ref 11.5–16)
IMM GRANULOCYTES # BLD AUTO: 0 K/UL (ref 0–0.04)
IMM GRANULOCYTES NFR BLD AUTO: 0 % (ref 0–0.5)
LYMPHOCYTES # BLD: 1.8 K/UL (ref 0.8–3.5)
LYMPHOCYTES NFR BLD: 21 % (ref 12–49)
MCH RBC QN AUTO: 31.6 PG (ref 26–34)
MCHC RBC AUTO-ENTMCNC: 34.7 G/DL (ref 30–36.5)
MCV RBC AUTO: 90.9 FL (ref 80–99)
MONOCYTES # BLD: 0.5 K/UL (ref 0–1)
MONOCYTES NFR BLD: 6 % (ref 5–13)
NEUTS SEG # BLD: 6.2 K/UL (ref 1.8–8)
NEUTS SEG NFR BLD: 70 % (ref 32–75)
NRBC # BLD: 0 K/UL (ref 0–0.01)
NRBC BLD-RTO: 0 PER 100 WBC
PLATELET # BLD AUTO: 268 K/UL (ref 150–400)
PMV BLD AUTO: 11.2 FL (ref 8.9–12.9)
POTASSIUM SERPL-SCNC: 3.8 MMOL/L (ref 3.5–5.1)
PROT SERPL-MCNC: 7.8 G/DL (ref 6.4–8.2)
RBC # BLD AUTO: 4.5 M/UL (ref 3.8–5.2)
SODIUM SERPL-SCNC: 139 MMOL/L (ref 136–145)
WBC # BLD AUTO: 8.7 K/UL (ref 3.6–11)

## 2020-11-24 PROCEDURE — 70450 CT HEAD/BRAIN W/O DYE: CPT

## 2020-11-24 PROCEDURE — 99284 EMERGENCY DEPT VISIT MOD MDM: CPT

## 2020-11-24 PROCEDURE — 80053 COMPREHEN METABOLIC PANEL: CPT

## 2020-11-24 PROCEDURE — 81025 URINE PREGNANCY TEST: CPT

## 2020-11-24 PROCEDURE — 96374 THER/PROPH/DIAG INJ IV PUSH: CPT

## 2020-11-24 PROCEDURE — 85025 COMPLETE CBC W/AUTO DIFF WBC: CPT

## 2020-11-24 PROCEDURE — 36415 COLL VENOUS BLD VENIPUNCTURE: CPT

## 2020-11-24 PROCEDURE — 74011250636 HC RX REV CODE- 250/636: Performed by: STUDENT IN AN ORGANIZED HEALTH CARE EDUCATION/TRAINING PROGRAM

## 2020-11-24 RX ORDER — ONDANSETRON 2 MG/ML
4 INJECTION INTRAMUSCULAR; INTRAVENOUS
Status: COMPLETED | OUTPATIENT
Start: 2020-11-24 | End: 2020-11-24

## 2020-11-24 RX ADMIN — ONDANSETRON 4 MG: 2 INJECTION INTRAMUSCULAR; INTRAVENOUS at 19:08

## 2020-11-24 NOTE — ED NOTES
Dr. Morales Comment evaluated pt in triage - verbal order for CT head wo. Pt placed in lobby at this time.

## 2020-11-24 NOTE — ED PROVIDER NOTES
EMERGENCY DEPARTMENT HISTORY AND PHYSICAL EXAM          Date: 11/24/2020  Patient Name: Belinda Alva  Attending of Record: Lyla Villagomez MD    History of Presenting Illness     Chief Complaint   Patient presents with    Numbness     R sided facial numbness x 1600; reports similar episode last week where she took 2 ASA and sx's resolved; also chronic pain behind L eye which she has been seeing her eye doctor for; chronic R neck pain; hx of migraines       History Provided By: Patient    HPI: Belinda Alva is a 35 y.o. female, pmhx of asthma, PCOS, migraines presenting with complaints of left-sided eye pain and right arm pain as well as numbness. She reports that over the past 2 weeks she has had several episodes of left-sided eye pain associated with blurry vision, and had recently seen an ophthalmologist who told her that there was slight inflammation in the nerve, and received a new contact lens prescription. During the episode of left-sided eye pain today, vision again became blurry, but was now associated with right upper extremity pain, right shoulder pain extending to the right side of her neck. Dates that she became dizzy and felt off balance. Has not taken anything for these episodes, and nothing apparently makes it worse. No LOC. Does have a history of migraines, but states that she has not had one in a long time, and no longer takes her Imitrex, but even when she did have migraines, she had no other neurologic symptoms associated. States that she feels overall weak, but denies any focal weakness. PCP: Denice Miller MD    There are no other complaints, changes, or physical findings at this time.      Current Facility-Administered Medications   Medication Dose Route Frequency Provider Last Rate Last Dose    ondansetron (ZOFRAN) injection 4 mg  4 mg IntraVENous NOW Marlys Vasquez MD         Current Outpatient Medications   Medication Sig Dispense Refill    predniSONE (STERAPRED DS) 10 mg dose pack As per instructions on dosepack 21 Tab 0    ondansetron (ZOFRAN ODT) 4 mg disintegrating tablet Take 1 Tab by mouth every eight (8) hours as needed for Nausea. 20 Tab 0    naproxen sodium (ALEVE) 220 mg cap Take  by mouth.  polyethylene glycol (MIRALAX) 17 gram/dose powder Take 17 g by mouth daily. 510 g 0    ibuprofen (MOTRIN) 600 mg tablet Take 1 Tab by mouth three (3) times daily (with meals). 21 Tab 0    naloxone (NARCAN) 4 mg/actuation nasal spray Use 1 spray intranasally, then discard. Repeat with new spray every 2 min as needed for opioid overdose symptoms, alternating nostrils. 2 Each 0    medroxyPROGESTERone (PROVERA) 10 mg tablet Take 10 mg by mouth three (3) times daily.  dicyclomine HCl (BENTYL PO) Take  by mouth four (4) times daily. Indications: As needed for pain      fluticasone propionate (FLONASE) 50 mcg/actuation nasal spray 2 Sprays by Both Nostrils route daily. Indications: As needed for allergies      multivitamin (MULTIPLE VITAMIN PO) Take  by mouth. Indications: Teresita fusion multi vitamin/folate 50 mg (2 gummies) by gyn dr Cehn ondansetron (ZOFRAN ODT) 4 mg disintegrating tablet Take 1 Tab by mouth every eight (8) hours as needed for Nausea. 10 Tab 0    ketorolac (TORADOL) 10 mg tablet Take 1 Tab by mouth every six (6) hours as needed for Pain. 20 Tab 0    traMADol (ULTRAM) 50 mg tablet Take 1 Tab by mouth every six (6) hours as needed for Pain. Max Daily Amount: 200 mg. 10 Tab 0    loratadine (CLARITIN) 10 mg tablet Take 10 mg by mouth daily.  pantoprazole (PROTONIX) 40 mg tablet Take 40 mg by mouth two (2) times a day.  sucralfate (CARAFATE) 100 mg/mL suspension Take  by mouth four (4) times daily.  albuterol (PROVENTIL VENTOLIN) 2.5 mg /3 mL (0.083 %) nebulizer solution 2.5 mg by Nebulization route once.  mometasone (NASONEX) 50 mcg/actuation nasal spray 2 Sprays daily.          Past History     Past Medical History:  Past Medical History:   Diagnosis Date    Acid reflux disease with ulcer     Calculus of kidney     Constipation     GERD (gastroesophageal reflux disease)     Headache     PCOS (polycystic ovarian syndrome)     Seasonal asthma     Stomach pain     Vertigo        Past Surgical History:  Past Surgical History:   Procedure Laterality Date    HX CHOLECYSTECTOMY  01/29/2020    Laparoscopic cholecystectomy w/cholangiogram    HX CHOLECYSTECTOMY      HX ENDOSCOPY      HX UROLOGICAL Left 02/18/2019    [facility]/Dr Cuauhtemoc Schwartz       Family History:  Family History   Problem Relation Age of Onset    Diabetes Mother     Hypertension Mother     Diabetes Father     Heart Disease Maternal Grandmother     Diabetes Maternal Grandmother     Hypertension Maternal Grandmother     Cancer Maternal Grandmother         lymphoma    Heart Disease Maternal Grandfather     Diabetes Maternal Grandfather     Cancer Maternal Grandfather         prostate/bone    Heart Disease Paternal Grandmother     Diabetes Paternal Grandmother     Heart Disease Paternal Grandfather     Diabetes Paternal Grandfather     Cancer Paternal Cousin         T-cell leukemia    Cancer Paternal Cousin         kidney    Cancer Maternal Great Grandmother         ovarian       Social History:  Social History     Tobacco Use    Smoking status: Never Smoker    Smokeless tobacco: Never Used   Substance Use Topics    Alcohol use: No     Frequency: Never    Drug use: Yes     Types: Prescription, OTC       Allergies:  No Known Allergies      Review of Systems   Review of Systems   Constitutional: Negative for chills and fever. HENT: Negative for sore throat. Eyes: Positive for photophobia, pain and visual disturbance. Respiratory: Negative for chest tightness and shortness of breath. Cardiovascular: Negative for chest pain. Gastrointestinal: Negative for abdominal pain, diarrhea, nausea and vomiting. Genitourinary: Negative for dysuria and hematuria. Musculoskeletal: Negative for myalgias. Neurological: Positive for dizziness, numbness and headaches. Negative for syncope, speech difficulty, weakness and light-headedness. Psychiatric/Behavioral: Negative for confusion. Anxious        Physical Exam   Physical Exam  Constitutional:       Appearance: Normal appearance. Comments: Tired appearing   HENT:      Head: Normocephalic and atraumatic. Mouth/Throat:      Mouth: Mucous membranes are moist.   Eyes:      Extraocular Movements: Extraocular movements intact. Conjunctiva/sclera: Conjunctivae normal.      Pupils: Pupils are equal, round, and reactive to light. Neck:      Musculoskeletal: Normal range of motion. Comments: Right-sided neck tenderness to palpation  Cardiovascular:      Rate and Rhythm: Normal rate and regular rhythm. Pulses: Normal pulses. Pulmonary:      Effort: Pulmonary effort is normal. No respiratory distress. Abdominal:      General: Abdomen is flat. Palpations: Abdomen is soft. Tenderness: There is no abdominal tenderness. Musculoskeletal: Normal range of motion. Skin:     General: Skin is warm and dry. Capillary Refill: Capillary refill takes less than 2 seconds. Neurological:      Mental Status: She is alert and oriented to person, place, and time. Coordination: Coordination normal.      Comments: Reported decreased sensation on right upper extremity  4/5 strength in bilateral lower extremities, however unclear if this is effort dependent  5/5 strength in the bilateral upper extremities, normal range of motion         Diagnostic Study Results     Labs -   No results found for this or any previous visit (from the past 12 hour(s)). Radiologic Studies -   CT HEAD WO CONT    (Results Pending)     CT Results  (Last 48 hours)    None        CXR Results  (Last 48 hours)    None            Medical Decision Making   I am the first provider for this patient.     I reviewed the vital signs, available nursing notes, past medical history, past surgical history, family history and social history. Vital Signs-Reviewed the patient's vital signs. Patient Vitals for the past 12 hrs:   Temp Pulse Resp BP SpO2   11/24/20 1657 98 °F (36.7 °C) 83 16 (!) 141/97 100 %       ECG Interpretation:     Records Reviewed: Nursing Notes and Old Medical Records    Provider Notes (Medical Decision Making):   DDx: Anxiety, migraine, stroke, hypoglycemia, optic neuritis, multiple sclerosis    23-year-old female with history as above presenting with acute onset left eye pain associated with right upper extremity pain, right shoulder pain, right neck pain and numbness as well as right facial numbness. On exam patient appears fatigued, and only focal neurologic deficit appreciated is in the bilateral lower extremities, however this is unclear if this is effort dependent. Patient does have photophobia and a prior history of migraines, and currently distribution of neurologic complaints does not fit with any known nerve distribution, so could be related to migraine. Acute ischemia possible but lower suspicion, and also lower suspicion for carotid or vertebral dissection/other pathology given normal coordination testing, normal eye exam, and no other clear reason to have 1 of these pathologies. Will get basic labs and head CT. Zofran for nausea. Obtain urine pregnancy test    ED Course and Progress Notes:   Initial assessment performed. The patients presenting problems have been discussed, and they are in agreement with the care plan formulated and outlined with them. I have encouraged them to ask questions as they arise throughout their visit. Diagnosis     Clinical Impression: Migraine  Disposition:  discharge    DISCHARGE PLAN:  Remove if admitted  1. Current Discharge Medication List        2. Follow-up Information    None       3.  Return to ED if worse         Resident Signature: Robin Sandhu MD

## 2020-11-24 NOTE — Clinical Note
[address] 
[facility] EMERGENCY DEPT 
[address] [place] [address] [facility] [id number] 
[phone] Work/School Note Date: 11/24/2020 To Whom It May concern: 
 
 
Le Tanner was seen and treated today in the emergency room by the following provider(s): 
Attending Provider: Lalo Clinton MD 
Resident: Stephanie Springer MD.   
 
Le Tanner is excused from work/school on 11/24/20. She is clear to return to work/school on 11/25/20.    
 
 
Sincerely, 
 
 
 
 
Marisa Zhang MD

## 2020-11-25 NOTE — ED NOTES
Patient was given discharge instructions. All questions were answered and the patient verbalized understanding. Patient left the department via wheelchair.

## 2020-12-14 ENCOUNTER — OFFICE VISIT (OUTPATIENT)
Dept: NEUROLOGY | Age: 30
End: 2020-12-14
Payer: COMMERCIAL

## 2020-12-14 VITALS
OXYGEN SATURATION: 99 % | TEMPERATURE: 98.3 F | BODY MASS INDEX: 33.66 KG/M2 | HEIGHT: 63 IN | RESPIRATION RATE: 18 BRPM | HEART RATE: 87 BPM | DIASTOLIC BLOOD PRESSURE: 88 MMHG | SYSTOLIC BLOOD PRESSURE: 137 MMHG | WEIGHT: 190 LBS

## 2020-12-14 DIAGNOSIS — R20.2 PARESTHESIA: ICD-10-CM

## 2020-12-14 DIAGNOSIS — E56.9 HYPOVITAMINOSIS: ICD-10-CM

## 2020-12-14 DIAGNOSIS — G37.9 DEMYELINATING DISEASE (HCC): ICD-10-CM

## 2020-12-14 DIAGNOSIS — H46.9 OPTIC NEURITIS: Primary | ICD-10-CM

## 2020-12-14 DIAGNOSIS — G43.009 MIGRAINE WITHOUT AURA AND WITHOUT STATUS MIGRAINOSUS, NOT INTRACTABLE: ICD-10-CM

## 2020-12-14 PROCEDURE — 99205 OFFICE O/P NEW HI 60 MIN: CPT | Performed by: PSYCHIATRY & NEUROLOGY

## 2020-12-14 RX ORDER — FLUTICASONE FUROATE, UMECLIDINIUM BROMIDE AND VILANTEROL TRIFENATATE 100; 62.5; 25 UG/1; UG/1; UG/1
1 POWDER RESPIRATORY (INHALATION) DAILY
COMMUNITY

## 2020-12-14 RX ORDER — TIZANIDINE 4 MG/1
4 TABLET ORAL
Qty: 30 TAB | Refills: 1 | Status: SHIPPED | OUTPATIENT
Start: 2020-12-14 | End: 2021-05-04

## 2020-12-14 RX ORDER — PREDNISONE 10 MG/1
10 TABLET ORAL 2 TIMES DAILY
Qty: 20 TAB | Refills: 0 | Status: SHIPPED | OUTPATIENT
Start: 2020-12-14 | End: 2021-04-16 | Stop reason: SDUPTHER

## 2020-12-14 RX ORDER — MECLIZINE HYDROCHLORIDE 25 MG/1
TABLET ORAL
COMMUNITY
End: 2021-05-04 | Stop reason: SDUPTHER

## 2020-12-14 RX ORDER — LOSARTAN POTASSIUM 50 MG/1
TABLET ORAL DAILY
COMMUNITY
End: 2021-05-04 | Stop reason: ALTCHOICE

## 2020-12-14 RX ORDER — MELOXICAM 15 MG/1
15 TABLET ORAL DAILY
COMMUNITY
End: 2021-05-04

## 2020-12-14 RX ORDER — FAMOTIDINE 40 MG/1
40 TABLET, FILM COATED ORAL DAILY
COMMUNITY

## 2020-12-14 NOTE — PROGRESS NOTES
Neurology Consult Note      HISTORY PROVIDED BY: patient    Chief Complaint:   Chief Complaint   Patient presents with    Numbness     right side       Subjective:    Oj Christina is a 35 y.o. right handed female who presents in consultation for numbness of the right side of the face and eye pain. This is a 45-year-old right-handed female with history of GERD, headache, cholelithiasis, polycystic ovarian syndrome, seasonal asthma, who was referred to the clinic to evaluate for numbness of the face and arm. According to patient, for the past month, she has been experiencing pain and blurriness of the left eye, at that time, she went to eye doctor  apparently optometrist who told her that there was a little  swelling of the nerve, and was living in contact lens. Since then, she has been having intermittent episodes of the blurriness of the eye and eye pain. On November 24, 2020, she experienced facial pain, right arm pain, numbness and tingling sensation on the right side of the face, left eye pain and blurriness, at this time, patient went to the emergency room, was evaluated, CT scan of the head was negative, was subsequently referred to the clinic for further evaluation and management. Patient says he has symptoms come and go. She says she feels pins-and-needles all over the body intermittently. At times she experiences vertigo. She noted that she also has headaches which tend to be worse with the symptoms. Headache is throbbing in nature, mostly frontal, frequency is variable. Headache associated with dizziness, blurry vision, occasional double vision, photophobia, phonophobia, nausea. No aggravating or relieving factor, sometimes made worse by stress and lack of sleep. Headache at times will last between 4 and 5 hours, going to a dark and quiet place makes it a little better. She says sometimes she experiences itching. She denies loss of consciousness, dysphagia, or odynophagia.   Review of Systems - General ROS: positive for  - fatigue and sleep disturbance  Psychological ROS: positive for - anxiety and sleep disturbances  Ophthalmic ROS: positive for - blurry vision, decreased vision, double vision and photophobia  ENT ROS: positive for - headaches, tinnitus, vertigo and visual changes  Allergy and Immunology ROS: negative  Hematological and Lymphatic ROS: negative  Endocrine ROS: negative  Respiratory ROS: no cough, shortness of breath, or wheezing  Cardiovascular ROS: negative  Gastrointestinal ROS: no abdominal pain, change in bowel habits, or black or bloody stools  Genito-Urinary ROS: no dysuria, trouble voiding, or hematuria  Musculoskeletal ROS: positive for - muscular weakness  Neurological ROS: positive for - dizziness, headaches, numbness/tingling, visual changes and weakness  Dermatological ROS: negative  With constellation of patient's symptoms, I will obtain MRI of the brain without gadolinium to evaluate for demyelinating disease, MRI of the brain is negative and may have to get the MRI of the cervical spine.   Past Medical History:   Diagnosis Date    Acid reflux disease with ulcer     Calculus of kidney     Constipation     GERD (gastroesophageal reflux disease)     Headache     PCOS (polycystic ovarian syndrome)     Seasonal asthma     Stomach pain     Vertigo       Past Surgical History:   Procedure Laterality Date    HX CHOLECYSTECTOMY  01/29/2020    Laparoscopic cholecystectomy w/cholangiogram    HX CHOLECYSTECTOMY      HX ENDOSCOPY      HX UROLOGICAL Left 02/18/2019    [facility]/Dr Jerry Martinez      Social History     Socioeconomic History    Marital status:      Spouse name: Not on file    Number of children: Not on file    Years of education: Not on file    Highest education level: Not on file   Occupational History    Not on file   Social Needs    Financial resource strain: Not on file    Food insecurity     Worry: Not on file     Inability: Not on file   [employer] Transportation needs     Medical: Not on file     Non-medical: Not on file   Tobacco Use    Smoking status: Never Smoker    Smokeless tobacco: Never Used   Substance and Sexual Activity    Alcohol use: No     Frequency: Never    Drug use: Yes     Types: Prescription, OTC    Sexual activity: Yes     Partners: Male     Birth control/protection: Condom   Lifestyle    Physical activity     Days per week: Not on file     Minutes per session: Not on file    Stress: Not on file   Relationships    Social connections     Talks on phone: Not on file     Gets together: Not on file     Attends [employer] service: Not on file     Active member of club or organization: Not on file     Attends meetings of clubs or organizations: Not on file     Relationship status: Not on file    Intimate partner violence     Fear of current or ex partner: Not on file     Emotionally abused: Not on file     Physically abused: Not on file     Forced sexual activity: Not on file   Other Topics Concern    Not on file   Social History Narrative    Not on file     Family History   Problem Relation Age of Onset    Diabetes Mother     Hypertension Mother     Diabetes Father     Heart Disease Maternal Grandmother     Diabetes Maternal Grandmother     Hypertension Maternal Grandmother     Cancer Maternal Grandmother         lymphoma    Heart Disease Maternal Grandfather     Diabetes Maternal Grandfather     Cancer Maternal Grandfather         prostate/bone    Heart Disease Paternal Grandmother     Diabetes Paternal Grandmother     Heart Disease Paternal Grandfather     Diabetes Paternal Grandfather     Cancer Paternal Cousin         T-cell leukemia    Cancer Paternal Cousin         kidney    Cancer Maternal Great Grandmother         ovarian         Objective:   ROS  As per HPI  No Known Allergies     Meds:  Outpatient Medications Prior to Visit   Medication Sig Dispense Refill    famotidine (PEPCID) 40 mg tablet Take 40 mg by mouth daily.  fluticasone-umeclidinium-vilanterol (Trelegy Ellipta) 100-62.5-25 mcg inhaler Take 1 Puff by inhalation daily.  meclizine (ANTIVERT) 25 mg tablet Take  by mouth three (3) times daily as needed for Dizziness.  losartan (COZAAR) 50 mg tablet Take  by mouth daily.  meloxicam (MOBIC) 15 mg tablet Take 15 mg by mouth daily.  naproxen sodium (ALEVE) 220 mg cap Take  by mouth.  polyethylene glycol (MIRALAX) 17 gram/dose powder Take 17 g by mouth daily. 510 g 0    naloxone (NARCAN) 4 mg/actuation nasal spray Use 1 spray intranasally, then discard. Repeat with new spray every 2 min as needed for opioid overdose symptoms, alternating nostrils. 2 Each 0    medroxyPROGESTERone (PROVERA) 10 mg tablet Take 10 mg by mouth three (3) times daily.  dicyclomine HCl (BENTYL PO) Take  by mouth four (4) times daily. Indications: As needed for pain      fluticasone propionate (FLONASE) 50 mcg/actuation nasal spray 2 Sprays by Both Nostrils route daily. Indications: As needed for allergies      multivitamin (MULTIPLE VITAMIN PO) Take  by mouth. Indications: Teresita fusion multi vitamin/folate 50 mg (2 gummies) by gyn dr Danii Banuelos ondansetron (ZOFRAN ODT) 4 mg disintegrating tablet Take 1 Tab by mouth every eight (8) hours as needed for Nausea. 10 Tab 0    pantoprazole (PROTONIX) 40 mg tablet Take 40 mg by mouth two (2) times a day.  sucralfate (CARAFATE) 100 mg/mL suspension Take  by mouth four (4) times daily.  albuterol (PROVENTIL VENTOLIN) 2.5 mg /3 mL (0.083 %) nebulizer solution 2.5 mg by Nebulization route once.  predniSONE (STERAPRED DS) 10 mg dose pack As per instructions on dosepack 21 Tab 0    ondansetron (ZOFRAN ODT) 4 mg disintegrating tablet Take 1 Tab by mouth every eight (8) hours as needed for Nausea. 20 Tab 0    ibuprofen (MOTRIN) 600 mg tablet Take 1 Tab by mouth three (3) times daily (with meals).  21 Tab 0    ketorolac (TORADOL) 10 mg tablet Take 1 Tab by mouth every six (6) hours as needed for Pain. 20 Tab 0    traMADol (ULTRAM) 50 mg tablet Take 1 Tab by mouth every six (6) hours as needed for Pain. Max Daily Amount: 200 mg. 10 Tab 0    loratadine (CLARITIN) 10 mg tablet Take 10 mg by mouth daily.  mometasone (NASONEX) 50 mcg/actuation nasal spray 2 Sprays daily. No facility-administered medications prior to visit. Imaging:  MRI Results (most recent):  Results from Hospital Encounter encounter on 12/21/20   MRI BRAIN W WO CONT    Narrative Clinical history: Paresthesias, migraine headaches  INDICATION:   Paresthesias    COMPARISON: CT 11/24/2020  TECHNIQUE: MR examination of the brain includes axial and sagittal T1 , axial  T2, axial FLAIR, axial gradient echo, axial DWI, coronal T1 . Pre and post  contrast axial T1-weighted imaging. Postcontrast T1-weighted imaging coronal  plane. Orbital protocol was utilized. CONTRAST: The patient was administered gadolinium-based contrast material,  subsequently axial and sagittal T1-weighted postcontrast imaging was obtained. FINDINGS:   There is no intracranial mass, hemorrhage or acute infarction. There is no intraconal mass. Optic nerves are symmetric in size and signal.  Perineural CSF is within normal limits. There is no intraconal mass. The globes  are symmetric in size. No proptosis. Ocular muscles are symmetric. There is no evidence of demyelinating process. Left vertebral artery is  dominant. . There is no abnormal parenchymal enhancement. There is no abnormal  meningeal enhancement demonstrated. The brain architecture is normal. There is  no evidence of midline shift or mass-effect. The ventricles are normal in size,  position and configuration. There are no extra-axial fluid collections. Major  intracranial vascular flow-voids are unremarkable. The orbits are grossly  symmetric. Dural venous sinuses are grossly unremarkable. There is no Chiari or  syrinx.  Pituitary and infundibulum grossly unremarkable. Cerebellopontine angles  are unremarkable. No skull base mass is identified. Cavernous sinuses are  symmetric. The mastoid air cells and are well pneumatized and clear. Impression IMPRESSION:  Normal MRI of the brain. Normal MR evaluation of the orbits. No evidence of demyelinating process. No intracranial mass, hemorrhage or evidence of acute infarction. CT Results (most recent):  Results from Hospital Encounter encounter on 11/24/20   CT HEAD WO CONT    Narrative EXAM: CT HEAD WO CONT    INDICATION: Nonspecific numbness. No additional history at the time of this  interpretation. COMPARISON: CT head on 3/18/2015. TECHNIQUE: Noncontrast head CT. Coronal and sagittal reformats. CT dose  reduction was achieved through the use of a standardized protocol tailored for  this examination and automatic exposure control for dose modulation. FINDINGS: The ventricles and sulci are age-appropriate without hydrocephalus. There is no mass effect or midline shift. There is no intracranial hemorrhage or  extra-axial fluid collection. There is no abnormal area of decreased density to  suggest infarct. The calvarium is intact. The visualized paranasal sinuses and mastoid air cells  are clear. Impression IMPRESSION:     Normal noncontrast CT head. No change.           Reviewed records in [facility] tab today    Lab Review   Results for orders placed or performed in visit on 12/14/20   VITAMIN B12   Result Value Ref Range    Vitamin B12 482 232 - 1,245 pg/mL   ALDOLASE   Result Value Ref Range    Aldolase 5.8 3.3 - 10.3 U/L   ANGIOTENSIN CONVERTING ENZYME   Result Value Ref Range    Angiotensin Converting Enzyme (ACE) 23 14 - 82 U/L   PROTEIN ELECTROPHORESIS   Result Value Ref Range    Protein, total 6.7 6.0 - 8.5 g/dL    Albumin 4.1 2.9 - 4.4 g/dL    Alpha-1-globulin 0.2 0.0 - 0.4 g/dL    ALPHA-2 GLOBULIN 0.6 0.4 - 1.0 g/dL    Beta globulin 0.9 0.7 - 1.3 g/dL Gamma globulin 0.9 0.4 - 1.8 g/dL    M-Dirk Not Observed Not Observed g/dL    Globulin, total 2.6 2.2 - 3.9 g/dL    A/G ratio 1.6 0.7 - 1.7    Please note Comment    RHEUMATOID FACTOR, QL   Result Value Ref Range    Rheumatoid factor <10.0 0.0 - 13.9 IU/mL   VITAMIN D, 25 HYDROXY   Result Value Ref Range    VITAMIN D, 25-HYDROXY 12.0 (L) 30.0 - 100.0 ng/mL   RASHAWN, DIRECT, W/REFLEX   Result Value Ref Range    Antinuclear Antibodies Direct Negative Negative   SED RATE (ESR)   Result Value Ref Range    Sed rate (ESR) 15 0 - 32 mm/hr        Exam:  Visit Vitals  /88 (BP 1 Location: Left arm, BP Patient Position: Sitting)   Pulse 87   Temp 98.3 °F (36.8 °C) (Temporal)   Resp 18   Ht 5' 3\" (1.6 m)   Wt 190 lb (86.2 kg)   SpO2 99%   BMI 33.66 kg/m²     General:  Alert, cooperative, no distress. Head:  Normocephalic, without obvious abnormality, atraumatic. Respiratory:  Heart:   Non labored breathing  Regular rate and rhythm, no murmurs   Neck:   2+ carotids, no bruits   Extremities: Warm, no cyanosis or edema. Pulses: 2+ radial pulses. Neurologic:  MS: Alert and oriented x 4, speech intact. Language intact, able to name, repeat, and follow all commands. Attention and fund of knowledge appropriate. Recent and remote memory intact.   Cranial Nerves:  II: visual fields Full to confrontation   II: pupils Equal, round, reactive to light   II: optic disc No papilledema   III,VII: ptosis none   III,IV,VI: extraocular muscles  EOMI, no nystagmus or diplopia   V: facial light touch sensation  normal   VII: facial muscle function   symmetric   VIII: hearing intact   IX: soft palate elevation  normal   XI: trapezius strength  5/5   XI: sternocleidomastoid strength 5/5   XII: tongue  Midline     Motor: normal bulk and tone, no tremor              Strength: 5/5 throughout, no PD  Sensory: Equivocal sensation to LT, PP, temperature and vibration  Coordination: FTN and HTS intact, HANS intact,Romberg negative  Gait: normal gait, able to heel, toe, and tandem walk  Reflexes: 3+ symmetric. Plantar: Withdrawn           Assessment/Plan       ICD-10-CM ICD-9-CM    1. Optic neuritis  H46.9 377.30 MRI BRAIN W WO CONT   2. Paresthesia  R20.2 782.0 MRI BRAIN W WO CONT      ALDOLASE      ANGIOTENSIN CONVERTING ENZYME      PROTEIN ELECTROPHORESIS      RHEUMATOID FACTOR, QL      RASHAWN, DIRECT, W/REFLEX      SED RATE (ESR)   3. Migraine without aura and without status migrainosus, not intractable  G43.009 346.10 MRI BRAIN W WO CONT      ALDOLASE      ANGIOTENSIN CONVERTING ENZYME      PROTEIN ELECTROPHORESIS      RHEUMATOID FACTOR, QL      RASHAWN, DIRECT, W/REFLEX      SED RATE (ESR)   4. Demyelinating disease (HCC)  G37.9 341.9 MRI BRAIN W WO CONT   5. Hypovitaminosis  E56.9 269.2 VITAMIN B12      VITAMIN D, 25 HYDROXY   Plan:  Prednisone 20 mg p.o. twice daily  Zanaflex 4 mg p.o. nightly  MRI of the brain without gadolinium  Blood for autoimmune work-up, ESR, vitamin B12, vitamin D, protein electrophoresis, aldolase, CK    Follow-up and Dispositions    · Return in about 6 weeks (around 1/25/2021). Thank you very much for this consultation.    Signed:  Chioma García MD  12/14/2020

## 2020-12-16 LAB
25(OH)D3+25(OH)D2 SERPL-MCNC: 12 NG/ML (ref 30–100)
ACE SERPL-CCNC: 23 U/L (ref 14–82)
ALBUMIN SERPL ELPH-MCNC: 4.1 G/DL (ref 2.9–4.4)
ALBUMIN/GLOB SERPL: 1.6 {RATIO} (ref 0.7–1.7)
ALDOLASE SERPL-CCNC: 5.8 U/L (ref 3.3–10.3)
ALPHA1 GLOB SERPL ELPH-MCNC: 0.2 G/DL (ref 0–0.4)
ALPHA2 GLOB SERPL ELPH-MCNC: 0.6 G/DL (ref 0.4–1)
ANA SER QL: NEGATIVE
B-GLOBULIN SERPL ELPH-MCNC: 0.9 G/DL (ref 0.7–1.3)
ERYTHROCYTE [SEDIMENTATION RATE] IN BLOOD BY WESTERGREN METHOD: 15 MM/HR (ref 0–32)
GAMMA GLOB SERPL ELPH-MCNC: 0.9 G/DL (ref 0.4–1.8)
GLOBULIN SER CALC-MCNC: 2.6 G/DL (ref 2.2–3.9)
M PROTEIN SERPL ELPH-MCNC: NORMAL G/DL
PLEASE NOTE, 011150: NORMAL
PROT SERPL-MCNC: 6.7 G/DL (ref 6–8.5)
RHEUMATOID FACT SERPL-ACNC: <10 IU/ML (ref 0–13.9)
VIT B12 SERPL-MCNC: 482 PG/ML (ref 232–1245)

## 2020-12-21 ENCOUNTER — HOSPITAL ENCOUNTER (OUTPATIENT)
Dept: MRI IMAGING | Age: 30
Discharge: HOME | End: 2020-12-21
Attending: PSYCHIATRY & NEUROLOGY
Payer: COMMERCIAL

## 2020-12-21 DIAGNOSIS — H46.9 OPTIC NEURITIS: ICD-10-CM

## 2020-12-21 DIAGNOSIS — G43.009 MIGRAINE WITHOUT AURA AND WITHOUT STATUS MIGRAINOSUS, NOT INTRACTABLE: ICD-10-CM

## 2020-12-21 DIAGNOSIS — R20.2 PARESTHESIA: ICD-10-CM

## 2020-12-21 DIAGNOSIS — G37.9 DEMYELINATING DISEASE (HCC): ICD-10-CM

## 2020-12-21 PROCEDURE — A9575 INJ GADOTERATE MEGLUMI 0.1ML: HCPCS | Performed by: PSYCHIATRY & NEUROLOGY

## 2020-12-21 PROCEDURE — 74011250636 HC RX REV CODE- 250/636: Performed by: PSYCHIATRY & NEUROLOGY

## 2020-12-21 PROCEDURE — 70553 MRI BRAIN STEM W/O & W/DYE: CPT

## 2020-12-21 RX ORDER — GADOTERATE MEGLUMINE 376.9 MG/ML
18 INJECTION INTRAVENOUS
Status: COMPLETED | OUTPATIENT
Start: 2020-12-21 | End: 2020-12-21

## 2020-12-21 RX ADMIN — GADOTERATE MEGLUMINE 18 ML: 376.9 INJECTION INTRAVENOUS at 20:00

## 2021-01-29 ENCOUNTER — OFFICE VISIT (OUTPATIENT)
Dept: NEUROLOGY | Age: 31
End: 2021-01-29
Payer: COMMERCIAL

## 2021-01-29 ENCOUNTER — TELEPHONE (OUTPATIENT)
Dept: NEUROLOGY | Age: 31
End: 2021-01-29

## 2021-01-29 VITALS
BODY MASS INDEX: 34.98 KG/M2 | OXYGEN SATURATION: 97 % | SYSTOLIC BLOOD PRESSURE: 118 MMHG | HEIGHT: 63 IN | TEMPERATURE: 97.3 F | RESPIRATION RATE: 14 BRPM | WEIGHT: 197.4 LBS | HEART RATE: 86 BPM | DIASTOLIC BLOOD PRESSURE: 82 MMHG

## 2021-01-29 DIAGNOSIS — R20.2 PARESTHESIA: ICD-10-CM

## 2021-01-29 DIAGNOSIS — G43.109 COMPLICATED MIGRAINE: ICD-10-CM

## 2021-01-29 DIAGNOSIS — E55.9 VITAMIN D DEFICIENCY: ICD-10-CM

## 2021-01-29 PROCEDURE — 99214 OFFICE O/P EST MOD 30 MIN: CPT | Performed by: PSYCHIATRY & NEUROLOGY

## 2021-01-29 RX ORDER — ERGOCALCIFEROL 1.25 MG/1
50000 CAPSULE ORAL
Qty: 8 CAP | Refills: 2 | Status: SHIPPED | OUTPATIENT
Start: 2021-02-01 | End: 2021-04-22

## 2021-01-29 RX ORDER — GALCANEZUMAB 120 MG/ML
120 INJECTION, SOLUTION SUBCUTANEOUS
Qty: 1 ML | Refills: 3 | Status: SHIPPED | OUTPATIENT
Start: 2021-01-29 | End: 2021-04-16 | Stop reason: SDUPTHER

## 2021-01-29 RX ORDER — LANOLIN ALCOHOL/MO/W.PET/CERES
1000 CREAM (GRAM) TOPICAL DAILY
COMMUNITY

## 2021-01-29 RX ORDER — GLUCOSAMINE SULFATE 1500 MG
1000 POWDER IN PACKET (EA) ORAL DAILY
COMMUNITY

## 2021-01-29 RX ORDER — DICLOFENAC POTASSIUM 50 MG/1
50 POWDER, FOR SOLUTION ORAL AS NEEDED
Qty: 9 PACKET | Refills: 3 | Status: SHIPPED | OUTPATIENT
Start: 2021-01-29 | End: 2021-05-04 | Stop reason: ALTCHOICE

## 2021-01-29 RX ORDER — CETIRIZINE HYDROCHLORIDE 10 MG/1
CAPSULE, LIQUID FILLED ORAL
COMMUNITY
End: 2021-05-04 | Stop reason: SDUPTHER

## 2021-01-29 NOTE — TELEPHONE ENCOUNTER
----- Message from Tristan Basurto sent at 1/29/2021 11:33 AM EST -----  Regarding: Dr. Preeti Garza Message/Vendor Calls    Caller's first and last name: patient        Reason for call:  PA Notification      Callback required yes/no and why:  Yes. To advise      Best contact number(s):  [phone]      Details to clarify the request:  Patient is calling to advise the medications recently prescribed require PA.  Pharmacy is also sending notification      Tristan Basurto

## 2021-01-29 NOTE — PROGRESS NOTES
Kesha Hernandez is a 35 y.o. female    Chief Complaint   Patient presents with    Migraine    Numbness       Health Maintenance Due   Topic Date Due    Pneumococcal 0-64 years (1 of 1 - PPSV23) 05/18/1997    COVID-19 Vaccine (1 of 2) 05/18/2007    DTaP/Tdap/Td series (1 - Tdap) 05/18/2012    PAP AKA CERVICAL CYTOLOGY  05/18/2012    Flu Vaccine (1) 09/01/2020       Visit Vitals  /82 (BP 1 Location: Right upper arm, BP Patient Position: Sitting)   Pulse 86   Temp 97.3 °F (36.3 °C) (Temporal)   Resp 14   Ht 5' 3\" (1.6 m)   Wt 197 lb 6.4 oz (89.5 kg)   SpO2 97%   BMI 34.97 kg/m²       3 most recent PHQ Screens 1/29/2021   Little interest or pleasure in doing things Not at all   Feeling down, depressed, irritable, or hopeless Not at all   Total Score PHQ 2 0       1. Have you been to the ER, urgent care clinic since your last visit? Hospitalized since your last visit? No    2. Have you seen or consulted any other health care providers outside of the [address] [place] [address] since your last visit? Include any pap smears or colon screening.  No

## 2021-01-30 NOTE — PROGRESS NOTES
Neurology Progress Note      NAME:  Amanda Thurston   :   1990   MRN:   [id number]     Date/Time:  2021  Subjective:      Amanda Thurston is a 35 y.o. female here today for follow-up for headache, numbness on the right side of the face and eye pain with blurriness, test results. Patient says the numbness on the face comes and goes, sometimes better with headache sometimes not. Frequency is variable  She also continues to have occasional headache, she says the rescue medication is not working and she is having problem with triptans. Headache is throbbing in nature, mostly frontal, frequency is variable. Headache associated with dizziness, blurry vision, occasional double vision, photophobia, phonophobia, nausea. No aggravating or relieving factor, sometimes made worse by stress and lack of sleep. Headache at times will last between 4 and 5 hours, going to a dark and quiet place makes it a little better. Review of Systems - General ROS: positive for  - fatigue and sleep disturbance  Psychological ROS: positive for - anxiety and sleep disturbances  Ophthalmic ROS: positive for - blurry vision, decreased vision, double vision and photophobia  ENT ROS: positive for - headaches, tinnitus, vertigo and visual changes  Allergy and Immunology ROS: negative  Hematological and Lymphatic ROS: negative  Endocrine ROS: negative  Respiratory ROS: no cough, shortness of breath, or wheezing  Cardiovascular ROS: negative  Gastrointestinal ROS: no abdominal pain, change in bowel habits, or black or bloody stools  Genito-Urinary ROS: no dysuria, trouble voiding, or hematuria  Musculoskeletal ROS: positive for - muscular weakness  Neurological ROS: positive for - dizziness, headaches, numbness/tingling, visual changes and weakness  Dermatological ROS: negative  MRI of the brain reviewed with patient was unremarkable.   Blood work was not helpful low vitamin D 12.0  I will replace vitamin D with 50,000 units 2 times a week  I will start patient on Cambia powder 50 mg as needed for severe headache because patient is allergic to triptans  Emgality. I told patient to inform the office as she is ready to start a family with her . Medications reviewed:  Current Outpatient Medications   Medication Sig Dispense Refill    Cetirizine (ZyrTEC) 10 mg cap Take  by mouth.  cholecalciferol (Vitamin D3) 25 mcg (1,000 unit) cap Take 1,000 Units by mouth daily.  cyanocobalamin 1,000 mcg tablet Take 1,000 mcg by mouth daily.  [START ON 2/1/2021] ergocalciferol (ERGOCALCIFEROL) 1,250 mcg (50,000 unit) capsule Take 1 Cap by mouth every Monday and Thursday. 8 Cap 2    galcanezumab-gnlm (Emgality Syringe) 120 mg/mL syrg 120 mg by SubCUTAneous route every thirty (30) days. 1 mL 3    Diclofenac Potassium (Cambia) 50 mg pwpk Take 50 mg by mouth as needed (headache). As needed q 8hrly, not to exceed 2 doses in 24 hrs 9 Packet 3    famotidine (PEPCID) 40 mg tablet Take 40 mg by mouth daily.  meclizine (ANTIVERT) 25 mg tablet Take  by mouth three (3) times daily as needed for Dizziness.  losartan (COZAAR) 50 mg tablet Take  by mouth daily.  ibuprofen (MOTRIN) 600 mg tablet Take 1 Tab by mouth three (3) times daily (with meals). 21 Tab 0    medroxyPROGESTERone (PROVERA) 10 mg tablet Take 10 mg by mouth three (3) times daily.  dicyclomine HCl (BENTYL PO) Take  by mouth four (4) times daily. Indications: As needed for pain      fluticasone propionate (FLONASE) 50 mcg/actuation nasal spray 2 Sprays by Both Nostrils route daily. Indications: As needed for allergies      multivitamin (MULTIPLE VITAMIN PO) Take  by mouth. Indications: Teresita fusion multi vitamin/folate 50 mg (2 gummies) by gyn dr Essence Manriquez pantoprazole (PROTONIX) 40 mg tablet Take 40 mg by mouth two (2) times a day.  sucralfate (CARAFATE) 100 mg/mL suspension Take  by mouth four (4) times daily.       albuterol (PROVENTIL VENTOLIN) 2.5 mg /3 mL (0.083 %) nebulizer solution 2.5 mg by Nebulization route once.  fluticasone-umeclidinium-vilanterol (Trelegy Ellipta) 100-62.5-25 mcg inhaler Take 1 Puff by inhalation daily.  meloxicam (MOBIC) 15 mg tablet Take 15 mg by mouth daily.  predniSONE (DELTASONE) 10 mg tablet Take 10 mg by mouth two (2) times a day. (Patient not taking: Reported on 1/29/2021) 20 Tab 0    tiZANidine (ZANAFLEX) 4 mg tablet Take 1 Tab by mouth nightly. (Patient not taking: Reported on 1/29/2021) 30 Tab 1    naproxen sodium (ALEVE) 220 mg cap Take  by mouth.  polyethylene glycol (MIRALAX) 17 gram/dose powder Take 17 g by mouth daily. (Patient not taking: Reported on 1/29/2021) 510 g 0    naloxone (NARCAN) 4 mg/actuation nasal spray Use 1 spray intranasally, then discard. Repeat with new spray every 2 min as needed for opioid overdose symptoms, alternating nostrils. (Patient not taking: Reported on 1/29/2021) 2 Each 0    ondansetron (ZOFRAN ODT) 4 mg disintegrating tablet Take 1 Tab by mouth every eight (8) hours as needed for Nausea. (Patient not taking: Reported on 1/29/2021) 10 Tab 0    ketorolac (TORADOL) 10 mg tablet Take 1 Tab by mouth every six (6) hours as needed for Pain. (Patient not taking: Reported on 1/29/2021) 20 Tab 0    traMADol (ULTRAM) 50 mg tablet Take 1 Tab by mouth every six (6) hours as needed for Pain. Max Daily Amount: 200 mg. (Patient not taking: Reported on 1/29/2021) 10 Tab 0    loratadine (CLARITIN) 10 mg tablet Take 10 mg by mouth daily.  mometasone (NASONEX) 50 mcg/actuation nasal spray 2 Sprays daily.           Objective:   Vitals:  Vitals:    01/29/21 0838   BP: 118/82   Pulse: 86   Resp: 14   Temp: 97.3 °F (36.3 °C)   TempSrc: Temporal   SpO2: 97%   Weight: 197 lb 6.4 oz (89.5 kg)   Height: 5' 3\" (1.6 m)   PainSc:   0 - No pain         Lab Data Reviewed:  Lab Results   Component Value Date/Time    WBC 8.7 11/24/2020 06:12 PM    HCT 40.9 11/24/2020 06:12 PM    HGB 14.2 11/24/2020 06:12 PM    PLATELET 597 69/99/6550 06:12 PM       Lab Results   Component Value Date/Time    Sodium 139 11/24/2020 06:12 PM    Potassium 3.8 11/24/2020 06:12 PM    Chloride 109 (H) 11/24/2020 06:12 PM    CO2 25 11/24/2020 06:12 PM    Glucose 98 11/24/2020 06:12 PM    BUN 12 11/24/2020 06:12 PM    Creatinine 0.90 11/24/2020 06:12 PM    Calcium 9.2 11/24/2020 06:12 PM       No components found for: TROPQUANT    No results found for: RASHAWN      No results found for: HBA1C, HGBE8, TKG2SVIA, TXV2JAKX     Lab Results   Component Value Date/Time    Vitamin B12 482 12/14/2020 10:44 AM       No results found for: RASHAWN, ANARX, ANAIGG, XBANA    No results found for: CHOL, CHOLPOCT, CHOLX, CHLST, CHOLV, HDL, HDLPOC, HDLP, LDL, LDLCPOC, LDLC, DLDLP, VLDLC, VLDL, TGLX, TRIGL, TRIGP, TGLPOCT, CHHD, CHHDX      CT Results (recent):  Results from [place] encounter on 11/24/20   CT HEAD WO CONT    Narrative EXAM: CT HEAD WO CONT    INDICATION: Nonspecific numbness. No additional history at the time of this  interpretation. COMPARISON: CT head on 3/18/2015. TECHNIQUE: Noncontrast head CT. Coronal and sagittal reformats. CT dose  reduction was achieved through the use of a standardized protocol tailored for  this examination and automatic exposure control for dose modulation. FINDINGS: The ventricles and sulci are age-appropriate without hydrocephalus. There is no mass effect or midline shift. There is no intracranial hemorrhage or  extra-axial fluid collection. There is no abnormal area of decreased density to  suggest infarct. The calvarium is intact. The visualized paranasal sinuses and mastoid air cells  are clear. Impression IMPRESSION:     Normal noncontrast CT head. No change.          MRI Results (recent):  Results from [place] encounter on 12/21/20   MRI BRAIN W WO CONT    Narrative Clinical history: Paresthesias, migraine headaches  INDICATION:   Paresthesias    COMPARISON: CT 11/24/2020  TECHNIQUE: MR examination of the brain includes axial and sagittal T1 , axial  T2, axial FLAIR, axial gradient echo, axial DWI, coronal T1 . Pre and post  contrast axial T1-weighted imaging. Postcontrast T1-weighted imaging coronal  plane. Orbital protocol was utilized. CONTRAST: The patient was administered gadolinium-based contrast material,  subsequently axial and sagittal T1-weighted postcontrast imaging was obtained. FINDINGS:   There is no intracranial mass, hemorrhage or acute infarction. There is no intraconal mass. Optic nerves are symmetric in size and signal.  Perineural CSF is within normal limits. There is no intraconal mass. The globes  are symmetric in size. No proptosis. Ocular muscles are symmetric. There is no evidence of demyelinating process. Left vertebral artery is  dominant. . There is no abnormal parenchymal enhancement. There is no abnormal  meningeal enhancement demonstrated. The brain architecture is normal. There is  no evidence of midline shift or mass-effect. The ventricles are normal in size,  position and configuration. There are no extra-axial fluid collections. Major  intracranial vascular flow-voids are unremarkable. The orbits are grossly  symmetric. Dural venous sinuses are grossly unremarkable. There is no Chiari or  syrinx. Pituitary and infundibulum grossly unremarkable. Cerebellopontine angles  are unremarkable. No skull base mass is identified. Cavernous sinuses are  symmetric. The mastoid air cells and are well pneumatized and clear. Impression IMPRESSION:  Normal MRI of the brain. Normal MR evaluation of the orbits. No evidence of demyelinating process. No intracranial mass, hemorrhage or evidence of acute infarction. IR Results (recent):  No results found for this or any previous visit. VAS/US Results (recent):  No results found for this or any previous visit.     PHYSICAL EXAM:  General:    Alert, cooperative, no distress, appears stated age. Head:   Normocephalic, without obvious abnormality, atraumatic. Eyes:   Conjunctivae/corneas clear. PERRLA  Nose:  Nares normal. No drainage or sinus tenderness. Throat:    Lips, mucosa, and tongue normal.  No Thrush  Neck:  Supple, symmetrical,  no adenopathy, thyroid: non tender    no carotid bruit and no JVD. Back:    Symmetric,  No CVA tenderness. Lungs:   Clear to auscultation bilaterally. No Wheezing or Rhonchi. No rales. Chest wall:  No tenderness or deformity. No Accessory muscle use. Heart:   Regular rate and rhythm,  no murmur, rub or gallop. Abdomen:   Soft, non-tender. Not distended. Bowel sounds normal. No masses  Extremities: Extremities normal, atraumatic, No cyanosis. No edema. No clubbing  Skin:     Texture, turgor normal. No rashes or lesions. Not Jaundiced  Lymph nodes: Cervical, supraclavicular normal.  Psych:  Good insight. Not depressed. Not anxious or agitated. NEUROLOGICAL EXAM:  Appearance: The patient is well developed, well nourished, provides a coherent history and is in no acute distress. Mental Status: Oriented to time, place and person. Mood and affect appropriate. Cranial Nerves:   Intact visual fields. Fundi are benign. OANH, EOM's full, no nystagmus, no ptosis. Facial sensation is normal. Corneal reflexes are intact. Facial movement is symmetric. Hearing is normal bilaterally. Palate is midline with normal sternocleidomastoid and trapezius muscles are normal. Tongue is midline. Motor:  5/5 strength in upper and lower proximal and distal muscles. Normal bulk and tone. No fasciculations. Reflexes:   Deep tendon reflexes 2+/4 and symmetrical.   Sensory:   Normal to touch, pinprick and vibration. Gait:  Normal gait. Tremor:   No tremor noted. Cerebellar:  No cerebellar signs present. Neurovascular:  Normal heart sounds and regular rhythm, peripheral pulses intact, and no carotid bruits. Assesment  1.  Chronic migraine  CGRP    2. Complicated migraine  Cambia    3. Paresthesia  Stable    4. Vitamin D deficiency  Replace vitamin D    ___________________________________________________  PLAN: Medication and plan discussed with patient      ICD-10-CM ICD-9-CM    1. Chronic migraine  G43.709 346.70    2. Complicated migraine  X12.056 346.00    3. Paresthesia  R20.2 782.0    4. Vitamin D deficiency  E55.9 268.9      Follow-up and Dispositions    · Return in about 3 months (around 4/29/2021).                ___________________________________________________    Attending Physician: Andrés Stout MD

## 2021-02-06 NOTE — TELEPHONE ENCOUNTER
Unable to locate any preventatives to use for Emgality prior auth. Sent to [facility] w/ copy of 1/29/21 office note. Doming[facility] OQUENDO Case ID: [id number]    Status is pending.

## 2021-02-09 ENCOUNTER — TELEPHONE (OUTPATIENT)
Dept: NEUROLOGY | Age: 31
End: 2021-02-09

## 2021-02-09 NOTE — TELEPHONE ENCOUNTER
Emgality denied. Must show a trial of two other prophylactic drugs. Letter scanned to chart. Forward to Dr. [facility] for next steps.

## 2021-02-22 ENCOUNTER — TELEPHONE (OUTPATIENT)
Dept: NEUROLOGY | Age: 31
End: 2021-02-22

## 2021-03-03 ENCOUNTER — TELEPHONE (OUTPATIENT)
Dept: NEUROLOGY | Age: 31
End: 2021-03-03

## 2021-04-15 ENCOUNTER — TELEPHONE (OUTPATIENT)
Dept: NEUROLOGY | Age: 31
End: 2021-04-15

## 2021-04-15 NOTE — TELEPHONE ENCOUNTER
Notified Dr Huy Gutierrez Dr [facility] white called (dentist) patient at dentist [place]. He says there is nothing clinically that he can do for her pain. No absess or filing. xrays are negative and he said that she has an open bite in the front. He feels it presents as trigeminal neuraligia.  He said please call him at [phone] with any questions or call patient. / an appt opened up for April 16th tomorrow at 840am. Writer put patient in this spot and lm for her that appt was made

## 2021-04-16 ENCOUNTER — OFFICE VISIT (OUTPATIENT)
Dept: NEUROLOGY | Age: 31
End: 2021-04-16
Payer: COMMERCIAL

## 2021-04-16 ENCOUNTER — TELEPHONE (OUTPATIENT)
Dept: NEUROLOGY | Age: 31
End: 2021-04-16

## 2021-04-16 VITALS
OXYGEN SATURATION: 97 % | HEART RATE: 82 BPM | BODY MASS INDEX: 34.38 KG/M2 | HEIGHT: 63 IN | WEIGHT: 194 LBS | RESPIRATION RATE: 16 BRPM | DIASTOLIC BLOOD PRESSURE: 70 MMHG | SYSTOLIC BLOOD PRESSURE: 116 MMHG | TEMPERATURE: 97.2 F

## 2021-04-16 DIAGNOSIS — G50.0 TRIGEMINAL NEURALGIA OF RIGHT SIDE OF FACE: ICD-10-CM

## 2021-04-16 DIAGNOSIS — R52 PAIN: ICD-10-CM

## 2021-04-16 DIAGNOSIS — G43.109 COMPLICATED MIGRAINE: Primary | ICD-10-CM

## 2021-04-16 PROCEDURE — 99214 OFFICE O/P EST MOD 30 MIN: CPT | Performed by: PSYCHIATRY & NEUROLOGY

## 2021-04-16 RX ORDER — RIMEGEPANT SULFATE 75 MG/75MG
75 TABLET, ORALLY DISINTEGRATING ORAL
Qty: 8 TAB | Refills: 1 | Status: SHIPPED | OUTPATIENT
Start: 2021-04-16 | End: 2021-04-16

## 2021-04-16 RX ORDER — CARBAMAZEPINE 200 MG/1
200 TABLET, EXTENDED RELEASE ORAL 2 TIMES DAILY
Qty: 60 TAB | Refills: 0 | Status: SHIPPED | OUTPATIENT
Start: 2021-04-16 | End: 2021-05-04

## 2021-04-16 RX ORDER — PREDNISONE 10 MG/1
10 TABLET ORAL 2 TIMES DAILY
Qty: 20 TAB | Refills: 0 | Status: SHIPPED | OUTPATIENT
Start: 2021-04-16 | End: 2021-05-24 | Stop reason: SDUPTHER

## 2021-04-16 RX ORDER — HYDROCODONE BITARTRATE AND ACETAMINOPHEN 7.5; 325 MG/1; MG/1
1 TABLET ORAL
Qty: 12 TAB | Refills: 0 | Status: SHIPPED | OUTPATIENT
Start: 2021-04-16 | End: 2021-04-19

## 2021-04-16 RX ORDER — GALCANEZUMAB 120 MG/ML
120 INJECTION, SOLUTION SUBCUTANEOUS
Qty: 1 ML | Refills: 3 | Status: SHIPPED | OUTPATIENT
Start: 2021-04-16 | End: 2021-05-24 | Stop reason: SDUPTHER

## 2021-04-16 NOTE — PROGRESS NOTES
Neurology Progress Note    NAME:  Guilherme Chapman   :   1990   MRN:   [id number]     Date/Time:  2021  Subjective:     Guilherme Chapman is a 35 y.o. female here today for follow-up for headache, sharp pain on the right side of the face and eye pain with blurriness. Patient was brought in today because her dentist called the office about the pain patient was having on the right side of the face. The patient apparently said experiencing pain in the right side of the face with tooth pain, at this time, patient went to a dentist thinking is possibly infection. After the dental evaluation, there was no sign of infection or problem with the teeth. At this time, patient's dentist contacted the office to look into the facial pain. Patient seen, excruciating pain, and very uncomfortable, crying most of the time. She says the pain is sharp in nature, mostly in the right side of the face and behind the eye, as if it is coming from the back of the neck. There is also translated into headache, it has been persistent. She says the right side of the face is sensitive to touch, ambulating through it, but this is not all the time, it comes and goes. However the headache is unrelenting. Headache is throbbing in nature, mostly frontal, frequency is variable. Headache associated with dizziness, blurry vision, occasional double vision, photophobia, phonophobia, nausea. No aggravating or relieving factor, sometimes made worse by stress and lack of sleep. Unfortunately, patient did not get CGRP that was prescribed in the last visit because of insurance problem. This is more like  atypical trigeminal neuralgia for the right side of the face complicating migraine headache. I will start patient on Tegretol and see how she tolerates it.   Review of Systems - General ROS: positive for  - fatigue and sleep disturbance  Psychological ROS: positive for - anxiety and sleep disturbances  Ophthalmic ROS: positive for - blurry vision, decreased vision, double vision and photophobia  ENT ROS: positive for - headaches, tinnitus, vertigo and visual changes  Allergy and Immunology ROS: negative  Hematological and Lymphatic ROS: negative  Endocrine ROS: negative  Respiratory ROS: no cough, shortness of breath, or wheezing  Cardiovascular ROS: negative  Gastrointestinal ROS: no abdominal pain, change in bowel habits, or black or bloody stools  Genito-Urinary ROS: no dysuria, trouble voiding, or hematuria  Musculoskeletal ROS: positive for - muscular weakness  Neurological ROS: positive for - dizziness, headaches, numbness/tingling, visual changes and weakness  Dermatological ROS: negative  Due to patient current condition, she is given note for work until 4/26/2021 to see if the acute condition will be under control, she will be reevaluated before going back to work. Medications reviewed:  Current Outpatient Medications   Medication Sig Dispense Refill    carBAMazepine XR (TEGretol XR) 200 mg SR tablet Take 1 Tab by mouth two (2) times a day. 60 Tab 0    HYDROcodone-acetaminophen (NORCO) 7.5-325 mg per tablet Take 1 Tab by mouth every six (6) hours as needed for Pain for up to 3 days. Max Daily Amount: 4 Tabs. 12 Tab 0    predniSONE (DELTASONE) 10 mg tablet Take 10 mg by mouth two (2) times a day. 20 Tab 0    galcanezumab-gnlm (Emgality Syringe) 120 mg/mL syrg 120 mg by SubCUTAneous route every thirty (30) days. 1 mL 3    rimegepant (Nurtec ODT) 75 mg disintegrating tablet Take 1 Tab by mouth once as needed for Migraine for up to 1 dose. 8 Tab 1    Cetirizine (ZyrTEC) 10 mg cap Take  by mouth.  cholecalciferol (Vitamin D3) 25 mcg (1,000 unit) cap Take 1,000 Units by mouth daily.  cyanocobalamin 1,000 mcg tablet Take 1,000 mcg by mouth daily.  ergocalciferol (ERGOCALCIFEROL) 1,250 mcg (50,000 unit) capsule Take 1 Cap by mouth every Monday and Thursday.  8 Cap 2    Diclofenac Potassium (Cambia) 50 mg pwpk Take 50 mg by mouth as needed (headache). As needed q 8hrly, not to exceed 2 doses in 24 hrs 9 Packet 3    famotidine (PEPCID) 40 mg tablet Take 40 mg by mouth daily.  meclizine (ANTIVERT) 25 mg tablet Take  by mouth three (3) times daily as needed for Dizziness.  losartan (COZAAR) 50 mg tablet Take  by mouth daily.  polyethylene glycol (MIRALAX) 17 gram/dose powder Take 17 g by mouth daily. 510 g 0    ibuprofen (MOTRIN) 600 mg tablet Take 1 Tab by mouth three (3) times daily (with meals). 21 Tab 0    medroxyPROGESTERone (PROVERA) 10 mg tablet Take 10 mg by mouth three (3) times daily.  dicyclomine HCl (BENTYL PO) Take  by mouth four (4) times daily. Indications: As needed for pain      fluticasone propionate (FLONASE) 50 mcg/actuation nasal spray 2 Sprays by Both Nostrils route daily. Indications: As needed for allergies      multivitamin (MULTIPLE VITAMIN PO) Take  by mouth. Indications: Teresita fusion multi vitamin/folate 50 mg (2 gummies) by gyn dr Cloby Doran loratadine (CLARITIN) 10 mg tablet Take 10 mg by mouth daily.  pantoprazole (PROTONIX) 40 mg tablet Take 40 mg by mouth two (2) times a day.  sucralfate (CARAFATE) 100 mg/mL suspension Take  by mouth four (4) times daily.  albuterol (PROVENTIL VENTOLIN) 2.5 mg /3 mL (0.083 %) nebulizer solution 2.5 mg by Nebulization route once.  fluticasone-umeclidinium-vilanterol (Trelegy Ellipta) 100-62.5-25 mcg inhaler Take 1 Puff by inhalation daily.  meloxicam (MOBIC) 15 mg tablet Take 15 mg by mouth daily.  tiZANidine (ZANAFLEX) 4 mg tablet Take 1 Tab by mouth nightly. (Patient not taking: Reported on 1/29/2021) 30 Tab 1    naproxen sodium (ALEVE) 220 mg cap Take  by mouth.  naloxone (NARCAN) 4 mg/actuation nasal spray Use 1 spray intranasally, then discard. Repeat with new spray every 2 min as needed for opioid overdose symptoms, alternating nostrils.  (Patient not taking: Reported on 1/29/2021) 2 Each 0    ondansetron (ZOFRAN ODT) 4 mg disintegrating tablet Take 1 Tab by mouth every eight (8) hours as needed for Nausea. (Patient not taking: Reported on 1/29/2021) 10 Tab 0    ketorolac (TORADOL) 10 mg tablet Take 1 Tab by mouth every six (6) hours as needed for Pain. (Patient not taking: Reported on 1/29/2021) 20 Tab 0    mometasone (NASONEX) 50 mcg/actuation nasal spray 2 Sprays daily. Objective:   Vitals:  Vitals:    04/16/21 0836   BP: 116/70   Pulse: 82   Resp: 16   Temp: 97.2 °F (36.2 °C)   TempSrc: Temporal   SpO2: 97%   Weight: 194 lb (88 kg)   Height: 5' 3\" (1.6 m)   PainSc:   9   PainLoc: Head               Lab Data Reviewed:  Lab Results   Component Value Date/Time    WBC 8.7 11/24/2020 06:12 PM    HCT 40.9 11/24/2020 06:12 PM    HGB 14.2 11/24/2020 06:12 PM    PLATELET 293 77/19/7493 06:12 PM       Lab Results   Component Value Date/Time    Sodium 139 11/24/2020 06:12 PM    Potassium 3.8 11/24/2020 06:12 PM    Chloride 109 (H) 11/24/2020 06:12 PM    CO2 25 11/24/2020 06:12 PM    Glucose 98 11/24/2020 06:12 PM    BUN 12 11/24/2020 06:12 PM    Creatinine 0.90 11/24/2020 06:12 PM    Calcium 9.2 11/24/2020 06:12 PM       No components found for: TROPQUANT    No results found for: RASHAWN      No results found for: HBA1C, HGBE8, OJE2FYTI, JPP3WVDX     Lab Results   Component Value Date/Time    Vitamin B12 482 12/14/2020 10:44 AM       No results found for: RASHAWN, ANARX, ANAIGG, XBANA    No results found for: CHOL, CHOLPOCT, CHOLX, CHLST, CHOLV, HDL, HDLPOC, HDLP, LDL, LDLCPOC, LDLC, DLDLP, VLDLC, VLDL, TGLX, TRIGL, TRIGP, TGLPOCT, CHHD, CHHDX      CT Results (recent):  Results from [place] encounter on 11/24/20   CT HEAD WO CONT    Narrative EXAM: CT HEAD WO CONT    INDICATION: Nonspecific numbness. No additional history at the time of this  interpretation. COMPARISON: CT head on 3/18/2015. TECHNIQUE: Noncontrast head CT. Coronal and sagittal reformats.   CT dose  reduction was achieved through the use of a standardized protocol tailored for  this examination and automatic exposure control for dose modulation. FINDINGS: The ventricles and sulci are age-appropriate without hydrocephalus. There is no mass effect or midline shift. There is no intracranial hemorrhage or  extra-axial fluid collection. There is no abnormal area of decreased density to  suggest infarct. The calvarium is intact. The visualized paranasal sinuses and mastoid air cells  are clear. Impression IMPRESSION:     Normal noncontrast CT head. No change. MRI Results (recent):  Results from [place] encounter on 12/21/20   MRI BRAIN W WO CONT    Narrative Clinical history: Paresthesias, migraine headaches  INDICATION:   Paresthesias    COMPARISON: CT 11/24/2020  TECHNIQUE: MR examination of the brain includes axial and sagittal T1 , axial  T2, axial FLAIR, axial gradient echo, axial DWI, coronal T1 . Pre and post  contrast axial T1-weighted imaging. Postcontrast T1-weighted imaging coronal  plane. Orbital protocol was utilized. CONTRAST: The patient was administered gadolinium-based contrast material,  subsequently axial and sagittal T1-weighted postcontrast imaging was obtained. FINDINGS:   There is no intracranial mass, hemorrhage or acute infarction. There is no intraconal mass. Optic nerves are symmetric in size and signal.  Perineural CSF is within normal limits. There is no intraconal mass. The globes  are symmetric in size. No proptosis. Ocular muscles are symmetric. There is no evidence of demyelinating process. Left vertebral artery is  dominant. . There is no abnormal parenchymal enhancement. There is no abnormal  meningeal enhancement demonstrated. The brain architecture is normal. There is  no evidence of midline shift or mass-effect. The ventricles are normal in size,  position and configuration. There are no extra-axial fluid collections.  Major  intracranial vascular flow-voids are unremarkable. The orbits are grossly  symmetric. Dural venous sinuses are grossly unremarkable. There is no Chiari or  syrinx. Pituitary and infundibulum grossly unremarkable. Cerebellopontine angles  are unremarkable. No skull base mass is identified. Cavernous sinuses are  symmetric. The mastoid air cells and are well pneumatized and clear. Impression IMPRESSION:  Normal MRI of the brain. Normal MR evaluation of the orbits. No evidence of demyelinating process. No intracranial mass, hemorrhage or evidence of acute infarction. IR Results (recent):  No results found for this or any previous visit. VAS/US Results (recent):  No results found for this or any previous visit. PHYSICAL EXAM:  General:    Alert, cooperative, no distress, appears stated age. Head:   Normocephalic, without obvious abnormality, atraumatic. Eyes:   Conjunctivae/corneas clear. PERRLA  Nose:  Nares normal. No drainage or sinus tenderness. Throat:    Lips, mucosa, and tongue normal.  No Thrush  Neck:  Supple, symmetrical,  no adenopathy, thyroid: non tender    no carotid bruit and no JVD. Paraspinal tenderness right  Back:    Symmetric,  No CVA tenderness. Lungs:   Clear to auscultation bilaterally. No Wheezing or Rhonchi. No rales. Chest wall:  No tenderness or deformity. No Accessory muscle use. Heart:   Regular rate and rhythm,  no murmur, rub or gallop. Abdomen:   Soft, non-tender. Not distended. Bowel sounds normal. No masses  Extremities: Extremities normal, atraumatic, No cyanosis. No edema. No clubbing  Skin:     Texture, turgor normal. No rashes or lesions. Not Jaundiced  Lymph nodes: Cervical, supraclavicular normal.  Psych:  Good insight. Not depressed. Not anxious or agitated. NEUROLOGICAL EXAM:  Appearance: The patient is well developed, well nourished, provides a coherent history and is in no acute distress. Mental Status: Oriented to time, place and person.  Mood and affect appropriate. Cranial Nerves:   Intact visual fields. Fundi are benign. OANH, EOM's full, no nystagmus, no ptosis. Dysesthesia left side of the face, face is symmetrical. Corneal reflexes are intact. Facial movement is symmetric. Hearing is normal bilaterally. Palate is midline with normal sternocleidomastoid and trapezius muscles are normal. Tongue is midline. Motor:  5/5 strength in upper and lower proximal and distal muscles. Normal bulk and tone. No fasciculations. Reflexes:   Deep tendon reflexes 2+/4 and symmetrical.   Sensory:    Dysesthesia to touch, pinprick and vibration right side of the face. Gait:  Normal gait. Tremor:   No tremor noted. Cerebellar:  No cerebellar signs present. Neurovascular:  Normal heart sounds and regular rhythm, peripheral pulses intact, and no carotid bruits. Assesment  1. Complicated migraine  Prednisone taper    2. Chronic migraine  Emgality    3. Trigeminal neuralgia of right side of face    - HYDROcodone-acetaminophen (NORCO) 7.5-325 mg per tablet; Take 1 Tab by mouth every six (6) hours as needed for Pain for up to 3 days. Max Daily Amount: 4 Tabs. Dispense: 12 Tab; Refill: 0    4. Pain    - HYDROcodone-acetaminophen (NORCO) 7.5-325 mg per tablet; Take 1 Tab by mouth every six (6) hours as needed for Pain for up to 3 days. Max Daily Amount: 4 Tabs. Dispense: 12 Tab; Refill: 0    ___________________________________________________  PLAN: Medication and plan discussed with patient. ICD-10-CM ICD-9-CM    1. Complicated migraine  B68.393 346.00    2. Chronic migraine  G43.709 346.70    3. Trigeminal neuralgia of right side of face  G50.0 350.1 HYDROcodone-acetaminophen (NORCO) 7.5-325 mg per tablet   4.  Pain  R52 780.96 HYDROcodone-acetaminophen (NORCO) 7.5-325 mg per tablet           ___________________________________________________    Attending Physician: Jimbo Martinez MD

## 2021-04-19 ENCOUNTER — TELEPHONE (OUTPATIENT)
Dept: NEUROLOGY | Age: 31
End: 2021-04-19

## 2021-04-19 NOTE — TELEPHONE ENCOUNTER
----- Message from Angelica Felix sent at 4/19/2021  4:14 PM EDT -----  Regarding: Dr. Maxine Yusuf Message/Vendor Calls    Caller's first and last name: Pt      Reason for call: unable to  Emgality      Callback required yes/no and why: Yes      Best contact number(s): [phone]      Details to clarify the request: Pt saw Dr. Rachael Cruz on Friday 04/16 and was prescribed Emgality. She was told by Dr. Rachael Cruz if she was unable to get this script from the pharmacy, that she would need to call the office and notify of this. She would like a call back from the nurse. Please advise.       Angelica Felix

## 2021-04-21 ENCOUNTER — TELEPHONE (OUTPATIENT)
Dept: NEUROLOGY | Age: 31
End: 2021-04-21

## 2021-04-21 ENCOUNTER — OFFICE VISIT (OUTPATIENT)
Dept: NEUROLOGY | Age: 31
End: 2021-04-21
Payer: COMMERCIAL

## 2021-04-21 VITALS
WEIGHT: 192 LBS | BODY MASS INDEX: 34.02 KG/M2 | HEART RATE: 80 BPM | DIASTOLIC BLOOD PRESSURE: 91 MMHG | OXYGEN SATURATION: 98 % | SYSTOLIC BLOOD PRESSURE: 123 MMHG | HEIGHT: 63 IN

## 2021-04-21 DIAGNOSIS — G44.031 INTRACTABLE EPISODIC PAROXYSMAL HEMICRANIA: ICD-10-CM

## 2021-04-21 DIAGNOSIS — G50.0 TRIGEMINAL NEURALGIA OF RIGHT SIDE OF FACE: ICD-10-CM

## 2021-04-21 DIAGNOSIS — G43.109 COMPLICATED MIGRAINE: Primary | ICD-10-CM

## 2021-04-21 PROCEDURE — 99214 OFFICE O/P EST MOD 30 MIN: CPT | Performed by: PSYCHIATRY & NEUROLOGY

## 2021-04-21 PROCEDURE — 20553 NJX 1/MLT TRIGGER POINTS 3/>: CPT | Performed by: PSYCHIATRY & NEUROLOGY

## 2021-04-21 NOTE — TELEPHONE ENCOUNTER
Patient came into office to inquire about Emgality with notes of her previous triptan trials and is waiting for a response. This was denied in 2/9/21 and forwarded to Dr. Veronica Aaron for review. Message delivered to Dr. Veronica Aaron today.

## 2021-04-21 NOTE — PROGRESS NOTES
Neurology Progress Note    NAME:  Elfego Catalan   :   1990   MRN:   [id number]     Date/Time:  2021  Subjective:   Elfego Catalan is a 35 y.o. female here today for follow-up for headache, sharp pain on the right side of the face and eye pain with blurriness. Patient walked into the office today because of increasing pain in the face, neck, dizziness, blurry vision. Patient was seen about 5 days ago and treated for the same headache and trigeminal neuralgia. She was to go back to work but because of increased symptoms, patient was unable to go back to work and ended up in the office. At this time, I had to squeeze patient in for trigger point injection as I would not want this patient to be overmedicated. Patient says she is still in excruciating pain. She says the pain is sharp in nature, mostly in the right side of the face and behind the eye, as if it is coming from the back of the neck and this is associated with dizziness. She says the right side of the face is still sensitive to touch. However the headache is unrelenting. At this time, I will give patient trigger point injection to see if it will reduce the intensity and frequency of headache thereby improving patient's activities of daily living.   Review of Systems - General ROS: positive for  - fatigue and sleep disturbance  Psychological ROS: positive for - anxiety and sleep disturbances  Ophthalmic ROS: positive for - blurry vision, decreased vision, double vision and photophobia  ENT ROS: positive for - headaches, tinnitus, vertigo and visual changes  Allergy and Immunology ROS: negative  Hematological and Lymphatic ROS: negative  Endocrine ROS: negative  Respiratory ROS: no cough, shortness of breath, or wheezing  Cardiovascular ROS: negative  Gastrointestinal ROS: no abdominal pain, change in bowel habits, or black or bloody stools  Genito-Urinary ROS: no dysuria, trouble voiding, or hematuria  Musculoskeletal ROS: positive for - muscular weakness  Neurological ROS: positive for - dizziness, headaches, numbness/tingling, visual changes and weakness  Dermatological ROS: negative  Nelly has suffered migraines more for more than six months. Migraines are characterized as severe throbbing incapacitating pain associated with nausea, dizziness, photophobia and lasting more than 5 hours. Migraine days occur more than 15 days of the month and are refractory to the following medications:  · Imitrex and Relpax  · Non steroidal anti-inflammatory medications: including motrin and BC powder  · Including topamax and neurontin  · Anti-depressants: including elavil   Beta Blockers: propranolol   Calcium Cannel blcokers: Verapamil   Barbituates: Fioricet  Each drug was tried for at least 2 months or until and adverse reaction occurred  Procedure:  After consent was obtained, using sterile technique the trigger point was prepped. Local anesthetic used: 0.5% bupivacaine. Solu-Medrol 40 mg, Toradol 60 mg and was then injected and the needle withdrawn. The procedure was well tolerated. The patient is asked to continue to rest the area for a few more days before resuming regular activities. It may be more painful for the first 1-2 days. Watch for fever, or increased swelling or persistent pain in the joint. Call or return to clinic prn if such symptoms occur or there is failure to improve as anticipated. Medications reviewed:  Current Outpatient Medications   Medication Sig Dispense Refill    DULoxetine (CYMBALTA) 30 mg capsule Take 1 Cap by mouth daily. 30 Cap 1    gabapentin (NEURONTIN) 100 mg capsule Take 1 Cap by mouth three (3) times daily. Max Daily Amount: 300 mg. 90 Cap 1    lidocaine (LIDODERM) 5 % Apply patch to the back of the neck for 12 hours a day and remove for 12 hours a day.  30 Each 0    ergocalciferol (ERGOCALCIFEROL) 1,250 mcg (50,000 unit) capsule TAKE ONE CAPSULE BY MOUTH EVERY MONDAY AND THURSDAY 8 Cap 2    carBAMazepine XR (TEGretol XR) 200 mg SR tablet Take 1 Tab by mouth two (2) times a day. 60 Tab 0    predniSONE (DELTASONE) 10 mg tablet Take 10 mg by mouth two (2) times a day. 20 Tab 0    galcanezumab-gnlm (Emgality Syringe) 120 mg/mL syrg 120 mg by SubCUTAneous route every thirty (30) days. 1 mL 3    Cetirizine (ZyrTEC) 10 mg cap Take  by mouth.  cholecalciferol (Vitamin D3) 25 mcg (1,000 unit) cap Take 1,000 Units by mouth daily.  cyanocobalamin 1,000 mcg tablet Take 1,000 mcg by mouth daily.  Diclofenac Potassium (Cambia) 50 mg pwpk Take 50 mg by mouth as needed (headache). As needed q 8hrly, not to exceed 2 doses in 24 hrs 9 Packet 3    famotidine (PEPCID) 40 mg tablet Take 40 mg by mouth daily.  fluticasone-umeclidinium-vilanterol (Trelegy Ellipta) 100-62.5-25 mcg inhaler Take 1 Puff by inhalation daily.  meclizine (ANTIVERT) 25 mg tablet Take  by mouth three (3) times daily as needed for Dizziness.  losartan (COZAAR) 50 mg tablet Take  by mouth daily.  meloxicam (MOBIC) 15 mg tablet Take 15 mg by mouth daily.  tiZANidine (ZANAFLEX) 4 mg tablet Take 1 Tab by mouth nightly. 30 Tab 1    naproxen sodium (ALEVE) 220 mg cap Take  by mouth.  polyethylene glycol (MIRALAX) 17 gram/dose powder Take 17 g by mouth daily. 510 g 0    ibuprofen (MOTRIN) 600 mg tablet Take 1 Tab by mouth three (3) times daily (with meals). 21 Tab 0    naloxone (NARCAN) 4 mg/actuation nasal spray Use 1 spray intranasally, then discard. Repeat with new spray every 2 min as needed for opioid overdose symptoms, alternating nostrils. (Patient not taking: Reported on 1/29/2021) 2 Each 0    medroxyPROGESTERone (PROVERA) 10 mg tablet Take 10 mg by mouth three (3) times daily.  dicyclomine HCl (BENTYL PO) Take  by mouth four (4) times daily. Indications: As needed for pain      fluticasone propionate (FLONASE) 50 mcg/actuation nasal spray 2 Sprays by Both Nostrils route daily.  Indications: As needed for allergies      multivitamin (MULTIPLE VITAMIN PO) Take  by mouth. Indications: Teresita fusion multi vitamin/folate 50 mg (2 gummies) by gyn dr     [facility] ondansetron (ZOFRAN ODT) 4 mg disintegrating tablet Take 1 Tab by mouth every eight (8) hours as needed for Nausea. (Patient not taking: Reported on 1/29/2021) 10 Tab 0    ketorolac (TORADOL) 10 mg tablet Take 1 Tab by mouth every six (6) hours as needed for Pain. 20 Tab 0    loratadine (CLARITIN) 10 mg tablet Take 10 mg by mouth daily.  pantoprazole (PROTONIX) 40 mg tablet Take 40 mg by mouth two (2) times a day.  sucralfate (CARAFATE) 100 mg/mL suspension Take  by mouth four (4) times daily.  albuterol (PROVENTIL VENTOLIN) 2.5 mg /3 mL (0.083 %) nebulizer solution 2.5 mg by Nebulization route once.  mometasone (NASONEX) 50 mcg/actuation nasal spray 2 Sprays daily.           Objective:   Vitals:  Vitals:    04/21/21 1406   BP: (!) 123/91   Pulse: 80   SpO2: 98%   Weight: 192 lb (87.1 kg)   Height: 5' 3\" (1.6 m)   PainSc:   8   PainLoc: Head               Lab Data Reviewed:  Lab Results   Component Value Date/Time    WBC 8.7 11/24/2020 06:12 PM    HCT 40.9 11/24/2020 06:12 PM    HGB 14.2 11/24/2020 06:12 PM    PLATELET 660 57/41/7669 06:12 PM       Lab Results   Component Value Date/Time    Sodium 139 11/24/2020 06:12 PM    Potassium 3.8 11/24/2020 06:12 PM    Chloride 109 (H) 11/24/2020 06:12 PM    CO2 25 11/24/2020 06:12 PM    Glucose 98 11/24/2020 06:12 PM    BUN 12 11/24/2020 06:12 PM    Creatinine 0.90 11/24/2020 06:12 PM    Calcium 9.2 11/24/2020 06:12 PM       No components found for: TROPQUANT    No results found for: RASHAWN      No results found for: HBA1C, HGBE8, KGO1JAXM, HNR4YHTV     Lab Results   Component Value Date/Time    Vitamin B12 482 12/14/2020 10:44 AM       No results found for: RASHAWN, ANARX, ANAIGG, XBANA    No results found for: CHOL, CHOLPOCT, CHOLX, CHLST, CHOLV, HDL, HDLPOC, HDLP, LDL, LDLCPOC, LDLC, DLDLP, VLDLC, VLDL, TGLX, TRIGL, TRIGP, TGLPOCT, CHHD, CHHDX      CT Results (recent):  Results from [place] encounter on 11/24/20   CT HEAD WO CONT    Narrative EXAM: CT HEAD WO CONT    INDICATION: Nonspecific numbness. No additional history at the time of this  interpretation. COMPARISON: CT head on 3/18/2015. TECHNIQUE: Noncontrast head CT. Coronal and sagittal reformats. CT dose  reduction was achieved through the use of a standardized protocol tailored for  this examination and automatic exposure control for dose modulation. FINDINGS: The ventricles and sulci are age-appropriate without hydrocephalus. There is no mass effect or midline shift. There is no intracranial hemorrhage or  extra-axial fluid collection. There is no abnormal area of decreased density to  suggest infarct. The calvarium is intact. The visualized paranasal sinuses and mastoid air cells  are clear. Impression IMPRESSION:     Normal noncontrast CT head. No change. MRI Results (recent):  Results from [place] encounter on 12/21/20   MRI BRAIN W WO CONT    Narrative Clinical history: Paresthesias, migraine headaches  INDICATION:   Paresthesias    COMPARISON: CT 11/24/2020  TECHNIQUE: MR examination of the brain includes axial and sagittal T1 , axial  T2, axial FLAIR, axial gradient echo, axial DWI, coronal T1 . Pre and post  contrast axial T1-weighted imaging. Postcontrast T1-weighted imaging coronal  plane. Orbital protocol was utilized. CONTRAST: The patient was administered gadolinium-based contrast material,  subsequently axial and sagittal T1-weighted postcontrast imaging was obtained. FINDINGS:   There is no intracranial mass, hemorrhage or acute infarction. There is no intraconal mass. Optic nerves are symmetric in size and signal.  Perineural CSF is within normal limits. There is no intraconal mass. The globes  are symmetric in size. No proptosis. Ocular muscles are symmetric.       There is no evidence of demyelinating process. Left vertebral artery is  dominant. . There is no abnormal parenchymal enhancement. There is no abnormal  meningeal enhancement demonstrated. The brain architecture is normal. There is  no evidence of midline shift or mass-effect. The ventricles are normal in size,  position and configuration. There are no extra-axial fluid collections. Major  intracranial vascular flow-voids are unremarkable. The orbits are grossly  symmetric. Dural venous sinuses are grossly unremarkable. There is no Chiari or  syrinx. Pituitary and infundibulum grossly unremarkable. Cerebellopontine angles  are unremarkable. No skull base mass is identified. Cavernous sinuses are  symmetric. The mastoid air cells and are well pneumatized and clear. Impression IMPRESSION:  Normal MRI of the brain. Normal MR evaluation of the orbits. No evidence of demyelinating process. No intracranial mass, hemorrhage or evidence of acute infarction. IR Results (recent):  No results found for this or any previous visit. VAS/US Results (recent):  No results found for this or any previous visit. PHYSICAL EXAM:  General:    Alert, cooperative, no distress, appears stated age. Head:   Normocephalic, without obvious abnormality, atraumatic. Eyes:   Conjunctivae/corneas clear. PERRLA  Nose:  Nares normal. No drainage or sinus tenderness. Throat:    Lips, mucosa, and tongue normal.  No Thrush  Neck:  Supple, symmetrical,  no adenopathy, thyroid: non tender    no carotid bruit and no JVD. Severe paraspinal and occipitalis tenderness  Back:    Symmetric,  No CVA tenderness. Lungs:   Clear to auscultation bilaterally. No Wheezing or Rhonchi. No rales. Chest wall:  No tenderness or deformity. No Accessory muscle use. Heart:   Regular rate and rhythm,  no murmur, rub or gallop. Abdomen:   Soft, non-tender. Not distended.   Bowel sounds normal. No masses  Extremities: Extremities normal, atraumatic, No cyanosis. No edema. No clubbing  Skin:     Texture, turgor normal. No rashes or lesions. Not Jaundiced  Lymph nodes: Cervical, supraclavicular normal.  Psych:  Good insight. Not depressed. Anxious. NEUROLOGICAL EXAM:  Appearance: The patient is well developed, well nourished, provides a coherent history and is in no acute distress. Mental Status: Oriented to time, place and person. Mood and affect appropriate. Cranial Nerves:   Intact visual fields. Fundi are benign. OANH, EOM's full, no nystagmus, no ptosis. Facial sensation dysesthesia. Corneal reflexes are intact. Facial movement is symmetric. Hearing is normal bilaterally. Palate is midline with normal sternocleidomastoid and trapezius muscles are normal. Tongue is midline. Motor:  5/5 strength in upper and lower proximal and distal muscles. Normal bulk and tone. No fasciculations. Reflexes:   Deep tendon reflexes 3+/4 and symmetrical.   Sensory:   Normal to touch, pinprick and vibration. Gait:  Normal gait. Tremor:   No tremor noted. Cerebellar:  No cerebellar signs present. Neurovascular:  Normal heart sounds and regular rhythm, peripheral pulses intact, and no carotid bruits. Assesment  1. Complicated migraine  Trigger point injection    2. Trigeminal neuralgia of right side of face  Tegretol    3. Chronic migraine  Emgality    4. Intractable episodic paroxysmal hemicrania  Trigger point injection    ___________________________________________________  PLAN: Medication and plan discussed with patient      ICD-10-CM ICD-9-CM    1. Complicated migraine  T91.375 346.00    2. Trigeminal neuralgia of right side of face  G50.0 350.1    3. Chronic migraine  G43.709 346.70    4. Intractable episodic paroxysmal hemicrania  G44.031 339.03      Follow-up and Dispositions    · Return in about 2 months (around 6/21/2021).               :    ___________________________________________________    Attending Physician: Demarco Jones MD [place]

## 2021-04-21 NOTE — LETTER
NOTIFICATION RETURN TO WORK / SCHOOL 
 
4/21/2021 1:54 PM 
 
[facility] To Whom It May Concern: 
 
Hernán Montana is currently under the care of [address] [place] [address]. She will return to work/school on: 4/26/2021 If there are questions or concerns please have the patient contact our office. Sincerely, Keena Estevez MD

## 2021-04-22 RX ORDER — ERGOCALCIFEROL 1.25 MG/1
CAPSULE ORAL
Qty: 8 CAP | Refills: 2 | Status: SHIPPED | OUTPATIENT
Start: 2021-04-22 | End: 2021-07-10

## 2021-04-23 DIAGNOSIS — M54.2 NECK PAIN: ICD-10-CM

## 2021-04-23 DIAGNOSIS — B02.29 PHN (POSTHERPETIC NEURALGIA): Primary | ICD-10-CM

## 2021-04-23 RX ORDER — LIDOCAINE 50 MG/G
PATCH TOPICAL
Qty: 30 EACH | Refills: 0 | Status: SHIPPED | OUTPATIENT
Start: 2021-04-23 | End: 2021-06-24 | Stop reason: SDUPTHER

## 2021-04-25 ENCOUNTER — TELEPHONE (OUTPATIENT)
Dept: NEUROLOGY | Age: 31
End: 2021-04-25

## 2021-04-25 NOTE — TELEPHONE ENCOUNTER
Lidocaine patch    Approved today  PA Case: [id number], Status: Approved, Coverage Starts on: 4/25/2021 12:00:00 AM, Coverage Ends on: 4/25/2022 12:00:00 AM.    Faxed to [employer] and message sent to pt of approval.

## 2021-04-29 ENCOUNTER — OFFICE VISIT (OUTPATIENT)
Dept: NEUROLOGY | Age: 31
End: 2021-04-29
Payer: COMMERCIAL

## 2021-04-29 VITALS
WEIGHT: 167.2 LBS | HEART RATE: 98 BPM | SYSTOLIC BLOOD PRESSURE: 140 MMHG | BODY MASS INDEX: 29.62 KG/M2 | DIASTOLIC BLOOD PRESSURE: 100 MMHG | OXYGEN SATURATION: 98 %

## 2021-04-29 DIAGNOSIS — G43.109 COMPLICATED MIGRAINE: Primary | ICD-10-CM

## 2021-04-29 DIAGNOSIS — M79.7 FIBROMYALGIA: ICD-10-CM

## 2021-04-29 DIAGNOSIS — G50.0 TRIGEMINAL NEURALGIA OF RIGHT SIDE OF FACE: ICD-10-CM

## 2021-04-29 PROCEDURE — 99214 OFFICE O/P EST MOD 30 MIN: CPT | Performed by: PSYCHIATRY & NEUROLOGY

## 2021-04-29 RX ORDER — DULOXETIN HYDROCHLORIDE 30 MG/1
30 CAPSULE, DELAYED RELEASE ORAL DAILY
Qty: 30 CAP | Refills: 1 | Status: SHIPPED | OUTPATIENT
Start: 2021-04-29

## 2021-04-29 RX ORDER — GABAPENTIN 100 MG/1
100 CAPSULE ORAL 3 TIMES DAILY
Qty: 90 CAP | Refills: 1 | Status: SHIPPED | OUTPATIENT
Start: 2021-04-29 | End: 2021-06-24 | Stop reason: DRUGHIGH

## 2021-04-29 NOTE — LETTER
NOTIFICATION RETURN TO WORK / SCHOOL 
 
4/29/2021 12:50 PM 
 
Ms. Hay Christopher [address] [facility] To Whom It May Concern: 
 
Hay Christopher is currently under the care of [address] [place] [address]. She will return to work/school on 5/25/2021 If there are questions or concerns please have the patient contact our office. Sincerely, Roberto Mcguire MD

## 2021-05-04 ENCOUNTER — OFFICE VISIT (OUTPATIENT)
Dept: NEUROLOGY | Age: 31
End: 2021-05-04
Payer: COMMERCIAL

## 2021-05-04 VITALS
HEART RATE: 95 BPM | WEIGHT: 193 LBS | OXYGEN SATURATION: 97 % | BODY MASS INDEX: 34.19 KG/M2 | DIASTOLIC BLOOD PRESSURE: 90 MMHG | SYSTOLIC BLOOD PRESSURE: 130 MMHG

## 2021-05-04 DIAGNOSIS — G44.031 INTRACTABLE EPISODIC PAROXYSMAL HEMICRANIA: ICD-10-CM

## 2021-05-04 DIAGNOSIS — M79.7 FIBROMYALGIA: ICD-10-CM

## 2021-05-04 DIAGNOSIS — G43.109 COMPLICATED MIGRAINE: ICD-10-CM

## 2021-05-04 PROCEDURE — 99214 OFFICE O/P EST MOD 30 MIN: CPT | Performed by: PSYCHIATRY & NEUROLOGY

## 2021-05-04 RX ORDER — GALCANEZUMAB 120 MG/ML
1 INJECTION, SOLUTION SUBCUTANEOUS ONCE
Qty: 1 SYRINGE | Refills: 0
Start: 2021-05-04 | End: 2021-05-04

## 2021-05-04 RX ORDER — PROPRANOLOL HYDROCHLORIDE 60 MG/1
60 CAPSULE, EXTENDED RELEASE ORAL
Qty: 30 CAP | Refills: 1 | Status: SHIPPED | OUTPATIENT
Start: 2021-05-04 | End: 2021-06-24 | Stop reason: SDUPTHER

## 2021-05-04 RX ORDER — ONDANSETRON 4 MG/1
4 TABLET, ORALLY DISINTEGRATING ORAL
Qty: 10 TAB | Refills: 0 | Status: SHIPPED | OUTPATIENT
Start: 2021-05-04 | End: 2021-11-29 | Stop reason: SDUPTHER

## 2021-05-04 RX ORDER — MECLIZINE HYDROCHLORIDE 25 MG/1
25 TABLET ORAL
Qty: 30 TAB | Refills: 1 | Status: SHIPPED | OUTPATIENT
Start: 2021-05-04 | End: 2021-11-29 | Stop reason: SDUPTHER

## 2021-05-04 NOTE — PROGRESS NOTES
Neurology Progress Note    NAME:  Martha Parra   :   1990   MRN:   [id number]     Date/Time:  2021  Subjective:    Martha Parra is a 35 y.o. female here today for follow-up for headache, sharp pain on the right side of the face and eye pain with blurriness. Patient walked into the office today because of persistent headache, facial pain, dizziness. When patient was last seen, because of possibility of fibromyalgia syndrome, complicated migraine and trigeminal neuralgia, setting changes were made as per medications. Patient however says the symptoms got worse, so much so that she was having difficulty with breathing, she tried to go to the emergency room however, ended up in the office. After reviewing patient medications, at this time, I will discontinue Tegretol, continue patient on Cymbalta, Neurontin. I will propranolol for patient's headache and tachycardia. At this time, likely difficult for patient to go back to work until reevaluated. Patient says she is still in excruciating pain. She says the pain is sharp in nature, mostly in the right side of the face and behind the eye, as if it is coming from the back of the neck and this is associated with dizziness. She says the right side of the face is still sensitive to touch. However the headache is unrelenting.   Review of Systems - General ROS: positive for  - fatigue and sleep disturbance  Psychological ROS: positive for - anxiety and sleep disturbances  Ophthalmic ROS: positive for - blurry vision, decreased vision, double vision and photophobia  ENT ROS: positive for - headaches, tinnitus, vertigo and visual changes  Allergy and Immunology ROS: negative  Hematological and Lymphatic ROS: negative  Endocrine ROS: negative  Respiratory ROS: no cough, shortness of breath, or wheezing  Cardiovascular ROS: negative  Gastrointestinal ROS: no abdominal pain, change in bowel habits, or black or bloody stools  Genito-Urinary ROS: no dysuria, trouble voiding, or hematuria  Musculoskeletal ROS: positive for - muscular weakness  Neurological ROS: positive for - dizziness, headaches, numbness/tingling, visual changes and weakness  Dermatological ROS: negative      Medications reviewed:  Current Outpatient Medications   Medication Sig Dispense Refill    propranolol LA (INDERAL LA) 60 mg SR capsule Take 1 Cap by mouth nightly. 30 Cap 1    meclizine (ANTIVERT) 25 mg tablet Take 1 Tab by mouth three (3) times daily as needed for Dizziness. 30 Tab 1    ondansetron (Zofran ODT) 4 mg disintegrating tablet Take 1 Tab by mouth every eight (8) hours as needed for Nausea. 10 Tab 0    DULoxetine (CYMBALTA) 30 mg capsule Take 1 Cap by mouth daily. 30 Cap 1    lidocaine (LIDODERM) 5 % Apply patch to the back of the neck for 12 hours a day and remove for 12 hours a day. 30 Each 0    ergocalciferol (ERGOCALCIFEROL) 1,250 mcg (50,000 unit) capsule TAKE ONE CAPSULE BY MOUTH EVERY MONDAY AND THURSDAY 8 Cap 2    predniSONE (DELTASONE) 10 mg tablet Take 10 mg by mouth two (2) times a day. 20 Tab 0    galcanezumab-gnlm (Emgality Syringe) 120 mg/mL syrg 120 mg by SubCUTAneous route every thirty (30) days. 1 mL 3    cholecalciferol (Vitamin D3) 25 mcg (1,000 unit) cap Take 1,000 Units by mouth daily.  cyanocobalamin 1,000 mcg tablet Take 1,000 mcg by mouth daily.  famotidine (PEPCID) 40 mg tablet Take 40 mg by mouth daily.  polyethylene glycol (MIRALAX) 17 gram/dose powder Take 17 g by mouth daily. 510 g 0    ibuprofen (MOTRIN) 600 mg tablet Take 1 Tab by mouth three (3) times daily (with meals). 21 Tab 0    naloxone (NARCAN) 4 mg/actuation nasal spray Use 1 spray intranasally, then discard. Repeat with new spray every 2 min as needed for opioid overdose symptoms, alternating nostrils. 2 Each 0    medroxyPROGESTERone (PROVERA) 10 mg tablet Take 10 mg by mouth three (3) times daily.       dicyclomine HCl (BENTYL PO) Take  by mouth four (4) times daily. Indications: As needed for pain      fluticasone propionate (FLONASE) 50 mcg/actuation nasal spray 2 Sprays by Both Nostrils route daily. Indications: As needed for allergies      multivitamin (MULTIPLE VITAMIN PO) Take  by mouth. Indications: Teresita fusion multi vitamin/folate 50 mg (2 gummies) by gyn dr Iglesia Edmondson pantoprazole (PROTONIX) 40 mg tablet Take 40 mg by mouth two (2) times a day.  sucralfate (CARAFATE) 100 mg/mL suspension Take  by mouth four (4) times daily.  albuterol (PROVENTIL VENTOLIN) 2.5 mg /3 mL (0.083 %) nebulizer solution 2.5 mg by Nebulization route once.  gabapentin (NEURONTIN) 100 mg capsule Take 1 Cap by mouth three (3) times daily. Max Daily Amount: 300 mg. 90 Cap 1    fluticasone-umeclidinium-vilanterol (Trelegy Ellipta) 100-62.5-25 mcg inhaler Take 1 Puff by inhalation daily.  naproxen sodium (ALEVE) 220 mg cap Take  by mouth.  loratadine (CLARITIN) 10 mg tablet Take 10 mg by mouth daily.           Objective:   Vitals:  Vitals:    05/04/21 1342   BP: (!) 130/90   Pulse: 95   SpO2: 97%   Weight: 193 lb (87.5 kg)               Lab Data Reviewed:  Lab Results   Component Value Date/Time    WBC 8.7 11/24/2020 06:12 PM    HCT 40.9 11/24/2020 06:12 PM    HGB 14.2 11/24/2020 06:12 PM    PLATELET 938 96/05/7381 06:12 PM       Lab Results   Component Value Date/Time    Sodium 139 11/24/2020 06:12 PM    Potassium 3.8 11/24/2020 06:12 PM    Chloride 109 (H) 11/24/2020 06:12 PM    CO2 25 11/24/2020 06:12 PM    Glucose 98 11/24/2020 06:12 PM    BUN 12 11/24/2020 06:12 PM    Creatinine 0.90 11/24/2020 06:12 PM    Calcium 9.2 11/24/2020 06:12 PM       No components found for: TROPQUANT    No results found for: RASHAWN      No results found for: HBA1C, HGBE8, [id number], [id number]     Lab Results   Component Value Date/Time    Vitamin B12 482 12/14/2020 10:44 AM       No results found for: RASHAWN, ANARX, ANAIGG, XBANA    No results found for: CHOL, CHOLPOCT, CHOLX, CHLST, CHOLV, HDL, [address], HDLP, LDL, LDLCPOC, LDLC, DLDLP, VLDLC, VLDL, TGLX, TRIGL, TRIGP, TGLPOCT, CHHD, CHHDX      CT Results (recent):  Results from [place] encounter on 11/24/20   CT HEAD WO CONT    Narrative EXAM: CT HEAD WO CONT    INDICATION: Nonspecific numbness. No additional history at the time of this  interpretation. COMPARISON: CT head on 3/18/2015. TECHNIQUE: Noncontrast head CT. Coronal and sagittal reformats. CT dose  reduction was achieved through the use of a standardized protocol tailored for  this examination and automatic exposure control for dose modulation. FINDINGS: The ventricles and sulci are age-appropriate without hydrocephalus. There is no mass effect or midline shift. There is no intracranial hemorrhage or  extra-axial fluid collection. There is no abnormal area of decreased density to  suggest infarct. The calvarium is intact. The visualized paranasal sinuses and mastoid air cells  are clear. Impression IMPRESSION:     Normal noncontrast CT head. No change. MRI Results (recent):  Results from [place] encounter on 12/21/20   MRI BRAIN W WO CONT    Narrative Clinical history: Paresthesias, migraine headaches  INDICATION:   Paresthesias    COMPARISON: CT 11/24/2020  TECHNIQUE: MR examination of the brain includes axial and sagittal T1 , axial  T2, axial FLAIR, axial gradient echo, axial DWI, coronal T1 . Pre and post  contrast axial T1-weighted imaging. Postcontrast T1-weighted imaging coronal  plane. Orbital protocol was utilized. CONTRAST: The patient was administered gadolinium-based contrast material,  subsequently axial and sagittal T1-weighted postcontrast imaging was obtained. FINDINGS:   There is no intracranial mass, hemorrhage or acute infarction. There is no intraconal mass. Optic nerves are symmetric in size and signal.  Perineural CSF is within normal limits. There is no intraconal mass. The globes  are symmetric in size. No proptosis. Ocular muscles are symmetric. There is no evidence of demyelinating process. Left vertebral artery is  dominant. . There is no abnormal parenchymal enhancement. There is no abnormal  meningeal enhancement demonstrated. The brain architecture is normal. There is  no evidence of midline shift or mass-effect. The ventricles are normal in size,  position and configuration. There are no extra-axial fluid collections. Major  intracranial vascular flow-voids are unremarkable. The orbits are grossly  symmetric. Dural venous sinuses are grossly unremarkable. There is no Chiari or  syrinx. Pituitary and infundibulum grossly unremarkable. Cerebellopontine angles  are unremarkable. No skull base mass is identified. Cavernous sinuses are  symmetric. The mastoid air cells and are well pneumatized and clear. Impression IMPRESSION:  Normal MRI of the brain. Normal MR evaluation of the orbits. No evidence of demyelinating process. No intracranial mass, hemorrhage or evidence of acute infarction. IR Results (recent):  No results found for this or any previous visit. VAS/US Results (recent):  No results found for this or any previous visit. PHYSICAL EXAM:  General:    Alert, cooperative, no distress, appears stated age. Head:   Normocephalic, without obvious abnormality, atraumatic. Eyes:   Conjunctivae/corneas clear. PERRLA  Nose:  Nares normal. No drainage or sinus tenderness. Throat:    Lips, mucosa, and tongue normal.  No Thrush  Neck:  Supple, symmetrical,  no adenopathy, thyroid: non tender    no carotid bruit and no JVD. Severe paraspinal and occipitalis tenderness  Back:    Symmetric,  No CVA tenderness. Lungs:   Clear to auscultation bilaterally. No Wheezing or Rhonchi. No rales. Chest wall:  No tenderness or deformity. No Accessory muscle use. Heart:   Regular rate and rhythm,  no murmur, rub or gallop. Abdomen:   Soft, non-tender. Not distended.   Bowel sounds normal. No masses  Extremities: Extremities normal, atraumatic, No cyanosis. No edema. No clubbing  Skin:     Texture, turgor normal. No rashes or lesions. Not Jaundiced  Lymph nodes: Cervical, supraclavicular normal.  Psych:  Good insight. Not depressed. Anxious. NEUROLOGICAL EXAM:  Appearance: The patient is well developed, well nourished, provides a coherent history and is in no acute distress. Mental Status: Oriented to time, place and person. Mood and affect appropriate. Cranial Nerves:   Intact visual fields. Fundi are benign. OANH, EOM's full, no nystagmus, no ptosis. Facial sensation dysesthesia. Corneal reflexes are intact. Facial movement is symmetric. Hearing is normal bilaterally. Palate is midline with normal sternocleidomastoid and trapezius muscles are normal. Tongue is midline. Motor:  5/5 strength in upper and lower proximal and distal muscles. Normal bulk and tone. No fasciculations. Reflexes:   Deep tendon reflexes 3+/4 and symmetrical.   Sensory:   Normal to touch, pinprick and vibration. Gait:  Normal gait. Tremor:   No tremor noted. Cerebellar:  No cerebellar signs present. Neurovascular:  Normal heart sounds and regular rhythm, peripheral pulses intact, and no carotid bruits. Assesment  1. Chronic migraine  Emgality  Propanolol  2. Complicated migraine  Continue management    3. Fibromyalgia  Cymbalta, Neurontin    4. Intractable episodic paroxysmal hemicrania  Emgality    ___________________________________________________  PLAN: Medication and plan discussed with patient      ICD-10-CM ICD-9-CM    1. Chronic migraine  G43.709 346.70    2. Complicated migraine  T74.584 346.00    3. Fibromyalgia  M79.7 729.1    4.  Intractable episodic paroxysmal hemicrania  G44.031 339.03                ___________________________________________________    Attending Physician: Georgi Hadley MD

## 2021-05-10 NOTE — PROGRESS NOTES
Neurology Progress Note    NAME:  Garrison Giordano   :   1990   MRN:   [id number]     Date/Time:  2021  Subjective: Allergic reaction. Patient had to be worked in today again because apparently, she became allergic to combination of medication given to her in the previous visit. Patient says when she got home, and started on the medications which is combination of Tegretol, Cymbalta, Neurontin, her headache got worse and she was somewhat dizzy, hot, confused, she had to go to emergency room. She noted that she still having symptoms. On reviewing the medication, I will discontinue Tegretol,start patient on propanolol. I will monitor patient for the next 3 weeks and see if it trigeminal neuralgia the persistent headache with fibromyalgia will improve. At this time, patient is advised to stay out of work until reevaluated in 3 weeks. Garrison Giordano is a 35 y.o. female here today for follow-up for headache, sharp pain on the right side of the face and eye pain with blurriness. She is still having the blurriness of the right eye.   It should be recalled that patient recently got trigger point injection because of intractable headache  Review of Systems - General ROS: positive for  - fatigue and sleep disturbance  Psychological ROS: positive for - anxiety and sleep disturbances  Ophthalmic ROS: positive for - blurry vision, decreased vision, double vision and photophobia  ENT ROS: positive for - headaches, tinnitus, vertigo and visual changes  Allergy and Immunology ROS: negative  Hematological and Lymphatic ROS: negative  Endocrine ROS: negative  Respiratory ROS: no cough, shortness of breath, or wheezing  Cardiovascular ROS: negative  Gastrointestinal ROS: no abdominal pain, change in bowel habits, or black or bloody stools  Genito-Urinary ROS: no dysuria, trouble voiding, or hematuria  Musculoskeletal ROS: positive for - muscular weakness  Neurological ROS: positive for - dizziness, headaches, numbness/tingling, visual changes and weakness  Dermatological ROS: negative      Medications reviewed:  Current Outpatient Medications   Medication Sig Dispense Refill    DULoxetine (CYMBALTA) 30 mg capsule Take 1 Cap by mouth daily. 30 Cap 1    gabapentin (NEURONTIN) 100 mg capsule Take 1 Cap by mouth three (3) times daily. Max Daily Amount: 300 mg. 90 Cap 1    lidocaine (LIDODERM) 5 % Apply patch to the back of the neck for 12 hours a day and remove for 12 hours a day. 30 Each 0    ergocalciferol (ERGOCALCIFEROL) 1,250 mcg (50,000 unit) capsule TAKE ONE CAPSULE BY MOUTH EVERY MONDAY AND THURSDAY 8 Cap 2    predniSONE (DELTASONE) 10 mg tablet Take 10 mg by mouth two (2) times a day. 20 Tab 0    galcanezumab-gnlm (Emgality Syringe) 120 mg/mL syrg 120 mg by SubCUTAneous route every thirty (30) days. 1 mL 3    cholecalciferol (Vitamin D3) 25 mcg (1,000 unit) cap Take 1,000 Units by mouth daily.  cyanocobalamin 1,000 mcg tablet Take 1,000 mcg by mouth daily.  famotidine (PEPCID) 40 mg tablet Take 40 mg by mouth daily.  polyethylene glycol (MIRALAX) 17 gram/dose powder Take 17 g by mouth daily. 510 g 0    ibuprofen (MOTRIN) 600 mg tablet Take 1 Tab by mouth three (3) times daily (with meals). 21 Tab 0    medroxyPROGESTERone (PROVERA) 10 mg tablet Take 10 mg by mouth three (3) times daily.  dicyclomine HCl (BENTYL PO) Take  by mouth four (4) times daily. Indications: As needed for pain      fluticasone propionate (FLONASE) 50 mcg/actuation nasal spray 2 Sprays by Both Nostrils route daily. Indications: As needed for allergies      pantoprazole (PROTONIX) 40 mg tablet Take 40 mg by mouth two (2) times a day.  sucralfate (CARAFATE) 100 mg/mL suspension Take  by mouth four (4) times daily.  albuterol (PROVENTIL VENTOLIN) 2.5 mg /3 mL (0.083 %) nebulizer solution 2.5 mg by Nebulization route once.  propranolol LA (INDERAL LA) 60 mg SR capsule Take 1 Cap by mouth nightly.  30 Cap 1    meclizine (ANTIVERT) 25 mg tablet Take 1 Tab by mouth three (3) times daily as needed for Dizziness. 30 Tab 1    ondansetron (Zofran ODT) 4 mg disintegrating tablet Take 1 Tab by mouth every eight (8) hours as needed for Nausea. 10 Tab 0    fluticasone-umeclidinium-vilanterol (Trelegy Ellipta) 100-62.5-25 mcg inhaler Take 1 Puff by inhalation daily.  naproxen sodium (ALEVE) 220 mg cap Take  by mouth.  naloxone (NARCAN) 4 mg/actuation nasal spray Use 1 spray intranasally, then discard. Repeat with new spray every 2 min as needed for opioid overdose symptoms, alternating nostrils. 2 Each 0    multivitamin (MULTIPLE VITAMIN PO) Take  by mouth. Indications: Teresita fusion multi vitamin/folate 50 mg (2 gummies) by gyn dr Corwin [place] loratadine (CLARITIN) 10 mg tablet Take 10 mg by mouth daily.           Objective:   Vitals:  Vitals:    04/29/21 1213   BP: (!) 140/100   Pulse: 98   SpO2: 98%   Weight: 167 lb 3.2 oz (75.8 kg)   PainSc:   8               Lab Data Reviewed:  Lab Results   Component Value Date/Time    WBC 8.7 11/24/2020 06:12 PM    HCT 40.9 11/24/2020 06:12 PM    HGB 14.2 11/24/2020 06:12 PM    PLATELET 153 53/84/6061 06:12 PM       Lab Results   Component Value Date/Time    Sodium 139 11/24/2020 06:12 PM    Potassium 3.8 11/24/2020 06:12 PM    Chloride 109 (H) 11/24/2020 06:12 PM    CO2 25 11/24/2020 06:12 PM    Glucose 98 11/24/2020 06:12 PM    BUN 12 11/24/2020 06:12 PM    Creatinine 0.90 11/24/2020 06:12 PM    Calcium 9.2 11/24/2020 06:12 PM       No components found for: TROPQUANT    No results found for: RASHAWN      No results found for: HBA1C, HGBE8, [id number], [id number]     Lab Results   Component Value Date/Time    Vitamin B12 482 12/14/2020 10:44 AM       No results found for: RASHAWN, ANARX, ANAIGG, XBANA    No results found for: CHOL, CHOLPOCT, CHOLX, CHLST, CHOLV, HDL, HDLPOC, HDLP, LDL, LDLCPOC, LDLC, DLDLP, VLDLC, VLDL, TGLX, TRIGL, TRIGP, TGLPOCT, CHHD, CHHDX      CT Results (recent):  Results from [facility]counter encounter on 11/24/20   CT HEAD WO CONT    Narrative EXAM: CT HEAD WO CONT    INDICATION: Nonspecific numbness. No additional history at the time of this  interpretation. COMPARISON: CT head on 3/18/2015. TECHNIQUE: Noncontrast head CT. Coronal and sagittal reformats. CT dose  reduction was achieved through the use of a standardized protocol tailored for  this examination and automatic exposure control for dose modulation. FINDINGS: The ventricles and sulci are age-appropriate without hydrocephalus. There is no mass effect or midline shift. There is no intracranial hemorrhage or  extra-axial fluid collection. There is no abnormal area of decreased density to  suggest infarct. The calvarium is intact. The visualized paranasal sinuses and mastoid air cells  are clear. Impression IMPRESSION:     Normal noncontrast CT head. No change. MRI Results (recent):  Results from [place] encounter on 12/21/20   MRI BRAIN W WO CONT    Narrative Clinical history: Paresthesias, migraine headaches  INDICATION:   Paresthesias    COMPARISON: CT 11/24/2020  TECHNIQUE: MR examination of the brain includes axial and sagittal T1 , axial  T2, axial FLAIR, axial gradient echo, axial DWI, coronal T1 . Pre and post  contrast axial T1-weighted imaging. Postcontrast T1-weighted imaging coronal  plane. Orbital protocol was utilized. CONTRAST: The patient was administered gadolinium-based contrast material,  subsequently axial and sagittal T1-weighted postcontrast imaging was obtained. FINDINGS:   There is no intracranial mass, hemorrhage or acute infarction. There is no intraconal mass. Optic nerves are symmetric in size and signal.  Perineural CSF is within normal limits. There is no intraconal mass. The globes  are symmetric in size. No proptosis. Ocular muscles are symmetric. There is no evidence of demyelinating process. Left vertebral artery is  dominant. . There is no abnormal parenchymal enhancement. There is no abnormal  meningeal enhancement demonstrated. The brain architecture is normal. There is  no evidence of midline shift or mass-effect. The ventricles are normal in size,  position and configuration. There are no extra-axial fluid collections. Major  intracranial vascular flow-voids are unremarkable. The orbits are grossly  symmetric. Dural venous sinuses are grossly unremarkable. There is no Chiari or  syrinx. Pituitary and infundibulum grossly unremarkable. Cerebellopontine angles  are unremarkable. No skull base mass is identified. Cavernous sinuses are  symmetric. The mastoid air cells and are well pneumatized and clear. Impression IMPRESSION:  Normal MRI of the brain. Normal MR evaluation of the orbits. No evidence of demyelinating process. No intracranial mass, hemorrhage or evidence of acute infarction. IR Results (recent):  No results found for this or any previous visit. VAS/US Results (recent):  No results found for this or any previous visit. PHYSICAL EXAM:  General:    Alert, cooperative, no distress, appears stated age. Head:   Normocephalic, without obvious abnormality, atraumatic. Eyes:   Conjunctivae/corneas clear. PERRLA  Nose:  Nares normal. No drainage or sinus tenderness. Throat:    Lips, mucosa, and tongue normal.  No Thrush  Neck:  Supple, symmetrical,  no adenopathy, thyroid: non tender    no carotid bruit and no JVD. Severe paraspinal and occipitalis tenderness  Back:    Symmetric, diffuse tenderness. Lungs:   Clear to auscultation bilaterally. No Wheezing or Rhonchi. No rales. Chest wall:  No tenderness or deformity. No Accessory muscle use. Heart:   Regular rate and rhythm,  no murmur, rub or gallop. Abdomen:   Soft, non-tender. Not distended. Bowel sounds normal. No masses  Extremities: Extremities normal, atraumatic, No cyanosis. No edema.  No clubbing  Skin:     Texture, turgor normal. No rashes or lesions. Not Jaundiced  Lymph nodes: Cervical, supraclavicular normal.  Psych:  Good insight. Not depressed. Anxious. NEUROLOGICAL EXAM:  Appearance: The patient is well developed, well nourished, provides a coherent history and is in no acute distress. Mental Status: Oriented to time, place and person. Mood and affect appropriate. Cranial Nerves:   Intact visual fields. Fundi are benign. OANH, EOM's full, no nystagmus, no ptosis. Facial sensation dysesthesia. Corneal reflexes are intact. Facial movement is symmetric. Hearing is normal bilaterally. Palate is midline with normal sternocleidomastoid and trapezius muscles are normal. Tongue is midline. Motor:  5/5 strength in upper and lower proximal and distal muscles. Normal bulk and tone. No fasciculations. Reflexes:   Deep tendon reflexes 3+/4 and symmetrical.   Sensory:    Dysesthesia to touch, pinprick and vibration. Gait:  Normal gait. Tremor:   No tremor noted. Cerebellar:  No cerebellar signs present. Neurovascular:  Normal heart sounds and regular rhythm, peripheral pulses intact, and no carotid bruits. Assesment  1. Complicated migraine  Continue management    2. Chronic migraine  Emgality  Propanolol  3. Trigeminal neuralgia of right side of face  Neurontin    4. Fibromyalgia    - gabapentin (NEURONTIN) 100 mg capsule; Take 1 Cap by mouth three (3) times daily. Max Daily Amount: 300 mg. Dispense: 90 Cap; Refill: 1  Cymbalta  ___________________________________________________  PLAN: Medication and plan discussed with patient      ICD-10-CM ICD-9-CM    1. Complicated migraine  V22.771 346.00    2. Chronic migraine  G43.709 346.70    3. Trigeminal neuralgia of right side of face  G50.0 350.1    4. Fibromyalgia  M79.7 729.1 gabapentin (NEURONTIN) 100 mg capsule     Follow-up and Dispositions    · Return in about 3 weeks (around 5/20/2021).              ___________________________________________________    Attending Physician: Oj Deleon MD

## 2021-05-24 ENCOUNTER — OFFICE VISIT (OUTPATIENT)
Dept: NEUROLOGY | Age: 31
End: 2021-05-24
Payer: COMMERCIAL

## 2021-05-24 VITALS
SYSTOLIC BLOOD PRESSURE: 130 MMHG | WEIGHT: 193 LBS | RESPIRATION RATE: 16 BRPM | HEIGHT: 63 IN | DIASTOLIC BLOOD PRESSURE: 88 MMHG | BODY MASS INDEX: 34.2 KG/M2 | OXYGEN SATURATION: 97 % | HEART RATE: 87 BPM

## 2021-05-24 DIAGNOSIS — G43.109 COMPLICATED MIGRAINE: ICD-10-CM

## 2021-05-24 DIAGNOSIS — G37.9 DEMYELINATING DISEASE (HCC): Primary | ICD-10-CM

## 2021-05-24 DIAGNOSIS — R20.2 PARESTHESIA: ICD-10-CM

## 2021-05-24 DIAGNOSIS — R42 DIZZINESS: ICD-10-CM

## 2021-05-24 DIAGNOSIS — G95.9 CERVICAL MYELOPATHY (HCC): ICD-10-CM

## 2021-05-24 DIAGNOSIS — M79.7 FIBROMYALGIA: ICD-10-CM

## 2021-05-24 PROCEDURE — 99214 OFFICE O/P EST MOD 30 MIN: CPT | Performed by: PSYCHIATRY & NEUROLOGY

## 2021-05-24 RX ORDER — PREDNISONE 10 MG/1
10 TABLET ORAL 2 TIMES DAILY
Qty: 30 TABLET | Refills: 0 | Status: SHIPPED | OUTPATIENT
Start: 2021-05-24 | End: 2021-11-04 | Stop reason: DRUGHIGH

## 2021-05-24 RX ORDER — GABAPENTIN 300 MG/1
300 CAPSULE ORAL 2 TIMES DAILY
Qty: 60 CAPSULE | Refills: 3 | Status: SHIPPED | OUTPATIENT
Start: 2021-05-24 | End: 2021-06-24 | Stop reason: DRUGHIGH

## 2021-05-24 RX ORDER — GALCANEZUMAB 120 MG/ML
120 INJECTION, SOLUTION SUBCUTANEOUS
Qty: 1 ML | Refills: 3 | Status: SHIPPED | OUTPATIENT
Start: 2021-05-24 | End: 2021-12-02

## 2021-05-24 NOTE — LETTER
NOTIFICATION RETURN TO WORK / SCHOOL 
 
5/24/2021 2:03 PM 
 
Ms. Glenford Spurling [place] [id number] To Whom It May Concern: 
 
Glenford Spurling is currently under the care of [address] [place] [address]. She will return to work 06/01/2021 If there are questions or concerns please have the patient contact our office. Sincerely, Aneta Gonzalez MD

## 2021-05-24 NOTE — PROGRESS NOTES
Neurology Progress Note    NAME:  Martha Parra   :   1990   MRN:   [id number]     Date/Time:  2021  Subjective:   Martha Parra is a32 y.o. female here today for follow-up for headache, sharp pain on the right side of the head and face with generalized body pain and frequent fall  Patient was accompanied by her mother to the visit  Patient says since the last visit, she had a little improvement, but if it appears to come back. She noted that after adjusting the medication and discontinuing Tegretol, she did not episode of severe dizziness that she had before. She says however she has been having intermittent headache, and there has been occasional sharp pain shooting through the right side of the head. She says this does not last long. Patient noted that she has had falls, she says her legs will give out on her up low and she will find difficult getting up, she says family member will help her to get up. She also noted that she is having this pain around her torso, pain is very sharp in nature. She says the gabapentin tends to help the pain, but takes the edge off only. At this time, I will increase or adjust the dose of gabapentin to 300 mg p.o. twice daily. Patient noted that she has been having a lot of spasms and muscle pain, she says sometimes using warm compresses help with the pain but it is too hot it makes her to be weak. She noted that she cannot stand or very condition. With patient's symptomatology, I will be entertaining demyelinating disease, as patient has been having intermittent weakness of the lower extremity or even the lwhole limbs, this patient has had visual obscuration, I will obtain MRI of the cervical spine with contrast to evaluate for myelopathy. Patient is highly claustrophobic, therefore I will obtain the the MRI in an open MRI. Patient says she still feels dizzy and her headache has not relented.   At this time, it would be difficult for patient to go back to work , patient will possibly be going back to work on 6/1/2021 at that time, she might have been reevaluated  Patient says she is still in excruciating pain. She says the pain is sharp in nature, mostly in the right side of the face and behind the eye, as if it is coming from the back of the neck and this is associated with dizziness. She says the right side of the face is still sensitive to touch. However the headache is unrelenting. Review of Systems - General ROS: positive for  - fatigue and sleep disturbance  Psychological ROS: positive for - anxiety and sleep disturbances  Ophthalmic ROS: positive for - blurry vision, decreased vision, double vision and photophobia  ENT ROS: positive for - headaches, tinnitus, vertigo and visual changes  Allergy and Immunology ROS: negative  Hematological and Lymphatic ROS: negative  Endocrine ROS: negative  Respiratory ROS: no cough, shortness of breath, or wheezing  Cardiovascular ROS: negative  Gastrointestinal ROS: no abdominal pain, change in bowel habits, or black or bloody stools  Genito-Urinary ROS: no dysuria, trouble voiding, or hematuria  Musculoskeletal ROS: positive for - muscular weakness  Neurological ROS: positive for - dizziness, headaches, numbness/tingling, visual changes and weakness  Dermatological ROS: negative         Medications reviewed:  Current Outpatient Medications   Medication Sig Dispense Refill    gabapentin (NEURONTIN) 300 mg capsule Take 1 Capsule by mouth two (2) times a day. Max Daily Amount: 600 mg. 60 Capsule 3    predniSONE (DELTASONE) 10 mg tablet Take 10 mg by mouth two (2) times a day. Take 1 tab p.o. tid x3days, 1 tab bid x5 days ,1 p.o.qd 30 Tablet 0    galcanezumab-gnlm (Emgality Syringe) 120 mg/mL syrg 120 mg by SubCUTAneous route every thirty (30) days. 1 mL 3    propranolol LA (INDERAL LA) 60 mg SR capsule Take 1 Cap by mouth nightly.  30 Cap 1    meclizine (ANTIVERT) 25 mg tablet Take 1 Tab by mouth three (3) times daily as needed for Dizziness. 30 Tab 1    ondansetron (Zofran ODT) 4 mg disintegrating tablet Take 1 Tab by mouth every eight (8) hours as needed for Nausea. 10 Tab 0    gabapentin (NEURONTIN) 100 mg capsule Take 1 Cap by mouth three (3) times daily. Max Daily Amount: 300 mg. 90 Cap 1    lidocaine (LIDODERM) 5 % Apply patch to the back of the neck for 12 hours a day and remove for 12 hours a day. 30 Each 0    cholecalciferol (Vitamin D3) 25 mcg (1,000 unit) cap Take 1,000 Units by mouth daily.  famotidine (PEPCID) 40 mg tablet Take 40 mg by mouth daily.  polyethylene glycol (MIRALAX) 17 gram/dose powder Take 17 g by mouth daily. 510 g 0    ibuprofen (MOTRIN) 600 mg tablet Take 1 Tab by mouth three (3) times daily (with meals). 21 Tab 0    medroxyPROGESTERone (PROVERA) 10 mg tablet Take 10 mg by mouth three (3) times daily.  dicyclomine HCl (BENTYL PO) Take  by mouth four (4) times daily. Indications: As needed for pain      fluticasone propionate (FLONASE) 50 mcg/actuation nasal spray 2 Sprays by Both Nostrils route daily. Indications: As needed for allergies      multivitamin (MULTIPLE VITAMIN PO) Take  by mouth. Indications: Teresita fusion multi vitamin/folate 50 mg (2 gummies) by gyn dr Sukhdeep Galo loratadine (CLARITIN) 10 mg tablet Take 10 mg by mouth daily.  pantoprazole (PROTONIX) 40 mg tablet Take 40 mg by mouth two (2) times a day.  sucralfate (CARAFATE) 100 mg/mL suspension Take  by mouth four (4) times daily.  albuterol (PROVENTIL VENTOLIN) 2.5 mg /3 mL (0.083 %) nebulizer solution 2.5 mg by Nebulization route once.  DULoxetine (CYMBALTA) 30 mg capsule Take 1 Cap by mouth daily. (Patient not taking: Reported on 5/24/2021) 30 Cap 1    ergocalciferol (ERGOCALCIFEROL) 1,250 mcg (50,000 unit) capsule TAKE ONE CAPSULE BY MOUTH EVERY MONDAY AND THURSDAY (Patient not taking: Reported on 5/24/2021) 8 Cap 2    cyanocobalamin 1,000 mcg tablet Take 1,000 mcg by mouth daily. (Patient not taking: Reported on 5/24/2021)      fluticasone-umeclidinium-vilanterol (Trelegy Ellipta) 100-62.5-25 mcg inhaler Take 1 Puff by inhalation daily. (Patient not taking: Reported on 5/24/2021)      naproxen sodium (ALEVE) 220 mg cap Take  by mouth. (Patient not taking: Reported on 5/24/2021)      naloxone [facility]) 4 mg/actuation nasal spray Use 1 spray intranasally, then discard. Repeat with new spray every 2 min as needed for opioid overdose symptoms, alternating nostrils. (Patient not taking: Reported on 5/24/2021) 2 Each 0        Objective:   Vitals:  Vitals:    05/24/21 1251   BP: 130/88   Pulse: 87   Resp: 16   SpO2: 97%   Weight: 193 lb (87.5 kg)   Height: 5' 3\" (1.6 m)   PainSc:   9               Lab Data Reviewed:  Lab Results   Component Value Date/Time    WBC 8.7 11/24/2020 06:12 PM    HCT 40.9 11/24/2020 06:12 PM    HGB 14.2 11/24/2020 06:12 PM    PLATELET 572 74/85/1233 06:12 PM       Lab Results   Component Value Date/Time    Sodium 139 11/24/2020 06:12 PM    Potassium 3.8 11/24/2020 06:12 PM    Chloride 109 (H) 11/24/2020 06:12 PM    CO2 25 11/24/2020 06:12 PM    Glucose 98 11/24/2020 06:12 PM    BUN 12 11/24/2020 06:12 PM    Creatinine 0.90 11/24/2020 06:12 PM    Calcium 9.2 11/24/2020 06:12 PM       No components found for: TROPQUANT    No results found for: RASHAWN      No results found for: HBA1C, [id number], [id number]     Lab Results   Component Value Date/Time    Vitamin B12 482 12/14/2020 10:44 AM       No results found for: RASHAWN, ANARX, ANAIGG, XBANA    No results found for: CHOL, CHOLPOCT, CHOLX, CHLST, CHOLV, HDL, HDLPOC, HDLP, LDL, LDLCPOC, LDLC, DLDLP, VLDLC, VLDL, TGLX, TRIGL, TRIGP, TGLPOCT, CHHD, CHHDX      CT Results (recent):  Results from [place] encounter on 11/24/20    CT HEAD WO CONT    Narrative  EXAM: CT HEAD WO CONT    INDICATION: Nonspecific numbness. No additional history at the time of this  interpretation. COMPARISON: CT head on 3/18/2015.     TECHNIQUE: Noncontrast head CT. Coronal and sagittal reformats. CT dose  reduction was achieved through the use of a standardized protocol tailored for  this examination and automatic exposure control for dose modulation. FINDINGS: The ventricles and sulci are age-appropriate without hydrocephalus. There is no mass effect or midline shift. There is no intracranial hemorrhage or  extra-axial fluid collection. There is no abnormal area of decreased density to  suggest infarct. The calvarium is intact. The visualized paranasal sinuses and mastoid air cells  are clear. Impression  IMPRESSION:    Normal noncontrast CT head. No change. MRI Results (recent):  Results from [place] encounter on 12/21/20    MRI BRAIN W WO CONT    Narrative  Clinical history: Paresthesias, migraine headaches  INDICATION:   Paresthesias    COMPARISON: CT 11/24/2020  TECHNIQUE: MR examination of the brain includes axial and sagittal T1 , axial  T2, axial FLAIR, axial gradient echo, axial DWI, coronal T1 . Pre and post  contrast axial T1-weighted imaging. Postcontrast T1-weighted imaging coronal  plane. Orbital protocol was utilized. CONTRAST: The patient was administered gadolinium-based contrast material,  subsequently axial and sagittal T1-weighted postcontrast imaging was obtained. FINDINGS:  There is no intracranial mass, hemorrhage or acute infarction. There is no intraconal mass. Optic nerves are symmetric in size and signal.  Perineural CSF is within normal limits. There is no intraconal mass. The globes  are symmetric in size. No proptosis. Ocular muscles are symmetric. There is no evidence of demyelinating process. Left vertebral artery is  dominant. . There is no abnormal parenchymal enhancement. There is no abnormal  meningeal enhancement demonstrated. The brain architecture is normal. There is  no evidence of midline shift or mass-effect. The ventricles are normal in size,  position and configuration. There are no extra-axial fluid collections. Major  intracranial vascular flow-voids are unremarkable. The orbits are grossly  symmetric. Dural venous sinuses are grossly unremarkable. There is no Chiari or  syrinx. Pituitary and infundibulum grossly unremarkable. Cerebellopontine angles  are unremarkable. No skull base mass is identified. Cavernous sinuses are  symmetric. The mastoid air cells and are well pneumatized and clear. Impression  IMPRESSION:  Normal MRI of the brain. Normal MR evaluation of the orbits. No evidence of demyelinating process. No intracranial mass, hemorrhage or evidence of acute infarction. IR Results (recent):  No results found for this or any previous visit. VAS/US Results (recent):  No results found for this or any previous visit. PHYSICAL EXAM:  General:    Alert, cooperative, no distress, appears stated age. Head:   Normocephalic, without obvious abnormality, atraumatic. Eyes:   Conjunctivae/corneas clear. PERRLA  Nose:  Nares normal. No drainage or sinus tenderness. Throat:    Lips, mucosa, and tongue normal.  No Thrush  Neck:  Supple, symmetrical,  no adenopathy, thyroid: non tender    no carotid bruit and no JVD. Severe paraspinal and occipitalis tenderness  Back:    Symmetric, diffuse tenderness. Lungs:   Clear to auscultation bilaterally. No Wheezing or Rhonchi. No rales. Chest wall:  No tenderness or deformity. No Accessory muscle use. Heart:   Regular rate and rhythm,  no murmur, rub or gallop. Abdomen:   Soft, non-tender. Not distended. Bowel sounds normal. No masses  Extremities: Extremities normal, atraumatic, No cyanosis. No edema. No clubbing  Skin:     Texture, turgor normal. No rashes or lesions. Not Jaundiced  Lymph nodes: Cervical, supraclavicular normal.  Psych:  Good insight. Not depressed. Anxious. NEUROLOGICAL EXAM:  Appearance:   The patient is well developed, well nourished, provides a coherent history and is in no acute distress. Mental Status: Oriented to time, place and person. Mood and affect appropriate. Cranial Nerves:   Intact visual fields. Fundi are benign. OANH, EOM's full, no nystagmus, no ptosis. Facial sensation dysesthesia. Corneal reflexes are intact. Facial movement is symmetric. Hearing is normal bilaterally. Palate is midline with normal sternocleidomastoid and trapezius muscles are normal. Tongue is midline. Motor:  5/5 strength in upper and lower proximal and distal muscles. Normal bulk and tone. No fasciculations. Reflexes:   Deep tendon reflexes 3+/4 and symmetrical.   Sensory:    Dysesthesia to touch, pinprick and vibration. Gait:   Slightly unsteady gait. Tremor:   No tremor noted. Cerebellar:  No cerebellar signs present. Neurovascular:  Normal heart sounds and regular rhythm, peripheral pulses intact, and no carotid bruits. Assesment  1. Demyelinating disease ([facility]    - MRI CERV SPINE W WO CONT; Future  - MRI CERV SPINE W WO CONT; Future    2. Cervical myelopathy (HCC)    - MRI CERV SPINE W WO CONT; Future  - MRI CERV SPINE W WO CONT; Future    3. Chronic migraine  Emgality  4. Fibromyalgia    - gabapentin (NEURONTIN) 300 mg capsule; Take 1 Capsule by mouth two (2) times a day. Max Daily Amount: 600 mg. Dispense: 60 Capsule; Refill: 3    5. Paresthesia    - MRI CERV SPINE W WO CONT; Future  - gabapentin (NEURONTIN) 300 mg capsule; Take 1 Capsule by mouth two (2) times a day. Max Daily Amount: 600 mg. Dispense: 60 Capsule; Refill: 3  - MRI CERV SPINE W WO CONT; Future    6. Complicated migraine  Continue Emgality    7. Dizziness    - MRI CERV SPINE W WO CONT; Future  - MRI CERV SPINE W WO CONT; Future    ___________________________________________________  PLAN: Medication and plan discussed with patient and mother      ICD-10-CM ICD-9-CM    1. Demyelinating disease ([facility]  G37.9 341.9 MRI CERV SPINE W WO CONT      MRI CERV SPINE W WO CONT   2.  Cervical myelopathy (HCC)  G95.9 721.1 MRI CERV SPINE W WO CONT      MRI CERV SPINE W WO CONT   3. Chronic migraine  G43.709 346.70    4. Fibromyalgia  M79.7 729.1 gabapentin (NEURONTIN) 300 mg capsule   5. Paresthesia  R20.2 782.0 MRI CERV SPINE W WO CONT      gabapentin (NEURONTIN) 300 mg capsule      MRI CERV SPINE W WO CONT   6. Complicated migraine  T41.317 346.00    7. Dizziness  R42 780.4 MRI CERV SPINE W WO CONT      MRI CERV SPINE W WO CONT     Follow-up and Dispositions    · Return in about 2 weeks (around 6/7/2021).                ___________________________________________________    Attending Physician: Jaquan Welsh MD

## 2021-05-24 NOTE — PROGRESS NOTES
Chief Complaint   Patient presents with    Follow-up     fibromyalgia- had bad flare up this morning

## 2021-05-25 ENCOUNTER — HOSPITAL ENCOUNTER (OUTPATIENT)
Dept: MRI IMAGING | Age: 31
Discharge: HOME | End: 2021-05-25
Attending: PSYCHIATRY & NEUROLOGY
Payer: COMMERCIAL

## 2021-05-25 VITALS — WEIGHT: 197 LBS | BODY MASS INDEX: 34.9 KG/M2

## 2021-05-25 DIAGNOSIS — R20.2 PARESTHESIA: ICD-10-CM

## 2021-05-25 DIAGNOSIS — R42 DIZZINESS: ICD-10-CM

## 2021-05-25 DIAGNOSIS — G37.9 DEMYELINATING DISEASE (HCC): ICD-10-CM

## 2021-05-25 DIAGNOSIS — G95.9 CERVICAL MYELOPATHY (HCC): ICD-10-CM

## 2021-05-25 PROCEDURE — 74011250636 HC RX REV CODE- 250/636: Performed by: PSYCHIATRY & NEUROLOGY

## 2021-05-25 PROCEDURE — A9575 INJ GADOTERATE MEGLUMI 0.1ML: HCPCS | Performed by: PSYCHIATRY & NEUROLOGY

## 2021-05-25 PROCEDURE — 72156 MRI NECK SPINE W/O & W/DYE: CPT

## 2021-05-25 RX ORDER — GADOTERATE MEGLUMINE 376.9 MG/ML
18 INJECTION INTRAVENOUS
Status: COMPLETED | OUTPATIENT
Start: 2021-05-25 | End: 2021-05-25

## 2021-05-25 RX ADMIN — GADOTERATE MEGLUMINE 15 ML: 376.9 INJECTION INTRAVENOUS at 14:36

## 2021-05-28 ENCOUNTER — TELEPHONE (OUTPATIENT)
Dept: NEUROLOGY | Age: 31
End: 2021-05-28

## 2021-05-28 NOTE — TELEPHONE ENCOUNTER
Spoke with patient she stated that she is continuing to have generalized pain, nasuea,, dizziness, has unsteady gait but has not falling,continues to have headaches throughout  the day, stated that she needs an updated work note, the one that she has has her returning back to work on the 60/1/21.

## 2021-06-01 ENCOUNTER — TELEPHONE (OUTPATIENT)
Dept: NEUROLOGY | Age: 31
End: 2021-06-01

## 2021-06-01 NOTE — TELEPHONE ENCOUNTER
----- Message from [facility] sent at 5/28/2021 11:09 AM EDT -----  Regarding: /Telephone  Caller's first and last name and relationship (if not the patient):Pt  Best contact number(s):[phone]  Whose call is being returned:Brinda  Details to clarify the request: Pt is returning the nurse's call and would like a call back.

## 2021-06-03 ENCOUNTER — OFFICE VISIT (OUTPATIENT)
Dept: NEUROLOGY | Age: 31
End: 2021-06-03
Payer: COMMERCIAL

## 2021-06-03 VITALS
WEIGHT: 196.6 LBS | BODY MASS INDEX: 34.83 KG/M2 | OXYGEN SATURATION: 100 % | TEMPERATURE: 98 F | SYSTOLIC BLOOD PRESSURE: 120 MMHG | HEART RATE: 82 BPM | RESPIRATION RATE: 18 BRPM | DIASTOLIC BLOOD PRESSURE: 80 MMHG

## 2021-06-03 DIAGNOSIS — M79.7 FIBROMYALGIA: ICD-10-CM

## 2021-06-03 DIAGNOSIS — M48.02 CERVICAL STENOSIS OF SPINE: ICD-10-CM

## 2021-06-03 DIAGNOSIS — F98.8 ATTENTION DEFICIT DISORDER (ADD) WITHOUT HYPERACTIVITY: ICD-10-CM

## 2021-06-03 DIAGNOSIS — M79.18 MYOFASCIAL MUSCLE PAIN: ICD-10-CM

## 2021-06-03 DIAGNOSIS — G43.109 COMPLICATED MIGRAINE: ICD-10-CM

## 2021-06-03 DIAGNOSIS — R53.82 CHRONIC FATIGUE: ICD-10-CM

## 2021-06-03 DIAGNOSIS — R42 DIZZINESS: ICD-10-CM

## 2021-06-03 DIAGNOSIS — M50.20 PROTRUSION OF CERVICAL INTERVERTEBRAL DISC: ICD-10-CM

## 2021-06-03 PROCEDURE — 99214 OFFICE O/P EST MOD 30 MIN: CPT | Performed by: PSYCHIATRY & NEUROLOGY

## 2021-06-03 NOTE — PROGRESS NOTES
Neurology Progress Note    NAME:  Castro Loyd   :   1990   MRN:   [id number]     Date/Time:  6/3/2021  Subjective:   Castro Loyd is a 28 y.o. female here today for follow-up for headache, sharp pain on the right side of the head and face with generalized body pain and frequent fall  Patient says she still having symptoms she has not improved much, vertigo and dizziness with generalized pain still persisted. She says she is having difficulty focusing and concentrating, she says with the symptoms lately she has been having a very difficult to complete any task. She says she is always very tired, extremity weakness. Patient noted that her legs are giving out on her and she had fallen couple of times. Due to patient has difficulty focusing and completing tasks, I will start patient on Vyvanse. This also will help patient persistent fatigue. Patient noted that when she falls, she had difficulty getting up, she says family member will help her to get up. She also noted that she is having this pain around her torso, pain is very sharp in nature. She says the gabapentin tends to help the pain, but takes the edge off only. At this time, I will refer patient to physical therapy  Patient noted that she has been having a lot of spasms and muscle pain, she says still uses sometimes using warm compresses help with the pain but if it is too hot it makes her to be weak. MRI of the cervical spine reviewed with patient showed C5-C6: Disc desiccation. Minimal disc bulge. Minimal right paracentral  Protrusion, this is consistent with mild canal stenosis and minimal right paracentral protrusion at C5-C6, no evidence of demyelination yet. I will refer patient to rheumatology for fibromyalgia syndrome  Patient says she still feels dizzy and her headache has not relented. Patient says she is still in excruciating pain.   She says the pain is sharp in nature, mostly in the right side of the face and behind the eye, as if it is coming from the back of the neck and this is associated with dizziness. She says the right side of the face is still sensitive to touch. However the headache is unrelenting. At this time, patient will probably going back to work on 6/11/2021 after reevaluation. Review of Systems - General ROS: positive for  - fatigue and sleep disturbance  Psychological ROS: positive for - anxiety and sleep disturbances  Ophthalmic ROS: positive for - blurry vision, decreased vision, double vision and photophobia  ENT ROS: positive for - headaches, tinnitus, vertigo and visual changes  Allergy and Immunology ROS: negative  Hematological and Lymphatic ROS: negative  Endocrine ROS: negative  Respiratory ROS: no cough, shortness of breath, or wheezing  Cardiovascular ROS: negative  Gastrointestinal ROS: no abdominal pain, change in bowel habits, or black or bloody stools  Genito-Urinary ROS: no dysuria, trouble voiding, or hematuria  Musculoskeletal ROS: positive for - muscular weakness  Neurological ROS: positive for - dizziness, headaches, numbness/tingling, visual changes and weakness  Dermatological ROS: negative     Medications reviewed:  Current Outpatient Medications   Medication Sig Dispense Refill    lisdexamfetamine (Vyvanse) 30 mg capsule Take 1 Capsule by mouth every morning. Max Daily Amount: 30 mg. 30 Capsule 0    gabapentin (NEURONTIN) 300 mg capsule Take 1 Capsule by mouth two (2) times a day. Max Daily Amount: 600 mg. (Patient not taking: Reported on 6/11/2021) 60 Capsule 3    predniSONE (DELTASONE) 10 mg tablet Take 10 mg by mouth two (2) times a day. Take 1 tab p.o. tid x3days, 1 tab bid x5 days ,1 p.o.qd 30 Tablet 0    galcanezumab-gnlm (Emgality Syringe) 120 mg/mL syrg 120 mg by SubCUTAneous route every thirty (30) days. 1 mL 3    propranolol LA (INDERAL LA) 60 mg SR capsule Take 1 Cap by mouth nightly.  30 Cap 1    meclizine (ANTIVERT) 25 mg tablet Take 1 Tab by mouth three (3) times daily as needed for Dizziness. 30 Tab 1    ondansetron (Zofran ODT) 4 mg disintegrating tablet Take 1 Tab by mouth every eight (8) hours as needed for Nausea. (Patient not taking: Reported on 6/11/2021) 10 Tab 0    gabapentin (NEURONTIN) 100 mg capsule Take 1 Cap by mouth three (3) times daily. Max Daily Amount: 300 mg. 90 Cap 1    lidocaine (LIDODERM) 5 % Apply patch to the back of the neck for 12 hours a day and remove for 12 hours a day. 30 Each 0    cholecalciferol (Vitamin D3) 25 mcg (1,000 unit) cap Take 1,000 Units by mouth daily.  famotidine (PEPCID) 40 mg tablet Take 40 mg by mouth daily.  polyethylene glycol (MIRALAX) 17 gram/dose powder Take 17 g by mouth daily. (Patient not taking: Reported on 6/11/2021) 510 g 0    ibuprofen (MOTRIN) 600 mg tablet Take 1 Tab by mouth three (3) times daily (with meals). 21 Tab 0    medroxyPROGESTERone (PROVERA) 10 mg tablet Take 10 mg by mouth three (3) times daily.  dicyclomine HCl (BENTYL PO) Take  by mouth four (4) times daily. Indications: As needed for pain (Patient not taking: Reported on 6/11/2021)      fluticasone propionate (FLONASE) 50 mcg/actuation nasal spray 2 Sprays by Both Nostrils route daily. Indications: As needed for allergies      multivitamin (MULTIPLE VITAMIN PO) Take  by mouth. Indications: Teresita fusion multi vitamin/folate 50 mg (2 gummies) by gyn dr Dorina Esteves pantoprazole (PROTONIX) 40 mg tablet Take 40 mg by mouth two (2) times a day.  sucralfate (CARAFATE) 100 mg/mL suspension Take  by mouth four (4) times daily.  albuterol (PROVENTIL VENTOLIN) 2.5 mg /3 mL (0.083 %) nebulizer solution 2.5 mg by Nebulization route once.  loratadine (Claritin) 10 mg tablet Claritin      Gaviscon Extra Strength 254-237.5 mg/5 mL susp TAKE 5 ML BY MOUTH TWICE DAILY AS NEEDED FOR HEARTBURN      buprenorphine (Butrans) 5 mcg/hour patch 1 Patch by TransDERmal route every seven (7) days for 30 days. Max Daily Amount: 1 Patch.  (Patient not taking: Reported on 6/11/2021) 4 Patch 0    ondansetron (Zofran ODT) 4 mg disintegrating tablet Take 1 Tablet by mouth every eight (8) hours as needed for Nausea. 10 Tablet 0    DULoxetine (CYMBALTA) 30 mg capsule Take 1 Cap by mouth daily. (Patient not taking: Reported on 5/24/2021) 30 Cap 1    ergocalciferol (ERGOCALCIFEROL) 1,250 mcg (50,000 unit) capsule TAKE ONE CAPSULE BY MOUTH EVERY MONDAY AND THURSDAY 8 Cap 2    cyanocobalamin 1,000 mcg tablet Take 1,000 mcg by mouth daily. (Patient not taking: Reported on 5/24/2021)      fluticasone-umeclidinium-vilanterol (Trelegy Ellipta) 100-62.5-25 mcg inhaler Take 1 Puff by inhalation daily. (Patient not taking: Reported on 5/24/2021)      naproxen sodium (ALEVE) 220 mg cap Take  by mouth. (Patient not taking: Reported on 5/24/2021)      naloxone [facility]) 4 mg/actuation nasal spray Use 1 spray intranasally, then discard. Repeat with new spray every 2 min as needed for opioid overdose symptoms, alternating nostrils. (Patient not taking: Reported on 5/24/2021) 2 Each 0    loratadine (CLARITIN) 10 mg tablet Take 10 mg by mouth daily.  (Patient not taking: Reported on 6/3/2021)          Objective:   Vitals:  Vitals:    06/03/21 1046   BP: 120/80   Pulse: 82   Resp: 18   Temp: 98 °F (36.7 °C)   SpO2: 100%   Weight: 196 lb 9.6 oz (89.2 kg)   PainSc:   6   PainLoc: Generalized   LMP: 04/18/2021               Lab Data Reviewed:  Lab Results   Component Value Date/Time    WBC 14.4 (H) 06/07/2021 04:33 AM    HCT 45.4 06/07/2021 04:33 AM    HGB 15.1 06/07/2021 04:33 AM    PLATELET 241 33/97/9492 04:33 AM       Lab Results   Component Value Date/Time    Sodium 135 (L) 06/07/2021 04:33 AM    Potassium 4.6 06/07/2021 04:33 AM    Chloride 104 06/07/2021 04:33 AM    CO2 28 06/07/2021 04:33 AM    Glucose 119 (H) 06/07/2021 04:33 AM    BUN 19 06/07/2021 04:33 AM    Creatinine 0.99 06/07/2021 04:33 AM    Calcium 9.3 06/07/2021 04:33 AM       No components found for: TROPQUANT    No results found for: RASHAWN      No results found for: HBA1C, ATA1IAVB, IOM2IXIH     Lab Results   Component Value Date/Time    Vitamin B12 482 12/14/2020 10:44 AM       No results found for: RASHAWN, ANARX, ANAIGG, XBANA    No results found for: CHOL, CHOLPOCT, CHOLX, CHLST, CHOLV, HDL, HDLPOC, HDLP, LDL, LDLCPOC, LDLC, DLDLP, VLDLC, VLDL, TGLX, TRIGL, TRIGP, TGLPOCT, CHHD, CHHDX      CT Results (recent):  Results from Hospital Encounter encounter on 06/07/21    CT ABD PELV WO CONT    Narrative  EXAM: CT ABD PELV WO CONT    INDICATION: right flank and right mid abd pain    COMPARISON: 2019    CONTRAST:  None. TECHNIQUE:  Thin axial images were obtained through the abdomen and pelvis. Coronal and  sagittal reformats were generated. Oral contrast was not administered. CT dose  reduction was achieved through use of a standardized protocol tailored for this  examination and automatic exposure control for dose modulation. The absence of intravenous contrast material reduces the sensitivity for  evaluation of the vasculature and solid organs. FINDINGS:  LOWER THORAX: No significant abnormality in the incidentally imaged lower chest.  LIVER: Hepatic steatosis. BILIARY TREE: Cholecystectomy. CBD is not dilated. SPLEEN: within normal limits. PANCREAS: No focal abnormality. ADRENALS: Unremarkable. KIDNEYS/URETERS: Right nephrolithiasis. No hydronephrosis. STOMACH: Unremarkable. SMALL BOWEL: No dilatation or wall thickening. COLON: No dilatation or wall thickening. APPENDIX: Normal.  PERITONEUM: No ascites or pneumoperitoneum. RETROPERITONEUM: No lymphadenopathy or aortic aneurysm. REPRODUCTIVE ORGANS: Normal.  URINARY BLADDER: No mass or calculus. BONES: No destructive bone lesion. ABDOMINAL WALL: No mass or hernia. ADDITIONAL COMMENTS: N/A    Impression  Right nephrolithiasis.       MRI Results (recent):  Results from [place] encounter on 05/25/21    MRI CERV SPINE W WO CONT    Narrative  EXAM: MRI CERV SPINE W WO CONT  Clinical history: Evaluate for demyelinating disease  INDICATION: . Demyelinating disease of central nervous system, unspecified    COMPARISON: None    TECHNIQUE: MR imaging of the cervical spine was performed using the following  sequences: sagittal T1, T2, STIR;  axial T2, T1 prior to and following contrast  administration. CONTRAST: 15 mL of Dotarem. FINDINGS:    The degree of motion artifact which slightly limits evaluation. Vertebral body  heights are maintained. Marrow signal is normal.    The craniocervical junction is intact. The course, caliber, and signal intensity  of the spinal cord are normal. There is no pathologic intrathecal enhancement. Left vertebral artery is dominant. The paraspinal soft tissues are within normal limits. C2-C3: No herniation or stenosis. C3-C4: No herniation or stenosis. C4-C5: No herniation or stenosis. C5-C6: Disc desiccation. Minimal disc bulge. Minimal right paracentral  protrusion. Mild canal stenosis. Foramina are patent. C6-C7: No herniation or stenosis. C7-T1: No herniation or stenosis. Impression  No definite cord signal abnormality suggestive of demyelinating process is  identified. Mild canal stenosis and minimal right paracentral protrusion at C5-C6. IR Results (recent):  No results found for this or any previous visit. VAS/US Results (recent):  No results found for this or any previous visit. PHYSICAL EXAM:  General:    Alert, cooperative, no distress, appears stated age. Head:   Normocephalic, without obvious abnormality, atraumatic. Eyes:   Conjunctivae/corneas clear. PERRLA  Nose:  Nares normal. No drainage or sinus tenderness. Throat:    Lips, mucosa, and tongue normal.  No Thrush  Neck:  Supple, symmetrical,  no adenopathy, thyroid: non tender    no carotid bruit and no JVD.   Severe paraspinal and occipitalis tenderness  Back:    Symmetric, diffuse tenderness. Lungs:   Clear to auscultation bilaterally. No Wheezing or Rhonchi. No rales. Chest wall:  No tenderness or deformity. No Accessory muscle use. Heart:   Regular rate and rhythm,  no murmur, rub or gallop. Abdomen:   Soft, non-tender. Not distended. Bowel sounds normal. No masses  Extremities: Extremities normal, atraumatic, No cyanosis. No edema. No clubbing  Skin:     Texture, turgor normal. No rashes or lesions. Not Jaundiced  Lymph nodes: Cervical, supraclavicular normal.  Psych:  Good insight. Not depressed. Anxious. NEUROLOGICAL EXAM:  Appearance: The patient is well developed, well nourished, provides a coherent history and is in no acute distress. Mental Status: Oriented to time, place and person. Mood and affect appropriate. Cranial Nerves:   Intact visual fields. Fundi are benign. OANH, EOM's full, no nystagmus, no ptosis. Facial sensation dysesthesia. Corneal reflexes are intact. Facial movement is symmetric. Hearing is normal bilaterally. Palate is midline with normal sternocleidomastoid and trapezius muscles are normal. Tongue is midline. Motor:  5/5 strength in upper and lower proximal and distal muscles. Normal bulk and tone. No fasciculations. Reflexes:   Deep tendon reflexes 3+/4 and symmetrical.   Sensory:    Dysesthesia to touch, pinprick and vibration. Gait:   Slightly unsteady gait. Tremor:   No tremor noted. Cerebellar:  No cerebellar signs present. Neurovascular:  Normal heart sounds and regular rhythm, peripheral pulses intact, and no carotid bruits. Assesment  1. Chronic migraine  Emgality    2. Fibromyalgia    - REFERRAL TO PHYSICAL THERAPY  - REFERRAL TO RHEUMATOLOGY    3. Dizziness    - REFERRAL TO PHYSICAL THERAPY    4. Protrusion of cervical intervertebral disc    - REFERRAL TO PHYSICAL THERAPY    5. Complicated migraine  Emgality  Propanolol  6. Myofascial muscle pain    - REFERRAL TO PHYSICAL THERAPY    7.  Chronic fatigue    - lisdexamfetamine (Vyvanse) 30 mg capsule; Take 1 Capsule by mouth every morning. Max Daily Amount: 30 mg. Dispense: 30 Capsule; Refill: 0    8. Attention deficit disorder (ADD) without hyperactivity    - lisdexamfetamine (Vyvanse) 30 mg capsule; Take 1 Capsule by mouth every morning. Max Daily Amount: 30 mg. Dispense: 30 Capsule; Refill: 0    9. Cervical stenosis of spine  Physical therapy when able    ___________________________________________________  PLAN: Medication and plan discussed with patient      ICD-10-CM ICD-9-CM    1. Chronic migraine  G43.709 346.70    2. Fibromyalgia  M79.7 729.1 REFERRAL TO PHYSICAL THERAPY      REFERRAL TO RHEUMATOLOGY   3. Dizziness  R42 780.4 REFERRAL TO PHYSICAL THERAPY   4. Protrusion of cervical intervertebral disc  M50.20 722.0 REFERRAL TO PHYSICAL THERAPY   5. Complicated migraine  L95.553 346.00    6. Myofascial muscle pain  M79.18 729.1 REFERRAL TO PHYSICAL THERAPY   7. Chronic fatigue  R53.82 780.79 lisdexamfetamine (Vyvanse) 30 mg capsule   8. Attention deficit disorder (ADD) without hyperactivity  F98.8 314.00 lisdexamfetamine (Vyvanse) 30 mg capsule   9. Cervical stenosis of spine  M48.02 723.0      Follow-up and Dispositions    · Return in about 1 week (around 6/10/2021).           :    ___________________________________________________    Attending Physician: Rashel Bolanos MD

## 2021-06-03 NOTE — LETTER
NOTIFICATION RETURN TO WORK / SCHOOL    6/3/2021 12:01 PM    Ms. Susan Guadalupe  [address] [address]      To Whom It May Concern:    Susan Guadalupe is currently under the care of [address] [place] [address]. She will return to work on or about 6/11/2021. She has been out of work due to her medical condition since 4/21/2021    If there are questions or concerns please have the patient contact our office.         Sincerely,      Jitendra Suárez MD

## 2021-06-07 ENCOUNTER — APPOINTMENT (OUTPATIENT)
Dept: CT IMAGING | Age: 31
End: 2021-06-07
Attending: EMERGENCY MEDICINE
Payer: COMMERCIAL

## 2021-06-07 ENCOUNTER — HOSPITAL ENCOUNTER (EMERGENCY)
Age: 31
Discharge: HOME | End: 2021-06-07
Attending: EMERGENCY MEDICINE
Payer: COMMERCIAL

## 2021-06-07 VITALS
HEIGHT: 63 IN | SYSTOLIC BLOOD PRESSURE: 121 MMHG | BODY MASS INDEX: 34.49 KG/M2 | WEIGHT: 194.67 LBS | HEART RATE: 74 BPM | DIASTOLIC BLOOD PRESSURE: 78 MMHG | RESPIRATION RATE: 18 BRPM | OXYGEN SATURATION: 97 % | TEMPERATURE: 98.1 F

## 2021-06-07 DIAGNOSIS — R10.9 RIGHT SIDED ABDOMINAL PAIN: Primary | ICD-10-CM

## 2021-06-07 DIAGNOSIS — N20.0 RIGHT NEPHROLITHIASIS: ICD-10-CM

## 2021-06-07 LAB
ALBUMIN SERPL-MCNC: 4.3 G/DL (ref 3.5–5)
ALBUMIN/GLOB SERPL: 1.1 {RATIO} (ref 1.1–2.2)
ALP SERPL-CCNC: 97 U/L (ref 45–117)
ALT SERPL-CCNC: 38 U/L (ref 12–78)
ANION GAP SERPL CALC-SCNC: 3 MMOL/L (ref 5–15)
APPEARANCE UR: CLEAR
AST SERPL-CCNC: 16 U/L (ref 15–37)
BACTERIA URNS QL MICRO: NEGATIVE /HPF
BASOPHILS # BLD: 0.1 K/UL (ref 0–0.1)
BASOPHILS NFR BLD: 1 % (ref 0–1)
BILIRUB SERPL-MCNC: 0.7 MG/DL (ref 0.2–1)
BILIRUB UR QL: NEGATIVE
BUN SERPL-MCNC: 19 MG/DL (ref 6–20)
BUN/CREAT SERPL: 19 (ref 12–20)
CALCIUM SERPL-MCNC: 9.3 MG/DL (ref 8.5–10.1)
CHLORIDE SERPL-SCNC: 104 MMOL/L (ref 97–108)
CO2 SERPL-SCNC: 28 MMOL/L (ref 21–32)
COLOR UR: NORMAL
CREAT SERPL-MCNC: 0.99 MG/DL (ref 0.55–1.02)
DIFFERENTIAL METHOD BLD: ABNORMAL
EOSINOPHIL # BLD: 0.1 K/UL (ref 0–0.4)
EOSINOPHIL NFR BLD: 1 % (ref 0–7)
EPITH CASTS URNS QL MICRO: NORMAL /LPF
ERYTHROCYTE [DISTWIDTH] IN BLOOD BY AUTOMATED COUNT: 12.3 % (ref 11.5–14.5)
GLOBULIN SER CALC-MCNC: 4 G/DL (ref 2–4)
GLUCOSE SERPL-MCNC: 119 MG/DL (ref 65–100)
GLUCOSE UR STRIP.AUTO-MCNC: NEGATIVE MG/DL
HCG UR QL: NEGATIVE
HCT VFR BLD AUTO: 45.4 % (ref 35–47)
HGB BLD-MCNC: 15.1 G/DL (ref 11.5–16)
HGB UR QL STRIP: NEGATIVE
HYALINE CASTS URNS QL MICRO: NORMAL /LPF (ref 0–5)
IMM GRANULOCYTES # BLD AUTO: 0.1 K/UL (ref 0–0.04)
IMM GRANULOCYTES NFR BLD AUTO: 1 % (ref 0–0.5)
KETONES UR QL STRIP.AUTO: NEGATIVE MG/DL
LEUKOCYTE ESTERASE UR QL STRIP.AUTO: NEGATIVE
LIPASE SERPL-CCNC: 128 U/L (ref 73–393)
LYMPHOCYTES # BLD: 1.8 K/UL (ref 0.8–3.5)
LYMPHOCYTES NFR BLD: 13 % (ref 12–49)
MCH RBC QN AUTO: 31.1 PG (ref 26–34)
MCHC RBC AUTO-ENTMCNC: 33.3 G/DL (ref 30–36.5)
MCV RBC AUTO: 93.6 FL (ref 80–99)
MONOCYTES # BLD: 0.5 K/UL (ref 0–1)
MONOCYTES NFR BLD: 3 % (ref 5–13)
NEUTS SEG # BLD: 11.9 K/UL (ref 1.8–8)
NEUTS SEG NFR BLD: 81 % (ref 32–75)
NITRITE UR QL STRIP.AUTO: NEGATIVE
NRBC # BLD: 0 K/UL (ref 0–0.01)
NRBC BLD-RTO: 0 PER 100 WBC
PH UR STRIP: 7.5 [PH] (ref 5–8)
PLATELET # BLD AUTO: 309 K/UL (ref 150–400)
PMV BLD AUTO: 11 FL (ref 8.9–12.9)
POTASSIUM SERPL-SCNC: 4.6 MMOL/L (ref 3.5–5.1)
PROT SERPL-MCNC: 8.3 G/DL (ref 6.4–8.2)
PROT UR STRIP-MCNC: NEGATIVE MG/DL
RBC # BLD AUTO: 4.85 M/UL (ref 3.8–5.2)
RBC #/AREA URNS HPF: NORMAL /HPF (ref 0–5)
SODIUM SERPL-SCNC: 135 MMOL/L (ref 136–145)
SP GR UR REFRACTOMETRY: 1.01 (ref 1–1.03)
UA: UC IF INDICATED,UAUC: NORMAL
UROBILINOGEN UR QL STRIP.AUTO: 0.2 EU/DL (ref 0.2–1)
WBC # BLD AUTO: 14.4 K/UL (ref 3.6–11)
WBC URNS QL MICRO: NORMAL /HPF (ref 0–4)

## 2021-06-07 PROCEDURE — 74176 CT ABD & PELVIS W/O CONTRAST: CPT

## 2021-06-07 PROCEDURE — 81025 URINE PREGNANCY TEST: CPT

## 2021-06-07 PROCEDURE — 74011250636 HC RX REV CODE- 250/636: Performed by: EMERGENCY MEDICINE

## 2021-06-07 PROCEDURE — 96374 THER/PROPH/DIAG INJ IV PUSH: CPT

## 2021-06-07 PROCEDURE — 96375 TX/PRO/DX INJ NEW DRUG ADDON: CPT

## 2021-06-07 PROCEDURE — 83690 ASSAY OF LIPASE: CPT

## 2021-06-07 PROCEDURE — 99284 EMERGENCY DEPT VISIT MOD MDM: CPT

## 2021-06-07 PROCEDURE — 36415 COLL VENOUS BLD VENIPUNCTURE: CPT

## 2021-06-07 PROCEDURE — 80053 COMPREHEN METABOLIC PANEL: CPT

## 2021-06-07 PROCEDURE — 85025 COMPLETE CBC W/AUTO DIFF WBC: CPT

## 2021-06-07 PROCEDURE — 74011250637 HC RX REV CODE- 250/637: Performed by: EMERGENCY MEDICINE

## 2021-06-07 PROCEDURE — 81001 URINALYSIS AUTO W/SCOPE: CPT

## 2021-06-07 RX ORDER — KETOROLAC TROMETHAMINE 30 MG/ML
15 INJECTION, SOLUTION INTRAMUSCULAR; INTRAVENOUS
Status: COMPLETED | OUTPATIENT
Start: 2021-06-07 | End: 2021-06-07

## 2021-06-07 RX ORDER — ONDANSETRON 2 MG/ML
INJECTION INTRAMUSCULAR; INTRAVENOUS
Status: DISCONTINUED
Start: 2021-06-07 | End: 2021-06-07 | Stop reason: HOSPADM

## 2021-06-07 RX ORDER — FAMOTIDINE 10 MG/ML
INJECTION INTRAVENOUS
Status: DISCONTINUED
Start: 2021-06-07 | End: 2021-06-07 | Stop reason: HOSPADM

## 2021-06-07 RX ORDER — FENTANYL CITRATE 50 UG/ML
INJECTION, SOLUTION INTRAMUSCULAR; INTRAVENOUS
Status: DISCONTINUED
Start: 2021-06-07 | End: 2021-06-07 | Stop reason: HOSPADM

## 2021-06-07 RX ORDER — SODIUM CHLORIDE 0.9 % (FLUSH) 0.9 %
5-40 SYRINGE (ML) INJECTION EVERY 8 HOURS
Status: DISCONTINUED | OUTPATIENT
Start: 2021-06-07 | End: 2021-06-07 | Stop reason: HOSPADM

## 2021-06-07 RX ORDER — FENTANYL CITRATE 50 UG/ML
50 INJECTION, SOLUTION INTRAMUSCULAR; INTRAVENOUS
Status: COMPLETED | OUTPATIENT
Start: 2021-06-07 | End: 2021-06-07

## 2021-06-07 RX ORDER — KETOROLAC TROMETHAMINE 30 MG/ML
INJECTION, SOLUTION INTRAMUSCULAR; INTRAVENOUS
Status: DISCONTINUED
Start: 2021-06-07 | End: 2021-06-07 | Stop reason: HOSPADM

## 2021-06-07 RX ORDER — FAMOTIDINE 10 MG/ML
20 INJECTION INTRAVENOUS
Status: COMPLETED | OUTPATIENT
Start: 2021-06-07 | End: 2021-06-07

## 2021-06-07 RX ORDER — HYDROCODONE BITARTRATE AND ACETAMINOPHEN 5; 325 MG/1; MG/1
TABLET ORAL
Status: DISCONTINUED
Start: 2021-06-07 | End: 2021-06-07 | Stop reason: HOSPADM

## 2021-06-07 RX ORDER — HYDROCODONE BITARTRATE AND ACETAMINOPHEN 5; 325 MG/1; MG/1
1 TABLET ORAL
Status: COMPLETED | OUTPATIENT
Start: 2021-06-07 | End: 2021-06-07

## 2021-06-07 RX ORDER — ONDANSETRON 4 MG/1
4 TABLET, ORALLY DISINTEGRATING ORAL
Qty: 10 TABLET | Refills: 0 | Status: SHIPPED | OUTPATIENT
Start: 2021-06-07

## 2021-06-07 RX ORDER — SODIUM CHLORIDE 0.9 % (FLUSH) 0.9 %
5-40 SYRINGE (ML) INJECTION AS NEEDED
Status: DISCONTINUED | OUTPATIENT
Start: 2021-06-07 | End: 2021-06-07 | Stop reason: HOSPADM

## 2021-06-07 RX ORDER — HYDROCODONE BITARTRATE AND ACETAMINOPHEN 5; 325 MG/1; MG/1
1 TABLET ORAL
Qty: 10 TABLET | Refills: 0 | Status: SHIPPED | OUTPATIENT
Start: 2021-06-07 | End: 2021-06-10

## 2021-06-07 RX ORDER — ONDANSETRON 2 MG/ML
4 INJECTION INTRAMUSCULAR; INTRAVENOUS
Status: COMPLETED | OUTPATIENT
Start: 2021-06-07 | End: 2021-06-07

## 2021-06-07 RX ADMIN — HYDROCODONE BITARTRATE AND ACETAMINOPHEN 1 TABLET: 5; 325 TABLET ORAL at 06:55

## 2021-06-07 RX ADMIN — ONDANSETRON 4 MG: 2 INJECTION INTRAMUSCULAR; INTRAVENOUS at 04:40

## 2021-06-07 RX ADMIN — SODIUM CHLORIDE 500 ML: 9 INJECTION, SOLUTION INTRAVENOUS at 04:40

## 2021-06-07 RX ADMIN — FENTANYL CITRATE 50 MCG: 50 INJECTION, SOLUTION INTRAMUSCULAR; INTRAVENOUS at 05:33

## 2021-06-07 RX ADMIN — FAMOTIDINE 20 MG: 10 INJECTION, SOLUTION INTRAVENOUS at 04:39

## 2021-06-07 RX ADMIN — KETOROLAC TROMETHAMINE 15 MG: 30 INJECTION, SOLUTION INTRAMUSCULAR; INTRAVENOUS at 04:40

## 2021-06-07 NOTE — Clinical Note
[address] 
[facility] EMERGENCY DEPT 
[address] [facility] [place] [address] [phone] Work/School Note Date: 6/7/2021 To Whom It May concern: 
 
Az Muniz was seen and treated today in the emergency room by the following provider(s): 
Attending Provider: Fer Trevino MD.   
 
Az Muniz is excused from work/school on 06/07/21 and 06/08/21. She is medically clear to return to work/school on 6/9/2021. Sincerely, Michael Mcduffie MD

## 2021-06-07 NOTE — ED NOTES
Pt states that starting around 1500 yesterday she has had sharp constant rt sided flank pain. Pt has hx of fibromyalgia and GI issues \"but this is diff and new\". Pt took gabapentin around 2100 last night and a GI cocktail 2 hrs PTA. Pt denies cp, sob, fevers,      Pt is a/o, resting in bed with call bell within reach. 3313: pt in transport to ct at this time.

## 2021-06-07 NOTE — ED PROVIDER NOTES
EMERGENCY DEPARTMENT HISTORY AND PHYSICAL EXAM      Date: 6/7/2021  Patient Name: Shara Romo    History of Presenting Illness     Chief Complaint   Patient presents with    Abdominal Pain     Patient ambulatory to triage w c/o R sided abdominal pain acute onset yesterday. Reports it has been getting worse. History Provided By: Patient    HPI: Shara Romo, 28 y.o. female with PMHx significant for fibromyalgia, complicated migraines, kidney stones, GERD, polycystic ovarian syndrome presents to the ED with chief complaint of sharp, stabbing, severe, right-sided abdominal pain that started at 3 PM yesterday. Pain has gotten progressively worse since onset. Pain radiates to right flank. It is associated with nausea, but no vomiting. Patient also reports feeling lightheaded. She has been having diarrhea as well. Patient has history of cholecystectomy. She denies any dysuria, hematuria, urinary frequency. There are no exacerbating or relieving factors. Patient is followed by Dr. Rishabh Araujo  for GI. Has endoscopy scheduled in July. Last menstrual cycle was in April, 2021    PCP: Ashley Acuna MD    No current facility-administered medications on file prior to encounter. Current Outpatient Medications on File Prior to Encounter   Medication Sig Dispense Refill    lisdexamfetamine (Vyvanse) 30 mg capsule Take 1 Capsule by mouth every morning. Max Daily Amount: 30 mg. 30 Capsule 0    gabapentin (NEURONTIN) 300 mg capsule Take 1 Capsule by mouth two (2) times a day. Max Daily Amount: 600 mg. 60 Capsule 3    predniSONE (DELTASONE) 10 mg tablet Take 10 mg by mouth two (2) times a day. Take 1 tab p.o. tid x3days, 1 tab bid x5 days ,1 p.o.qd 30 Tablet 0    galcanezumab-gnlm (Emgality Syringe) 120 mg/mL syrg 120 mg by SubCUTAneous route every thirty (30) days. 1 mL 3    propranolol LA (INDERAL LA) 60 mg SR capsule Take 1 Cap by mouth nightly.  30 Cap 1    meclizine (ANTIVERT) 25 mg tablet Take 1 Tab by mouth three (3) times daily as needed for Dizziness. 30 Tab 1    ondansetron (Zofran ODT) 4 mg disintegrating tablet Take 1 Tab by mouth every eight (8) hours as needed for Nausea. 10 Tab 0    DULoxetine (CYMBALTA) 30 mg capsule Take 1 Cap by mouth daily. (Patient not taking: Reported on 5/24/2021) 30 Cap 1    gabapentin (NEURONTIN) 100 mg capsule Take 1 Cap by mouth three (3) times daily. Max Daily Amount: 300 mg. 90 Cap 1    lidocaine (LIDODERM) 5 % Apply patch to the back of the neck for 12 hours a day and remove for 12 hours a day. 30 Each 0    ergocalciferol (ERGOCALCIFEROL) 1,250 mcg (50,000 unit) capsule TAKE ONE CAPSULE BY MOUTH EVERY MONDAY AND THURSDAY (Patient not taking: Reported on 5/24/2021) 8 Cap 2    cholecalciferol (Vitamin D3) 25 mcg (1,000 unit) cap Take 1,000 Units by mouth daily.  cyanocobalamin 1,000 mcg tablet Take 1,000 mcg by mouth daily. (Patient not taking: Reported on 5/24/2021)      famotidine (PEPCID) 40 mg tablet Take 40 mg by mouth daily.  fluticasone-umeclidinium-vilanterol (Trelegy Ellipta) 100-62.5-25 mcg inhaler Take 1 Puff by inhalation daily. (Patient not taking: Reported on 5/24/2021)      naproxen sodium (ALEVE) 220 mg cap Take  by mouth. (Patient not taking: Reported on 5/24/2021)      polyethylene glycol (MIRALAX) 17 gram/dose powder Take 17 g by mouth daily. 510 g 0    ibuprofen (MOTRIN) 600 mg tablet Take 1 Tab by mouth three (3) times daily (with meals). 21 Tab 0    naloxone (NARCAN) 4 mg/actuation nasal spray Use 1 spray intranasally, then discard. Repeat with new spray every 2 min as needed for opioid overdose symptoms, alternating nostrils. (Patient not taking: Reported on 5/24/2021) 2 Each 0    medroxyPROGESTERone (PROVERA) 10 mg tablet Take 10 mg by mouth three (3) times daily.  dicyclomine HCl (BENTYL PO) Take  by mouth four (4) times daily.  Indications: As needed for pain      fluticasone propionate (FLONASE) 50 mcg/actuation nasal spray 2 Sprays by Both Nostrils route daily. Indications: As needed for allergies      multivitamin (MULTIPLE VITAMIN PO) Take  by mouth. Indications: Teresita fusion multi vitamin/folate 50 mg (2 gummies) by gyn dr Chen loratadine (CLARITIN) 10 mg tablet Take 10 mg by mouth daily. (Patient not taking: Reported on 6/3/2021)      pantoprazole (PROTONIX) 40 mg tablet Take 40 mg by mouth two (2) times a day.  sucralfate (CARAFATE) 100 mg/mL suspension Take  by mouth four (4) times daily.  albuterol (PROVENTIL VENTOLIN) 2.5 mg /3 mL (0.083 %) nebulizer solution 2.5 mg by Nebulization route once.          Past History     Past Medical History:  Past Medical History:   Diagnosis Date    Acid reflux disease with ulcer     Calculus of kidney     Constipation     GERD (gastroesophageal reflux disease)     Headache     PCOS (polycystic ovarian syndrome)     Seasonal asthma     Stomach pain     Vertigo        Past Surgical History:  Past Surgical History:   Procedure Laterality Date    HX CHOLECYSTECTOMY  01/29/2020    Laparoscopic cholecystectomy w/cholangiogram    HX CHOLECYSTECTOMY      HX ENDOSCOPY      HX UROLOGICAL Left 02/18/2019    [facility]/Dr Luke Suresh       Family History:  Family History   Problem Relation Age of Onset    Diabetes Mother     Hypertension Mother     Diabetes Father     Heart Disease Maternal Grandmother     Diabetes Maternal Grandmother     Hypertension Maternal Grandmother     Cancer Maternal Grandmother         lymphoma    Heart Disease Maternal Grandfather     Diabetes Maternal Grandfather     Cancer Maternal Grandfather         prostate/bone    Heart Disease Paternal Grandmother     Diabetes Paternal Grandmother     Heart Disease Paternal Grandfather     Diabetes Paternal Grandfather     Cancer Paternal Cousin         T-cell leukemia    Cancer Paternal Cousin         kidney    Cancer Maternal Great Grandmother         ovarian       Social History:  Social History     Tobacco Use    Smoking status: Never Smoker    Smokeless tobacco: Never Used   Vaping Use    Vaping Use: Never used   Substance Use Topics    Alcohol use: No    Drug use: Yes     Types: Prescription, OTC       Allergies:  No Known Allergies      Review of Systems   Review of Systems   Constitutional: Negative for chills and fever. HENT: Negative for congestion, sinus pain and sore throat. Respiratory: Negative for cough, chest tightness, shortness of breath and wheezing. Cardiovascular: Negative for chest pain, palpitations and leg swelling. Gastrointestinal: Positive for abdominal pain, diarrhea and nausea. Negative for constipation and vomiting. Genitourinary: Positive for flank pain. Negative for dysuria, hematuria, pelvic pain and vaginal bleeding. Musculoskeletal: Negative for back pain, myalgias and neck pain. Skin: Negative for rash and wound. Neurological: Positive for light-headedness. Negative for syncope and headaches. Psychiatric/Behavioral: Negative for confusion. The patient is not nervous/anxious. All other systems reviewed and are negative.         Physical Exam    General appearance -overweight, well appearing, and in no distress  Eyes - pupils equal and reactive, extraocular eye movements intact  ENT - mucous membranes moist, pharynx normal without lesions  Neck - supple, no significant adenopathy; non-tender to palpation  Chest - clear to auscultation, no wheezes, rales or rhonchi; non-tender to palpation  Heart - normal rate and regular rhythm, S1 and S2 normal, no murmurs noted  Abdomen - soft, tender to palpation right mid abdomen, no rebound or guarding, nondistended, no masses or organomegaly  Musculoskeletal - no joint tenderness, deformity or swelling; normal ROM  Extremities - peripheral pulses normal, no pedal edema  Skin - normal coloration and turgor, no rashes  Neurological - alert, oriented x3, normal speech, no focal findings or movement disorder noted    Diagnostic Study Results     Labs -     Recent Results (from the past 12 hour(s))   URINALYSIS W/ REFLEX CULTURE    Collection Time: 06/07/21  4:33 AM    Specimen: Urine   Result Value Ref Range    Color YELLOW/STRAW      Appearance CLEAR CLEAR      Specific gravity 1.012 1.003 - 1.030      pH (UA) 7.5 5.0 - 8.0      Protein Negative NEG mg/dL    Glucose Negative NEG mg/dL    Ketone Negative NEG mg/dL    Bilirubin Negative NEG      Blood Negative NEG      Urobilinogen 0.2 0.2 - 1.0 EU/dL    Nitrites Negative NEG      Leukocyte Esterase Negative NEG      WBC 0-4 0 - 4 /hpf    RBC 0-5 0 - 5 /hpf    Epithelial cells FEW FEW /lpf    Bacteria Negative NEG /hpf    UA:UC IF INDICATED CULTURE NOT INDICATED BY UA RESULT CNI      Hyaline cast 0-2 0 - 5 /lpf   CBC WITH AUTOMATED DIFF    Collection Time: 06/07/21  4:33 AM   Result Value Ref Range    WBC 14.4 (H) 3.6 - 11.0 K/uL    RBC 4.85 3.80 - 5.20 M/uL    HGB 15.1 11.5 - 16.0 g/dL    HCT 45.4 35.0 - 47.0 %    MCV 93.6 80.0 - 99.0 FL    MCH 31.1 26.0 - 34.0 PG    MCHC 33.3 30.0 - 36.5 g/dL    RDW 12.3 11.5 - 14.5 %    PLATELET 856 023 - 189 K/uL    MPV 11.0 8.9 - 12.9 FL    NRBC 0.0 0  WBC    ABSOLUTE NRBC 0.00 0.00 - 0.01 K/uL    NEUTROPHILS 81 (H) 32 - 75 %    LYMPHOCYTES 13 12 - 49 %    MONOCYTES 3 (L) 5 - 13 %    EOSINOPHILS 1 0 - 7 %    BASOPHILS 1 0 - 1 %    IMMATURE GRANULOCYTES 1 (H) 0.0 - 0.5 %    ABS. NEUTROPHILS 11.9 (H) 1.8 - 8.0 K/UL    ABS. LYMPHOCYTES 1.8 0.8 - 3.5 K/UL    ABS. MONOCYTES 0.5 0.0 - 1.0 K/UL    ABS. EOSINOPHILS 0.1 0.0 - 0.4 K/UL    ABS. BASOPHILS 0.1 0.0 - 0.1 K/UL    ABS. IMM.  GRANS. 0.1 (H) 0.00 - 0.04 K/UL    DF AUTOMATED     METABOLIC PANEL, COMPREHENSIVE    Collection Time: 06/07/21  4:33 AM   Result Value Ref Range    Sodium 135 (L) 136 - 145 mmol/L    Potassium 4.6 3.5 - 5.1 mmol/L    Chloride 104 97 - 108 mmol/L    CO2 28 21 - 32 mmol/L    Anion gap 3 (L) 5 - 15 mmol/L    Glucose 119 (H) 65 - 100 mg/dL    BUN 19 6 - 20 MG/DL    Creatinine 0.99 0.55 - 1.02 MG/DL    BUN/Creatinine ratio 19 12 - 20      GFR est AA >60 >60 ml/min/1.73m2    GFR est non-AA >60 >60 ml/min/1.73m2    Calcium 9.3 8.5 - 10.1 MG/DL    Bilirubin, total 0.7 0.2 - 1.0 MG/DL    ALT (SGPT) 38 12 - 78 U/L    AST (SGOT) 16 15 - 37 U/L    Alk. phosphatase 97 45 - 117 U/L    Protein, total 8.3 (H) 6.4 - 8.2 g/dL    Albumin 4.3 3.5 - 5.0 g/dL    Globulin 4.0 2.0 - 4.0 g/dL    A-G Ratio 1.1 1.1 - 2.2     LIPASE    Collection Time: 06/07/21  4:33 AM   Result Value Ref Range    Lipase 128 73 - 393 U/L   HCG URINE, QL. - POC    Collection Time: 06/07/21  4:48 AM   Result Value Ref Range    Pregnancy test,urine (POC) Negative NEG         Radiologic Studies -   CT ABD PELV WO CONT   Final Result   Right nephrolithiasis. CT Results  (Last 48 hours)               06/07/21 0520  CT ABD PELV WO CONT Final result    Impression:  Right nephrolithiasis. Narrative:  EXAM: CT ABD PELV WO CONT       INDICATION: right flank and right mid abd pain       COMPARISON: 2019       CONTRAST:  None. TECHNIQUE:    Thin axial images were obtained through the abdomen and pelvis. Coronal and   sagittal reformats were generated. Oral contrast was not administered. CT dose   reduction was achieved through use of a standardized protocol tailored for this   examination and automatic exposure control for dose modulation. The absence of intravenous contrast material reduces the sensitivity for   evaluation of the vasculature and solid organs. FINDINGS:    LOWER THORAX: No significant abnormality in the incidentally imaged lower chest.   LIVER: Hepatic steatosis. BILIARY TREE: Cholecystectomy. CBD is not dilated. SPLEEN: within normal limits. PANCREAS: No focal abnormality. ADRENALS: Unremarkable. KIDNEYS/URETERS: Right nephrolithiasis. No hydronephrosis. STOMACH: Unremarkable. SMALL BOWEL: No dilatation or wall thickening.    COLON: No dilatation or wall thickening. APPENDIX: Normal.   PERITONEUM: No ascites or pneumoperitoneum. RETROPERITONEUM: No lymphadenopathy or aortic aneurysm. REPRODUCTIVE ORGANS: Normal.   URINARY BLADDER: No mass or calculus. BONES: No destructive bone lesion. ABDOMINAL WALL: No mass or hernia. ADDITIONAL COMMENTS: N/A               CXR Results  (Last 48 hours)    None            Medical Decision Making   I am the first provider for this patient. I reviewed the vital signs, available nursing notes, past medical history, past surgical history, family history and social history. Vital Signs-Reviewed the patient's vital signs. Patient Vitals for the past 12 hrs:   Temp Pulse Resp BP SpO2   06/07/21 0627 -- -- -- 121/78 97 %   06/07/21 0349 -- -- -- (!) 132/96 --   06/07/21 0345 98.1 °F (36.7 °C) 74 18 (!) 143/88 98 %            Records Reviewed: Nursing Notes and Old Medical Records    Provider Notes (Medical Decision Making):   Differential diagnosis: Gastritis, gas pains, kidney stone, pyelonephritis, appendicitis  We will check CBC, CMP, UA, abdominal pelvis CT. ED Course:   Initial assessment performed. The patients presenting problems have been discussed, and they are in agreement with the care plan formulated and outlined with them. I have encouraged them to ask questions as they arise throughout their visit. Progress Notes:   Patient with leukocytosis of white count of 14. CT does not show any acute abnormality. Patient is feeling somewhat better after meds in the ED. UA unremarkable. .  CMP unremarkable. We will have her follow-up with Dr. Rosa Isela Mueller and come back to ED for worsening symptoms. Disposition:  PR home    PLAN:  1. Current Discharge Medication List      START taking these medications    Details   HYDROcodone-acetaminophen (Norco) 5-325 mg per tablet Take 1 Tablet by mouth every six (6) hours as needed for Pain for up to 3 days. Max Daily Amount: 4 Tablets.   Qty: 10 Tablet, Refills: 0  Start date: 6/7/2021, End date: 6/10/2021    Associated Diagnoses: Right sided abdominal pain      !! ondansetron (Zofran ODT) 4 mg disintegrating tablet Take 1 Tablet by mouth every eight (8) hours as needed for Nausea. Qty: 10 Tablet, Refills: 0  Start date: 6/7/2021       !! - Potential duplicate medications found. Please discuss with provider. CONTINUE these medications which have NOT CHANGED    Details   !! ondansetron (Zofran ODT) 4 mg disintegrating tablet Take 1 Tab by mouth every eight (8) hours as needed for Nausea. Qty: 10 Tab, Refills: 0       !! - Potential duplicate medications found. Please discuss with provider. 2.   Follow-up Information     Follow up With Specialties Details Why Contact Info    Raman Oglesby MD Family Medicine Schedule an appointment as soon as possible for a visit   [address] EMERGENCY DEPT Emergency Medicine  If symptoms worsen [address] [place] [address]    Ari Armenta MD Gastroenterology Schedule an appointment as soon as possible for a visit   [address] [place] [address] Rd  [phone]          Return to ED if worse     Diagnosis     Clinical Impression:   1. Right sided abdominal pain    2.  Right nephrolithiasis

## 2021-06-10 ENCOUNTER — OFFICE VISIT (OUTPATIENT)
Dept: NEUROLOGY | Age: 31
End: 2021-06-10
Payer: COMMERCIAL

## 2021-06-10 VITALS
OXYGEN SATURATION: 100 % | HEART RATE: 78 BPM | SYSTOLIC BLOOD PRESSURE: 118 MMHG | DIASTOLIC BLOOD PRESSURE: 90 MMHG | TEMPERATURE: 98.1 F | BODY MASS INDEX: 34.22 KG/M2 | WEIGHT: 193.2 LBS | RESPIRATION RATE: 18 BRPM

## 2021-06-10 DIAGNOSIS — G62.9 NEUROPATHY: ICD-10-CM

## 2021-06-10 DIAGNOSIS — M48.02 CERVICAL STENOSIS OF SPINE: ICD-10-CM

## 2021-06-10 DIAGNOSIS — R10.9 CHRONIC RIGHT FLANK PAIN: ICD-10-CM

## 2021-06-10 DIAGNOSIS — M79.18 MYOFASCIAL MUSCLE PAIN: ICD-10-CM

## 2021-06-10 DIAGNOSIS — R52 PAIN: ICD-10-CM

## 2021-06-10 DIAGNOSIS — G89.29 CHRONIC RIGHT FLANK PAIN: ICD-10-CM

## 2021-06-10 DIAGNOSIS — R26.9 GAIT DISORDER: ICD-10-CM

## 2021-06-10 DIAGNOSIS — M50.20 PROTRUSION OF CERVICAL INTERVERTEBRAL DISC: ICD-10-CM

## 2021-06-10 DIAGNOSIS — M79.7 FIBROMYALGIA: Primary | ICD-10-CM

## 2021-06-10 DIAGNOSIS — B02.29 PHN (POSTHERPETIC NEURALGIA): ICD-10-CM

## 2021-06-10 PROCEDURE — 99214 OFFICE O/P EST MOD 30 MIN: CPT | Performed by: PSYCHIATRY & NEUROLOGY

## 2021-06-10 RX ORDER — BUPRENORPHINE 5 UG/H
1 PATCH TRANSDERMAL
Qty: 4 PATCH | Refills: 0 | Status: SHIPPED | OUTPATIENT
Start: 2021-06-10 | End: 2021-06-24 | Stop reason: SDUPTHER

## 2021-06-10 NOTE — PROGRESS NOTES
Was in the er  [facility]  Neurology Progress Note    NAME:  Leydi Scales   :   1990   MRN:   [id number]     Date/Time:  6/10/2021  Subjective:    Leydi Scales is a 28 y.o. female here today for follow-up for headache, sharp pain on the right side of the head and face with generalized body pain and frequent fall  Patient was accompanied by her  to the visit. Patient says she continues to have the symptoms she has not improved much, vertigo and dizziness with generalized pain still persisted. She says everything is pretty much the same. Patient complaining of right flank pain. As patient pain is becoming chronic, has not responded to anti-inflammatory medication. I will start this patient on Butrans 5 mcg patch. q. 7 days. This will limit use of narcotics. She noted that she has been having increasing numbness and tingling sensation mostly of the lower extremity. She says she is still having difficulty focusing and concentrating, she says with the symptoms lately she has been having a very difficult to complete any task. She says she is always very tired with extremity weakness. Patient noted that her legs are giving out on her and she had fallen couple of times because of the weakness, numbness and tingling sensation. I will obtain EMG/nerve conduction study bilateral lower extremity to evaluate for neuropathy. Patient noted that when she falls, she had difficulty getting up, she says family member will help her to get up. She also noted that she is having this pain around her torso, pain is very sharp in nature. Patient has not started physical therapy mostly because of the pain. Patient noted that she has been having a lot of spasms and muscle pain, she says still uses sometimes using warm compresses help with the pain but if it is too hot it makes her to be weak.   She also says she has been having a lot of abdominal pain, her GI problem is being followed by GI, patient has been trying to get appointment as symptoms have lately increased. Patient says she still feels dizzy and her headache has not relented. Patient says she is still in excruciating pain. She says the pain is sharp in nature, mostly in the right side of the face and behind the eye, as if it is coming from the back of the neck and this is associated with dizziness. She says the right side of the face is still sensitive to touch. However the headache is unrelenting. At this time, patient cannot go back to work in this her present condition, I will obtain the EMG/nerve conduction study, patient will be probably going back to work in 2 weeks however we will have to evaluate patient to see if she is capable of returning to work. Review of Systems - General ROS: positive for  - fatigue and sleep disturbance  Psychological ROS: positive for - anxiety and sleep disturbances  Ophthalmic ROS: positive for - blurry vision, decreased vision, double vision and photophobia  ENT ROS: positive for - headaches, tinnitus, vertigo and visual changes  Allergy and Immunology ROS: negative  Hematological and Lymphatic ROS: negative  Endocrine ROS: negative  Respiratory ROS: no cough, shortness of breath, or wheezing  Cardiovascular ROS: negative  Gastrointestinal ROS: no abdominal pain, change in bowel habits, or black or bloody stools  Genito-Urinary ROS: no dysuria, trouble voiding, or hematuria  Musculoskeletal ROS: positive for - muscular weakness  Neurological ROS: positive for - dizziness, headaches, numbness/tingling, visual changes and weakness  Dermatological ROS: negative           Medications reviewed:  Current Outpatient Medications   Medication Sig Dispense Refill    buprenorphine (Butrans) 5 mcg/hour patch 1 Patch by TransDERmal route every seven (7) days for 30 days. Max Daily Amount: 1 Patch.  4 Patch 0    ondansetron (Zofran ODT) 4 mg disintegrating tablet Take 1 Tablet by mouth every eight (8) hours as needed for Nausea. 10 Tablet 0    lisdexamfetamine (Vyvanse) 30 mg capsule Take 1 Capsule by mouth every morning. Max Daily Amount: 30 mg. 30 Capsule 0    gabapentin (NEURONTIN) 300 mg capsule Take 1 Capsule by mouth two (2) times a day. Max Daily Amount: 600 mg. 60 Capsule 3    predniSONE (DELTASONE) 10 mg tablet Take 10 mg by mouth two (2) times a day. Take 1 tab p.o. tid x3days, 1 tab bid x5 days ,1 p.o.qd 30 Tablet 0    galcanezumab-gnlm (Emgality Syringe) 120 mg/mL syrg 120 mg by SubCUTAneous route every thirty (30) days. 1 mL 3    propranolol LA (INDERAL LA) 60 mg SR capsule Take 1 Cap by mouth nightly. 30 Cap 1    meclizine (ANTIVERT) 25 mg tablet Take 1 Tab by mouth three (3) times daily as needed for Dizziness. 30 Tab 1    ondansetron (Zofran ODT) 4 mg disintegrating tablet Take 1 Tab by mouth every eight (8) hours as needed for Nausea. 10 Tab 0    gabapentin (NEURONTIN) 100 mg capsule Take 1 Cap by mouth three (3) times daily. Max Daily Amount: 300 mg. 90 Cap 1    lidocaine (LIDODERM) 5 % Apply patch to the back of the neck for 12 hours a day and remove for 12 hours a day. 30 Each 0    cholecalciferol (Vitamin D3) 25 mcg (1,000 unit) cap Take 1,000 Units by mouth daily.  famotidine (PEPCID) 40 mg tablet Take 40 mg by mouth daily.  polyethylene glycol (MIRALAX) 17 gram/dose powder Take 17 g by mouth daily. 510 g 0    ibuprofen (MOTRIN) 600 mg tablet Take 1 Tab by mouth three (3) times daily (with meals). 21 Tab 0    medroxyPROGESTERone (PROVERA) 10 mg tablet Take 10 mg by mouth three (3) times daily.  dicyclomine HCl (BENTYL PO) Take  by mouth four (4) times daily. Indications: As needed for pain      fluticasone propionate (FLONASE) 50 mcg/actuation nasal spray 2 Sprays by Both Nostrils route daily. Indications: As needed for allergies      multivitamin (MULTIPLE VITAMIN PO) Take  by mouth.  Indications: Teresita fusion multi vitamin/folate 50 mg (2 gummies) by gyn dr Ashkan Banegas pantoprazole (PROTONIX) 40 mg tablet Take 40 mg by mouth two (2) times a day.  sucralfate (CARAFATE) 100 mg/mL suspension Take  by mouth four (4) times daily.  albuterol (PROVENTIL VENTOLIN) 2.5 mg /3 mL (0.083 %) nebulizer solution 2.5 mg by Nebulization route once.  DULoxetine (CYMBALTA) 30 mg capsule Take 1 Cap by mouth daily. (Patient not taking: Reported on 5/24/2021) 30 Cap 1    ergocalciferol (ERGOCALCIFEROL) 1,250 mcg (50,000 unit) capsule TAKE ONE CAPSULE BY MOUTH EVERY MONDAY AND THURSDAY (Patient not taking: Reported on 5/24/2021) 8 Cap 2    cyanocobalamin 1,000 mcg tablet Take 1,000 mcg by mouth daily. (Patient not taking: Reported on 5/24/2021)      fluticasone-umeclidinium-vilanterol (Trelegy Ellipta) 100-62.5-25 mcg inhaler Take 1 Puff by inhalation daily. (Patient not taking: Reported on 5/24/2021)      naproxen sodium (ALEVE) 220 mg cap Take  by mouth. (Patient not taking: Reported on 5/24/2021)      naloxone [facility]) 4 mg/actuation nasal spray Use 1 spray intranasally, then discard. Repeat with new spray every 2 min as needed for opioid overdose symptoms, alternating nostrils. (Patient not taking: Reported on 5/24/2021) 2 Each 0    loratadine (CLARITIN) 10 mg tablet Take 10 mg by mouth daily.  (Patient not taking: Reported on 6/3/2021)          Objective:   Vitals:  Vitals:    06/10/21 0917   BP: (!) 118/90   Pulse: 78   Resp: 18   Temp: 98.1 °F (36.7 °C)   SpO2: 100%   Weight: 193 lb 3.2 oz (87.6 kg)   PainSc:   6               Lab Data Reviewed:  Lab Results   Component Value Date/Time    WBC 14.4 (H) 06/07/2021 04:33 AM    HCT 45.4 06/07/2021 04:33 AM    HGB 15.1 06/07/2021 04:33 AM    PLATELET 904 25/97/7484 04:33 AM       Lab Results   Component Value Date/Time    Sodium 135 (L) 06/07/2021 04:33 AM    Potassium 4.6 06/07/2021 04:33 AM    Chloride 104 06/07/2021 04:33 AM    CO2 28 06/07/2021 04:33 AM    Glucose 119 (H) 06/07/2021 04:33 AM    BUN 19 06/07/2021 04:33 AM    Creatinine 0.99 06/07/2021 04:33 AM    Calcium 9.3 06/07/2021 04:33 AM       No components found for: TROPQUANT    No results found for: RASHAWN      No results found for: HBA1C, PZJ5HXZE, QJW6YYRZ     Lab Results   Component Value Date/Time    Vitamin B12 482 12/14/2020 10:44 AM       No results found for: RASHAWN, ANARX, ANAIGG, XBANA    No results found for: CHOL, CHOLPOCT, CHOLX, CHLST, CHOLV, HDL, HDLPOC, HDLP, LDL, LDLCPOC, LDLC, DLDLP, VLDLC, VLDL, TGLX, TRIGL, TRIGP, TGLPOCT, CHHD, CHHDX      CT Results (recent):  Results from Hospital Encounter encounter on 06/07/21    CT ABD PELV WO CONT    Narrative  EXAM: CT ABD PELV WO CONT    INDICATION: right flank and right mid abd pain    COMPARISON: 2019    CONTRAST:  None. TECHNIQUE:  Thin axial images were obtained through the abdomen and pelvis. Coronal and  sagittal reformats were generated. Oral contrast was not administered. CT dose  reduction was achieved through use of a standardized protocol tailored for this  examination and automatic exposure control for dose modulation. The absence of intravenous contrast material reduces the sensitivity for  evaluation of the vasculature and solid organs. FINDINGS:  LOWER THORAX: No significant abnormality in the incidentally imaged lower chest.  LIVER: Hepatic steatosis. BILIARY TREE: Cholecystectomy. CBD is not dilated. SPLEEN: within normal limits. PANCREAS: No focal abnormality. ADRENALS: Unremarkable. KIDNEYS/URETERS: Right nephrolithiasis. No hydronephrosis. STOMACH: Unremarkable. SMALL BOWEL: No dilatation or wall thickening. COLON: No dilatation or wall thickening. APPENDIX: Normal.  PERITONEUM: No ascites or pneumoperitoneum. RETROPERITONEUM: No lymphadenopathy or aortic aneurysm. REPRODUCTIVE ORGANS: Normal.  URINARY BLADDER: No mass or calculus. BONES: No destructive bone lesion. ABDOMINAL WALL: No mass or hernia. ADDITIONAL COMMENTS: N/A    Impression  Right nephrolithiasis.       MRI Results (recent):  Results from [place] encounter on 05/25/21    MRI CERV SPINE W WO CONT    Narrative  EXAM: MRI CERV SPINE W WO CONT  Clinical history: Evaluate for demyelinating disease  INDICATION: . Demyelinating disease of central nervous system, unspecified    COMPARISON: None    TECHNIQUE: MR imaging of the cervical spine was performed using the following  sequences: sagittal T1, T2, STIR;  axial T2, T1 prior to and following contrast  administration. CONTRAST: 15 mL of Dotarem. FINDINGS:    The degree of motion artifact which slightly limits evaluation. Vertebral body  heights are maintained. Marrow signal is normal.    The craniocervical junction is intact. The course, caliber, and signal intensity  of the spinal cord are normal. There is no pathologic intrathecal enhancement. Left vertebral artery is dominant. The paraspinal soft tissues are within normal limits. C2-C3: No herniation or stenosis. C3-C4: No herniation or stenosis. C4-C5: No herniation or stenosis. C5-C6: Disc desiccation. Minimal disc bulge. Minimal right paracentral  protrusion. Mild canal stenosis. Foramina are patent. C6-C7: No herniation or stenosis. C7-T1: No herniation or stenosis. Impression  No definite cord signal abnormality suggestive of demyelinating process is  identified. Mild canal stenosis and minimal right paracentral protrusion at C5-C6. IR Results (recent):  No results found for this or any previous visit. VAS/US Results (recent):  No results found for this or any previous visit. PHYSICAL EXAM:  General:    Alert, cooperative, no distress, appears stated age. Head:   Normocephalic, without obvious abnormality, atraumatic. Eyes:   Conjunctivae/corneas clear. PERRLA  Nose:  Nares normal. No drainage or sinus tenderness.   Throat:    Lips, mucosa, and tongue normal.  No Thrush  Neck:  Supple, symmetrical,  no adenopathy, thyroid: non tender    no carotid bruit and no JVD.  Severe paraspinal and occipitalis tenderness  Back:    Symmetric, diffuse tenderness. Lungs:   Clear to auscultation bilaterally. No Wheezing or Rhonchi. No rales. Chest wall:  No tenderness or deformity. No Accessory muscle use. Heart:   Regular rate and rhythm,  no murmur, rub or gallop. Abdomen:   Soft, non-tender. Not distended. Bowel sounds normal. No masses  Extremities: Extremities normal, atraumatic, No cyanosis. No edema. No clubbing  Skin:     Texture, turgor normal. No rashes or lesions. Not Jaundiced  Lymph nodes: Cervical, supraclavicular normal.  Psych:  Good insight. Not depressed. Anxious. NEUROLOGICAL EXAM:  Appearance: The patient is well developed, well nourished, provides a coherent history and is in no acute distress. Mental Status: Oriented to time, place and person. Mood and affect appropriate. Cranial Nerves:   Intact visual fields. Fundi are benign. OANH, EOM's full, no nystagmus, no ptosis. Facial sensation dysesthesia. Corneal reflexes are intact. Facial movement is symmetric. Hearing is normal bilaterally. Palate is midline with normal sternocleidomastoid and trapezius muscles are normal. Tongue is midline. Motor:  5/5 strength in upper and lower proximal and distal muscles. Normal bulk and tone. No fasciculations. Reflexes:   Deep tendon reflexes 3+/4 and symmetrical.   Sensory:    Dysesthesia to touch, pinprick and vibration. Gait:   Slightly unsteady gait. Tremor:   No tremor noted. Cerebellar:  No cerebellar signs present. Neurovascular:  Normal heart sounds and regular rhythm, peripheral pulses intact, and no carotid bruits. Assesment  1. Fibromyalgia  Following rheumatology  2. Chronic migraine  Emgality  3. Myofascial muscle pain    - buprenorphine (Butrans) 5 mcg/hour patch; 1 Patch by TransDERmal route every seven (7) days for 30 days. Max Daily Amount: 1 Patch. Dispense: 4 Patch; Refill: 0    4.  Protrusion of cervical intervertebral disc  Physical therapy    5. Cervical stenosis of spine  Physical therapy    6. Pain    - buprenorphine (Butrans) 5 mcg/hour patch; 1 Patch by TransDERmal route every seven (7) days for 30 days. Max Daily Amount: 1 Patch. Dispense: 4 Patch; Refill: 0    7. Chronic right flank pain    - buprenorphine (Butrans) 5 mcg/hour patch; 1 Patch by TransDERmal route every seven (7) days for 30 days. Max Daily Amount: 1 Patch. Dispense: 4 Patch; Refill: 0    8. PHN (postherpetic neuralgia)    - buprenorphine (Butrans) 5 mcg/hour patch; 1 Patch by TransDERmal route every seven (7) days for 30 days. Max Daily Amount: 1 Patch. Dispense: 4 Patch; Refill: 0    9. Neuropathy    - EMG NCV MOTOR WITH F/WAVE PER NERVE; Future    10. Gait disorder    - EMG NCV MOTOR WITH F/WAVE PER NERVE; Future    ___________________________________________________  PLAN: Medication and plan discussed with patient      ICD-10-CM ICD-9-CM    1. Fibromyalgia  M79.7 729.1    2. Chronic migraine  G43.709 346.70    3. Myofascial muscle pain  M79.18 729.1 buprenorphine (Butrans) 5 mcg/hour patch   4. Protrusion of cervical intervertebral disc  M50.20 722.0    5. Cervical stenosis of spine  M48.02 723.0    6. Pain  R52 780.96 buprenorphine (Butrans) 5 mcg/hour patch   7. Chronic right flank pain  R10.9 789.09 buprenorphine (Butrans) 5 mcg/hour patch    G89.29 338.29    8. PHN (postherpetic neuralgia)  B02.29 053.19 buprenorphine (Butrans) 5 mcg/hour patch   9. Neuropathy  G62.9 355.9 EMG NCV MOTOR WITH F/WAVE PER NERVE   10. Gait disorder  R26.9 781. 2 EMG NCV MOTOR WITH F/WAVE PER NERVE     Follow-up and Dispositions    · Return in about 2 weeks (around 6/24/2021).            ___________________________________________________    Attending Physician: Tylor Klein MD                 Pain in the right flank, t7-t10  Very sensitive to touch      Sees rhematoid today

## 2021-06-10 NOTE — LETTER
NOTIFICATION RETURN TO WORK  
 
6/10/2021 10:31 AM 
 
Ms. Martha Parra [address] To Whom It May Concern: 
 
Martha Parra is currently under the care of [address] [place] [address]. She will return to work on:6/25/2021 If there are questions or concerns please have the patient contact our office. Sincerely, Latasha Trinh MD

## 2021-06-11 ENCOUNTER — OFFICE VISIT (OUTPATIENT)
Dept: RHEUMATOLOGY | Age: 31
End: 2021-06-11
Payer: COMMERCIAL

## 2021-06-11 VITALS
TEMPERATURE: 98.4 F | WEIGHT: 192 LBS | OXYGEN SATURATION: 97 % | HEIGHT: 63 IN | SYSTOLIC BLOOD PRESSURE: 117 MMHG | DIASTOLIC BLOOD PRESSURE: 84 MMHG | BODY MASS INDEX: 34.02 KG/M2 | RESPIRATION RATE: 20 BRPM | HEART RATE: 78 BPM

## 2021-06-11 DIAGNOSIS — M79.7 FIBROMYALGIA: ICD-10-CM

## 2021-06-11 DIAGNOSIS — E83.52 HYPERCALCEMIA: ICD-10-CM

## 2021-06-11 DIAGNOSIS — N20.0 RECURRENT NEPHROLITHIASIS: Primary | ICD-10-CM

## 2021-06-11 DIAGNOSIS — R74.01 TRANSAMINITIS: ICD-10-CM

## 2021-06-11 PROCEDURE — 99205 OFFICE O/P NEW HI 60 MIN: CPT | Performed by: INTERNAL MEDICINE

## 2021-06-11 RX ORDER — LORATADINE 10 MG/1
TABLET ORAL
COMMUNITY
End: 2022-07-07 | Stop reason: SDUPTHER

## 2021-06-11 RX ORDER — MAG CARB/ALUMINUM HYDROX/ALGIN 237.5-254
SUSPENSION, ORAL (FINAL DOSE FORM) ORAL
COMMUNITY
Start: 2021-05-05

## 2021-06-11 NOTE — PROGRESS NOTES
REASON FOR VISIT:   Louana Severe is a 28 y.o. female with history of GERD and PCOS who is being referred to [facility] at the request of Dr. Wai Falcon, regarding a diagnosis of body pain. HISTORY OF PRESENT ILLNESS    Mid-2020 started hurting, felt \"body just hurts. \" No clear trigger. In July started having more difficulty catching her breath, felt possibly exacerbated by COVID masking. Went to ER, CTA was unremarkable. Breathing improved but continued to experience soreness across the mid/upper chest. Increasingly fatigued. Has been out of work since around 4/21. Hard to tell if pain is more in the bones, joints, or muscles. Migraines returned, with development of right face/neck numbness and dysesthesia. Former neurologist had left the practice, established with Dr. Cj Gonzalez, diagnosed with trigeminal neuralgia and fibromyalgia. Continues to experience intermittent right facial numbness. Went to ER on Monday for lateral right abdominal pain. CT had shown right-sided nephrolithiasis but UA was normal and was told unlikely cause. She has PCOS for which she sees ob/gyn on the 18th. Loses strength and feeling in legs unpredictably. Can collapse when standing, or can become dizzy while sitting and find herself unable to stand. Currently taking gabapentin 300mg bid, which she hasn't found consistently helpful for pain. Has had esophagitis and GERD in the past,     No rashes with this. No unexplained rashes. Has noticed mild splotchiness with sun exposure. Tends to get cracking between fingers in the winter      REVIEW OF SYSTEMS  A comprehensive review of systems was negative except for that written in the HPI. A 10-point review of systems is per the new patient questionnaire, which has been reviewed extensively and scanned into the electronic medical record for future reference. Review of systems is as above and is otherwise negative.     ALLERGIES  Patient has no known allergies. MEDICATIONS  Current Outpatient Medications   Medication Sig    buprenorphine (Butrans) 5 mcg/hour patch 1 Patch by TransDERmal route every seven (7) days for 30 days. Max Daily Amount: 1 Patch.  ondansetron (Zofran ODT) 4 mg disintegrating tablet Take 1 Tablet by mouth every eight (8) hours as needed for Nausea.  lisdexamfetamine (Vyvanse) 30 mg capsule Take 1 Capsule by mouth every morning. Max Daily Amount: 30 mg.    gabapentin (NEURONTIN) 300 mg capsule Take 1 Capsule by mouth two (2) times a day. Max Daily Amount: 600 mg.  predniSONE (DELTASONE) 10 mg tablet Take 10 mg by mouth two (2) times a day. Take 1 tab p.o. tid x3days, 1 tab bid x5 days ,1 p.o.qd    galcanezumab-gnlm (Emgality Syringe) 120 mg/mL syrg 120 mg by SubCUTAneous route every thirty (30) days.  propranolol LA (INDERAL LA) 60 mg SR capsule Take 1 Cap by mouth nightly.  meclizine (ANTIVERT) 25 mg tablet Take 1 Tab by mouth three (3) times daily as needed for Dizziness.  ondansetron (Zofran ODT) 4 mg disintegrating tablet Take 1 Tab by mouth every eight (8) hours as needed for Nausea.  DULoxetine (CYMBALTA) 30 mg capsule Take 1 Cap by mouth daily. (Patient not taking: Reported on 5/24/2021)    gabapentin (NEURONTIN) 100 mg capsule Take 1 Cap by mouth three (3) times daily. Max Daily Amount: 300 mg.    lidocaine (LIDODERM) 5 % Apply patch to the back of the neck for 12 hours a day and remove for 12 hours a day.  ergocalciferol (ERGOCALCIFEROL) 1,250 mcg (50,000 unit) capsule TAKE ONE CAPSULE BY MOUTH EVERY MONDAY AND THURSDAY (Patient not taking: Reported on 5/24/2021)    cholecalciferol (Vitamin D3) 25 mcg (1,000 unit) cap Take 1,000 Units by mouth daily.  cyanocobalamin 1,000 mcg tablet Take 1,000 mcg by mouth daily. (Patient not taking: Reported on 5/24/2021)    famotidine (PEPCID) 40 mg tablet Take 40 mg by mouth daily.     fluticasone-umeclidinium-vilanterol (Trelegy Ellipta) 100-62.5-25 mcg inhaler Take 1 Puff by inhalation daily. (Patient not taking: Reported on 5/24/2021)    naproxen sodium (ALEVE) 220 mg cap Take  by mouth. (Patient not taking: Reported on 5/24/2021)    polyethylene glycol (MIRALAX) 17 gram/dose powder Take 17 g by mouth daily.  ibuprofen (MOTRIN) 600 mg tablet Take 1 Tab by mouth three (3) times daily (with meals).  naloxone (NARCAN) 4 mg/actuation nasal spray Use 1 spray intranasally, then discard. Repeat with new spray every 2 min as needed for opioid overdose symptoms, alternating nostrils. (Patient not taking: Reported on 5/24/2021)    medroxyPROGESTERone (PROVERA) 10 mg tablet Take 10 mg by mouth three (3) times daily.  dicyclomine HCl (BENTYL PO) Take  by mouth four (4) times daily. Indications: As needed for pain    fluticasone propionate (FLONASE) 50 mcg/actuation nasal spray 2 Sprays by Both Nostrils route daily. Indications: As needed for allergies    multivitamin (MULTIPLE VITAMIN PO) Take  by mouth. Indications: Teresita fusion multi vitamin/folate 50 mg (2 gummies) by gyn dr Ammy Ott loratadine (CLARITIN) 10 mg tablet Take 10 mg by mouth daily. (Patient not taking: Reported on 6/3/2021)    pantoprazole (PROTONIX) 40 mg tablet Take 40 mg by mouth two (2) times a day.  sucralfate (CARAFATE) 100 mg/mL suspension Take  by mouth four (4) times daily.  albuterol (PROVENTIL VENTOLIN) 2.5 mg /3 mL (0.083 %) nebulizer solution 2.5 mg by Nebulization route once. No current facility-administered medications for this visit.        PAST MEDICAL HISTORY  Past Medical History:   Diagnosis Date    Acid reflux disease with ulcer     Calculus of kidney     Constipation     GERD (gastroesophageal reflux disease)     Headache     PCOS (polycystic ovarian syndrome)     Seasonal asthma     Stomach pain     Vertigo        FAMILY HISTORY  family history includes Cancer in her maternal grandfather, maternal grandmother, maternal great grandmother, paternal cousin, and paternal cousin; Diabetes in her father, maternal grandfather, maternal grandmother, mother, paternal grandfather, and paternal grandmother; Heart Disease in her maternal grandfather, maternal grandmother, paternal grandfather, and paternal grandmother; Hypertension in her maternal grandmother and mother. maternal aunt has fibromyalgia and lupus, maternal great aunt has lupus. Father has RA. SOCIAL HISTORY  She  reports that she has never smoked. She has never used smokeless tobacco. She reports current drug use. Drugs: Prescription and OTC. She reports that she does not drink alcohol. 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 DATA  Visit Vitals  /84 (BP 1 Location: Right arm, BP Patient Position: Sitting)   Pulse 78   Temp 98.4 °F (36.9 °C) (Oral)   Resp 20   Ht 5' 3\" (1.6 m)   Wt 192 lb (87.1 kg)   SpO2 97%   BMI 34.01 kg/m²    There is no height or weight on file to calculate BMI. No flowsheet data found. General:  The patient is well developed, well nourished, alert, and in no apparent distress. Eyes: Sclera are anicteric. No conjunctival injection. HEENT:  Oropharynx is clear. No oral ulcers. Adequate salivary pooling. No cervical or supraclavicular lymphadenopathy. Lungs:  Clear to auscultation bilaterally, without wheeze or stridor. Normal respiratory effort. Cor:  Regular rate and rhythm. No murmur rub or gallop. Abdomen: Soft, non-tender, without hepatomegaly or masses. Extremities: No calf tenderness or edema. Warm and well perfused. Skin:  No significant abnormalities. No petechial, purpuric, or psoriaform rashes   Neuro: Nonfocal. No foot or wrist drop. Normal gait. Musculoskeletal:    A comprehensive musculoskeletal exam was performed for all joints of each upper and lower extremity and assessed for swelling, tenderness and range of motion.  Results are documented as below:  Pan-positive FMTP  No evidence of synovitis in the small joints of the hands, wrists, shoulders, elbows, hips, knees or ankles. Labs:  21:  Cr 0.94, Ca 10.3, Tbili 0.3, AST 22, ALT 41, AlkP 85, , TSH WNL; CPK 71  20 ESR 15, Vit D 12, B12 482, RASHAWN direct negative, SPEP WNL      Imagin21 CT abd/pelvis without:  Right nephrolithiasis. 21: MR Cspine:  IMPRESSION  No definite cord signal abnormality suggestive of demyelinating process is  identified. Mild canal stenosis and minimal right paracentral protrusion at C5-C6.    20 CTA chest:  There is no pulmonary embolism. There is no aortic aneurysm or dissection. No acute intrathoracic process is identified. Incidental findings are as  described above.        ASSESSMENT AND PLAN  Ms. Maria G Silverman is a 28 y.o. female with h/o nephrolithiasis, hypercalcemia, and PCOS who presents for evaluation of pain sensitization, migraines, and abdominal pain. Hyperparathyroidism could account for pain and calcium complications though hypercalcemia has been mild. No e/o deep organ damage or inflammatory joint pain; negative RASHAWN effectively rules out systemic lupus. Will check cortisol to r/o adrenal insufficiency; if remaining workup is unremarkable, would focus on pain management for fibromyalgia with her PCP or Pain Management. 1. Recurrent nephrolithiasis  - VITAMIN D, 1, 25 DIHYDROXY; Future    2. Fibromyalgia  - CORTISOL, AM; Future  - PTH INTACT; Future    3. Hypercalcemia  - VITAMIN D, 1, 25 DIHYDROXY; Future  - PTH INTACT; Future    4. Transaminitis  - CHRONIC HEPATITIS PANEL; Future    Patient Instructions   1. I don't see any signs of rheumatoid arthritis, sarcoidosis, or an autoimmune connective tissue disease. 2. Labs, first thing in the morning when the lab opens, ie 7-8am.    3. Follow up with your EMG next week. 4. Once you do your labs, send me a message and I'll close the loop with you on next steps.  If there aren't any surprises, then we may have you follow up with your PCP and/or Pain Management. 5. For fibromyalgia, we usually recommend treating with a combination of medications and lifestyle/activity modifications. Pain-directed medications such as gabapentin or Lyrica, antidepressant-class medications more effective for pain such as SNRI's (duloxetine or milnacripran),or TCA-type medications like Elavil (amitriptyline) which can help with headaches and sleep as well, are all first-line alternatives, and these can be sometimes used in careful combination. For activities, search for \"nila chi for arthritis\" for free 1-hour videos from Luis Gordon on [place], which has been shown to be as helpful as any medications we use when done for 30 minutes per day, and can be done in conjunction with medication management. Also, if you can find a compounding pharmacy that can make you naltrexone 4.5mg daily, this has been shown to be helpful for pain in fibromyalgia as well (Arthritis Rheum. 2013 Feb;65(2):529-38). Opiate-type medications have actually been shown to associate with worse pain in the long-term, so we recommend avoiding these for fibromyalgia-related pain. As we exclusively treat inflammatory autoimmune disease from this clinic, we rely on your primary care physician (sometimes in conjunction with a Pain Management physician) to manage fibromyalgia.                 Orders Placed This Encounter    VITAMIN D, 1, 25 DIHYDROXY    CORTISOL, AM    PTH INTACT    CHRONIC HEPATITIS PANEL    COMMENT    loratadine (Claritin) 10 mg tablet    Gaviscon Extra Strength 254-237.5 mg/5 mL susp       Medications: I am having Ella Denny maintain her albuterol, loratadine, pantoprazole, sucralfate, medroxyPROGESTERone, dicyclomine HCl (BENTYL PO), fluticasone propionate, multivitamin (MULTIPLE VITAMIN PO), naproxen sodium, polyethylene glycol, ibuprofen, naloxone, famotidine, Trelegy Ellipta, cholecalciferol, cyanocobalamin, ergocalciferol, lidocaine, DULoxetine, gabapentin, propranolol LA, meclizine, ondansetron, gabapentin, predniSONE, Emgality Syringe, lisdexamfetamine, ondansetron, and buprenorphine. Follow up: Return if symptoms worsen or fail to improve. Face to face time: 50 minutes  Note preparation and records review day of service: 20 minutes  Total provider time day of service: 70 minutes    Guilherme Hernandez MD    Adult Rheumatology   [id number] [facility] [address]  [facility] [address], [address] [place] [address]   Phone [phone]  Fax [phone]    --ADDENDUM--  Results reproduced below. Labs unremarkable. Referring patient back to her PCP to continue pursuing pain management, our recommendations for fibromyalgia are outlined above. Office Visit on 06/11/2021   Component Date Value Ref Range Status    Calcitriol (Vit D 1, 25 di-OH) 06/15/2021 42.1  19.9 - 79.3 pg/mL Final    Cortisol, a.m. 06/15/2021 9.9  6.2 - 19.4 ug/dL Final    PTH, Intact 06/15/2021 19  15 - 65 pg/mL Final    Hep B surface Ag screen 06/15/2021 Negative  Negative Final    Hepatitis Be Antigen 06/15/2021 Negative  Negative Final    Hep B Core Ab, IgM 06/15/2021 Negative  Negative Final    Hep B Core Ab, total 06/15/2021 Negative  Negative Final    Hepatitis Be Antibody 06/15/2021 Negative  Negative Final    HEP B SURFACE AB, QUAL 06/15/2021 Reactive   Final    Comment:               Non Reactive: Inconsistent with immunity,                              less than 10 mIU/mL                Reactive:     Consistent with immunity,                              greater than 9.9 mIU/mL      HCV Ab 06/15/2021 <0.1  0.0 - 0.9 s/co ratio Final    Comment 06/15/2021 Comment   Final    Comment: Non reactive HCV antibody screen is consistent with no HCV infection,  unless recent infection is suspected or other evidence exists to  indicate HCV infection.      Admission on 06/07/2021, Discharged on 06/07/2021   Component Date Value Ref Range Status  Color 06/07/2021 YELLOW/STRAW    Final    Color Reference Range: Straw, Yellow or Dark Yellow    Appearance 06/07/2021 CLEAR  CLEAR   Final    Specific gravity 06/07/2021 1.012  1.003 - 1.030   Final    pH (UA) 06/07/2021 7.5  5.0 - 8.0   Final    Protein 06/07/2021 Negative  NEG mg/dL Final    Glucose 06/07/2021 Negative  NEG mg/dL Final    Ketone 06/07/2021 Negative  NEG mg/dL Final    Bilirubin 06/07/2021 Negative  NEG   Final    Blood 06/07/2021 Negative  NEG   Final    Urobilinogen 06/07/2021 0.2  0.2 - 1.0 EU/dL Final    Nitrites 06/07/2021 Negative  NEG   Final    Leukocyte Esterase 06/07/2021 Negative  NEG   Final    WBC 06/07/2021 0-4  0 - 4 /hpf Final    RBC 06/07/2021 0-5  0 - 5 /hpf Final    Epithelial cells 06/07/2021 FEW  FEW /lpf Final    Epithelial cell category consists of squamous cells and /or transitional urothelial cells. Renal tubular cells, if present, are separately identified as such.     Bacteria 06/07/2021 Negative  NEG /hpf Final    UA:UC IF INDICATED 06/07/2021 CULTURE NOT INDICATED BY UA RESULT  CNI   Final    Hyaline cast 06/07/2021 0-2  0 - 5 /lpf Final    WBC 06/07/2021 14.4* 3.6 - 11.0 K/uL Final    RBC 06/07/2021 4.85  3.80 - 5.20 M/uL Final    HGB 06/07/2021 15.1  11.5 - 16.0 g/dL Final    HCT 06/07/2021 45.4  35.0 - 47.0 % Final    MCV 06/07/2021 93.6  80.0 - 99.0 FL Final    MCH 06/07/2021 31.1  26.0 - 34.0 PG Final    MCHC 06/07/2021 33.3  30.0 - 36.5 g/dL Final    RDW 06/07/2021 12.3  11.5 - 14.5 % Final    PLATELET 80/15/2178 682  150 - 400 K/uL Final    MPV 06/07/2021 11.0  8.9 - 12.9 FL Final    NRBC 06/07/2021 0.0  0  WBC Final    ABSOLUTE NRBC 06/07/2021 0.00  0.00 - 0.01 K/uL Final    NEUTROPHILS 06/07/2021 81* 32 - 75 % Final    LYMPHOCYTES 06/07/2021 13  12 - 49 % Final    MONOCYTES 06/07/2021 3* 5 - 13 % Final    EOSINOPHILS 06/07/2021 1  0 - 7 % Final    BASOPHILS 06/07/2021 1  0 - 1 % Final    IMMATURE GRANULOCYTES 06/07/2021 1* 0.0 - 0.5 % Final    ABS. NEUTROPHILS 06/07/2021 11.9* 1.8 - 8.0 K/UL Final    ABS. LYMPHOCYTES 06/07/2021 1.8  0.8 - 3.5 K/UL Final    ABS. MONOCYTES 06/07/2021 0.5  0.0 - 1.0 K/UL Final    ABS. EOSINOPHILS 06/07/2021 0.1  0.0 - 0.4 K/UL Final    ABS. BASOPHILS 06/07/2021 0.1  0.0 - 0.1 K/UL Final    ABS. IMM. GRANS. 06/07/2021 0.1* 0.00 - 0.04 K/UL Final    DF 06/07/2021 AUTOMATED    Final    Sodium 06/07/2021 135* 136 - 145 mmol/L Final    Potassium 06/07/2021 4.6  3.5 - 5.1 mmol/L Final    Chloride 06/07/2021 104  97 - 108 mmol/L Final    CO2 06/07/2021 28  21 - 32 mmol/L Final    Anion gap 06/07/2021 3* 5 - 15 mmol/L Final    Glucose 06/07/2021 119* 65 - 100 mg/dL Final    BUN 06/07/2021 19  6 - 20 MG/DL Final    Creatinine 06/07/2021 0.99  0.55 - 1.02 MG/DL Final    BUN/Creatinine ratio 06/07/2021 19  12 - 20   Final    GFR est AA 06/07/2021 >60  >60 ml/min/1.73m2 Final    GFR est non-AA 06/07/2021 >60  >60 ml/min/1.73m2 Final    Estimated GFR is calculated using the IDMS-traceable Modification of Diet in Renal Disease (MDRD) Study equation, reported for both  Americans (GFRAA) and non- Americans (GFRNA), and normalized to 1.73m2 body surface area. The physician must decide which value applies to the patient.  Calcium 06/07/2021 9.3  8.5 - 10.1 MG/DL Final    Bilirubin, total 06/07/2021 0.7  0.2 - 1.0 MG/DL Final    ALT (SGPT) 06/07/2021 38  12 - 78 U/L Final    AST (SGOT) 06/07/2021 16  15 - 37 U/L Final    Alk.  phosphatase 06/07/2021 97  45 - 117 U/L Final    Protein, total 06/07/2021 8.3* 6.4 - 8.2 g/dL Final    Albumin 06/07/2021 4.3  3.5 - 5.0 g/dL Final    Globulin 06/07/2021 4.0  2.0 - 4.0 g/dL Final    A-G Ratio 06/07/2021 1.1  1.1 - 2.2   Final    Lipase 06/07/2021 128  73 - 393 U/L Final    Pregnancy test,urine (POC) 06/07/2021 Negative  NEG   Final

## 2021-06-11 NOTE — PATIENT INSTRUCTIONS
1. I don't see any signs of rheumatoid arthritis, sarcoidosis, or an autoimmune connective tissue disease. 2. Labs, first thing in the morning when the lab opens, ie 7-8am. 
 
3. Follow up with your EMG next week. 4. Once you do your labs, send me a message and I'll close the loop with you on next steps. If there aren't any surprises, then we may have you follow up with your PCP and/or Pain Management. 5. For fibromyalgia, we usually recommend treating with a combination of medications and lifestyle/activity modifications. Pain-directed medications such as gabapentin or Lyrica, antidepressant-class medications more effective for pain such as SNRI's (duloxetine or milnacripran),or TCA-type medications like Elavil (amitriptyline) which can help with headaches and sleep as well, are all first-line alternatives, and these can be sometimes used in careful combination. For activities, search for \"nila chi for arthritis\" for free 1-hour videos from Luis Gordon on [place], which has been shown to be as helpful as any medications we use when done for 30 minutes per day, and can be done in conjunction with medication management. Also, if you can find a compounding pharmacy that can make you naltrexone 4.5mg daily, this has been shown to be helpful for pain in fibromyalgia as well (Arthritis Rheum. 2013 Feb;65(2):529-38). Opiate-type medications have actually been shown to associate with worse pain in the long-term, so we recommend avoiding these for fibromyalgia-related pain. As we exclusively treat inflammatory autoimmune disease from this clinic, we rely on your primary care physician (sometimes in conjunction with a Pain Management physician) to manage fibromyalgia.

## 2021-06-11 NOTE — LETTER
6/20/2021 10:43 PM    Patient:  Lynsey Dey   YOB: 1990  Date of Visit: 6/11/2021      Dear Ha Cowart MD  [place] [id number]  Via Fax: [phone]: Thank you for referring Ms. Menon Born to me for evaluation/treatment. Below are the relevant portions of my assessment and plan of care. If you have questions, please do not hesitate to call me.          Sincerely,      Barrie Admas MD  Cell: [phone]

## 2021-06-15 ENCOUNTER — TELEPHONE (OUTPATIENT)
Dept: NEUROLOGY | Age: 31
End: 2021-06-15

## 2021-06-15 ENCOUNTER — OFFICE VISIT (OUTPATIENT)
Dept: NEUROLOGY | Age: 31
End: 2021-06-15
Payer: COMMERCIAL

## 2021-06-15 DIAGNOSIS — H59.093: ICD-10-CM

## 2021-06-15 DIAGNOSIS — M54.12 CERVICAL RADICULOPATHY: ICD-10-CM

## 2021-06-15 DIAGNOSIS — G62.9 NEUROPATHY: Primary | ICD-10-CM

## 2021-06-15 PROCEDURE — 99212 OFFICE O/P EST SF 10 MIN: CPT | Performed by: PSYCHIATRY & NEUROLOGY

## 2021-06-15 NOTE — TELEPHONE ENCOUNTER
Butrans PA request.     Office visit 6/10/21 is unsigned and I am not able to process the PA at this time. Also, on the Rx it states \"Patient is not taking\". Does that mean it is supposed to be discontinued?

## 2021-06-16 LAB
1,25(OH)2D SERPL-MCNC: 42.1 PG/ML (ref 19.9–79.3)
COMMENT, 144067: NORMAL
CORTIS AM PEAK SERPL-MCNC: 9.9 UG/DL (ref 6.2–19.4)
HBV CORE AB SERPL QL IA: NEGATIVE
HBV CORE IGM SERPL QL IA: NEGATIVE
HBV E AB SERPL QL IA: NEGATIVE
HBV E AG SERPL QL IA: NEGATIVE
HBV SURFACE AB SER QL: REACTIVE
HBV SURFACE AG SERPL QL IA: NEGATIVE
HCV AB S/CO SERPL IA: <0.1 S/CO RATIO (ref 0–0.9)
PTH-INTACT SERPL-MCNC: 19 PG/ML (ref 15–65)

## 2021-06-18 NOTE — PROCEDURES
[address], [address], [address]  p: [phone]  f: [phone]    Test Date:  6/15/2021    Patient: Rani Lu : 1990 Physician:  Dr. Demetrio Arambula   Sex: Female Height:  Ref Phys: Dr Demetrio Arambula   ID#: [id number] Weight:  Technician:  Fransico Govea     Patient Complaints: Numbness and tingling sensation of the lower extremity, weakness of the  legs      Medications: See chart      Patient History / Exam: This is a 70-year-old right-handed white female who is being evaluated for numbness and tingling sensation lower extremity, weakness of the legs and frequent fall. NCV & EMG Findings:  All nerve conduction studies (as indicated in the following tables) were within normal limits. All examined muscles (as indicated in the following table) showed no evidence of electrical instability. Impression: There is no electrodiagnostic evidence of peripheral neuropathy.   However, small fiber neuropathy and radiculopathy cannot be excluded      Recommendations: Neuro imaging of the lumbosacral spine      ___________________________          Motor Nerve Conduction Studies     Stim Site NR Onset (ms) Norm Onset (ms) O-P Amp (mV) Norm O-P Amp Site1 Site2 Delta-0 (ms) Dist (cm) Maycol (m/s) Norm Maycol (m/s)   Left Fibular Motor (Ext Dig Brev)  34°C   Ankle    3.8 <6.5 9.4 >1.3 B Fib Ankle 6.0 29.0 48 >38   B Fib    9.8  9.4  Poplt B Fib 1.8 10.0 56 >40   Poplt    11.6  9.3          Right Fibular Motor (Ext Dig Brev)  34°C   Ankle    3.7 <6.5 8.6 >1.3 B Fib Ankle 6.3 32.0 51 >38   B Fib    10.0  8.8  Poplt B Fib 1.7 10.0 59 >40   Poplt    11.7  8.4          Left Tibial Motor (Abd Rios Brev)  34°C   Ankle    3.4 <6.1 9.2 >4.4 Knee Ankle 7.4 40.0 54 >39   Knee    10.8  4.6          Right Tibial Motor (Abd Rios Brev)  34°C   Ankle    3.7 <6.1 12.9 >4.4 Knee Ankle 7.9 40.0 51 >39   Knee    11.6  6.3                        Anti - Sensory Nerve Conduction Studies     Stim Site NR Peak (ms) Norm Peak (ms) O-P Amp (µV) Norm O-P Amp Site1 Site2 Delta-0 (ms) Dist (cm) Maycol (m/s) Norm Maycol (m/s)   Left Sup Fibular Anti Sensory (Ant Lat Mall)   14 cm    2.7 <4.4 6.9 >6 14 cm Ant Lat Mall 1.9 10.0 53    Right Sup Fibular Anti Sensory (Ant Lat Mall)  34°C   14 cm    2.9 <4.4 19.1 >6 14 cm Ant Lat Mall 2.3 10.0 43    Left Sural Anti Sensory (Lat Mall)   Calf    3.5 <4.4 19.0 >6 Calf Lat Mall 2.6 14.0 54    Right Sural Anti Sensory (Lat Mall)  34°C   Calf    3.7 <4.4 13.8 >6 Calf Lat Mall 2.8 14.0 50                  Admin

## 2021-06-22 NOTE — ED TRIAGE NOTES
Pt. Presents to ED today for complaints of mid/upper abdominal pain. Patient sent by her GI doctor. Pt. Alert and oriented x4. Pt. Placed in position of comfort with call bell in reach. R1 Progress Note      SUBJECTIVE     No acute events overnight, feels well, still endorses slight pain in LLE. Feels like it is getting better per patient. No additional complaints.      Inpatient Meds  Current Facility-Administered Medications   Medication Dose Route Frequency Provider Last Rate Last Admin   • sodium chloride 0.9 % flush bag 25 mL  25 mL Intravenous PRN [facility] A Sarbjit       • sodium chloride (PF) 0.9 % injection 2 mL  2 mL Intracatheter 2 times per day [facility]   2 mL at 06/21/21 2233   • heparin (porcine) injection 5,000 Units  5,000 Units Subcutaneous 3 times per day Phillip Willett DO   5,000 Units at 06/22/21 0528   • carvedilol (COREG) tablet 12.5 mg  12.5 mg Oral BID  Phillip Willett DO   12.5 mg at 06/21/21 1757   • gabapentin (NEURONTIN) capsule 100 mg  100 mg Oral 3 times per day Phillip Willett DO   100 mg at 06/22/21 0514   • polyethylene glycol (MIRALAX) packet 17 g  17 g Oral PRN Phillip Willett DO       • rosuvastatin (CRESTOR) tablet 40 mg  40 mg Oral QHS Phillip Willett DO   40 mg at 06/21/21 2215   • sertraline (ZOLOFT) tablet 25 mg  25 mg Oral Daily Phillip Willett DO       • tamsulosin (FLOMAX) capsule 0.4 mg  0.4 mg Oral Nightly Phillip Willett DO   0.4 mg at 06/21/21 2215   • calcium acetate gelcap (PHOSLO) capsule 667 mg  667 mg Oral TID  Phillip Willett DO       • insulin glargine (LANTUS) injection 30 Units  30 Units Subcutaneous Nightly [facility] A Sarbjit   30 Units at 06/21/21 2215   • dextrose 50 % injection 25 g  25 g Intravenous PRN [facility] A Sarbjit       • dextrose 50 % injection 12.5 g  12.5 g Intravenous PRN [facility] A Sarbjit       • glucagon (GLUCAGEN) injection 1 mg  1 mg Intramuscular PRN [facility] A Sarbjit       • dextrose (GLUTOSE) 40 % gel 15 g  15 g Oral PRN [facility] A Sarbjit       • dextrose (GLUTOSE) 40 % gel 30 g  30 g Oral PRN [facility] A Sarbjit       • insulin lispro (ADMELOG,HumaLOG) - Correction Dose   Subcutaneous TID [facility] A Sarbjit       • cefepime (MAXIPIME) 1,000 mg in sodium  chloride 0.9 % 100 mL IVPB  1,000 mg Intravenous Q12H Tonny Meier  mL/hr at 06/22/21 0107 1,000 mg at 06/22/21 0107   • DAPTOmycin (CUBICIN) 620 mg in sodium chloride 0.9 % 100 mL IVPB  6 mg/kg (Adjusted) Intravenous Q24H Tonny Meier MD       • oxyCODONE (IMM REL) (ROXICODONE) tablet 15 mg  15 mg Oral Q6H PRN [facility] A Sarbjit   15 mg at 06/22/21 0245        OBJECTIVE     VITAL SIGNS:     Vital Last Value 24 Hour Range   Temperature 98.1 °F (36.7 °C) (06/21/21 2301) Temp  Min: 97.5 °F (36.4 °C)  Max: 98.6 °F (37 °C)   Pulse 64 (06/21/21 2301) Pulse  Min: 64  Max: 86   Respiratory 16 (06/22/21 0345) Resp  Min: 16  Max: 20   Non-Invasive  Blood Pressure 106/58 (06/21/21 2301) BP  Min: 101/55  Max: 138/72   Pulse Oximetry 96 % (06/21/21 2301) SpO2  Min: 95 %  Max: 99 %       IINTAKE/OUTPUT:  I/O last 3 completed shifts:  In: 480 [P.O.:480]  Out: 1400 [Urine:1400]  No intake/output data recorded.    Intake/Output Summary (Last 24 hours) at 6/22/2021 0727  Last data filed at 6/22/2021 0500  Gross per 24 hour   Intake 480 ml   Output 1400 ml   Net -920 ml       PHYSICAL EXAM    Physical Exam  Constitutional:       Appearance: Normal appearance.   HENT:      Head: Normocephalic and atraumatic.      Nose: Nose normal.      Mouth/Throat:      Mouth: Mucous membranes are moist.   Cardiovascular:      Rate and Rhythm: Normal rate and regular rhythm.      Pulses: Normal pulses.      Heart sounds: Normal heart sounds.   Pulmonary:      Effort: Pulmonary effort is normal.      Breath sounds: Normal breath sounds.   Abdominal:      General: Abdomen is flat. Bowel sounds are normal.      Palpations: Abdomen is soft.   Skin:     General: Skin is warm and dry.      Comments: Evidence of LLE erythema, less from yesterday.    Neurological:      Mental Status: He is alert.         LABS    Recent Labs   Lab 06/22/21  0625 06/21/21  1012   SODIUM 129* 128*   POTASSIUM 3.2* 2.8*   CHLORIDE 94* 90*   CO2 30 30   ANIONGAP 8* 11   BUN 60*  63*   CREATININE 1.87* 2.07*   CALCIUM 9.0 8.5   GLUCOSE 299* 343*         Recent Labs   Lab 06/22/21  0624 06/21/21  1012   HGB 9.4* 9.6*   HCT 28.9* 27.8*    156   WBC 5.8 9.7         Recent Labs   Lab 06/22/21  0625 06/21/21  1012   MG 2.6* 2.2         No results found      Recent Labs   Lab 06/22/21  0625 06/21/21  1012   ALBUMIN 3.0* 3.3*   AST 22 11       UA  Lab Results   Component Value Date    UWBC MODERATE (A) 09/29/2020    URBC LARGE (A) 09/29/2020        IMAGING  XR FOOT MIN 3 VIEWS LEFT   Final Result   Suspect active erosions/osteomyelitis of the head of the 3rd   metatarsal as medial cortical outline is no longer visualized.  Interval   healing of previous active processes involving the 1st metatarsal   proximally in the 2nd metatarsophalangeal amputation site.  The      Electronically Signed by: BRE SOMMERS M.D.    Signed on: 6/21/2021 1:00 PM          MRI FOOT LEFT WO CONTRAST    (Results Pending)           ASSESSMENT AND PLAN   Davidson is a 64 year old male with pmh of Cellulitis of Left foot, osteomyelitis, DM, HFpEF (65% on Echo 2020), HTN, HLD, mealanoma, neuropathy, who presents with right foot discomfort and redness.      #Lower Extremity Erythema   #LLE discomfort  Hx of osteo/cellulitis, last noted   Foot Xray this admission cannot rule out osteomyelitis   Plan:  ID on consult: -   -f/u cultures and MRI  - continue cefepime for now  - dc daptomycin given clinical improvement of nonpurulent cellulitis  - if symptoms recur, then consider gout therapy  - podiatry following      #Pseudohyponatremia  Na 129 this am, corrected 132-134  Plan:  Trend NA     #BRANDON - improving   Pt takes diuretic at home   Creatinine this am downtrending  Plan:  Continue IVF, hold bumex      DM 2  BG this am 299  Pt takes 35 units glargine nightly  Aspart 10 units TID with meals  3 units SSI received overnight  Plan:  Continue home dose glargine 35 units nightly  SSI if BG>200        Hx of HfPEF  Hx of  HTN  Hx of HLD  Plan:  Continue Carvedilol 12.5 mg  Continue Rosuvastatin 40mg  Bumex 2mg tablet daily held in the setting of magda     Hx of BPH  Plan:  Continue Tamsulosin 0.4 mg     Hx of Asthma  Plan:  Continue albuterol inhaler     FEN: Monitor and replete lytes prn.   PPX: SCDs. Heparin   Activity: Advance per baseline.   Code:  Code Status: Full Resuscitation     Discussed with attending. Please see attending physician note for final recommendations.     Mikayla Schaffer MD  PGY-1 Internal Medicine  [employer]

## 2021-06-24 ENCOUNTER — OFFICE VISIT (OUTPATIENT)
Dept: NEUROLOGY | Age: 31
End: 2021-06-24
Payer: COMMERCIAL

## 2021-06-24 VITALS
WEIGHT: 194.6 LBS | DIASTOLIC BLOOD PRESSURE: 90 MMHG | SYSTOLIC BLOOD PRESSURE: 110 MMHG | RESPIRATION RATE: 18 BRPM | OXYGEN SATURATION: 98 % | HEART RATE: 80 BPM | TEMPERATURE: 98 F | BODY MASS INDEX: 34.47 KG/M2

## 2021-06-24 DIAGNOSIS — G89.29 CHRONIC RIGHT FLANK PAIN: ICD-10-CM

## 2021-06-24 DIAGNOSIS — M79.7 FIBROMYALGIA: ICD-10-CM

## 2021-06-24 DIAGNOSIS — B02.29 PHN (POSTHERPETIC NEURALGIA): ICD-10-CM

## 2021-06-24 DIAGNOSIS — R52 PAIN: ICD-10-CM

## 2021-06-24 DIAGNOSIS — M54.2 NECK PAIN: ICD-10-CM

## 2021-06-24 DIAGNOSIS — M54.16 LUMBAR RADICULOPATHY: ICD-10-CM

## 2021-06-24 DIAGNOSIS — R10.9 CHRONIC RIGHT FLANK PAIN: ICD-10-CM

## 2021-06-24 DIAGNOSIS — M50.20 PROTRUSION OF CERVICAL INTERVERTEBRAL DISC: ICD-10-CM

## 2021-06-24 DIAGNOSIS — M79.18 MYOFASCIAL MUSCLE PAIN: ICD-10-CM

## 2021-06-24 DIAGNOSIS — M54.50 RIGHT-SIDED LOW BACK PAIN WITHOUT SCIATICA, UNSPECIFIED CHRONICITY: ICD-10-CM

## 2021-06-24 DIAGNOSIS — R42 DIZZINESS: ICD-10-CM

## 2021-06-24 DIAGNOSIS — G62.9 NEUROPATHY: ICD-10-CM

## 2021-06-24 PROCEDURE — 99214 OFFICE O/P EST MOD 30 MIN: CPT | Performed by: PSYCHIATRY & NEUROLOGY

## 2021-06-24 RX ORDER — GABAPENTIN 300 MG/1
300 CAPSULE ORAL 3 TIMES DAILY
Qty: 90 CAPSULE | Refills: 3 | Status: SHIPPED | OUTPATIENT
Start: 2021-06-24 | End: 2021-07-13 | Stop reason: SDUPTHER

## 2021-06-24 RX ORDER — PROPRANOLOL HYDROCHLORIDE 60 MG/1
60 CAPSULE, EXTENDED RELEASE ORAL
Qty: 30 CAPSULE | Refills: 1 | Status: SHIPPED | OUTPATIENT
Start: 2021-06-24 | End: 2021-08-24

## 2021-06-24 RX ORDER — BUPRENORPHINE 5 UG/H
1 PATCH TRANSDERMAL
Qty: 4 PATCH | Refills: 3 | Status: SHIPPED | OUTPATIENT
Start: 2021-06-24 | End: 2021-07-02 | Stop reason: SDUPTHER

## 2021-06-24 RX ORDER — LIDOCAINE 50 MG/G
PATCH TOPICAL
Qty: 30 EACH | Refills: 0 | Status: SHIPPED | OUTPATIENT
Start: 2021-06-24 | End: 2021-12-27

## 2021-06-24 NOTE — LETTER
NOTIFICATION RETURN TO WORK / SCHOOL    6/24/2021 9:54 AM    Ms. Sabine Brody  [address] [place] [id number]      To Whom It May Concern:    Sabine Brody is currently under the care of [address] [place] [address]. She will return to work on 7/2/2021    If there are questions or concerns please have the patient contact our office.         Sincerely,      Marcos De La Cruz MD

## 2021-06-24 NOTE — PROGRESS NOTES
Neurology Progress Note    NAME:  Kathleen Ogden   :   1990   MRN:   [id number]     Date/Time:  2021  Subjective:   Kathleen Ogden is a 28 y.o. female here today for follow-up for headache, sharp pain on the right side of the head and face with generalized body pain and frequent fall, test results  Patient says she still having the symptoms, currently having right flank pain, pain is sharp in nature, comes intermittently, no aggravating or relieving factor. She also experiencing low back pain, pain is sharp in nature, burning sensation that goes down to the legs causing numbness and tingling sensation. Patient says sometimes the legs are weak and she will fall. Due to persistent low back pain, I will obtain MRI of the lumbosacral spine to evaluate for radiculopathy. EMG/nerve conduction study reviewed with patient was unremarkable for peripheral neuropathy with possible radiculopathy  Patient says she continues to have the symptoms she has not improved much, vertigo and dizziness with generalized pain still persisted. She says everything is pretty much the same. .  She noted that she has been having increasing numbness and tingling sensation mostly of the lower extremity. She says she is still having difficulty focusing and concentrating, she says with the symptoms lately she has been having a very difficult to complete any task. Patient has not started physical therapy mostly because of the pain. Patient noted that she has been having a lot of spasms and muscle pain, she says still uses sometimes using warm compresses help with the pain but if it is too hot it makes her to be weak. .  Patient says she still feels dizzy and her headache has not relented. Patient says she is still in excruciating pain. She says the pain is sharp in nature, mostly in the right side of the face and behind the eye, as if it is coming from the back of the neck and this is associated with dizziness.    She says the right side of the face is still sensitive to touch.     will have to evaluate patient to see if she is capable of returning to work. Patient was prescribed Butrans, however the insurance is yet to approve, if Sergio Semen has been approved and patient started using it may be the pain would have reduced size to allow patient to start physical therapy. Review of Systems - General ROS: positive for  - fatigue and sleep disturbance  Psychological ROS: positive for - anxiety and sleep disturbances  Ophthalmic ROS: positive for - blurry vision, decreased vision, double vision and photophobia  ENT ROS: positive for - headaches, tinnitus, vertigo and visual changes  Allergy and Immunology ROS: negative  Hematological and Lymphatic ROS: negative  Endocrine ROS: negative  Respiratory ROS: no cough, shortness of breath, or wheezing  Cardiovascular ROS: negative  Gastrointestinal ROS: no abdominal pain, change in bowel habits, or black or bloody stools  Genito-Urinary ROS: no dysuria, trouble voiding, or hematuria  Musculoskeletal ROS: positive for - muscular weakness  Neurological ROS: positive for - dizziness, headaches, numbness/tingling, visual changes and weakness  Dermatological ROS: negative          Medications reviewed:  Current Outpatient Medications   Medication Sig Dispense Refill    gabapentin (NEURONTIN) 300 mg capsule Take 1 Capsule by mouth three (3) times daily. Max Daily Amount: 900 mg. 90 Capsule 3    lidocaine (LIDODERM) 5 % Apply patch to the back of the neck for 12 hours a day and remove for 12 hours a day. 30 Each 0    propranolol LA (INDERAL LA) 60 mg SR capsule Take 1 Capsule by mouth nightly. 30 Capsule 1    loratadine (Claritin) 10 mg tablet Claritin      Gaviscon Extra Strength 254-237.5 mg/5 mL susp TAKE 5 ML BY MOUTH TWICE DAILY AS NEEDED FOR HEARTBURN      ondansetron (Zofran ODT) 4 mg disintegrating tablet Take 1 Tablet by mouth every eight (8) hours as needed for Nausea.  10 Tablet 0    galcanezumab-gnlm (Emgality Syringe) 120 mg/mL syrg 120 mg by SubCUTAneous route every thirty (30) days. 1 mL 3    meclizine (ANTIVERT) 25 mg tablet Take 1 Tab by mouth three (3) times daily as needed for Dizziness. 30 Tab 1    ondansetron (Zofran ODT) 4 mg disintegrating tablet Take 1 Tab by mouth every eight (8) hours as needed for Nausea. 10 Tab 0    ergocalciferol (ERGOCALCIFEROL) 1,250 mcg (50,000 unit) capsule TAKE ONE CAPSULE BY MOUTH EVERY MONDAY AND THURSDAY 8 Cap 2    cholecalciferol (Vitamin D3) 25 mcg (1,000 unit) cap Take 1,000 Units by mouth daily.  famotidine (PEPCID) 40 mg tablet Take 40 mg by mouth daily.  polyethylene glycol (MIRALAX) 17 gram/dose powder Take 17 g by mouth daily. 510 g 0    medroxyPROGESTERone (PROVERA) 10 mg tablet Take 10 mg by mouth three (3) times daily.  dicyclomine HCl (BENTYL PO) Take  by mouth four (4) times daily. Indications: As needed for pain      fluticasone propionate (FLONASE) 50 mcg/actuation nasal spray 2 Sprays by Both Nostrils route daily. Indications: As needed for allergies      multivitamin (MULTIPLE VITAMIN PO) Take  by mouth. Indications: Teresita fusion multi vitamin/folate 50 mg (2 gummies) by gyn dr Alisa Torrez loratadine (CLARITIN) 10 mg tablet Take 10 mg by mouth daily. (Patient not taking: Reported on 6/28/2021)      pantoprazole (PROTONIX) 40 mg tablet Take 40 mg by mouth two (2) times a day.  sucralfate (CARAFATE) 100 mg/mL suspension Take  by mouth four (4) times daily.  albuterol (PROVENTIL VENTOLIN) 2.5 mg /3 mL (0.083 %) nebulizer solution 2.5 mg by Nebulization route once.  buprenorphine (Butrans) 5 mcg/hour patch 1 Patch by TransDERmal route every seven (7) days. Max Daily Amount: 1 Patch. 4 Patch 3    lisdexamfetamine (Vyvanse) 30 mg capsule Take 1 Capsule by mouth every morning. Max Daily Amount: 30 mg. 30 Capsule 0    predniSONE (DELTASONE) 10 mg tablet Take 10 mg by mouth two (2) times a day. Take 1 tab p.o. tid x3days, 1 tab bid x5 days ,1 p.o.qd (Patient not taking: Reported on 6/24/2021) 30 Tablet 0    DULoxetine (CYMBALTA) 30 mg capsule Take 1 Cap by mouth daily. (Patient not taking: Reported on 5/24/2021) 30 Cap 1    cyanocobalamin 1,000 mcg tablet Take 1,000 mcg by mouth daily. (Patient not taking: Reported on 5/24/2021)      fluticasone-umeclidinium-vilanterol (Trelegy Ellipta) 100-62.5-25 mcg inhaler Take 1 Puff by inhalation daily. (Patient not taking: Reported on 5/24/2021)      naproxen sodium (ALEVE) 220 mg cap Take  by mouth. (Patient not taking: Reported on 5/24/2021)      ibuprofen (MOTRIN) 600 mg tablet Take 1 Tab by mouth three (3) times daily (with meals). (Patient not taking: Reported on 6/24/2021) 21 Tab 0    naloxone (NARCAN) 4 mg/actuation nasal spray Use 1 spray intranasally, then discard. Repeat with new spray every 2 min as needed for opioid overdose symptoms, alternating nostrils.  (Patient not taking: Reported on 5/24/2021) 2 Each 0        Objective:   Vitals:  Vitals:    06/24/21 0900   BP: (!) 110/90   Pulse: 80   Resp: 18   Temp: 98 °F (36.7 °C)   SpO2: 98%   Weight: 194 lb 9.6 oz (88.3 kg)   PainSc:   0 - No pain               Lab Data Reviewed:  Lab Results   Component Value Date/Time    WBC 14.4 (H) 06/07/2021 04:33 AM    HCT 45.4 06/07/2021 04:33 AM    HGB 15.1 06/07/2021 04:33 AM    PLATELET 264 82/49/0226 04:33 AM       Lab Results   Component Value Date/Time    Sodium 135 (L) 06/07/2021 04:33 AM    Potassium 4.6 06/07/2021 04:33 AM    Chloride 104 06/07/2021 04:33 AM    CO2 28 06/07/2021 04:33 AM    Glucose 119 (H) 06/07/2021 04:33 AM    BUN 19 06/07/2021 04:33 AM    Creatinine 0.99 06/07/2021 04:33 AM    Calcium 9.3 06/07/2021 04:33 AM       No components found for: TROPQUANT    No results found for: RASHAWN      No results found for: HBA1C, [id number], [id number]     Lab Results   Component Value Date/Time    Vitamin B12 482 12/14/2020 10:44 AM       No results found for: RASHAWN, ANARX, Sotomayor Daily    No results found for: CHOL, CHOLPOCT, CHOLX, CHLST, CHOLV, HDL, HDLPOC, HDLP, LDL, LDLCPOC, LDLC, DLDLP, VLDLC, VLDL, TGLX, TRIGL, TRIGP, TGLPOCT, CHHD, CHHDX      CT Results (recent):  Results from Hospital Encounter encounter on 06/07/21    CT ABD PELV WO CONT    Narrative  EXAM: CT ABD PELV WO CONT    INDICATION: right flank and right mid abd pain    COMPARISON: 2019    CONTRAST:  None. TECHNIQUE:  Thin axial images were obtained through the abdomen and pelvis. Coronal and  sagittal reformats were generated. Oral contrast was not administered. CT dose  reduction was achieved through use of a standardized protocol tailored for this  examination and automatic exposure control for dose modulation. The absence of intravenous contrast material reduces the sensitivity for  evaluation of the vasculature and solid organs. FINDINGS:  LOWER THORAX: No significant abnormality in the incidentally imaged lower chest.  LIVER: Hepatic steatosis. BILIARY TREE: Cholecystectomy. CBD is not dilated. SPLEEN: within normal limits. PANCREAS: No focal abnormality. ADRENALS: Unremarkable. KIDNEYS/URETERS: Right nephrolithiasis. No hydronephrosis. STOMACH: Unremarkable. SMALL BOWEL: No dilatation or wall thickening. COLON: No dilatation or wall thickening. APPENDIX: Normal.  PERITONEUM: No ascites or pneumoperitoneum. RETROPERITONEUM: No lymphadenopathy or aortic aneurysm. REPRODUCTIVE ORGANS: Normal.  URINARY BLADDER: No mass or calculus. BONES: No destructive bone lesion. ABDOMINAL WALL: No mass or hernia. ADDITIONAL COMMENTS: N/A    Impression  Right nephrolithiasis.       MRI Results (recent):  Results from [place] encounter on 05/25/21    MRI CERV SPINE W WO CONT    Narrative  EXAM: MRI CERV SPINE W WO CONT  Clinical history: Evaluate for demyelinating disease  INDICATION: . Demyelinating disease of central nervous system, unspecified    COMPARISON: None    TECHNIQUE: MR imaging of the cervical spine was performed using the following  sequences: sagittal T1, T2, STIR;  axial T2, T1 prior to and following contrast  administration. CONTRAST: 15 mL of Dotarem. FINDINGS:    The degree of motion artifact which slightly limits evaluation. Vertebral body  heights are maintained. Marrow signal is normal.    The craniocervical junction is intact. The course, caliber, and signal intensity  of the spinal cord are normal. There is no pathologic intrathecal enhancement. Left vertebral artery is dominant. The paraspinal soft tissues are within normal limits. C2-C3: No herniation or stenosis. C3-C4: No herniation or stenosis. C4-C5: No herniation or stenosis. C5-C6: Disc desiccation. Minimal disc bulge. Minimal right paracentral  protrusion. Mild canal stenosis. Foramina are patent. C6-C7: No herniation or stenosis. C7-T1: No herniation or stenosis. Impression  No definite cord signal abnormality suggestive of demyelinating process is  identified. Mild canal stenosis and minimal right paracentral protrusion at C5-C6. IR Results (recent):  No results found for this or any previous visit. VAS/US Results (recent):  No results found for this or any previous visit. PHYSICAL EXAM:  General:    Alert, cooperative, no distress, appears stated age. Head:   Normocephalic, without obvious abnormality, atraumatic. Eyes:   Conjunctivae/corneas clear. PERRLA  Nose:  Nares normal. No drainage or sinus tenderness. Throat:    Lips, mucosa, and tongue normal.  No Thrush  Neck:  Supple, symmetrical,  no adenopathy, thyroid: non tender    no carotid bruit and no JVD. Severe paraspinal and occipitalis tenderness  Back:    Symmetric, diffuse tenderness. Lungs:   Clear to auscultation bilaterally. No Wheezing or Rhonchi. No rales. Chest wall:  No tenderness or deformity. No Accessory muscle use. Heart:   Regular rate and rhythm,  no murmur, rub or gallop.   Abdomen:   Soft, non-tender. Not distended. Bowel sounds normal. No masses  Extremities: Extremities normal, atraumatic, No cyanosis. No edema. No clubbing  Skin:     Texture, turgor normal. No rashes or lesions. Not Jaundiced  Lymph nodes: Cervical, supraclavicular normal.  Psych:  Good insight. Not depressed. Anxious. NEUROLOGICAL EXAM:  Appearance: The patient is well developed, well nourished, provides a coherent history and is in no acute distress. Mental Status: Oriented to time, place and person. Mood and affect appropriate. Cranial Nerves:   Intact visual fields. Fundi are benign. OANH, EOM's full, no nystagmus, no ptosis. Facial sensation dysesthesia. Corneal reflexes are intact. Facial movement is symmetric. Hearing is normal bilaterally. Palate is midline with normal sternocleidomastoid and trapezius muscles are normal. Tongue is midline. Motor:  5/5 strength in upper and lower proximal and distal muscles. Normal bulk and tone. No fasciculations. Reflexes:   Deep tendon reflexes 3+/4 and symmetrical.   Sensory:    Dysesthesia to touch, pinprick and vibration. Gait:   Slightly unsteady gait. Tremor:   No tremor noted. Cerebellar:  No cerebellar signs present. Neurovascular:  Normal heart sounds and regular rhythm, peripheral pulses intact, and no carotid bruits. Assesment  1. Chronic migraine  Emgality    2. Fibromyalgia  Rheumatology    3. Lumbar radiculopathy    - gabapentin (NEURONTIN) 300 mg capsule; Take 1 Capsule by mouth three (3) times daily. Max Daily Amount: 900 mg. Dispense: 90 Capsule; Refill: 3  - MRI LUMB SPINE WO CONT; Future    4. PHN (postherpetic neuralgia)    - lidocaine (LIDODERM) 5 %; Apply patch to the back of the neck for 12 hours a day and remove for 12 hours a day. Dispense: 30 Each; Refill: 0    5. Protrusion of cervical intervertebral disc  Physical therapy    6. Dizziness  Physical therapy    7. Myofascial muscle pain    - MRI LUMB SPINE WO CONT; Future    8. Neuropathy    - gabapentin (NEURONTIN) 300 mg capsule; Take 1 Capsule by mouth three (3) times daily. Max Daily Amount: 900 mg. Dispense: 90 Capsule; Refill: 3    9. Right-sided low back pain without sciatica, unspecified chronicity    - gabapentin (NEURONTIN) 300 mg capsule; Take 1 Capsule by mouth three (3) times daily. Max Daily Amount: 900 mg. Dispense: 90 Capsule; Refill: 3  - MRI LUMB SPINE WO CONT; Future    10. Pain  Continue management    11. Chronic right flank pain  Following GI    12. Neck pain    - lidocaine (LIDODERM) 5 %; Apply patch to the back of the neck for 12 hours a day and remove for 12 hours a day. Dispense: 30 Each; Refill: 0    ___________________________________________________  PLAN: Medication and plan discussed with patient      ICD-10-CM ICD-9-CM    1. Chronic migraine  G43.709 346.70    2. Fibromyalgia  M79.7 729.1    3. Lumbar radiculopathy  M54.16 724.4 gabapentin (NEURONTIN) 300 mg capsule      MRI LUMB SPINE WO CONT   4. PHN (postherpetic neuralgia)  B02.29 053.19 lidocaine (LIDODERM) 5 %      DISCONTINUED: buprenorphine (Butrans) 5 mcg/hour patch   5. Protrusion of cervical intervertebral disc  M50.20 722.0    6. Dizziness  R42 780.4    7. Myofascial muscle pain  M79.18 729.1 MRI LUMB SPINE WO CONT      DISCONTINUED: buprenorphine (Butrans) 5 mcg/hour patch   8. Neuropathy  G62.9 355.9 gabapentin (NEURONTIN) 300 mg capsule   9. Right-sided low back pain without sciatica, unspecified chronicity  M54.5 724.2 gabapentin (NEURONTIN) 300 mg capsule      MRI LUMB SPINE WO CONT   10. Pain  R52 780.96 DISCONTINUED: buprenorphine (Butrans) 5 mcg/hour patch   11. Chronic right flank pain  R10.9 789.09 DISCONTINUED: buprenorphine (Butrans) 5 mcg/hour patch    G89.29 338.29    12.  Neck pain  M54.2 723.1 lidocaine (LIDODERM) 5 %           ___________________________________________________    Attending Physician: Omega Fontenot MD

## 2021-06-28 ENCOUNTER — OFFICE VISIT (OUTPATIENT)
Dept: NEUROLOGY | Age: 31
End: 2021-06-28
Payer: COMMERCIAL

## 2021-06-28 VITALS
DIASTOLIC BLOOD PRESSURE: 84 MMHG | HEART RATE: 64 BPM | WEIGHT: 194.2 LBS | OXYGEN SATURATION: 98 % | BODY MASS INDEX: 34.4 KG/M2 | TEMPERATURE: 98.3 F | RESPIRATION RATE: 20 BRPM | SYSTOLIC BLOOD PRESSURE: 119 MMHG

## 2021-06-28 DIAGNOSIS — M54.16 LUMBAR RADICULOPATHY: ICD-10-CM

## 2021-06-28 DIAGNOSIS — M50.20 PROTRUSION OF CERVICAL INTERVERTEBRAL DISC: ICD-10-CM

## 2021-06-28 DIAGNOSIS — R42 DIZZINESS: ICD-10-CM

## 2021-06-28 DIAGNOSIS — M79.7 FIBROMYALGIA: ICD-10-CM

## 2021-06-28 DIAGNOSIS — B02.29 PHN (POSTHERPETIC NEURALGIA): ICD-10-CM

## 2021-06-28 DIAGNOSIS — R20.2 PARESTHESIA: ICD-10-CM

## 2021-06-28 DIAGNOSIS — G43.109 COMPLICATED MIGRAINE: ICD-10-CM

## 2021-06-28 PROCEDURE — 99214 OFFICE O/P EST MOD 30 MIN: CPT | Performed by: PSYCHIATRY & NEUROLOGY

## 2021-06-28 NOTE — PROGRESS NOTES
Neurology Progress Note    NAME:  Hay Christopher   :   1990   MRN:   [id number]     Date/Time:  2021  Subjective:     Hay Christopher is a 28 y.o. female here today for follow-up for headache, sharp pain on the right side of the head and face with generalized body pain and frequent fall, test results  Patient says she is still having the  the symptoms, however, she says only very little improvement with improvement  vertigo and dizziness. She says she is still experiencing generalized pain, pain is worse with activity. Patient says the right flank pain which is sharp in nature is still persistent but the preliminary results from GI did not reveal anything much. Patient was prescribed Butrans for the chronic pain but she is yet to get the medication because insurance needed authorization. She also has not been able to get Emgality  It should be recalled that  patient's pain is becoming chronic, has not responded to anti-inflammatory medication. I  She says she still having increasing numbness and tingling sensation mostly of the lower extremity. EMG/nerve conduction study reviewed with patient was unremarkable for peripheral neuropathy but remarkable for possible radiculopathy. MRI of the lumbosacral spine has been done  Patient continues to have difficulty difficulty focusing and concentrating, she says with the symptoms lately she has been having a very difficult to complete any task. She says she is always very tired with extremity weakness. Patient said that her legs are giving out on her and she had fallen couple of times because of the weakness, numbness and tingling sensation. Patient is also recommended to start physical therapy  Falls have reduced in frequency. Patient is yet to start physical therapy mostly because of the pain.   Patient says she  still has a lot of spasms and muscle pain, she says  sometimes using warm compresses help with the pain but if it is too hot it makes her to be weak. She also says she has been having a lot of abdominal pain, her GI problem is being followed by GI  Patient says she still feels dizzy but not as frequent as before. She says the pain is sharp in nature, mostly in the right side of the face and behind the eye, as if it is coming from the back of the neck and this is associated with dizziness. She says the right side of the face continues to be sensitive to touch. However the headache is unrelenting. .  Review of Systems - General ROS: positive for  - fatigue and sleep disturbance  Psychological ROS: positive for - anxiety and sleep disturbances  Ophthalmic ROS: positive for - blurry vision, decreased vision, double vision and photophobia  ENT ROS: positive for - headaches, tinnitus, vertigo and visual changes  Allergy and Immunology ROS: negative  Hematological and Lymphatic ROS: negative  Endocrine ROS: negative  Respiratory ROS: no cough, shortness of breath, or wheezing  Cardiovascular ROS: negative  Gastrointestinal ROS: no abdominal pain, change in bowel habits, or black or bloody stools  Genito-Urinary ROS: no dysuria, trouble voiding, or hematuria  Musculoskeletal ROS: positive for - muscular weakness  Neurological ROS: positive for - dizziness, headaches, numbness/tingling, visual changes and weakness  Dermatological ROS: negative   At this time, I will send patient back to work with restriction, hoping that if Butrans patch is authorized by insurance may be that will ease the persistent pain. Emgality will help to cut down the frequency of headache. Medications reviewed:  Current Outpatient Medications   Medication Sig Dispense Refill    gabapentin (NEURONTIN) 300 mg capsule Take 1 Capsule by mouth three (3) times daily. Max Daily Amount: 900 mg. 90 Capsule 3    buprenorphine (Butrans) 5 mcg/hour patch 1 Patch by TransDERmal route every seven (7) days. Max Daily Amount: 1 Patch.  4 Patch 3    lidocaine (LIDODERM) 5 % Apply patch to the back of the neck for 12 hours a day and remove for 12 hours a day. 30 Each 0    propranolol LA (INDERAL LA) 60 mg SR capsule Take 1 Capsule by mouth nightly. 30 Capsule 1    loratadine (Claritin) 10 mg tablet Claritin      Gaviscon Extra Strength 254-237.5 mg/5 mL susp TAKE 5 ML BY MOUTH TWICE DAILY AS NEEDED FOR HEARTBURN      ondansetron (Zofran ODT) 4 mg disintegrating tablet Take 1 Tablet by mouth every eight (8) hours as needed for Nausea. 10 Tablet 0    lisdexamfetamine (Vyvanse) 30 mg capsule Take 1 Capsule by mouth every morning. Max Daily Amount: 30 mg. 30 Capsule 0    galcanezumab-gnlm (Emgality Syringe) 120 mg/mL syrg 120 mg by SubCUTAneous route every thirty (30) days. 1 mL 3    meclizine (ANTIVERT) 25 mg tablet Take 1 Tab by mouth three (3) times daily as needed for Dizziness. 30 Tab 1    ondansetron (Zofran ODT) 4 mg disintegrating tablet Take 1 Tab by mouth every eight (8) hours as needed for Nausea. 10 Tab 0    ergocalciferol (ERGOCALCIFEROL) 1,250 mcg (50,000 unit) capsule TAKE ONE CAPSULE BY MOUTH EVERY MONDAY AND THURSDAY 8 Cap 2    cholecalciferol (Vitamin D3) 25 mcg (1,000 unit) cap Take 1,000 Units by mouth daily.  famotidine (PEPCID) 40 mg tablet Take 40 mg by mouth daily.  polyethylene glycol (MIRALAX) 17 gram/dose powder Take 17 g by mouth daily. 510 g 0    medroxyPROGESTERone (PROVERA) 10 mg tablet Take 10 mg by mouth three (3) times daily.  dicyclomine HCl (BENTYL PO) Take  by mouth four (4) times daily. Indications: As needed for pain      fluticasone propionate (FLONASE) 50 mcg/actuation nasal spray 2 Sprays by Both Nostrils route daily. Indications: As needed for allergies      multivitamin (MULTIPLE VITAMIN PO) Take  by mouth. Indications: Teresita fusion multi vitamin/folate 50 mg (2 gummies) by gyn dr Jad Rose pantoprazole (PROTONIX) 40 mg tablet Take 40 mg by mouth two (2) times a day.       sucralfate (CARAFATE) 100 mg/mL suspension Take  by mouth four (4) times daily.  albuterol (PROVENTIL VENTOLIN) 2.5 mg /3 mL (0.083 %) nebulizer solution 2.5 mg by Nebulization route once.  predniSONE (DELTASONE) 10 mg tablet Take 10 mg by mouth two (2) times a day. Take 1 tab p.o. tid x3days, 1 tab bid x5 days ,1 p.o.qd (Patient not taking: Reported on 6/24/2021) 30 Tablet 0    DULoxetine (CYMBALTA) 30 mg capsule Take 1 Cap by mouth daily. (Patient not taking: Reported on 5/24/2021) 30 Cap 1    cyanocobalamin 1,000 mcg tablet Take 1,000 mcg by mouth daily. (Patient not taking: Reported on 5/24/2021)      fluticasone-umeclidinium-vilanterol (Trelegy Ellipta) 100-62.5-25 mcg inhaler Take 1 Puff by inhalation daily. (Patient not taking: Reported on 5/24/2021)      naproxen sodium (ALEVE) 220 mg cap Take  by mouth. (Patient not taking: Reported on 5/24/2021)      ibuprofen (MOTRIN) 600 mg tablet Take 1 Tab by mouth three (3) times daily (with meals). (Patient not taking: Reported on 6/24/2021) 21 Tab 0    naloxone (NARCAN) 4 mg/actuation nasal spray Use 1 spray intranasally, then discard. Repeat with new spray every 2 min as needed for opioid overdose symptoms, alternating nostrils. (Patient not taking: Reported on 5/24/2021) 2 Each 0    loratadine (CLARITIN) 10 mg tablet Take 10 mg by mouth daily.  (Patient not taking: Reported on 6/28/2021)          Objective:   Vitals:  Vitals:    06/28/21 1155   BP: 119/84   Pulse: 64   Resp: 20   Temp: 98.3 °F (36.8 °C)   SpO2: 98%   Weight: 194 lb 3.2 oz (88.1 kg)   PainSc:   6   PainLoc: Generalized               Lab Data Reviewed:  Lab Results   Component Value Date/Time    WBC 14.4 (H) 06/07/2021 04:33 AM    HCT 45.4 06/07/2021 04:33 AM    HGB 15.1 06/07/2021 04:33 AM    PLATELET 467 28/16/0651 04:33 AM       Lab Results   Component Value Date/Time    Sodium 135 (L) 06/07/2021 04:33 AM    Potassium 4.6 06/07/2021 04:33 AM    Chloride 104 06/07/2021 04:33 AM    CO2 28 06/07/2021 04:33 AM    Glucose 119 (H) 06/07/2021 04:33 AM    BUN 19 06/07/2021 04:33 AM    Creatinine 0.99 06/07/2021 04:33 AM    Calcium 9.3 06/07/2021 04:33 AM       No components found for: TROPQUANT    No results found for: RASHAWN      No results found for: HBA1C, ALV7UDNI, RPI2ZFUW     Lab Results   Component Value Date/Time    Vitamin B12 482 12/14/2020 10:44 AM       No results found for: RASHAWN, ANARX, ANAIGG, XBANA    No results found for: CHOL, CHOLPOCT, CHOLX, CHLST, CHOLV, HDL, HDLPOC, HDLP, LDL, LDLCPOC, LDLC, DLDLP, VLDLC, VLDL, TGLX, TRIGL, TRIGP, TGLPOCT, CHHD, CHHDX      CT Results (recent):  Results from Hospital Encounter encounter on 06/07/21    CT ABD PELV WO CONT    Narrative  EXAM: CT ABD PELV WO CONT    INDICATION: right flank and right mid abd pain    COMPARISON: 2019    CONTRAST:  None. TECHNIQUE:  Thin axial images were obtained through the abdomen and pelvis. Coronal and  sagittal reformats were generated. Oral contrast was not administered. CT dose  reduction was achieved through use of a standardized protocol tailored for this  examination and automatic exposure control for dose modulation. The absence of intravenous contrast material reduces the sensitivity for  evaluation of the vasculature and solid organs. FINDINGS:  LOWER THORAX: No significant abnormality in the incidentally imaged lower chest.  LIVER: Hepatic steatosis. BILIARY TREE: Cholecystectomy. CBD is not dilated. SPLEEN: within normal limits. PANCREAS: No focal abnormality. ADRENALS: Unremarkable. KIDNEYS/URETERS: Right nephrolithiasis. No hydronephrosis. STOMACH: Unremarkable. SMALL BOWEL: No dilatation or wall thickening. COLON: No dilatation or wall thickening. APPENDIX: Normal.  PERITONEUM: No ascites or pneumoperitoneum. RETROPERITONEUM: No lymphadenopathy or aortic aneurysm. REPRODUCTIVE ORGANS: Normal.  URINARY BLADDER: No mass or calculus. BONES: No destructive bone lesion. ABDOMINAL WALL: No mass or hernia.   ADDITIONAL COMMENTS: N/A    Impression  Right nephrolithiasis. MRI Results (recent):  Results from [place] encounter on 05/25/21    MRI CERV SPINE W WO CONT    Narrative  EXAM: MRI CERV SPINE W WO CONT  Clinical history: Evaluate for demyelinating disease  INDICATION: . Demyelinating disease of central nervous system, unspecified    COMPARISON: None    TECHNIQUE: MR imaging of the cervical spine was performed using the following  sequences: sagittal T1, T2, STIR;  axial T2, T1 prior to and following contrast  administration. CONTRAST: 15 mL of Dotarem. FINDINGS:    The degree of motion artifact which slightly limits evaluation. Vertebral body  heights are maintained. Marrow signal is normal.    The craniocervical junction is intact. The course, caliber, and signal intensity  of the spinal cord are normal. There is no pathologic intrathecal enhancement. Left vertebral artery is dominant. The paraspinal soft tissues are within normal limits. C2-C3: No herniation or stenosis. C3-C4: No herniation or stenosis. C4-C5: No herniation or stenosis. C5-C6: Disc desiccation. Minimal disc bulge. Minimal right paracentral  protrusion. Mild canal stenosis. Foramina are patent. C6-C7: No herniation or stenosis. C7-T1: No herniation or stenosis. Impression  No definite cord signal abnormality suggestive of demyelinating process is  identified. Mild canal stenosis and minimal right paracentral protrusion at C5-C6. IR Results (recent):  No results found for this or any previous visit. VAS/US Results (recent):  No results found for this or any previous visit. PHYSICAL EXAM:  General:    Alert, cooperative, no distress, appears stated age. Head:   Normocephalic, without obvious abnormality, atraumatic. Eyes:   Conjunctivae/corneas clear. PERRLA  Nose:  Nares normal. No drainage or sinus tenderness.   Throat:    Lips, mucosa, and tongue normal.  No Thrush  Neck:  Supple, symmetrical,  no adenopathy, thyroid: non tender    no carotid bruit and no JVD. Severe paraspinal and occipitalis tenderness  Back:    Symmetric, diffuse tenderness. Lungs:   Clear to auscultation bilaterally. No Wheezing or Rhonchi. No rales. Chest wall:  No tenderness or deformity. No Accessory muscle use. Heart:   Regular rate and rhythm,  no murmur, rub or gallop. Abdomen:   Soft, non-tender. Not distended. Bowel sounds normal. No masses  Extremities: Extremities normal, atraumatic, No cyanosis. No edema. No clubbing  Skin:     Texture, turgor normal. No rashes or lesions. Not Jaundiced  Lymph nodes: Cervical, supraclavicular normal.  Psych:  Good insight. D Emgality epressed. Anxious. NEUROLOGICAL EXAM:  Appearance: The patient is well developed, well nourished, provides a coherent history and is in no acute distress. Mental Status: Oriented to time, place and person. Mood and affect appropriate. Cranial Nerves:   Intact visual fields. Fundi are benign. OANH, EOM's full, no nystagmus, no ptosis. Facial sensation dysesthesia. Corneal reflexes are intact. Facial movement is symmetric. Hearing is normal bilaterally. Palate is midline with normal sternocleidomastoid and trapezius muscles are normal. Tongue is midline. Motor:  5/5 strength in upper and lower proximal and distal muscles. Normal bulk and tone. No fasciculations. Reflexes:   Deep tendon reflexes 3+/4 and symmetrical.   Sensory:    Dysesthesia to touch, pinprick and vibration. Gait:   Slightly unsteady gait. Tremor:   No tremor noted. Cerebellar:  No cerebellar signs present. Neurovascular:  Normal heart sounds and regular rhythm, peripheral pulses intact, and no carotid bruits. Assesment  1. Chronic migraine  Emgality    2. Fibromyalgia  Following rheumatology  3. PHN (postherpetic neuralgia)  Lidoderm patch    4. Lumbar radiculopathy  MRI of the lumbosacral spine    5. Complicated migraine  Continue management    6. Paresthesia  Continue management    7. Protrusion of cervical intervertebral disc  Intermittent physical therapy    8. Dizziness  Physical therapy    ___________________________________________________  PLAN: Medication and plan discussed with patient. ICD-10-CM ICD-9-CM    1. Chronic migraine  G43.709 346.70    2. Fibromyalgia  M79.7 729.1    3. PHN (postherpetic neuralgia)  B02.29 053.19    4. Lumbar radiculopathy  M54.16 724.4    5. Complicated migraine  T27.163 346.00    6. Paresthesia  R20.2 782.0    7. Protrusion of cervical intervertebral disc  M50.20 722.0    8. Dizziness  R42 780.4      Follow-up and Dispositions    · Return in about 4 weeks (around 7/26/2021).           :    ___________________________________________________    Attending Physician: Jimbo Martinez MD

## 2021-06-28 NOTE — LETTER
6/28/2021 12:45 PM    Ms. [facility]  [address] [place] [address]      To whom it may concern:    [facility] is currently under the care of [facility] Neurology clinic at [facility]. She will return to Work/School on: 7/2/2021   If you have any questions or concerns  feel free to contact our office at the number listed above.          Sincerely,      Rashel Bolanos MD

## 2021-06-28 NOTE — LETTER
NOTIFICATION RETURN TO WORK / SCHOOL    6/28/2021 12:33 PM     Ms. Rodrigue Crane  [address] [facility] [id number]      To Whom It May Concern:    Rodrigue Crane is currently under the care of [facility] since 4/21 2021   She will return to work on: 07/02/2021 with restriction of no pushing, pulling or lifting of more than 10lb. Patient cannot stand for more than 30 mis at a time. If there are questions or concerns please have the patient contact our office.         Sincerely,      Jose Pena MD

## 2021-06-28 NOTE — LETTER
NOTIFICATION RETURN TO WORK / SCHOOL    6/28/2021 12:50 PM    Ms. Sophia Asencio  [address] [place] [id number]      To Whom It May Concern:    Sophia Asencio is currently under the care of [address] [place] [address]. She will return to work/school on: 6/29/2021    If there are questions or concerns please have the patient contact our office.         Sincerely,      Ariel Cabot, MD

## 2021-06-28 NOTE — LETTER
6/28/2021    Patient: Elisha Miguel   YOB: 1990   Date of Visit: 6/28/2021     Hu Serrano MD  [address] [facility] [address]  Via Fax: [phone]    Dear Hu Serrano MD,      Thank you for referring Ms. Elisha Miguel to [address] [place] [address] for evaluation. My notes for this consultation are attached. If you have questions, please do not hesitate to call me. I look forward to following your patient along with you.       Sincerely,    Vanessa Beltran MD

## 2021-06-29 ENCOUNTER — TELEPHONE (OUTPATIENT)
Dept: NEUROLOGY | Age: 31
End: 2021-06-29

## 2021-06-29 ENCOUNTER — HOSPITAL ENCOUNTER (OUTPATIENT)
Dept: PHYSICAL THERAPY | Age: 31
Discharge: HOME | End: 2021-06-29
Payer: COMMERCIAL

## 2021-06-29 PROCEDURE — 97110 THERAPEUTIC EXERCISES: CPT | Performed by: PHYSICAL THERAPIST

## 2021-06-29 PROCEDURE — 97162 PT EVAL MOD COMPLEX 30 MIN: CPT | Performed by: PHYSICAL THERAPIST

## 2021-06-29 NOTE — PROGRESS NOTES
PT INITIAL EVALUATION NOTE 2-15    Patient Name: Micky Hoffman  Date:2021  : 1990  [x]  Patient  Verified  Payor: Delon Cook / Plan: Boy Benoit / Product Type: HMO /    In time:1215  Out time:103  Total Treatment Time (min): 48  Visit #: 1     Treatment Area: Dizziness [R42]  Fibromyalgia [M79.7]    SUBJECTIVE  Pain Level (0-10 scale): 7  Any medication changes, allergies to medications, adverse drug reactions, diagnosis change, or new procedure performed?: [] No    [x] Yes (see summary sheet for update)  Subjective:     Pt states that she started having pain in April. Prior to that she had minor symptoms of pain all over her body, loss of appetite, migraines. She was given a diagnosis of fibromyalgia in April as well. She is working with PCP and neurologist on this and there is still some concern that she may have MS. She is unable to point to any trigger for her symptoms and states that it always hurts it just depends on the severity. She is on Neurontin which helps the pain some but doesn't eliminate the pain. She has been out of work since April. She will see her PCP  to see if she can return to work with restrictions. 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 She barr shave help at home with household chores. She is not currently in need of assistance for ADL's unless she is currently in a flare up. She also has trigeminal neuralgia which may contribute to her dizziness. She reports that she has dizziness everyday periodically. Each episode lasting a few minutes to multiple hours. She reports when this occurs the room is spinning. She has been unable to determine if there is any consistency or pattern to when she gets these symptoms. Her goals are to return to work 21 without lifting pushing or pulling more than 10 pounds and not standing more than 30 minutes.            OBJECTIVE/EXAMINATION  Posture:  Rounded shoulders and forward head  Other Observations:    Gait and Functional Mobility:  Antalgic, bilateral hip drop, positive toe sign bilaterally, genu recurvatum  Palpation: trigger points and tender spots throughout bilaterally. ROM is WFL but painful throughout. She reports the whole body hurts  Joint Mobility Assessment: not tested  Strength is diminished throughout 3+ to 4-/5    Neurological: Reflexes / Sensations: she reports full arm and leg numbness and tingling to the point that she is unable to use the extremity. She states that the tingling and sensation occurs a few times each week. Since April she has had 10 episodes where she has been unable to use the legs. She states that she has fallen 10 times since April.       12 min Therapeutic Exercise:  [x] See flow sheet :   Rationale: increase ROM, increase strength and improve coordination to improve the patients ability to complete all activity    With   [x] TE   [] TA   [] Neuro   [] SC   [] other: Patient Education: [x] Review HEP    [] Progressed/Changed HEP based on:   [x] positioning   [x] body mechanics   [] transfers   [x] heat/ice application    [x] other:        Other Objective/Functional Measures: FOTO Functional Measure: 60/100                  Pain Level (0-10 scale) post treatment: 7      ASSESSMENT:      [x]  See Plan of Eliseo, PT 6/29/2021

## 2021-06-29 NOTE — TELEPHONE ENCOUNTER
Emgality PA sent to [facility]. Status is pending.       Chris OQUENDO Case ID: [id number]

## 2021-06-30 NOTE — PROGRESS NOTES
[facility]  [address] ([address]), [address] [place]  Phone: [phone] Fax: [phone]    Plan of Care/Statement of Necessity for Physical Therapy Services  2-15    Patient name: Neil Marcial  : 1990  Provider#: [id number]  Referral source: Tristan Dutta MD      Medical/Treatment Diagnosis: Dizziness [R42]  Fibromyalgia [M79.7]     Prior Hospitalization: see medical history     Comorbidities: Allergies, Asthma, Back pain, BMI over 30, Gastrointestinal Disease, Headaches,  High Blood Pressure, Kidney, Bladder, Prostate or Urination Problems, Neurological Disease, Other disorders  Prior Level of Function: 20 minutes seldom if ever  Medications: Verified on Patient Summary List    Start of Care: 21      Onset Date: chronic       The Plan of Care and following information is based on the information from the initial evaluation. Assessment/ key information: Pt is a 29 yo female who is in to begin therapy for fibromyalgia. She states that she has had increased symptoms since April of this year, but admits to having some of her symptoms for longer. She complains of total body pain, migraines, loss of appetite, and numbness and tingling. She is unable to point to any particular triggering activity that makes her symptoms worse. She presents with generalized weakness throughout and pain with any type of testing. AROM is WFL throughout but painful. She reports numbness and tingling of her entire arm and leg intermittently to the point that she unable to use the extremity. She reports that this is occurring 2-3 times each week and says that she has had 10 episodes since April when she was unable to use her legs. Additionally she reports that she has fallen 10 times since April.   It is unclear if she will be a good candidate for PT as she lives 40-45 minutes away and has already expressed some concern with her schedule specifically if she is able to return to work. Given this and her reported pain levels and intermittent inability to use her extremities progress could be challenging. Will plan to see her 2x/wk and work to progress her strength and activity level to improve her ability to complete ADL's and work related duties. Evaluation Complexity History HIGH Complexity :3+ comorbidities / personal factors will impact the outcome/ POC ; Examination HIGH Complexity : 4+ Standardized tests and measures addressing body structure, function, activity limitation and / or participation in recreation  ;Presentation HIGH Complexity : Unstable and unpredictable characteristics  ; Clinical Decision Making MEDIUM Complexity : FOTO score of 26-74  Overall Complexity Rating: MEDIUM    Problem List: pain affecting function, decrease ROM, decrease strength, impaired gait/ balance, decrease ADL/ functional abilitiies, decrease activity tolerance, decrease flexibility/ joint mobility and decrease transfer abilities   Treatment Plan may include any combination of the following: Therapeutic exercise, Therapeutic activities, Neuromuscular re-education, Physical agent/modality, Gait/balance training, Manual therapy, Patient education, Self Care training, Functional mobility training, Home safety training and Stair training  Patient / Family readiness to learn indicated by: trying to perform skills  Persons(s) to be included in education: patient (P)  Barriers to Learning/Limitations: yes;  none  Patient Goal (s): some pain relief, get some energy back  Patient Self Reported Health Status: fair  Rehabilitation Potential: fair    Short Term Goals: To be accomplished in 8-10 treatments:   Pt will be I with HEP  Pt will complain of pain 4-5/10 with all activity  Pt will increase strength to maintain better postures with all activity    Long Term Goals:  To be accomplished in 16-18 treatments:   Pt will complain of pain 2-3/10 with all activity  Pt will increase strength to complete all household chores and work duties without increased symptoms  Pt will increase FOTO score by 9 points to meet [address] and improve their function  Pt will decrease radicular symptoms by 75% to complete all activity  Pt will return to aquatics   Pt will be able to complete all work tasks without increased symptoms    Frequency / Duration: Patient to be seen 2 times per week for 16-18 treatments. Patient/ Caregiver education and instruction: self care, activity modification, brace/ splint application and exercises    [x]  Plan of care has been reviewed with JAY Mendenhall, PT 6/30/2021     ________________________________________________________________________    I certify that the above Therapy Services are being furnished while the patient is under my care. I agree with the treatment plan and certify that this therapy is necessary.     Physician's Signature:____________________  Date:____________Time: _________      Alex Norman MD

## 2021-07-01 ENCOUNTER — OFFICE VISIT (OUTPATIENT)
Dept: NEUROLOGY | Age: 31
End: 2021-07-01
Payer: COMMERCIAL

## 2021-07-01 VITALS
BODY MASS INDEX: 33.9 KG/M2 | WEIGHT: 191.4 LBS | OXYGEN SATURATION: 99 % | HEART RATE: 80 BPM | SYSTOLIC BLOOD PRESSURE: 122 MMHG | TEMPERATURE: 99.1 F | RESPIRATION RATE: 18 BRPM | DIASTOLIC BLOOD PRESSURE: 92 MMHG

## 2021-07-01 DIAGNOSIS — M54.16 LUMBAR RADICULOPATHY: ICD-10-CM

## 2021-07-01 DIAGNOSIS — B02.29 PHN (POSTHERPETIC NEURALGIA): ICD-10-CM

## 2021-07-01 DIAGNOSIS — G43.109 COMPLICATED MIGRAINE: Primary | ICD-10-CM

## 2021-07-01 DIAGNOSIS — M79.7 FIBROMYALGIA: ICD-10-CM

## 2021-07-01 PROCEDURE — 99213 OFFICE O/P EST LOW 20 MIN: CPT | Performed by: PSYCHIATRY & NEUROLOGY

## 2021-07-01 NOTE — PROGRESS NOTES
Neurology Progress Note    NAME:  Kenroy Walker   :   1990   MRN:   [id number]     Date/Time:  2021  Subjective:    Kenroy Walker is a 28 y.o. female here today for follow-up for headache, sharp pain on the right side of the head and face with generalized body pain and frequent fall. Patient was worked in today because of little improvement impressions symptoms. She was scheduled to return to work on 2021, however after being evaluated by physical therapy and other disciplines, it was deemed unsafe to for patient to return to work. She says her legs continue to give out on her, she cannot stand heat, heat makes her very tired and her muscles very weak  Patient says she is still having the  the symptoms, however, she says only very little improvement with improvement  vertigo and dizziness. She says she is still experiencing generalized pain, pain is worse with activity. She says she still having increasing numbness and tingling sensation mostly of the lower extremity. Patient still has  difficulty focusing and concentrating, she says with the symptoms lately she has been having a very difficult to complete any task. Patient said that her legs are giving out on her and she had fallen couple of times because of the weakness, numbness and tingling sensation. Patient was evaluated for physical therapy today  Patient is yet to start physical therapy mostly because of the pain. Patient says she  still has a lot of spasms and muscle pain, she says  sometimes using warm compresses help with the pain but if it is too hot it makes her to be weak. She says the pain is sharp in nature, mostly in the right side of the face and behind the eye, as if it is coming from the back of the neck and this is associated with dizziness. She says the right side of the face continues to be sensitive to touch. However the headache is unrelenting. .  Review of Systems - General ROS: positive for  - fatigue and sleep disturbance  Psychological ROS: positive for - anxiety and sleep disturbances  Ophthalmic ROS: positive for - blurry vision, decreased vision, double vision and photophobia  ENT ROS: positive for - headaches, tinnitus, vertigo and visual changes  Allergy and Immunology ROS: negative  Hematological and Lymphatic ROS: negative  Endocrine ROS: negative  Respiratory ROS: no cough, shortness of breath, or wheezing  Cardiovascular ROS: negative  Gastrointestinal ROS: no abdominal pain, change in bowel habits, or black or bloody stools  Genito-Urinary ROS: no dysuria, trouble voiding, or hematuria  Musculoskeletal ROS: positive for - muscular weakness  Neurological ROS: positive for - dizziness, headaches, numbness/tingling, visual changes and weakness  Dermatological ROS: negative   At this time, I will send patient back to work with restriction, hoping that if Butrans patch is authorized by insurance may be that will ease the persistent pain. Emgality will help to cut down the frequency of headache. Medications reviewed:  Current Outpatient Medications   Medication Sig Dispense Refill    gabapentin (NEURONTIN) 300 mg capsule Take 1 Capsule by mouth three (3) times daily. Max Daily Amount: 900 mg. 90 Capsule 3    lidocaine (LIDODERM) 5 % Apply patch to the back of the neck for 12 hours a day and remove for 12 hours a day. 30 Each 0    propranolol LA (INDERAL LA) 60 mg SR capsule Take 1 Capsule by mouth nightly. 30 Capsule 1    Gaviscon Extra Strength 254-237.5 mg/5 mL susp TAKE 5 ML BY MOUTH TWICE DAILY AS NEEDED FOR HEARTBURN      ondansetron (Zofran ODT) 4 mg disintegrating tablet Take 1 Tablet by mouth every eight (8) hours as needed for Nausea. 10 Tablet 0    galcanezumab-gnlm (Emgality Syringe) 120 mg/mL syrg 120 mg by SubCUTAneous route every thirty (30) days. 1 mL 3    meclizine (ANTIVERT) 25 mg tablet Take 1 Tab by mouth three (3) times daily as needed for Dizziness.  30 Tab 1    ondansetron (Zofran ODT) 4 mg disintegrating tablet Take 1 Tab by mouth every eight (8) hours as needed for Nausea. 10 Tab 0    cholecalciferol (Vitamin D3) 25 mcg (1,000 unit) cap Take 1,000 Units by mouth daily.  famotidine (PEPCID) 40 mg tablet Take 40 mg by mouth daily.  polyethylene glycol (MIRALAX) 17 gram/dose powder Take 17 g by mouth daily. 510 g 0    medroxyPROGESTERone (PROVERA) 10 mg tablet Take 10 mg by mouth three (3) times daily.  dicyclomine HCl (BENTYL PO) Take  by mouth four (4) times daily. Indications: As needed for pain      fluticasone propionate (FLONASE) 50 mcg/actuation nasal spray 2 Sprays by Both Nostrils route daily. Indications: As needed for allergies      multivitamin (MULTIPLE VITAMIN PO) Take  by mouth. Indications: Teresita fusion multi vitamin/folate 50 mg (2 gummies) by gyn dr Dutton Neither pantoprazole (PROTONIX) 40 mg tablet Take 40 mg by mouth two (2) times a day.  sucralfate (CARAFATE) 100 mg/mL suspension Take  by mouth four (4) times daily.  albuterol (PROVENTIL VENTOLIN) 2.5 mg /3 mL (0.083 %) nebulizer solution 2.5 mg by Nebulization route once.  ergocalciferol (ERGOCALCIFEROL) 1,250 mcg (50,000 unit) capsule TAKE ONE CAPSULE BY MOUTH EVERY MONDAY AND THURSDAY 8 Capsule 2    buprenorphine (Butrans) 5 mcg/hour patch 1 Patch by TransDERmal route every seven (7) days. Max Daily Amount: 1 Patch. 4 Patch 3    lisdexamfetamine (Vyvanse) 30 mg capsule Take 1 Capsule by mouth every morning. Max Daily Amount: 30 mg. 30 Capsule 0    loratadine (Claritin) 10 mg tablet Claritin      predniSONE (DELTASONE) 10 mg tablet Take 10 mg by mouth two (2) times a day. Take 1 tab p.o. tid x3days, 1 tab bid x5 days ,1 p.o.qd (Patient not taking: Reported on 6/24/2021) 30 Tablet 0    DULoxetine (CYMBALTA) 30 mg capsule Take 1 Cap by mouth daily. (Patient not taking: Reported on 5/24/2021) 30 Cap 1    cyanocobalamin 1,000 mcg tablet Take 1,000 mcg by mouth daily.  (Patient not taking: Reported on 5/24/2021)      fluticasone-umeclidinium-vilanterol (Trelegy Ellipta) 100-62.5-25 mcg inhaler Take 1 Puff by inhalation daily. (Patient not taking: Reported on 5/24/2021)      naproxen sodium (ALEVE) 220 mg cap Take  by mouth. (Patient not taking: Reported on 5/24/2021)      ibuprofen (MOTRIN) 600 mg tablet Take 1 Tab by mouth three (3) times daily (with meals). (Patient not taking: Reported on 6/24/2021) 21 Tab 0    naloxone (NARCAN) 4 mg/actuation nasal spray Use 1 spray intranasally, then discard. Repeat with new spray every 2 min as needed for opioid overdose symptoms, alternating nostrils. (Patient not taking: Reported on 5/24/2021) 2 Each 0    loratadine (CLARITIN) 10 mg tablet Take 10 mg by mouth daily.  (Patient not taking: Reported on 6/28/2021)          Objective:   Vitals:  Vitals:    07/01/21 1555   BP: (!) 122/92   Pulse: 80   Resp: 18   Temp: 99.1 °F (37.3 °C)   SpO2: 99%   Weight: 191 lb 6.4 oz (86.8 kg)   PainSc:   8   PainLoc: Generalized               Lab Data Reviewed:  Lab Results   Component Value Date/Time    WBC 14.4 (H) 06/07/2021 04:33 AM    HCT 45.4 06/07/2021 04:33 AM    HGB 15.1 06/07/2021 04:33 AM    PLATELET 757 31/44/8832 04:33 AM       Lab Results   Component Value Date/Time    Sodium 135 (L) 06/07/2021 04:33 AM    Potassium 4.6 06/07/2021 04:33 AM    Chloride 104 06/07/2021 04:33 AM    CO2 28 06/07/2021 04:33 AM    Glucose 119 (H) 06/07/2021 04:33 AM    BUN 19 06/07/2021 04:33 AM    Creatinine 0.99 06/07/2021 04:33 AM    Calcium 9.3 06/07/2021 04:33 AM       No components found for: TROPQUANT    No results found for: RASHAWN      No results found for: HBA1C, BAY2TKAJ, UJN5MIJT     Lab Results   Component Value Date/Time    Vitamin B12 482 12/14/2020 10:44 AM       No results found for: RASHAWN, ANARX, ANAIGG, XBANA    No results found for: CHOL, CHOLPOCT, CHOLX, CHLST, CHOLV, HDL, HDLPOC, HDLP, LDL, LDLCPOC, LDLC, DLDLP, VLDLC, VLDL, TGLX, TRIGL, TRIGP, TGLPOCT, CHHD, [address]  [facility]      CT Results (recent):  Results from Hospital Encounter encounter on 06/07/21    CT ABD PELV WO CONT    Narrative  EXAM: CT ABD PELV WO CONT    INDICATION: right flank and right mid abd pain    COMPARISON: 2019    CONTRAST:  None. TECHNIQUE:  Thin axial images were obtained through the abdomen and pelvis. Coronal and  sagittal reformats were generated. Oral contrast was not administered. CT dose  reduction was achieved through use of a standardized protocol tailored for this  examination and automatic exposure control for dose modulation. The absence of intravenous contrast material reduces the sensitivity for  evaluation of the vasculature and solid organs. FINDINGS:  LOWER THORAX: No significant abnormality in the incidentally imaged lower chest.  LIVER: Hepatic steatosis. BILIARY TREE: Cholecystectomy. CBD is not dilated. SPLEEN: within normal limits. PANCREAS: No focal abnormality. ADRENALS: Unremarkable. KIDNEYS/URETERS: Right nephrolithiasis. No hydronephrosis. STOMACH: Unremarkable. SMALL BOWEL: No dilatation or wall thickening. COLON: No dilatation or wall thickening. APPENDIX: Normal.  PERITONEUM: No ascites or pneumoperitoneum. RETROPERITONEUM: No lymphadenopathy or aortic aneurysm. REPRODUCTIVE ORGANS: Normal.  URINARY BLADDER: No mass or calculus. BONES: No destructive bone lesion. ABDOMINAL WALL: No mass or hernia. ADDITIONAL COMMENTS: N/A    Impression  Right nephrolithiasis. MRI Results (recent):  Results from [place] encounter on 07/08/21    MRI LUMB SPINE WO CONT    Narrative  EXAM: MRI LUMB SPINE WO CONT    INDICATION: Radiculopathy, lumbar region    COMPARISON: None    TECHNIQUE: MR imaging of the lumbar spine was performed using the following  sequences: sagittal T1, T2, STIR;  axial T1, T2.    CONTRAST:  None. FINDINGS:    There is normal alignment of the lumbar spine. Vertebral body heights are  maintained.  Marrow signal is normal.    The conus medullaris terminates at . Signal and caliber of the distal spinal  cord are within normal limits. The paraspinal soft tissues are within normal limits. Lower thoracic spine: No herniation or stenosis. L1-L2: No herniation or stenosis. L2-L3: No herniation or stenosis. L3-L4: No herniation or stenosis. L4-L5: No herniation or stenosis. L5-S1: The disc space is mildly narrowed without disc desiccation. This is  likely congenital. No herniation or stenosis. Mild bilateral posterior facet  arthropathy. Impression  No significant spinal stenosis or neural foraminal narrowing. No acute process. IR Results (recent):  No results found for this or any previous visit. VAS/US Results (recent):  No results found for this or any previous visit. PHYSICAL EXAM:  General:    Alert, cooperative, no distress, appears stated age. Head:   Normocephalic, without obvious abnormality, atraumatic. Eyes:   Conjunctivae/corneas clear. PERRLA  Nose:  Nares normal. No drainage or sinus tenderness. Throat:    Lips, mucosa, and tongue normal.  No Thrush  Neck:  Supple, symmetrical,  no adenopathy, thyroid: non tender    no carotid bruit and no JVD. Severe paraspinal and occipitalis tenderness  Back:    Symmetric, diffuse tenderness. Lungs:   Clear to auscultation bilaterally. No Wheezing or Rhonchi. No rales. Chest wall:  No tenderness or deformity. No Accessory muscle use. Heart:   Regular rate and rhythm,  no murmur, rub or gallop. Abdomen:   Soft, non-tender. Not distended. Bowel sounds normal. No masses  Extremities: Extremities normal, atraumatic, No cyanosis. No edema. No clubbing  Skin:     Texture, turgor normal. No rashes or lesions. Not Jaundiced  Lymph nodes: Cervical, supraclavicular normal.  Psych:  Good insight. Depressed. Anxious. NEUROLOGICAL EXAM:  Appearance: The patient is well developed, well nourished, provides a coherent history and is in no acute distress. Mental Status: Oriented to time, place and person. Mood and affect appropriate. Cranial Nerves:   Intact visual fields. Fundi are benign. OANH, EOM's full, no nystagmus, no ptosis. Facial sensation dysesthesia. Corneal reflexes are intact. Facial movement is symmetric. Hearing is normal bilaterally. Palate is midline with normal sternocleidomastoid and trapezius muscles are normal. Tongue is midline. Motor:  5-/5 strength in upper extremity and 4+/5 lower extremity proximal and distal muscles. Normal bulk and tone. No fasciculations. Reflexes:   Deep tendon reflexes 3+/4 and symmetrical.   Sensory:    Dysesthesia to touch, pinprick and vibration. Gait:   Slightly unsteady gait. Tremor:   No tremor noted. Cerebellar:  No cerebellar signs present. Neurovascular:  Normal heart sounds and regular rhythm, peripheral pulses intact, and no carotid bruits. Assesment  1. Complicated migraine  Continue management  Emgality  2. Fibromyalgia  Neurontin  Cymbalta    3. Chronic migraine  Emgality    4. PHN (postherpetic neuralgia)  Lidoderm patch  Butrans patch  5. Lumbar radiculopathy  Physical therapy    ___________________________________________________  PLAN: Medication and plan discussed with patient  Patient will be reevaluated to return to work on 7/13/2021. ICD-10-CM ICD-9-CM    1. Complicated migraine  P96.079 346.00    2. Fibromyalgia  M79.7 729.1    3. Chronic migraine  G43.709 346.70    4. PHN (postherpetic neuralgia)  B02.29 053.19    5. Lumbar radiculopathy  M54.16 724.4      Follow-up and Dispositions    · Return in about 12 days (around 7/13/2021).            ___________________________________________________    Attending Physician: Jacky Krabbe, MD

## 2021-07-02 ENCOUNTER — TELEPHONE (OUTPATIENT)
Dept: NEUROLOGY | Age: 31
End: 2021-07-02

## 2021-07-02 ENCOUNTER — HOSPITAL ENCOUNTER (OUTPATIENT)
Dept: PHYSICAL THERAPY | Age: 31
Discharge: HOME | End: 2021-07-02
Payer: COMMERCIAL

## 2021-07-02 DIAGNOSIS — F98.8 ATTENTION DEFICIT DISORDER (ADD) WITHOUT HYPERACTIVITY: ICD-10-CM

## 2021-07-02 DIAGNOSIS — R10.9 CHRONIC RIGHT FLANK PAIN: ICD-10-CM

## 2021-07-02 DIAGNOSIS — R53.82 CHRONIC FATIGUE: ICD-10-CM

## 2021-07-02 DIAGNOSIS — G89.29 CHRONIC RIGHT FLANK PAIN: ICD-10-CM

## 2021-07-02 DIAGNOSIS — R52 PAIN: ICD-10-CM

## 2021-07-02 DIAGNOSIS — M79.18 MYOFASCIAL MUSCLE PAIN: ICD-10-CM

## 2021-07-02 DIAGNOSIS — B02.29 PHN (POSTHERPETIC NEURALGIA): ICD-10-CM

## 2021-07-02 PROCEDURE — 97110 THERAPEUTIC EXERCISES: CPT | Performed by: PHYSICAL THERAPIST

## 2021-07-02 RX ORDER — BUPRENORPHINE 5 UG/H
1 PATCH TRANSDERMAL
Qty: 4 PATCH | Refills: 3 | Status: SHIPPED | OUTPATIENT
Start: 2021-07-02 | End: 2022-04-05

## 2021-07-07 ENCOUNTER — HOSPITAL ENCOUNTER (OUTPATIENT)
Dept: PHYSICAL THERAPY | Age: 31
Discharge: HOME | End: 2021-07-07
Payer: COMMERCIAL

## 2021-07-07 PROCEDURE — 97110 THERAPEUTIC EXERCISES: CPT | Performed by: PHYSICAL THERAPIST

## 2021-07-07 NOTE — PROGRESS NOTES
PT DAILY TREATMENT NOTE - [facility] 2-15    Patient Name: Daniella Chris  Date:2021  : 1990  [x]  Patient  Verified  Payor: Bruce Freedman / Plan: Shana Bess / Product Type: HMO /    In time:100  Out time:138  Total Treatment Time (min): 38  Total Timed Codes (min): 38  1:1 Treatment Time ( only): 24  Visit #:  3    Treatment Area: Dizziness [R42]  Fibromyalgia [M79.7]    SUBJECTIVE  Pain Level (0-10 scale): 6  Any medication changes, allergies to medications, adverse drug reactions, diagnosis change, or new procedure performed?: [x] No    [] Yes (see summary sheet for update)  Subjective functional status/changes:   [] No changes reported  Pt reports that she has been having ongoing discomfort. She is a little better today than over the weekend    OBJECTIVE    38 min Therapeutic Exercise:  [x] See flow sheet :   Rationale: increase ROM, increase strength, improve coordination, improve balance and increase proprioception to improve the patients ability to complete all activity    Other Objective/Functional Measures:      Pain Level (0-10 scale) post treatment: 9    ASSESSMENT/Changes in Function:   Pt continues to have discomfort and she states that she hurts all the time. Continues to be a challenge for her to do much activity at all. She is able to get through the there-ex with rest periods but unable to do more than 5-10 reps of each exercise. Her progress will be limited and very slow as she has such a low tolerance for activity. Will progress as able. Patient will continue to benefit from skilled PT services to modify and progress therapeutic interventions, address functional mobility deficits, address ROM deficits, address strength deficits, analyze and address soft tissue restrictions, analyze and cue movement patterns, analyze and modify body mechanics/ergonomics, assess and modify postural abnormalities and address imbalance/dizziness to attain remaining goals.      []  See Plan of Care  []  See progress note/recertification  []  See Discharge Summary         Progress towards goals / Updated goals:  Progressing toward goals    PLAN  [x]  Upgrade activities as tolerated     [x]  Continue plan of care  [x]  Update interventions per flow sheet       []  Discharge due to:_  []  Other:_      Apryl Richardson, PT 7/2/2021

## 2021-07-07 NOTE — PROGRESS NOTES
PT DAILY TREATMENT NOTE - [facility] 2-15    Patient Name: Bianca Hines  Date:2021  : 1990  [x]  Patient  Verified  Payor: Lucía Almazan / Plan: Janie Grace / Product Type: HMO /    In time:941  Out time:1030  Total Treatment Time (min): 39  Total Timed Codes (min): 39  1:1 Treatment Time ( only): 44   Visit #:  2    Treatment Area: Dizziness [R42]  Fibromyalgia [M79.7]    SUBJECTIVE  Pain Level (0-10 scale): 6  Any medication changes, allergies to medications, adverse drug reactions, diagnosis change, or new procedure performed?: [x] No    [] Yes (see summary sheet for update)  Subjective functional status/changes:   [] No changes reported  Pt reports that she has been sore since doing her exercises but she knows it is good for her    OBJECTIVE    39 min Therapeutic Exercise:  [x] See flow sheet :   Rationale: increase ROM, increase strength, improve coordination, improve balance and increase proprioception to improve the patients ability to complete all activity    Other Objective/Functional Measures:      Pain Level (0-10 scale) post treatment: 9    ASSESSMENT/Changes in Function:   Pt is able to complete all there-ex but she is uncomfortable and exhausted by the end of the session. Will take a few visits to dial in the intensity and amount of activity that is best tolerated for her. Patient will continue to benefit from skilled PT services to modify and progress therapeutic interventions, address functional mobility deficits, address ROM deficits, address strength deficits, analyze and address soft tissue restrictions, analyze and cue movement patterns, analyze and modify body mechanics/ergonomics, assess and modify postural abnormalities and address imbalance/dizziness to attain remaining goals.      []  See Plan of Care  []  See progress note/recertification  []  See Discharge Summary         Progress towards goals / Updated goals:  Progressing toward goals    PLAN  [x]  Upgrade activities as tolerated     [x]  Continue plan of care  [x]  Update interventions per flow sheet       []  Discharge due to:_  []  Other:_      Laurelyn Favre, PT 7/2/2021

## 2021-07-08 ENCOUNTER — HOSPITAL ENCOUNTER (OUTPATIENT)
Dept: MRI IMAGING | Age: 31
Discharge: HOME | End: 2021-07-08
Attending: PSYCHIATRY & NEUROLOGY
Payer: COMMERCIAL

## 2021-07-08 DIAGNOSIS — M54.50 RIGHT-SIDED LOW BACK PAIN WITHOUT SCIATICA, UNSPECIFIED CHRONICITY: ICD-10-CM

## 2021-07-08 DIAGNOSIS — M79.18 MYOFASCIAL MUSCLE PAIN: ICD-10-CM

## 2021-07-08 DIAGNOSIS — M54.16 LUMBAR RADICULOPATHY: ICD-10-CM

## 2021-07-08 PROCEDURE — 72148 MRI LUMBAR SPINE W/O DYE: CPT

## 2021-07-09 ENCOUNTER — HOSPITAL ENCOUNTER (OUTPATIENT)
Dept: PHYSICAL THERAPY | Age: 31
Discharge: HOME | End: 2021-07-09
Payer: COMMERCIAL

## 2021-07-09 PROCEDURE — 97110 THERAPEUTIC EXERCISES: CPT

## 2021-07-09 NOTE — PROGRESS NOTES
PT DAILY TREATMENT NOTE - [facility] -15    Patient Name: Sabrina Banda  Date:2021  : 1990  [x]  Patient  Verified  Payor: Eric Saunders / Plan: Ria Luis A / Product Type: HMO /    In time:935  Out time:1019  Total Treatment Time (min): 44  Total Timed Codes (min): 37  1:1 Treatment Time ( only): 40  Visit #:  4    Treatment Area: Dizziness [R42]  Fibromyalgia [M79.7]    SUBJECTIVE  Pain Level (0-10 scale): 4  Any medication changes, allergies to medications, adverse drug reactions, diagnosis change, or new procedure performed?: [x] No    [] Yes (see summary sheet for update)  Subjective functional status/changes:   [] No changes reported  Patient reports she is having a better day today. OBJECTIVE    44 min Therapeutic Exercise:  [x] See flow sheet :   Rationale: increase ROM, increase strength, improve coordination, improve balance and increase proprioception to improve the patients ability to complete all activity    Other Objective/Functional Measures: none noted       Pain Level (0-10 scale) post treatment: 4/10    ASSESSMENT/Changes in Function:   Patient demonstrates good staitc stability on uneven surfaces. Tolerated all therex well, will continue to progress as tolerated. Patient will continue to benefit from skilled PT services to modify and progress therapeutic interventions, address functional mobility deficits, address ROM deficits, address strength deficits, analyze and address soft tissue restrictions, analyze and cue movement patterns, analyze and modify body mechanics/ergonomics, assess and modify postural abnormalities and address imbalance/dizziness to attain remaining goals.      [x]  See Plan of Care  []  See progress note/recertification  []  See Discharge Summary         Progress towards goals / Updated goals:  Progressing toward goals    PLAN  [x]  Upgrade activities as tolerated     [x]  Continue plan of care  [x]  Update interventions per flow sheet       [] Discharge due to:_  []  Other:_      Baldomero Broderick, PTA 7/2/2021

## 2021-07-10 RX ORDER — ERGOCALCIFEROL 1.25 MG/1
CAPSULE ORAL
Qty: 8 CAPSULE | Refills: 2 | Status: SHIPPED | OUTPATIENT
Start: 2021-07-10 | End: 2021-10-05

## 2021-07-12 ENCOUNTER — APPOINTMENT (OUTPATIENT)
Dept: PHYSICAL THERAPY | Age: 31
End: 2021-07-12
Payer: COMMERCIAL

## 2021-07-13 ENCOUNTER — OFFICE VISIT (OUTPATIENT)
Dept: NEUROLOGY | Age: 31
End: 2021-07-13
Payer: COMMERCIAL

## 2021-07-13 VITALS
BODY MASS INDEX: 35.04 KG/M2 | TEMPERATURE: 97.8 F | WEIGHT: 197.8 LBS | OXYGEN SATURATION: 99 % | HEART RATE: 79 BPM | RESPIRATION RATE: 20 BRPM

## 2021-07-13 DIAGNOSIS — M79.7 FIBROMYALGIA: ICD-10-CM

## 2021-07-13 DIAGNOSIS — G89.4 CHRONIC PAIN SYNDROME: ICD-10-CM

## 2021-07-13 DIAGNOSIS — F98.8 ATTENTION DEFICIT DISORDER (ADD) WITHOUT HYPERACTIVITY: ICD-10-CM

## 2021-07-13 DIAGNOSIS — M54.50 RIGHT-SIDED LOW BACK PAIN WITHOUT SCIATICA, UNSPECIFIED CHRONICITY: ICD-10-CM

## 2021-07-13 DIAGNOSIS — M54.16 LUMBAR RADICULOPATHY: ICD-10-CM

## 2021-07-13 DIAGNOSIS — G50.0 TN (TRIGEMINAL NEURALGIA): ICD-10-CM

## 2021-07-13 DIAGNOSIS — R53.82 CHRONIC FATIGUE: ICD-10-CM

## 2021-07-13 DIAGNOSIS — G62.9 NEUROPATHY: ICD-10-CM

## 2021-07-13 DIAGNOSIS — G43.109 COMPLICATED MIGRAINE: Primary | ICD-10-CM

## 2021-07-13 DIAGNOSIS — G52.1 GLOSSOPHARYNGEAL NEURALGIA: ICD-10-CM

## 2021-07-13 DIAGNOSIS — M79.18 MYOFASCIAL MUSCLE PAIN: ICD-10-CM

## 2021-07-13 PROCEDURE — 99214 OFFICE O/P EST MOD 30 MIN: CPT | Performed by: PSYCHIATRY & NEUROLOGY

## 2021-07-13 RX ORDER — GABAPENTIN 300 MG/1
300 CAPSULE ORAL 3 TIMES DAILY
Qty: 90 CAPSULE | Refills: 3 | Status: SHIPPED | OUTPATIENT
Start: 2021-07-13 | End: 2021-09-22 | Stop reason: SDUPTHER

## 2021-07-13 RX ORDER — CARBAMAZEPINE 200 MG/1
200 TABLET, EXTENDED RELEASE ORAL
Qty: 30 TABLET | Refills: 1 | Status: SHIPPED | OUTPATIENT
Start: 2021-07-13 | End: 2021-08-19

## 2021-07-13 NOTE — PROGRESS NOTES
Neurology Progress Note    NAME:  Candie Hernandez   :   1990   MRN:   [id number]     Date/Time:  2021  Subjective:   Candie Hernandez is a 28 y.o. female here today for follow-up for headache, sharp pain on the right side of the head and face with generalized body pain and frequent fall. Patient returns today with more complaints, she says on 7/10/2021, her legs gave out on her, lost feeling in the legs, she started also having more trigeminal neuralgia symptoms, this time with numbness and tingling sensation, pain on the right side of the tongue, right facial pain, according to patient, it woke her from sleep. Since then, patient has been having facial pain. At this time, I will restart patient on carbamazepine or Tegretol 200 mg p.o. nightly, I will adjust Neurontin to 600 mg p.o. nightly, 300 mg p.o. every morning. Patient is currently in physical therapy , she says e even though she is in a lot of pain, she is trying to participate in physical therapy. Patient will eventually transition into aqua therapy that may be more beneficial in view of the fact that patient has evidence of fibromyalgia. Patient was scheduled to go back to work but at this time, because of tendency to fall, persistent pain, I advised patient apply for short-term disability pending when her condition stabilizes. She noted that she cannot stand it makes her very weak and causes her leg to buckle. She says she is still experiencing generalized pain, pain is worse with activity. She says she still having increasing numbness and tingling sensation mostly of the lower extremity. Patient still has  difficulty focusing and concentrating, she says with the symptoms lately she has been having a very difficult to complete any task. She says she still has pain behind her right eye, pain is sharp in nature as if it is coming from the back of the neck and this is associated with dizziness.    She says the right side of the face continues to be sensitive to touch. However the headache is unrelenting. .  With a constellation of patient's symptoms, it will be very difficult for her to continue with her job at this time  Review of Systems - General ROS: positive for  - fatigue and sleep disturbance  Psychological ROS: positive for - anxiety and sleep disturbances  Ophthalmic ROS: positive for - blurry vision, decreased vision, double vision and photophobia  ENT ROS: positive for - headaches, tinnitus, vertigo and visual changes  Allergy and Immunology ROS: negative  Hematological and Lymphatic ROS: negative  Endocrine ROS: negative  Respiratory ROS: no cough, shortness of breath, or wheezing  Cardiovascular ROS: negative  Gastrointestinal ROS: no abdominal pain, change in bowel habits, or black or bloody stools  Genito-Urinary ROS: no dysuria, trouble voiding, or hematuria  Musculoskeletal ROS: positive for - muscular weakness  Neurological ROS: positive for - dizziness, headaches, numbness/tingling, visual changes and weakness  Dermatological ROS: negative     Medications reviewed:  Current Outpatient Medications   Medication Sig Dispense Refill    ergocalciferol (ERGOCALCIFEROL) 1,250 mcg (50,000 unit) capsule TAKE ONE CAPSULE BY MOUTH EVERY MONDAY AND THURSDAY 8 Capsule 2    buprenorphine (Butrans) 5 mcg/hour patch 1 Patch by TransDERmal route every seven (7) days. Max Daily Amount: 1 Patch. 4 Patch 3    lisdexamfetamine (Vyvanse) 30 mg capsule Take 1 Capsule by mouth every morning. Max Daily Amount: 30 mg. 30 Capsule 0    gabapentin (NEURONTIN) 300 mg capsule Take 1 Capsule by mouth three (3) times daily. Max Daily Amount: 900 mg. 90 Capsule 3    lidocaine (LIDODERM) 5 % Apply patch to the back of the neck for 12 hours a day and remove for 12 hours a day. 30 Each 0    propranolol LA (INDERAL LA) 60 mg SR capsule Take 1 Capsule by mouth nightly.  30 Capsule 1    loratadine (Claritin) 10 mg tablet Claritin      Gaviscon Extra Strength 254-237.5 mg/5 mL susp TAKE 5 ML BY MOUTH TWICE DAILY AS NEEDED FOR HEARTBURN      ondansetron (Zofran ODT) 4 mg disintegrating tablet Take 1 Tablet by mouth every eight (8) hours as needed for Nausea. 10 Tablet 0    galcanezumab-gnlm (Emgality Syringe) 120 mg/mL syrg 120 mg by SubCUTAneous route every thirty (30) days. 1 mL 3    meclizine (ANTIVERT) 25 mg tablet Take 1 Tab by mouth three (3) times daily as needed for Dizziness. 30 Tab 1    ondansetron (Zofran ODT) 4 mg disintegrating tablet Take 1 Tab by mouth every eight (8) hours as needed for Nausea. 10 Tab 0    cholecalciferol (Vitamin D3) 25 mcg (1,000 unit) cap Take 1,000 Units by mouth daily.  famotidine (PEPCID) 40 mg tablet Take 40 mg by mouth daily.  polyethylene glycol (MIRALAX) 17 gram/dose powder Take 17 g by mouth daily. 510 g 0    medroxyPROGESTERone (PROVERA) 10 mg tablet Take 10 mg by mouth three (3) times daily.  dicyclomine HCl (BENTYL PO) Take  by mouth four (4) times daily. Indications: As needed for pain      fluticasone propionate (FLONASE) 50 mcg/actuation nasal spray 2 Sprays by Both Nostrils route daily. Indications: As needed for allergies      multivitamin (MULTIPLE VITAMIN PO) Take  by mouth. Indications: Teresita fusion multi vitamin/folate 50 mg (2 gummies) by gyn dr     [facility] pantoprazole (PROTONIX) 40 mg tablet Take 40 mg by mouth two (2) times a day.  sucralfate (CARAFATE) 100 mg/mL suspension Take  by mouth four (4) times daily.  albuterol (PROVENTIL VENTOLIN) 2.5 mg /3 mL (0.083 %) nebulizer solution 2.5 mg by Nebulization route once.  predniSONE (DELTASONE) 10 mg tablet Take 10 mg by mouth two (2) times a day. Take 1 tab p.o. tid x3days, 1 tab bid x5 days ,1 p.o.qd (Patient not taking: Reported on 6/24/2021) 30 Tablet 0    DULoxetine (CYMBALTA) 30 mg capsule Take 1 Cap by mouth daily.  (Patient not taking: Reported on 5/24/2021) 30 Cap 1    cyanocobalamin 1,000 mcg tablet Take 1,000 mcg by mouth daily. (Patient not taking: Reported on 5/24/2021)      fluticasone-umeclidinium-vilanterol (Trelegy Ellipta) 100-62.5-25 mcg inhaler Take 1 Puff by inhalation daily. (Patient not taking: Reported on 5/24/2021)      naproxen sodium (ALEVE) 220 mg cap Take  by mouth. (Patient not taking: Reported on 5/24/2021)      ibuprofen (MOTRIN) 600 mg tablet Take 1 Tab by mouth three (3) times daily (with meals). (Patient not taking: Reported on 6/24/2021) 21 Tab 0    naloxone (NARCAN) 4 mg/actuation nasal spray Use 1 spray intranasally, then discard. Repeat with new spray every 2 min as needed for opioid overdose symptoms, alternating nostrils. (Patient not taking: Reported on 5/24/2021) 2 Each 0    loratadine (CLARITIN) 10 mg tablet Take 10 mg by mouth daily.  (Patient not taking: Reported on 6/28/2021)          Objective:   Vitals:  Vitals:    07/13/21 0928   Pulse: 79   Resp: 20   Temp: 97.8 °F (36.6 °C)   SpO2: 99%   Weight: 197 lb 12.8 oz (89.7 kg)   PainSc:   8   PainLoc: Generalized               Lab Data Reviewed:  Lab Results   Component Value Date/Time    WBC 14.4 (H) 06/07/2021 04:33 AM    HCT 45.4 06/07/2021 04:33 AM    HGB 15.1 06/07/2021 04:33 AM    PLATELET 158 00/16/0534 04:33 AM       Lab Results   Component Value Date/Time    Sodium 135 (L) 06/07/2021 04:33 AM    Potassium 4.6 06/07/2021 04:33 AM    Chloride 104 06/07/2021 04:33 AM    CO2 28 06/07/2021 04:33 AM    Glucose 119 (H) 06/07/2021 04:33 AM    BUN 19 06/07/2021 04:33 AM    Creatinine 0.99 06/07/2021 04:33 AM    Calcium 9.3 06/07/2021 04:33 AM       No components found for: TROPQUANT    No results found for: RASHAWN      No results found for: HBA1C, NZX3IVLT, QQG7MXRW     Lab Results   Component Value Date/Time    Vitamin B12 482 12/14/2020 10:44 AM       No results found for: RASHAWN, ANARX, ANAIGG, XBANA    No results found for: CHOL, CHOLPOCT, CHOLX, CHLST, CHOLV, HDL, HDLPOC, HDLP, LDL, LDLCPOC, LDLC, DLDLP, VLDLC, VLDL, TGLX, TRIGL, TRIGP, TGLPOCT, CHHD, [address]  [facility]      CT Results (recent):  Results from Hospital Encounter encounter on 06/07/21    CT ABD PELV WO CONT    Narrative  EXAM: CT ABD PELV WO CONT    INDICATION: right flank and right mid abd pain    COMPARISON: 2019    CONTRAST:  None. TECHNIQUE:  Thin axial images were obtained through the abdomen and pelvis. Coronal and  sagittal reformats were generated. Oral contrast was not administered. CT dose  reduction was achieved through use of a standardized protocol tailored for this  examination and automatic exposure control for dose modulation. The absence of intravenous contrast material reduces the sensitivity for  evaluation of the vasculature and solid organs. FINDINGS:  LOWER THORAX: No significant abnormality in the incidentally imaged lower chest.  LIVER: Hepatic steatosis. BILIARY TREE: Cholecystectomy. CBD is not dilated. SPLEEN: within normal limits. PANCREAS: No focal abnormality. ADRENALS: Unremarkable. KIDNEYS/URETERS: Right nephrolithiasis. No hydronephrosis. STOMACH: Unremarkable. SMALL BOWEL: No dilatation or wall thickening. COLON: No dilatation or wall thickening. APPENDIX: Normal.  PERITONEUM: No ascites or pneumoperitoneum. RETROPERITONEUM: No lymphadenopathy or aortic aneurysm. REPRODUCTIVE ORGANS: Normal.  URINARY BLADDER: No mass or calculus. BONES: No destructive bone lesion. ABDOMINAL WALL: No mass or hernia. ADDITIONAL COMMENTS: N/A    Impression  Right nephrolithiasis. MRI Results (recent):  Results from [place] encounter on 07/08/21    MRI LUMB SPINE WO CONT    Narrative  EXAM: MRI LUMB SPINE WO CONT    INDICATION: Radiculopathy, lumbar region    COMPARISON: None    TECHNIQUE: MR imaging of the lumbar spine was performed using the following  sequences: sagittal T1, T2, STIR;  axial T1, T2.    CONTRAST:  None. FINDINGS:    There is normal alignment of the lumbar spine. Vertebral body heights are  maintained.  Marrow signal is normal.    The conus medullaris terminates at . Signal and caliber of the distal spinal  cord are within normal limits. The paraspinal soft tissues are within normal limits. Lower thoracic spine: No herniation or stenosis. L1-L2: No herniation or stenosis. L2-L3: No herniation or stenosis. L3-L4: No herniation or stenosis. L4-L5: No herniation or stenosis. L5-S1: The disc space is mildly narrowed without disc desiccation. This is  likely congenital. No herniation or stenosis. Mild bilateral posterior facet  arthropathy. Impression  No significant spinal stenosis or neural foraminal narrowing. No acute process. IR Results (recent):  No results found for this or any previous visit. VAS/US Results (recent):  No results found for this or any previous visit. PHYSICAL EXAM:  General:    Alert, cooperative, no distress, appears stated age. Head:   Normocephalic, without obvious abnormality, atraumatic. Eyes:   Conjunctivae/corneas clear. PERRLA  Nose:  Nares normal. No drainage or sinus tenderness. Throat:    Lips, mucosa, and tongue normal.  No Thrush  Neck:  Supple, symmetrical,  no adenopathy, thyroid: non tender    no carotid bruit and no JVD. Severe paraspinal and occipitalis tenderness  Back:    Symmetric, diffuse tenderness. Lungs:   Clear to auscultation bilaterally. No Wheezing or Rhonchi. No rales. Chest wall:  No tenderness or deformity. No Accessory muscle use. Heart:   Regular rate and rhythm,  no murmur, rub or gallop. Abdomen:   Soft, non-tender. Not distended. Bowel sounds normal. No masses  Extremities: Extremities normal, atraumatic, No cyanosis. No edema. No clubbing  Skin:     Texture, turgor normal. No rashes or lesions. Not Jaundiced  Lymph nodes: Cervical, supraclavicular normal.  Psych:  Good insight. Depressed. Anxious. NEUROLOGICAL EXAM:  Appearance:   The patient is well developed, well nourished, provides a coherent history and is in no acute distress. Mental Status: Oriented to time, place and person. Mood and affect appropriate. Cranial Nerves:   Intact visual fields. Fundi are benign. OANH, EOM's full, no nystagmus, no ptosis. Facial sensation dysesthesia. Corneal reflexes are intact. Facial movement is symmetric. Hearing is normal bilaterally. Palate is midline with normal sternocleidomastoid and trapezius muscles are normal. Tongue is midline. Motor:  5-/5 strength in upper extremity and 4+/5 lower extremity proximal and distal muscles. Normal bulk and tone. No fasciculations. Reflexes:   Deep tendon reflexes 3+/4 and symmetrical.   Sensory:    Dysesthesia to touch, pinprick and vibration. Gait:   Slightly unsteady gait. Tremor:   No tremor noted. Cerebellar:  No cerebellar signs present. Neurovascular:  Normal heart sounds and regular rhythm, peripheral pulses intact, and no carotid bruits. Assesment  1. Complicated migraine  Continue management     2. Chronic migraine  Emgality    3. Fibromyalgia  Following rheumatology    4. Myofascial muscle pain  Physical therapy    5. Attention deficit disorder (ADD) without hyperactivity  Vyvanse    6. Chronic pain syndrome  Referred to pain management    7. TN (trigeminal neuralgia)  Tegretol    8. Chronic fatigue  Vyvanse    9. Glossopharyngeal neuralgia  Tegretol XR  ___________________________________________________  PLAN: Medication and plan discussed with patient      ICD-10-CM ICD-9-CM    1. Complicated migraine  D27.384 346.00    2. Chronic migraine  G43.709 346.70    3. Fibromyalgia  M79.7 729.1    4. Myofascial muscle pain  M79.18 729.1    5. Attention deficit disorder (ADD) without hyperactivity  F98.8 314.00    6. Chronic pain syndrome  G89.4 338.4    7. TN (trigeminal neuralgia)  G50.0 350.1    8. Chronic fatigue  R53.82 780.79    9.  Glossopharyngeal neuralgia  G52.1 352.1            ___________________________________________________    Attending Physician: Annalise Willard Sierra Boyer MD

## 2021-07-14 ENCOUNTER — HOSPITAL ENCOUNTER (OUTPATIENT)
Dept: PHYSICAL THERAPY | Age: 31
Discharge: HOME | End: 2021-07-14
Payer: COMMERCIAL

## 2021-07-14 PROCEDURE — 97110 THERAPEUTIC EXERCISES: CPT | Performed by: PHYSICAL THERAPIST

## 2021-07-14 NOTE — PROGRESS NOTES
PT DAILY TREATMENT NOTE - [facility] 2-15    Patient Name: Celine Smith  Date:2021  : 1990  [x]  Patient  Verified  Payor: Guera Nevarez / Plan: Joe Alexander / Product Type: HMO /    In time:100  Out time:138  Total Treatment Time (min): 38  Total Timed Codes (min): 38  1:1 Treatment Time ( only): 45  Visit #:  5    Treatment Area: Dizziness [R42]  Fibromyalgia [M79.7]    SUBJECTIVE  Pain Level (0-10 scale): 6  Any medication changes, allergies to medications, adverse drug reactions, diagnosis change, or new procedure performed?: [x] No    [] Yes (see summary sheet for update)  Subjective functional status/changes:   [] No changes reported  Pt reports a bad bout of pain around 2AM  morning. Pain meds did seem to help some. On  night \"I lost my legs\" but she states that she was sitting down. She stayed seated until it subsided. That episode lasted about 30 minutes. She saw the MD yesterday who put off her return to work and has advised her to go on short term disability. She saw her dentist last week who also told her that the trigeminal neuralgia has returned. OBJECTIVE    38 min Therapeutic Exercise:  [x] See flow sheet :   Rationale: increase ROM, increase strength, improve coordination, improve balance and increase proprioception to improve the patients ability to complete all activity    Other Objective/Functional Measures: none noted       Pain Level (0-10 scale) post treatment: 7/10    ASSESSMENT/Changes in Function:   Pt is progressing. She is tolerating therapy better with less need for resting but she is still moving slowly and is in obvious discomfort and fatigued. Will continue as able.     Patient will continue to benefit from skilled PT services to modify and progress therapeutic interventions, address functional mobility deficits, address ROM deficits, address strength deficits, analyze and address soft tissue restrictions, analyze and cue movement patterns, analyze and modify body mechanics/ergonomics, assess and modify postural abnormalities and address imbalance/dizziness to attain remaining goals.      [x]  See Plan of Care  []  See progress note/recertification  []  See Discharge Summary         Progress towards goals / Updated goals:  Progressing toward goals    PLAN  [x]  Upgrade activities as tolerated     [x]  Continue plan of care  [x]  Update interventions per flow sheet       []  Discharge due to:_  []  Other:_      Laurel Cameron, PT 7/9/2021

## 2021-07-19 ENCOUNTER — OFFICE VISIT (OUTPATIENT)
Dept: NEUROLOGY | Age: 31
End: 2021-07-19
Payer: COMMERCIAL

## 2021-07-19 VITALS
TEMPERATURE: 97.8 F | RESPIRATION RATE: 16 BRPM | WEIGHT: 194.4 LBS | OXYGEN SATURATION: 97 % | DIASTOLIC BLOOD PRESSURE: 86 MMHG | SYSTOLIC BLOOD PRESSURE: 118 MMHG | HEART RATE: 95 BPM | BODY MASS INDEX: 34.45 KG/M2 | HEIGHT: 63 IN

## 2021-07-19 DIAGNOSIS — M50.20 PROTRUSION OF CERVICAL INTERVERTEBRAL DISC: ICD-10-CM

## 2021-07-19 DIAGNOSIS — M79.7 FIBROMYALGIA: ICD-10-CM

## 2021-07-19 DIAGNOSIS — R53.82 CHRONIC FATIGUE: ICD-10-CM

## 2021-07-19 DIAGNOSIS — F98.8 ATTENTION DEFICIT DISORDER (ADD) WITHOUT HYPERACTIVITY: ICD-10-CM

## 2021-07-19 DIAGNOSIS — G52.1 GLOSSOPHARYNGEAL NEURALGIA: ICD-10-CM

## 2021-07-19 DIAGNOSIS — R42 DIZZINESS: ICD-10-CM

## 2021-07-19 DIAGNOSIS — G89.4 CHRONIC PAIN SYNDROME: ICD-10-CM

## 2021-07-19 DIAGNOSIS — G50.0 TN (TRIGEMINAL NEURALGIA): ICD-10-CM

## 2021-07-19 DIAGNOSIS — G43.109 COMPLICATED MIGRAINE: Primary | ICD-10-CM

## 2021-07-19 DIAGNOSIS — M79.18 MYOFASCIAL MUSCLE PAIN: ICD-10-CM

## 2021-07-19 DIAGNOSIS — G43.109 MIGRAINE WITH VERTIGO: ICD-10-CM

## 2021-07-19 PROCEDURE — 99214 OFFICE O/P EST MOD 30 MIN: CPT | Performed by: PSYCHIATRY & NEUROLOGY

## 2021-07-19 RX ORDER — BUSPIRONE HYDROCHLORIDE 5 MG/1
5 TABLET ORAL
Qty: 90 TABLET | Refills: 1 | Status: SHIPPED | OUTPATIENT
Start: 2021-07-19 | End: 2021-09-17

## 2021-07-19 NOTE — PROGRESS NOTES
Chief Complaint   Patient presents with    Follow-up     follow up on medication and in case there is questions for patient short term disability

## 2021-07-20 ENCOUNTER — APPOINTMENT (OUTPATIENT)
Dept: PHYSICAL THERAPY | Age: 31
End: 2021-07-20
Payer: COMMERCIAL

## 2021-07-22 ENCOUNTER — HOSPITAL ENCOUNTER (OUTPATIENT)
Dept: PHYSICAL THERAPY | Age: 31
Discharge: HOME | End: 2021-07-22
Payer: COMMERCIAL

## 2021-07-22 PROCEDURE — 97110 THERAPEUTIC EXERCISES: CPT | Performed by: PHYSICAL THERAPIST

## 2021-07-22 NOTE — PROGRESS NOTES
PT DAILY TREATMENT NOTE - [facility] -15    Patient Name: Irene Lizarraga  Date:2021  : 1990  [x]  Patient  Verified  Payor: Cas Adkins / Plan: Valentine Bailey / Product Type: HMO /    In time:1134  Out time:1212  Total Treatment Time (min): 38  Total Timed Codes (min): 38  1:1 Treatment Time ( only): 45  Visit #:  6    Treatment Area: Dizziness [R42]  Fibromyalgia [M79.7]    SUBJECTIVE  Pain Level (0-10 scale): 7  Any medication changes, allergies to medications, adverse drug reactions, diagnosis change, or new procedure performed?: [x] No    [] Yes (see summary sheet for update)  Subjective functional status/changes:   [] No changes reported  Pt is in continued discomfort. She states her balance may be a little better but she isn't sure that the PT is helping. She does admit that her episodes of the legs going out have decreased. OBJECTIVE    38 min Therapeutic Exercise:  [x] See flow sheet :   Rationale: increase ROM, increase strength, improve coordination, improve balance and increase proprioception to improve the patients ability to complete all activity    Other Objective/Functional Measures: none noted       Pain Level (0-10 scale) post treatment: 9/10    ASSESSMENT/Changes in Function:   Pt is able to complete all there-ex. She is still having pain with all activity however. Today she is limited by pain in the low back and sacrum even when lying supine. Patient will continue to benefit from skilled PT services to modify and progress therapeutic interventions, address functional mobility deficits, address ROM deficits, address strength deficits, analyze and address soft tissue restrictions, analyze and cue movement patterns, analyze and modify body mechanics/ergonomics, assess and modify postural abnormalities and address imbalance/dizziness to attain remaining goals.      [x]  See Plan of Care  []  See progress note/recertification  []  See Discharge Summary         Progress towards goals / Updated goals:  Progressing toward goals    PLAN  [x]  Upgrade activities as tolerated     [x]  Continue plan of care  [x]  Update interventions per flow sheet       []  Discharge due to:_  []  Other:_      Obey Machado, PT 7/22/2021

## 2021-07-23 NOTE — PROGRESS NOTES
Neurology Progress Note    NAME:  Irene Lizarraga   :   1990   MRN:   [id number]     Date/Time:  2021  Subjective:   Irene Lizarraga is a 28 y.o. female here today for follow-up for headache, sharp pain on the right side of the head and face with generalized body pain and frequent fall. Patient says today she still having a lot of symptoms,  her legs continue to give out on her, lost feeling in the legs,  trigeminal neuralgia symptoms  with numbness and tingling sensation, pain on the right side of the tongue, right facial pain are still persisting. I will continue patient on carbamazepine or Tegretol 200 mg p.o. nightly,  Neurontin to 600 mg p.o. nightly, 300 mg p.o. every morning. Patient is currently in physical therapy , she says  even though she is in a lot of pain, she is trying to participate in physical therapy, I encouraged patient to continue to participate in physical therapy. Patient will eventually transition into aqua therapy that may be more beneficial in view of the fact that patient has evidence of fibromyalgia. As previously stated, because of tendency to fall, persistent pain, memory difficulty  and difficulty focusing, it is pertinent that patient applies for short-term disability pending when her condition stabilizes. She says she still cannot stand for a while, it makes her very weak and causes her leg to buckle. Patient says she is still experiencing generalized pain, pain is worse with activity. She says she continues to have increasing numbness and tingling sensation mostly of the lower extremity. Patient still has  difficulty focusing and concentrating, she says with the symptoms lately she has been having a very difficult to complete any task. She says she still has pain behind her right eye, pain is sharp in nature as if it is coming from the back of the neck and this is associated with dizziness.    She says the right side of the face continues to be sensitive to touch. However the headache is unrelenting. .  With a constellation of patient's symptoms, it will be very difficult for her to continue with her job at this time  Review of Systems - General ROS: positive for  - fatigue and sleep disturbance  Psychological ROS: positive for - anxiety and sleep disturbances  Ophthalmic ROS: positive for - blurry vision, decreased vision, double vision and photophobia  ENT ROS: positive for - headaches, tinnitus, vertigo and visual changes  Allergy and Immunology ROS: negative  Hematological and Lymphatic ROS: negative  Endocrine ROS: negative  Respiratory ROS: no cough, shortness of breath, or wheezing  Cardiovascular ROS: negative  Gastrointestinal ROS: no abdominal pain, change in bowel habits, or black or bloody stools  Genito-Urinary ROS: no dysuria, trouble voiding, or hematuria  Musculoskeletal ROS: positive for - muscular weakness  Neurological ROS: positive for - dizziness, headaches, numbness/tingling, visual changes and weakness  Dermatological ROS: negative            Medications reviewed:  Current Outpatient Medications   Medication Sig Dispense Refill    busPIRone (BUSPAR) 5 mg tablet Take 1 Tablet by mouth three (3) times daily (with meals). 90 Tablet 1    gabapentin (NEURONTIN) 300 mg capsule Take 1 Capsule by mouth three (3) times daily. Max Daily Amount: 900 mg. 90 Capsule 3    carBAMazepine XR (TEGretol XR) 200 mg SR tablet Take 1 Tablet by mouth nightly. 30 Tablet 1    ergocalciferol (ERGOCALCIFEROL) 1,250 mcg (50,000 unit) capsule TAKE ONE CAPSULE BY MOUTH EVERY MONDAY AND THURSDAY 8 Capsule 2    buprenorphine (Butrans) 5 mcg/hour patch 1 Patch by TransDERmal route every seven (7) days. Max Daily Amount: 1 Patch. 4 Patch 3    lisdexamfetamine (Vyvanse) 30 mg capsule Take 1 Capsule by mouth every morning.  Max Daily Amount: 30 mg. 30 Capsule 0    lidocaine (LIDODERM) 5 % Apply patch to the back of the neck for 12 hours a day and remove for 12 hours a day. 30 Each 0    propranolol LA (INDERAL LA) 60 mg SR capsule Take 1 Capsule by mouth nightly. 30 Capsule 1    loratadine (Claritin) 10 mg tablet Claritin      Gaviscon Extra Strength 254-237.5 mg/5 mL susp TAKE 5 ML BY MOUTH TWICE DAILY AS NEEDED FOR HEARTBURN      ondansetron (Zofran ODT) 4 mg disintegrating tablet Take 1 Tablet by mouth every eight (8) hours as needed for Nausea. 10 Tablet 0    predniSONE (DELTASONE) 10 mg tablet Take 10 mg by mouth two (2) times a day. Take 1 tab p.o. tid x3days, 1 tab bid x5 days ,1 p.o.qd 30 Tablet 0    galcanezumab-gnlm (Emgality Syringe) 120 mg/mL syrg 120 mg by SubCUTAneous route every thirty (30) days. 1 mL 3    meclizine (ANTIVERT) 25 mg tablet Take 1 Tab by mouth three (3) times daily as needed for Dizziness. 30 Tab 1    ondansetron (Zofran ODT) 4 mg disintegrating tablet Take 1 Tab by mouth every eight (8) hours as needed for Nausea. 10 Tab 0    DULoxetine (CYMBALTA) 30 mg capsule Take 1 Cap by mouth daily. 30 Cap 1    cholecalciferol (Vitamin D3) 25 mcg (1,000 unit) cap Take 1,000 Units by mouth daily.  cyanocobalamin 1,000 mcg tablet Take 1,000 mcg by mouth daily.  famotidine (PEPCID) 40 mg tablet Take 40 mg by mouth daily.  polyethylene glycol (MIRALAX) 17 gram/dose powder Take 17 g by mouth daily. 510 g 0    ibuprofen (MOTRIN) 600 mg tablet Take 1 Tab by mouth three (3) times daily (with meals). 21 Tab 0    naloxone (NARCAN) 4 mg/actuation nasal spray Use 1 spray intranasally, then discard. Repeat with new spray every 2 min as needed for opioid overdose symptoms, alternating nostrils. 2 Each 0    medroxyPROGESTERone (PROVERA) 10 mg tablet Take 10 mg by mouth three (3) times daily.  dicyclomine HCl (BENTYL PO) Take  by mouth four (4) times daily. Indications: As needed for pain      fluticasone propionate (FLONASE) 50 mcg/actuation nasal spray 2 Sprays by Both Nostrils route daily.  Indications: As needed for allergies      multivitamin (MULTIPLE VITAMIN PO) Take  by mouth. Indications: Teresita fusion multi vitamin/folate 50 mg (2 gummies) by gyn dr     [facility] pantoprazole (PROTONIX) 40 mg tablet Take 40 mg by mouth two (2) times a day.  sucralfate (CARAFATE) 100 mg/mL suspension Take  by mouth four (4) times daily.  albuterol (PROVENTIL VENTOLIN) 2.5 mg /3 mL (0.083 %) nebulizer solution 2.5 mg by Nebulization route once.  fluticasone-umeclidinium-vilanterol (Trelegy Ellipta) 100-62.5-25 mcg inhaler Take 1 Puff by inhalation daily. (Patient not taking: Reported on 7/19/2021)      naproxen sodium (ALEVE) 220 mg cap Take  by mouth. (Patient not taking: Reported on 7/19/2021)      loratadine (CLARITIN) 10 mg tablet Take 10 mg by mouth daily.  (Patient not taking: Reported on 7/19/2021)          Objective:   Vitals:  Vitals:    07/19/21 1225   BP: 118/86   Pulse: 95   Resp: 16   Temp: 97.8 °F (36.6 °C)   TempSrc: Oral   SpO2: 97%   Weight: 194 lb 6.4 oz (88.2 kg)   Height: 5' 3\" (1.6 m)   PainSc:   6   PainLoc: Generalized     Lab Data Reviewed:  Lab Results   Component Value Date/Time    WBC 14.4 (H) 06/07/2021 04:33 AM    HCT 45.4 06/07/2021 04:33 AM    HGB 15.1 06/07/2021 04:33 AM    PLATELET 822 00/97/3520 04:33 AM       Lab Results   Component Value Date/Time    Sodium 135 (L) 06/07/2021 04:33 AM    Potassium 4.6 06/07/2021 04:33 AM    Chloride 104 06/07/2021 04:33 AM    CO2 28 06/07/2021 04:33 AM    Glucose 119 (H) 06/07/2021 04:33 AM    BUN 19 06/07/2021 04:33 AM    Creatinine 0.99 06/07/2021 04:33 AM    Calcium 9.3 06/07/2021 04:33 AM       No components found for: TROPQUANT    No results found for: RASHAWN      No results found for: HBA1C, BWF0PJUJ, SIH2GOFT     Lab Results   Component Value Date/Time    Vitamin B12 482 12/14/2020 10:44 AM       No results found for: RASHAWN, ANARX, ANAIGG, XBANA    No results found for: CHOL, CHOLPOCT, CHOLX, CHLST, CHOLV, HDL, HDLPOC, HDLP, LDL, LDLCPOC, LDLC, DLDLP, VLDLC, VLDL, TGLX, TRIGL, TRIGP, TGLPOCT, CHHD, [address]  [facility]      CT Results (recent):  Results from Hospital Encounter encounter on 06/07/21    CT ABD PELV WO CONT    Narrative  EXAM: CT ABD PELV WO CONT    INDICATION: right flank and right mid abd pain    COMPARISON: 2019    CONTRAST:  None. TECHNIQUE:  Thin axial images were obtained through the abdomen and pelvis. Coronal and  sagittal reformats were generated. Oral contrast was not administered. CT dose  reduction was achieved through use of a standardized protocol tailored for this  examination and automatic exposure control for dose modulation. The absence of intravenous contrast material reduces the sensitivity for  evaluation of the vasculature and solid organs. FINDINGS:  LOWER THORAX: No significant abnormality in the incidentally imaged lower chest.  LIVER: Hepatic steatosis. BILIARY TREE: Cholecystectomy. CBD is not dilated. SPLEEN: within normal limits. PANCREAS: No focal abnormality. ADRENALS: Unremarkable. KIDNEYS/URETERS: Right nephrolithiasis. No hydronephrosis. STOMACH: Unremarkable. SMALL BOWEL: No dilatation or wall thickening. COLON: No dilatation or wall thickening. APPENDIX: Normal.  PERITONEUM: No ascites or pneumoperitoneum. RETROPERITONEUM: No lymphadenopathy or aortic aneurysm. REPRODUCTIVE ORGANS: Normal.  URINARY BLADDER: No mass or calculus. BONES: No destructive bone lesion. ABDOMINAL WALL: No mass or hernia. ADDITIONAL COMMENTS: N/A    Impression  Right nephrolithiasis. MRI Results (recent):  Results from [place] encounter on 07/08/21    MRI LUMB SPINE WO CONT    Narrative  EXAM: MRI LUMB SPINE WO CONT    INDICATION: Radiculopathy, lumbar region    COMPARISON: None    TECHNIQUE: MR imaging of the lumbar spine was performed using the following  sequences: sagittal T1, T2, STIR;  axial T1, T2.    CONTRAST:  None. FINDINGS:    There is normal alignment of the lumbar spine. Vertebral body heights are  maintained.  Marrow signal is normal.    The conus medullaris terminates at . Signal and caliber of the distal spinal  cord are within normal limits. The paraspinal soft tissues are within normal limits. Lower thoracic spine: No herniation or stenosis. L1-L2: No herniation or stenosis. L2-L3: No herniation or stenosis. L3-L4: No herniation or stenosis. L4-L5: No herniation or stenosis. L5-S1: The disc space is mildly narrowed without disc desiccation. This is  likely congenital. No herniation or stenosis. Mild bilateral posterior facet  arthropathy. Impression  No significant spinal stenosis or neural foraminal narrowing. No acute process. IR Results (recent):  No results found for this or any previous visit. VAS/US Results (recent):  No results found for this or any previous visit. PHYSICAL EXAM:  General:    Alert, cooperative, no distress, appears stated age. Head:   Normocephalic, without obvious abnormality, atraumatic. Eyes:   Conjunctivae/corneas clear. PERRLA  Nose:  Nares normal. No drainage or sinus tenderness. Throat:    Lips, mucosa, and tongue normal.  No Thrush  Neck:  Supple, symmetrical,  no adenopathy, thyroid: non tender    no carotid bruit and no JVD. Severe paraspinal and occipitalis tenderness  Back:    Symmetric, diffuse tenderness. Lungs:   Clear to auscultation bilaterally. No Wheezing or Rhonchi. No rales. Chest wall:  No tenderness or deformity. No Accessory muscle use. Heart:   Regular rate and rhythm,  no murmur, rub or gallop. Abdomen:   Soft, non-tender. Not distended. Bowel sounds normal. No masses  Extremities: Extremities normal, atraumatic, No cyanosis. No edema. No clubbing  Skin:     Texture, turgor normal. No rashes or lesions. Not Jaundiced  Lymph nodes: Cervical, supraclavicular normal.  Psych:  Good insight. Depressed. Anxious. NEUROLOGICAL EXAM:  Appearance:   The patient is well developed, well nourished, provides a coherent history and is in no acute distress. Mental Status: Oriented to time, place and person. Mood and affect appropriate. Cranial Nerves:   Intact visual fields. Fundi are benign. OANH, EOM's full, no nystagmus, no ptosis. Facial sensation dysesthesia. Corneal reflexes are intact. Facial movement is symmetric. Hearing is normal bilaterally. Palate is midline with normal sternocleidomastoid and trapezius muscles are normal. Tongue is midline. Motor:  5-/5 strength in upper extremity and 4+/5 lower extremity proximal and distal muscles. Normal bulk and tone. No fasciculations. Reflexes:   Deep tendon reflexes 3+/4 and symmetrical.   Sensory:    Dysesthesia to touch, pinprick and vibration. Gait:   Slightly unsteady gait. Tremor:   No tremor noted. Cerebellar:  No cerebellar signs present. Neurovascular:  Normal heart sounds and regular rhythm, peripheral pulses intact, and no carotid bruits. Assesment  1. Complicated migraine    Continue Emgality    2. Migraine with vertigo  Emgality    3. TN (trigeminal neuralgia)  Tegretol    4. Attention deficit disorder (ADD) without hyperactivity  Vyvanse    5. Chronic pain syndrome  Referred to pain management    6. Chronic migraine  Emgality    7. Fibromyalgia  Referred to rheumatologist    8. Chronic fatigue  Continue management    9. Glossopharyngeal neuralgia  Tegretol    10. Protrusion of cervical intervertebral disc  Physical therapy    11. Dizziness  Stable    12. Myofascial muscle pain  Continue management    ___________________________________________________  PLAN: Medication and plan discussed with patient      ICD-10-CM ICD-9-CM    1. Complicated migraine  I83.094 346.00    2. Migraine with vertigo  G43.109 346.80      780.4    3. TN (trigeminal neuralgia)  G50.0 350.1    4. Attention deficit disorder (ADD) without hyperactivity  F98.8 314.00    5. Chronic pain syndrome  G89.4 338.4    6. Chronic migraine  G43.709 346.70    7.  Fibromyalgia  M79.7 729.1    8. Chronic fatigue  R53.82 780.79    9. Glossopharyngeal neuralgia  G52.1 352.1    10. Protrusion of cervical intervertebral disc  M50.20 722.0    11. Dizziness  R42 780.4    12. Myofascial muscle pain  M79.18 729.1      Follow-up and Dispositions    · Return in about 2 months (around 9/19/2021).            :    ___________________________________________________    Attending Physician: Clayton Pedraza MD

## 2021-07-26 ENCOUNTER — HOSPITAL ENCOUNTER (OUTPATIENT)
Dept: PHYSICAL THERAPY | Age: 31
Discharge: HOME | End: 2021-07-26
Payer: COMMERCIAL

## 2021-07-26 DIAGNOSIS — R53.82 CHRONIC FATIGUE: ICD-10-CM

## 2021-07-26 DIAGNOSIS — F98.8 ATTENTION DEFICIT DISORDER (ADD) WITHOUT HYPERACTIVITY: ICD-10-CM

## 2021-07-26 PROCEDURE — 97110 THERAPEUTIC EXERCISES: CPT

## 2021-07-26 NOTE — PROGRESS NOTES
PT DAILY TREATMENT NOTE - [facility] -15    Patient Name: Ian Watson  Date:2021  : 1990  [x]  Patient  Verified  Payor: Maggie Leo / Plan: Meredith Francisca / Product Type: HMO /    In time:1034  Out time:1119  Total Treatment Time (min): 45  Total Timed Codes (min): 29  1:1 Treatment Time ( only): 29  Visit #:  7    Treatment Area: Dizziness [R42]  Fibromyalgia [M79.7]    SUBJECTIVE  Pain Level (0-10 scale): 7/10  Any medication changes, allergies to medications, adverse drug reactions, diagnosis change, or new procedure performed?: [x] No    [] Yes (see summary sheet for update)  Subjective functional status/changes:   [] No changes reported  Patient reports yesterday was a bad, and today is a bit worse. OBJECTIVE    45 min Therapeutic Exercise:  [x] See flow sheet :   Rationale: increase ROM, increase strength, improve coordination, improve balance and increase proprioception to improve the patients ability to complete all activity    Other Objective/Functional Measures: none noted       Pain Level (0-10 scale) post treatment: 4/10    ASSESSMENT/Changes in Function:   Patient tolerated all therex well, will continue to progress as tolerated. Patient will continue to benefit from skilled PT services to modify and progress therapeutic interventions, address functional mobility deficits, address ROM deficits, address strength deficits, analyze and address soft tissue restrictions, analyze and cue movement patterns, analyze and modify body mechanics/ergonomics, assess and modify postural abnormalities and address imbalance/dizziness to attain remaining goals.      [x]  See Plan of Care  []  See progress note/recertification  []  See Discharge Summary         Progress towards goals / Updated goals:  Progressing toward goals    PLAN  [x]  Upgrade activities as tolerated     [x]  Continue plan of care  [x]  Update interventions per flow sheet       []  Discharge due to:_  []  Other:_      Consuelo Blandon FERNIE Cardoso, PTA 7/2/2021

## 2021-07-29 ENCOUNTER — HOSPITAL ENCOUNTER (OUTPATIENT)
Dept: PHYSICAL THERAPY | Age: 31
Discharge: HOME | End: 2021-07-29
Payer: COMMERCIAL

## 2021-07-29 PROCEDURE — 97110 THERAPEUTIC EXERCISES: CPT | Performed by: PHYSICAL THERAPIST

## 2021-07-29 NOTE — PROGRESS NOTES
PT DAILY TREATMENT NOTE - [facility] 2-15    Patient Name: Lisa Sun  Date:2021  : 1990  [x]  Patient  Verified  Payor: Aby Vargas / Plan: Autumn Davis / Product Type: HMO /    In time:1135  Out time:1213  Total Treatment Time (min): 38  Total Timed Codes (min): 38  1:1 Treatment Time ( only): 45  Visit #:  8    Treatment Area: Dizziness [R42]  Fibromyalgia [M79.7]    SUBJECTIVE  Pain Level (0-10 scale): 5/10  Any medication changes, allergies to medications, adverse drug reactions, diagnosis change, or new procedure performed?: [x] No    [] Yes (see summary sheet for update)  Subjective functional status/changes:   [] No changes reported  Pt reports that today is pretty good. Feels 45-50% better    OBJECTIVE    38 min Therapeutic Exercise:  [x] See flow sheet :   Rationale: increase ROM, increase strength, improve coordination, improve balance and increase proprioception to improve the patients ability to complete all activity    Other Objective/Functional Measures: none noted       Pain Level (0-10 scale) post treatment: 5/10    ASSESSMENT/Changes in Function:   Pt able to complete all there-ex well today. She is feeling radha despite scoring lower on her FOTO. Will continue as able   Patient will continue to benefit from skilled PT services to modify and progress therapeutic interventions, address functional mobility deficits, address ROM deficits, address strength deficits, analyze and address soft tissue restrictions, analyze and cue movement patterns, analyze and modify body mechanics/ergonomics, assess and modify postural abnormalities and address imbalance/dizziness to attain remaining goals.      [x]  See Plan of Care  []  See progress note/recertification  []  See Discharge Summary         Progress towards goals / Updated goals:  Progressing toward goals    PLAN  [x]  Upgrade activities as tolerated     [x]  Continue plan of care  [x]  Update interventions per flow sheet       [] Discharge due to:_  []  Other:_      Laurelyn Favre, PT 7/29/2021

## 2021-08-03 ENCOUNTER — VIRTUAL VISIT (OUTPATIENT)
Dept: NEUROLOGY | Age: 31
End: 2021-08-03
Payer: COMMERCIAL

## 2021-08-03 ENCOUNTER — HOSPITAL ENCOUNTER (OUTPATIENT)
Dept: PHYSICAL THERAPY | Age: 31
Discharge: HOME | End: 2021-08-03
Payer: COMMERCIAL

## 2021-08-03 DIAGNOSIS — R53.82 CHRONIC FATIGUE: ICD-10-CM

## 2021-08-03 DIAGNOSIS — M79.7 FIBROMYALGIA: ICD-10-CM

## 2021-08-03 DIAGNOSIS — G50.0 TN (TRIGEMINAL NEURALGIA): ICD-10-CM

## 2021-08-03 DIAGNOSIS — F98.8 ATTENTION DEFICIT DISORDER (ADD) WITHOUT HYPERACTIVITY: ICD-10-CM

## 2021-08-03 DIAGNOSIS — M50.20 PROTRUSION OF CERVICAL INTERVERTEBRAL DISC: ICD-10-CM

## 2021-08-03 DIAGNOSIS — G43.109 COMPLICATED MIGRAINE: ICD-10-CM

## 2021-08-03 DIAGNOSIS — R42 DIZZINESS: Primary | ICD-10-CM

## 2021-08-03 DIAGNOSIS — G43.109 MIGRAINE WITH VERTIGO: Primary | ICD-10-CM

## 2021-08-03 DIAGNOSIS — G89.4 CHRONIC PAIN SYNDROME: ICD-10-CM

## 2021-08-03 PROCEDURE — 99213 OFFICE O/P EST LOW 20 MIN: CPT | Performed by: PSYCHIATRY & NEUROLOGY

## 2021-08-03 PROCEDURE — 97110 THERAPEUTIC EXERCISES: CPT | Performed by: PHYSICAL THERAPIST

## 2021-08-03 NOTE — PROGRESS NOTES
[facility]  [address] ([address]), [address] [place] [address] [address]  Phone: [phone] Fax: [phone]    Progress Note    Name: Norman Reese   : 1990   MD: Wang Kan MD       Treatment Diagnosis: Dizziness [R42]  Fibromyalgia [M79.7]  Start of Care: 21    Visits from Start of Care: 9  Missed Visits: 0    Summary of Care:Pt has completed 9 visits of therapy. She has had some minor progress stating 40-50% improvement since eval, but her pain levels have fluctuated. Today she is up to 9/10 pain. She reports multiple episodes of the legs giving out over the weekend. She reports a fall on  and needing her  to pick her back up. She is independent with HEP but feels the benefit of being in therapy for accountability purposes. She will be going on vacation in one week. Will plan to switch to 1x/wk following this time and work to implement her long term HEP as we start to shift toward Discharge. Assessment / Recommendations:     Goal:  Short Term Goals: To be accomplished in 8-10 treatments:               Pt will be I with HEP Progressing Toward  Pt will complain of pain 4-5/10 with all activity Not MET  Pt will increase strength to maintain better postures with all activity Progressing Toward     Long Term Goals:  To be accomplished in 16-18 treatments:               Pt will complain of pain 2-3/10 with all activity Not MET  Pt will increase strength to complete all household chores and work duties without increased symptoms Not MET  Pt will increase FOTO score by 9 points to meet [address] and improve their function Not MET  Pt will decrease radicular symptoms by 75% to complete all activity Not Met  Pt will return to aquatics Not MET  Pt will be able to complete all work tasks without increased symptoms Not MET       Other: Cont 1-2 x wk x 5-6 treatments      Rosie London, PT 8/3/2021

## 2021-08-03 NOTE — PROGRESS NOTES
PT DAILY TREATMENT NOTE - [facility] 2-15    Patient Name: King Carla  Date:8/3/2021  : 1990  [x]  Patient  Verified  Payor: Jodi Casarez / Plan: Beni Mcbrideape / Product Type: HMO /    In time:1006  Out time:1041  Total Treatment Time (min): 35  Total Timed Codes (min): 35  1:1 Treatment Time ( only): 35  Visit #:  9    Treatment Area: Dizziness [R42]  Fibromyalgia [M79.7]    SUBJECTIVE  Pain Level (0-10 scale): 98/10  Any medication changes, allergies to medications, adverse drug reactions, diagnosis change, or new procedure performed?: [x] No    [] Yes (see summary sheet for update)  Subjective functional status/changes:   [] No changes reported  Pt reports that she is in the middle of a few really bad days. States that her legs became very weak and gave out multiple times on Saturday and she fell in the floor on  and needed her  to pick her back up. OBJECTIVE    35 min Therapeutic Exercise:  [x] See flow sheet :   Rationale: increase ROM, increase strength, improve coordination, improve balance and increase proprioception to improve the patients ability to complete all activity    Other Objective/Functional Measures: none noted       Pain Level (0-10 scale) post treatment: 9/10    ASSESSMENT/Changes in Function:   Pt struggles to complete therapy today secondary to total body pain and fatigue. Patient will continue to benefit from skilled PT services to modify and progress therapeutic interventions, address functional mobility deficits, address ROM deficits, address strength deficits, analyze and address soft tissue restrictions, analyze and cue movement patterns, analyze and modify body mechanics/ergonomics, assess and modify postural abnormalities and address imbalance/dizziness to attain remaining goals.      [x]  See Plan of Care  []  See progress note/recertification  []  See Discharge Summary         Progress towards goals / Updated goals:  Progressing toward goals    PLAN  [x]  Upgrade activities as tolerated     [x]  Continue plan of care  [x]  Update interventions per flow sheet       []  Discharge due to:_  []  Other:_      Anna Wasserman, PT 8/3/2021

## 2021-08-03 NOTE — PROGRESS NOTES
Chief Complaint   Patient presents with    Follow-up     short-term disability paperwork, fibromyalgia is acting up    Migraine     stated they are stable for now

## 2021-08-09 ENCOUNTER — HOSPITAL ENCOUNTER (OUTPATIENT)
Dept: PHYSICAL THERAPY | Age: 31
Discharge: HOME | End: 2021-08-09
Payer: COMMERCIAL

## 2021-08-09 PROCEDURE — 97110 THERAPEUTIC EXERCISES: CPT

## 2021-08-09 NOTE — PROGRESS NOTES
Neurology Progress Note  Allen Dutta was seen by synchronous (real-time) audio-video technology on 21. Consent:  She  is aware that this patient-initiated Telehealth encounter is a billable service, with coverage as determined by her insurance carrier. She is aware that she may receive a bill and has provided verbal consent to proceed: Yes    I was in the office while conducting this encounter. Pursuant to the emergency declaration under the [facility] [place] [address] [facility] authority and the [employer] Act, this Virtual  Visit was conducted, with patient's consent, to reduce the patient's risk of exposure to COVID-19 and provide continuity of care for an established patient. Services were provided through a video synchronous discussion virtually to substitute for in-person clinic visit. NAME:  Allen Dutta   :   1990   MRN:   [id number]     Date/Time:  8/3/2021  Subjective:    Allen Dutta is a 28 y.o. female here today for follow-up for headache, sharp pain on the right side of the head and face with generalized body pain and frequent fall. Patient today to discuss her paper for short-term disability  Patient says she continues to have  a lot of symptoms,  her legs continue to give out on her, lost feeling in the legs,  trigeminal neuralgia symptoms  with numbness and tingling sensation, pain on the right side of the tongue, right facial pain are still persisting. I will continue patient on her current medications. Patient to continue physical therapy, with fibromyalgia will require upper therapy   As previously stated, because of tendency to fall, persistent pain, memory difficulty  and difficulty focusing, it is pertinent that patient applies for short-term disability pending when her condition stabilizes.   She says she still cannot stand for a while, it makes her very weak and causes her leg to buckle. Patient says she is still experiencing generalized pain, pain is worse with activity. She says she continues to have increasing numbness and tingling sensation mostly of the lower extremity. Patient still has  difficulty focusing and concentrating, she says with the symptoms lately she has been having a very difficult to complete any task. She says she still has pain behind her right eye, pain is sharp in nature as if it is coming from the back of the neck and this is associated with dizziness. She says the right side of the face continues to be sensitive to touch. However the headache is unrelenting. .  With a constellation of patient's symptoms, it will be very difficult for her to continue with her job at this time  Review of Systems - General ROS: positive for  - fatigue and sleep disturbance  Psychological ROS: positive for - anxiety and sleep disturbances  Ophthalmic ROS: positive for - blurry vision, decreased vision, double vision and photophobia  ENT ROS: positive for - headaches, tinnitus, vertigo and visual changes  Allergy and Immunology ROS: negative  Hematological and Lymphatic ROS: negative  Endocrine ROS: negative  Respiratory ROS: no cough, shortness of breath, or wheezing  Cardiovascular ROS: negative  Gastrointestinal ROS: no abdominal pain, change in bowel habits, or black or bloody stools  Genito-Urinary ROS: no dysuria, trouble voiding, or hematuria  Musculoskeletal ROS: positive for - muscular weakness  Neurological ROS: positive for - dizziness, headaches, numbness/tingling, visual changes and weakness  Dermatological ROS: negative          Medications reviewed:  Current Outpatient Medications   Medication Sig Dispense Refill    lisdexamfetamine (Vyvanse) 30 mg capsule Take 1 Capsule by mouth every morning. Max Daily Amount: 30 mg. 30 Capsule 0    busPIRone (BUSPAR) 5 mg tablet Take 1 Tablet by mouth three (3) times daily (with meals).  90 Tablet 1    gabapentin (NEURONTIN) 300 mg capsule Take 1 Capsule by mouth three (3) times daily. Max Daily Amount: 900 mg. 90 Capsule 3    carBAMazepine XR (TEGretol XR) 200 mg SR tablet Take 1 Tablet by mouth nightly. 30 Tablet 1    ergocalciferol (ERGOCALCIFEROL) 1,250 mcg (50,000 unit) capsule TAKE ONE CAPSULE BY MOUTH EVERY MONDAY AND THURSDAY 8 Capsule 2    buprenorphine (Butrans) 5 mcg/hour patch 1 Patch by TransDERmal route every seven (7) days. Max Daily Amount: 1 Patch. 4 Patch 3    lidocaine (LIDODERM) 5 % Apply patch to the back of the neck for 12 hours a day and remove for 12 hours a day. 30 Each 0    propranolol LA (INDERAL LA) 60 mg SR capsule Take 1 Capsule by mouth nightly. 30 Capsule 1    loratadine (Claritin) 10 mg tablet Claritin      Gaviscon Extra Strength 254-237.5 mg/5 mL susp TAKE 5 ML BY MOUTH TWICE DAILY AS NEEDED FOR HEARTBURN      ondansetron (Zofran ODT) 4 mg disintegrating tablet Take 1 Tablet by mouth every eight (8) hours as needed for Nausea. 10 Tablet 0    predniSONE (DELTASONE) 10 mg tablet Take 10 mg by mouth two (2) times a day. Take 1 tab p.o. tid x3days, 1 tab bid x5 days ,1 p.o.qd 30 Tablet 0    galcanezumab-gnlm (Emgality Syringe) 120 mg/mL syrg 120 mg by SubCUTAneous route every thirty (30) days. 1 mL 3    meclizine (ANTIVERT) 25 mg tablet Take 1 Tab by mouth three (3) times daily as needed for Dizziness. 30 Tab 1    ondansetron (Zofran ODT) 4 mg disintegrating tablet Take 1 Tab by mouth every eight (8) hours as needed for Nausea. 10 Tab 0    DULoxetine (CYMBALTA) 30 mg capsule Take 1 Cap by mouth daily. 30 Cap 1    cholecalciferol (Vitamin D3) 25 mcg (1,000 unit) cap Take 1,000 Units by mouth daily.  cyanocobalamin 1,000 mcg tablet Take 1,000 mcg by mouth daily.  famotidine (PEPCID) 40 mg tablet Take 40 mg by mouth daily.  polyethylene glycol (MIRALAX) 17 gram/dose powder Take 17 g by mouth daily.  510 g 0    ibuprofen (MOTRIN) 600 mg tablet Take 1 Tab by mouth three (3) times daily (with meals). 21 Tab 0    naloxone (NARCAN) 4 mg/actuation nasal spray Use 1 spray intranasally, then discard. Repeat with new spray every 2 min as needed for opioid overdose symptoms, alternating nostrils. 2 Each 0    medroxyPROGESTERone (PROVERA) 10 mg tablet Take 10 mg by mouth three (3) times daily.  dicyclomine HCl (BENTYL PO) Take  by mouth four (4) times daily. Indications: As needed for pain      fluticasone propionate (FLONASE) 50 mcg/actuation nasal spray 2 Sprays by Both Nostrils route daily. Indications: As needed for allergies      multivitamin (MULTIPLE VITAMIN PO) Take  by mouth. Indications: Teresita fusion multi vitamin/folate 50 mg (2 gummies) by gyn dr Mora loratadine (CLARITIN) 10 mg tablet Take 10 mg by mouth daily.  pantoprazole (PROTONIX) 40 mg tablet Take 40 mg by mouth two (2) times a day.  sucralfate (CARAFATE) 100 mg/mL suspension Take  by mouth four (4) times daily.  albuterol (PROVENTIL VENTOLIN) 2.5 mg /3 mL (0.083 %) nebulizer solution 2.5 mg by Nebulization route once.  fluticasone-umeclidinium-vilanterol (Trelegy Ellipta) 100-62.5-25 mcg inhaler Take 1 Puff by inhalation daily. (Patient not taking: Reported on 7/19/2021)      naproxen sodium (ALEVE) 220 mg cap Take  by mouth. (Patient not taking: Reported on 7/19/2021)          Objective:   Vitals: There were no vitals filed for this visit.             Lab Data Reviewed:  Lab Results   Component Value Date/Time    WBC 14.4 (H) 06/07/2021 04:33 AM    HCT 45.4 06/07/2021 04:33 AM    HGB 15.1 06/07/2021 04:33 AM    PLATELET 013 62/54/6216 04:33 AM       Lab Results   Component Value Date/Time    Sodium 135 (L) 06/07/2021 04:33 AM    Potassium 4.6 06/07/2021 04:33 AM    Chloride 104 06/07/2021 04:33 AM    CO2 28 06/07/2021 04:33 AM    Glucose 119 (H) 06/07/2021 04:33 AM    BUN 19 06/07/2021 04:33 AM    Creatinine 0.99 06/07/2021 04:33 AM    Calcium 9.3 06/07/2021 04:33 AM       No components found for: TROPQUANT    No results found for: RASHAWN      No results found for: HBA1C, FVJ3MPPR, AXA3RAFT     Lab Results   Component Value Date/Time    Vitamin B12 482 12/14/2020 10:44 AM       No results found for: RASHAWN, ANARX, ANAIGG, XBANA    No results found for: CHOL, CHOLPOCT, CHOLX, CHLST, CHOLV, HDL, HDLPOC, HDLP, LDL, LDLCPOC, LDLC, DLDLP, VLDLC, VLDL, TGLX, TRIGL, TRIGP, TGLPOCT, CHHD, CHHDX      CT Results (recent):  Results from Hospital Encounter encounter on 06/07/21    CT ABD PELV WO CONT    Narrative  EXAM: CT ABD PELV WO CONT    INDICATION: right flank and right mid abd pain    COMPARISON: 2019    CONTRAST:  None. TECHNIQUE:  Thin axial images were obtained through the abdomen and pelvis. Coronal and  sagittal reformats were generated. Oral contrast was not administered. CT dose  reduction was achieved through use of a standardized protocol tailored for this  examination and automatic exposure control for dose modulation. The absence of intravenous contrast material reduces the sensitivity for  evaluation of the vasculature and solid organs. FINDINGS:  LOWER THORAX: No significant abnormality in the incidentally imaged lower chest.  LIVER: Hepatic steatosis. BILIARY TREE: Cholecystectomy. CBD is not dilated. SPLEEN: within normal limits. PANCREAS: No focal abnormality. ADRENALS: Unremarkable. KIDNEYS/URETERS: Right nephrolithiasis. No hydronephrosis. STOMACH: Unremarkable. SMALL BOWEL: No dilatation or wall thickening. COLON: No dilatation or wall thickening. APPENDIX: Normal.  PERITONEUM: No ascites or pneumoperitoneum. RETROPERITONEUM: No lymphadenopathy or aortic aneurysm. REPRODUCTIVE ORGANS: Normal.  URINARY BLADDER: No mass or calculus. BONES: No destructive bone lesion. ABDOMINAL WALL: No mass or hernia. ADDITIONAL COMMENTS: N/A    Impression  Right nephrolithiasis.       MRI Results (recent):  Results from [place] encounter on 07/08/21     [facility] CONT    Narrative  EXAM: MRI LUMB SPINE WO CONT    INDICATION: Radiculopathy, lumbar region    COMPARISON: None    TECHNIQUE: MR imaging of the lumbar spine was performed using the following  sequences: sagittal T1, T2, STIR;  axial T1, T2.    CONTRAST:  None. FINDINGS:    There is normal alignment of the lumbar spine. Vertebral body heights are  maintained. Marrow signal is normal.    The conus medullaris terminates at . Signal and caliber of the distal spinal  cord are within normal limits. The paraspinal soft tissues are within normal limits. Lower thoracic spine: No herniation or stenosis. L1-L2: No herniation or stenosis. L2-L3: No herniation or stenosis. L3-L4: No herniation or stenosis. L4-L5: No herniation or stenosis. L5-S1: The disc space is mildly narrowed without disc desiccation. This is  likely congenital. No herniation or stenosis. Mild bilateral posterior facet  arthropathy. Impression  No significant spinal stenosis or neural foraminal narrowing. No acute process. IR Results (recent):  No results found for this or any previous visit. VAS/US Results (recent):  No results found for this or any previous visit. PHYSICAL EXAM:  General:    Alert, cooperative, no distress, appears stated age. Head:   Normocephalic, without obvious abnormality, atraumatic. Eyes:   Conjunctivae/corneas clear. Nose:  Nares normal. .  Throat:    Lips, and tongue normal.    Neck:  Symmetrical,  no adenopathy, thyroidr     no JVD. Back:    Symmetric,    Lungs:   Deferred. Chest wall:  No Accessory muscle use. Heart:   Regular rate and rhythm,  no murmur, rub or gallop. Abdomen:   Not distended. Extremities: Extremities normal, atraumatic, No cyanosis. No edema. No clubbing  Skin:     No rashes or lesions. Not Jaundiced  Lymph nodes: Cervical, supraclavicular normal.  Psych:  Good insight. Depressed. Anxious . NEUROLOGICAL EXAM:  Appearance:   The patient is well developed, well nourished, provides a coherent history and is in no acute distress. Mental Status: Oriented to time, place and person. Mood and affect appropriate. Cranial Nerves:   Intact visual fields. EOM's full, no nystagmus, no ptosis. Facial movement is symmetric. Hearing is normal bilaterally. Tongue is midline. Motor:   Moves all extremities. Normal bulk . No fasciculations. Reflexes:   Deferred. Sensory:    Deferred. Gait:  Normal gait. Tremor:    Mild tremor noted. Cerebellar:  No cerebellar signs present. Assesment  1. Migraine with vertigo  Continue management    2. Complicated migraine  Continue management    3. TN (trigeminal neuralgia)  Continue management    4. Fibromyalgia  Rheumatology    5. Protrusion of cervical intervertebral disc  Physical therapy    6. Chronic migraine  CGRP    7. Chronic fatigue  Adderall    8. Attention deficit disorder (ADD) without hyperactivity  Adderall    9. Chronic pain syndrome  Referred to pain management    ___________________________________________________  PLAN:      ICD-10-CM ICD-9-CM    1. Migraine with vertigo  G43.109 346.80      780.4    2. Complicated migraine  I77.079 346.00    3. TN (trigeminal neuralgia)  G50.0 350.1    4. Fibromyalgia  M79.7 729.1    5. Protrusion of cervical intervertebral disc  M50.20 722.0    6. Chronic migraine  G43.709 346.70    7. Chronic fatigue  R53.82 780.79    8. Attention deficit disorder (ADD) without hyperactivity  F98.8 314.00    9.  Chronic pain syndrome  G89.4 338.4                ___________________________________________________    Attending Physician: Liz Morley MD

## 2021-08-09 NOTE — PROGRESS NOTES
PT DAILY TREATMENT NOTE - [facility] 2-15    Patient Name: Allen Dutat  Date:2021  : 1990  [x]  Patient  Verified  Payor: Cherry Hatchet / Plan: Sebastian Starks / Product Type: HMO /    In OBUW:[id number]  Out time:1123  Total Treatment Time (min): 51  Total Timed Codes (min): 40  1:1 Treatment Time ( only): 40  Visit #:  10    Treatment Area: Dizziness [R42]  Fibromyalgia [M79.7]    SUBJECTIVE  Pain Level (0-10 scale): 7/10  Any medication changes, allergies to medications, adverse drug reactions, diagnosis change, or new procedure performed?: [x] No    [] Yes (see summary sheet for update)  Subjective functional status/changes:   [] No changes reported  Patient reports she has not had any buckling in her legs in the past few days. She does however feel exhausted and a bit out of it today due to lack of sleep. OBJECTIVE    51 min Therapeutic Exercise:  [x] See flow sheet :   Rationale: increase ROM, increase strength, improve coordination, improve balance and increase proprioception to improve the patients ability to complete all activity    Other Objective/Functional Measures: none noted       Pain Level (0-10 scale) post treatment: 7/10    ASSESSMENT/Changes in Function:   Patient demonstrates fair stability while performing bosu step ups. difficulty with activating the TA with PPT. Patient tolerated all therex well, will continue to progress as tolerated. Patient will continue to benefit from skilled PT services to modify and progress therapeutic interventions, address functional mobility deficits, address ROM deficits, address strength deficits, analyze and address soft tissue restrictions, analyze and cue movement patterns, analyze and modify body mechanics/ergonomics, assess and modify postural abnormalities and address imbalance/dizziness to attain remaining goals.      [x]  See Plan of Care  []  See progress note/recertification  []  See Discharge Summary         Progress towards goals / Updated goals:  Progressing toward goals    PLAN  [x]  Upgrade activities as tolerated     [x]  Continue plan of care  [x]  Update interventions per flow sheet       []  Discharge due to:_  []  Other:_      Mita Elias, JAY 7/2/2021

## 2021-08-11 ENCOUNTER — HOSPITAL ENCOUNTER (OUTPATIENT)
Dept: PHYSICAL THERAPY | Age: 31
Discharge: HOME | End: 2021-08-11
Payer: COMMERCIAL

## 2021-08-11 PROCEDURE — 97110 THERAPEUTIC EXERCISES: CPT | Performed by: PHYSICAL THERAPIST

## 2021-08-12 NOTE — PROGRESS NOTES
PT DAILY TREATMENT NOTE - [facility] 2-15    Patient Name: Delon Flynn  Date:2021  : 1990  [x]  Patient  Verified  Payor: Amado Shaw / Plan: Hadley Oats / Product Type: HMO /    In time:109  Out time:151  Total Treatment Time (min): 42  Total Timed Codes (min): 342  1:1 Treatment Time ( [address] only): 42  Visit #:  11    Treatment Area: Dizziness [R42]  Fibromyalgia [M79.7]    SUBJECTIVE  Pain Level (0-10 scale): 5/10  Any medication changes, allergies to medications, adverse drug reactions, diagnosis change, or new procedure performed?: [x] No    [] Yes (see summary sheet for update)  Subjective functional status/changes:   [] No changes reported  Reports a bad day yesterday but is better today         OBJECTIVE    42 min Therapeutic Exercise:  [x] See flow sheet :   Rationale: increase ROM, increase strength, improve coordination, improve balance and increase proprioception to improve the patients ability to complete all activity    Other Objective/Functional Measures: none noted       Pain Level (0-10 scale) post treatment: 9/10    ASSESSMENT/Changes in Function:   Pt was able to tolerate all activity and was able to add to her there-ex. She is challenged by fatigue and muscle soreness, but was able to get through the routine. She states that she is just tired. Pt will be on vacation for 1 week and will return . Will assess how she made out during that visit    Patient will continue to benefit from skilled PT services to modify and progress therapeutic interventions, address functional mobility deficits, address ROM deficits, address strength deficits, analyze and address soft tissue restrictions, analyze and cue movement patterns, analyze and modify body mechanics/ergonomics, assess and modify postural abnormalities and address imbalance/dizziness to attain remaining goals.      [x]  See Plan of Care  []  See progress note/recertification  []  See Discharge Summary         Progress towards goals / Updated goals:  Progressing toward goals    PLAN  [x]  Upgrade activities as tolerated     [x]  Continue plan of care  [x]  Update interventions per flow sheet       []  Discharge due to:_  []  Other:_      Saúl De Paz, PT 8/11/2021

## 2021-08-19 ENCOUNTER — HOSPITAL ENCOUNTER (OUTPATIENT)
Dept: PHYSICAL THERAPY | Age: 31
Discharge: HOME | End: 2021-08-19
Payer: COMMERCIAL

## 2021-08-19 PROCEDURE — 97110 THERAPEUTIC EXERCISES: CPT

## 2021-08-19 RX ORDER — CARBAMAZEPINE 200 MG/1
TABLET, EXTENDED RELEASE ORAL
Qty: 30 TABLET | Refills: 1 | Status: SHIPPED | OUTPATIENT
Start: 2021-08-19 | End: 2021-10-01 | Stop reason: SDUPTHER

## 2021-08-19 NOTE — PROGRESS NOTES
PT DAILY TREATMENT NOTE - [facility] 2-15    Patient Name: Kareem Hernandez  Date:2021  : 1990  [x]  Patient  Verified  Payor: BLUE CROSS / Plan: Robert Arshad [id number] PPO / Product Type: PPO /    In time:1134  Out time:1216  Total Treatment Time (min): 42  Total Timed Codes (min): 26  1:1 Treatment Time ( only): 26  Visit #:  12    Treatment Area: Dizziness [R42]  Fibromyalgia [M79.7]    SUBJECTIVE  Pain Level (0-10 scale): 510  Any medication changes, allergies to medications, adverse drug reactions, diagnosis change, or new procedure performed?: [x] No    [] Yes (see summary sheet for update)  Subjective functional status/changes:   [] No changes reported  Patient reports [place] and was unable to get back up or activate her LE. Her family needed to assist her in getting into bed when she began to regain some activation. OBJECTIVE    42 min Therapeutic Exercise:  [x] See flow sheet :   Rationale: increase ROM, increase strength, improve coordination, improve balance and increase proprioception to improve the patients ability to complete all activity    Other Objective/Functional Measures: none noted       Pain Level (0-10 scale) post treatment: 5/10    ASSESSMENT/Changes in Function:   Patient demonstrates good stability on the bosu ball. Tolerated all therex well, will continue to progress as tolerated. Patient will continue to benefit from skilled PT services to modify and progress therapeutic interventions, address functional mobility deficits, address ROM deficits, address strength deficits, analyze and address soft tissue restrictions, analyze and cue movement patterns, analyze and modify body mechanics/ergonomics, assess and modify postural abnormalities and address imbalance/dizziness to attain remaining goals.      [x]  See Plan of Care  []  See progress note/recertification  []  See Discharge Summary         Progress towards goals / Updated goals:  Progressing toward goals    PLAN  [x]  Upgrade activities as tolerated     [x]  Continue plan of care  [x]  Update interventions per flow sheet       []  Discharge due to:_  []  Other:_      Amber Kaplan, PTA 7/2/2021

## 2021-08-20 ENCOUNTER — HOSPITAL ENCOUNTER (OUTPATIENT)
Dept: NEUROLOGY | Age: 31
Discharge: HOME | End: 2021-08-20
Attending: PSYCHIATRY & NEUROLOGY
Payer: COMMERCIAL

## 2021-08-20 DIAGNOSIS — R41.82 ACUTE ALTERATION IN MENTAL STATUS: ICD-10-CM

## 2021-08-20 DIAGNOSIS — R55 CONVULSIVE SYNCOPE: ICD-10-CM

## 2021-08-20 DIAGNOSIS — R42 DIZZINESS: ICD-10-CM

## 2021-08-20 PROCEDURE — 95819 EEG AWAKE AND ASLEEP: CPT | Performed by: PSYCHIATRY & NEUROLOGY

## 2021-08-20 PROCEDURE — 95816 EEG AWAKE AND DROWSY: CPT

## 2021-08-21 NOTE — PROCEDURES
[address]  EEG    Name:  Marko Feldman  MR#:  [id number]  :  1990  ACCOUNT #:  [de-identified]  DATE OF SERVICE:  2021    CLINICAL INDICATION:  The patient is a 45-year-old female with a history of altered mental status, sudden spells, possible convulsive seizures, possible seizures. EEG to rule out seizures, rule out cortical abnormality, rule out encephalopathy. EEG CLASSIFICATION:  On this patient is normal, awake and asleep. DESCRIPTION OF THE RECORD:  This is a 16-channel EEG recording with EKG monitoring. During this study, the patient had a posteriorly located occipital alpha rhythm of 9-10 Hz that did attenuate some with eye opening. Hyperventilation was not performed. Photic stimulation produced a mild driving response in the posterior head regions. Throughout the recording, there were no clear areas of focal slowing or spike or spike-and-wave discharges seen. The patient did enter brief states of sleep with K complexes and sleep spindles seen in the central head regions. INTERPRETATION:  This is a normal electroencephalogram showing no clear focal abnormalities, no spike or spike-and-wave discharges, no recorded dysrhythmic spells of any type seen. Clinical correlation recommended.         Greta Galicia MD      [id number]  D:  2021 0:03  T:  2021 1:50  JOB #:  [id number]

## 2021-08-24 ENCOUNTER — HOSPITAL ENCOUNTER (OUTPATIENT)
Dept: PHYSICAL THERAPY | Age: 31
Discharge: HOME | End: 2021-08-24
Payer: COMMERCIAL

## 2021-08-24 PROCEDURE — 97110 THERAPEUTIC EXERCISES: CPT | Performed by: PHYSICAL THERAPIST

## 2021-08-24 RX ORDER — PROPRANOLOL HYDROCHLORIDE 60 MG/1
CAPSULE, EXTENDED RELEASE ORAL
Qty: 30 CAPSULE | Refills: 1 | Status: SHIPPED | OUTPATIENT
Start: 2021-08-24 | End: 2021-10-27

## 2021-08-24 NOTE — PROGRESS NOTES
PT DAILY TREATMENT NOTE - [facility] 2-15    Patient Name: Sabine Brody  Date:2021  : 1990  [x]  Patient  Verified  Payor: BLUE CROSS / Plan: Robert Arshad [id number] PPO / Product Type: PPO /    In time:1133 Out time:1213  Total Treatment Time (min): 40  Total Timed Codes (min): 40  1:1 Treatment Time ( only): 40  Visit #:  13    Treatment Area: Dizziness [R42]  Fibromyalgia [M79.7]    SUBJECTIVE  Pain Level (0-10 scale): 5/10  Any medication changes, allergies to medications, adverse drug reactions, diagnosis change, or new procedure performed?: [x] No    [] Yes (see summary sheet for update)  Subjective functional status/changes:   [] No changes reported  Pt is having a good day today. OBJECTIVE    40 min Therapeutic Exercise:  [x] See flow sheet :   Rationale: increase ROM, increase strength, improve coordination, improve balance and increase proprioception to improve the patients ability to complete all activity    Other Objective/Functional Measures: none noted       Pain Level (0-10 scale) post treatment: 10    ASSESSMENT/Changes in Function:   Pt is able to complete all activity. She is feeling better today. She states one episode of her legs losing function and sensation while on vacation. She reports swelling that coincided with this episode which lasted 30 minutes. She was able to get to the bed with assistance and reports that she fell getting out of the bed the next morning because the legs gave out. WIll continue 2 visits and plan to DC to [facility]P    Patient will continue to benefit from skilled PT services to modify and progress therapeutic interventions, address functional mobility deficits, address ROM deficits, address strength deficits, analyze and address soft tissue restrictions, analyze and cue movement patterns, analyze and modify body mechanics/ergonomics, assess and modify postural abnormalities and address imbalance/dizziness to attain remaining goals.      [x]  See Plan of Care  []  See progress note/recertification  []  See Discharge Summary         Progress towards goals / Updated goals:  Progressing toward goals    PLAN  [x]  Upgrade activities as tolerated     [x]  Continue plan of care  [x]  Update interventions per flow sheet       []  Discharge due to:_  []  Other:_      Phoenix Osorio, PT 8/24/2021

## 2021-08-26 ENCOUNTER — HOSPITAL ENCOUNTER (OUTPATIENT)
Dept: PHYSICAL THERAPY | Age: 31
Discharge: HOME | End: 2021-08-26
Payer: COMMERCIAL

## 2021-08-26 PROCEDURE — 97110 THERAPEUTIC EXERCISES: CPT | Performed by: PHYSICAL THERAPIST

## 2021-08-26 NOTE — PROGRESS NOTES
PT DAILY TREATMENT NOTE - [facility] 2-15    Patient Name: Kathleen Ogden  Date:2021  : 1990  [x]  Patient  Verified  Payor: BLUE MIL / Plan: Robert Arshad [id number] PPO / Product Type: PPO /    In time:1132 Out time:1212  Total Treatment Time (min): 38  Total Timed Codes (min): 28  1:1 Treatment Time ( only): 28  Visit #:  14    Treatment Area: Dizziness [R42]  Fibromyalgia [M79.7]    SUBJECTIVE  Pain Level (0-10 scale): 4/10 with 8/10 in the right greater trochanter  Any medication changes, allergies to medications, adverse drug reactions, diagnosis change, or new procedure performed?: [x] No    [] Yes (see summary sheet for update)  Subjective functional status/changes:   [] No changes reported  Pt states that she hurt her hip when she was here last.  Indicates the right greater trochanteric region and over the bursa. She also complains of muscle pain in the gluteals   OBJECTIVE    28 min Therapeutic Exercise:  [x] See flow sheet :   Rationale: increase ROM, increase strength, improve coordination, improve balance and increase proprioception to improve the patients ability to complete all activity    Other Objective/Functional Measures: none noted       Pain Level (0-10 scale) post treatment: 5/10    ASSESSMENT/Changes in Function:   Pt is able to complete all activity. She is feeling better today. She states one episode of her legs losing function and sensation while on vacation. She reports swelling that coincided with this episode which lasted 30 minutes. She was able to get to the bed with assistance and reports that she fell getting out of the bed the next morning because the legs gave out.   WIll continue 2 visits and plan to DC to HEP    Patient will continue to benefit from skilled PT services to modify and progress therapeutic interventions, address functional mobility deficits, address ROM deficits, address strength deficits, analyze and address soft tissue restrictions, analyze and cue movement patterns, analyze and modify body mechanics/ergonomics, assess and modify postural abnormalities and address imbalance/dizziness to attain remaining goals.      [x]  See Plan of Care  []  See progress note/recertification  []  See Discharge Summary         Progress towards goals / Updated goals:  Progressing toward goals    PLAN  [x]  Upgrade activities as tolerated     [x]  Continue plan of care  [x]  Update interventions per flow sheet       []  Discharge due to:_  []  Other:_      Evelyn Harris, PT 8/26/2021

## 2021-08-31 ENCOUNTER — HOSPITAL ENCOUNTER (OUTPATIENT)
Dept: PHYSICAL THERAPY | Age: 31
Discharge: HOME | End: 2021-08-31
Payer: COMMERCIAL

## 2021-08-31 DIAGNOSIS — F98.8 ATTENTION DEFICIT DISORDER (ADD) WITHOUT HYPERACTIVITY: ICD-10-CM

## 2021-08-31 DIAGNOSIS — R53.82 CHRONIC FATIGUE: ICD-10-CM

## 2021-08-31 PROCEDURE — 97110 THERAPEUTIC EXERCISES: CPT | Performed by: PHYSICAL THERAPIST

## 2021-08-31 NOTE — PROGRESS NOTES
PT DAILY TREATMENT NOTE - [facility] 2-15    Patient Name: Micky Hoffman  Date:2021  : 1990  [x]  Patient  Verified  Payor: Delon Cook / Plan: Robert Arshad [id number] PPO / Product Type: PPO /    In time:1128 Out time:1207  Total Treatment Time (min): 39  Total Timed Codes (min): 39  1:1 Treatment Time ( only): 39  Visit #:  15    Treatment Area: Dizziness [R42]  Fibromyalgia [M79.7]    SUBJECTIVE  Pain Level (0-10 scale):4   Pt reports that she is having a good day today and agrees that while she has had decreased moments of giving out of the leg, she hasn't felt drastically different. She is agreeable to having today be the last day of formal treatment  Any medication changes, allergies to medications, adverse drug reactions, diagnosis change, or new procedure performed?: [x] No    [] Yes (see summary sheet for update)  Subjective functional status/changes:   [] No changes reported  Pt reports that today is a better day  OBJECTIVE    39 min Therapeutic Exercise:  [x] See flow sheet :   Rationale: increase ROM, increase strength, improve coordination, improve balance and increase proprioception to improve the patients ability to complete all activity    Other Objective/Functional Measures: FOTO 58      Pain Level (0-10 scale) post treatment: 4/10    ASSESSMENT/Changes in Function:   Pt has been able to tolerate her there-ex and is independent with HEP. She has not shown significant progress in function but is managing her symptoms more readily.   At this time we will be [address] her to her home program with instructions to not over do it and focus on core stability and proper form   Patient will continue to benefit from skilled PT services to modify and progress therapeutic interventions, address functional mobility deficits, address ROM deficits, address strength deficits, analyze and address soft tissue restrictions, analyze and cue movement patterns, analyze and modify body mechanics/ergonomics, assess and modify postural abnormalities and address imbalance/dizziness to attain remaining goals.      []  See Plan of Care  []  See progress note/recertification  [x]  See Discharge Summary         Progress towards goals / Updated goals:  Progressing toward goals    PLAN  []  Upgrade activities as tolerated     []  Continue plan of care  []  Update interventions per flow sheet       [x]  Discharge due to:plateau in progress  []  Other:_      Zan Mack, PT 8/31/2021

## 2021-08-31 NOTE — TELEPHONE ENCOUNTER
Patient requesting a refill of Vyvanse and Meclizine. Last office visit: 8/3/21    Last refill: Vyvanse 7/26//21    Meclizine 5/4/21    Please review and fill if warranted.

## 2021-09-17 RX ORDER — BUSPIRONE HYDROCHLORIDE 5 MG/1
TABLET ORAL
Qty: 90 TABLET | Refills: 1 | Status: SHIPPED | OUTPATIENT
Start: 2021-09-17 | End: 2021-11-15

## 2021-09-22 DIAGNOSIS — G62.9 NEUROPATHY: ICD-10-CM

## 2021-09-22 DIAGNOSIS — R53.82 CHRONIC FATIGUE: ICD-10-CM

## 2021-09-22 DIAGNOSIS — F98.8 ATTENTION DEFICIT DISORDER (ADD) WITHOUT HYPERACTIVITY: ICD-10-CM

## 2021-09-22 DIAGNOSIS — M54.50 RIGHT-SIDED LOW BACK PAIN WITHOUT SCIATICA, UNSPECIFIED CHRONICITY: ICD-10-CM

## 2021-09-22 DIAGNOSIS — M54.16 LUMBAR RADICULOPATHY: ICD-10-CM

## 2021-09-23 RX ORDER — GABAPENTIN 300 MG/1
300 CAPSULE ORAL 3 TIMES DAILY
Qty: 90 CAPSULE | Refills: 3 | Status: SHIPPED | OUTPATIENT
Start: 2021-09-23 | End: 2021-11-23 | Stop reason: SDUPTHER

## 2021-09-29 NOTE — PROGRESS NOTES
[facility]. Physical Therapy  [address] ([address]), [address] [place]  Phone: [phone] Fax: [phone]    Discharge Summary  2-15    Patient name: Kendra Wray  : 1990  Provider#: [id number]  Referral source: Lucía Donis MD      Medical/Treatment Diagnosis: Dizziness [R42]  Fibromyalgia [M79.7]     Prior Hospitalization: see medical history     Comorbidities: See Plan of Care  Prior Level of Function:See Plan of Care  Medications: Verified on Patient Summary List    Start of Care: 21      Onset Date:chronic   Visits from Start of Care: 15     Missed Visits: 0  Reporting Period : 21 to 21      ASSESSMENT/SUMMARY OF CARE: Pt has completed 15 visits of therapy that have addressed weakness, balance deficits, and core stability. She has progressed through her course of treatment well and has become independent with her HEP. However, she has not seen significant changes in her function. She remains easily fatigued and has intermittent moments where she feels that her legs will give out while walking or standing. Given this lack of progress toward her treatment goals she will be DC'd today to her HEP and encouraged to continue this to manage her symptoms. Goal:  Short Term Goals: To be accomplished in 8-10 treatments:               Pt will be I with HEP MET  Pt will complain of pain 4-5/10 with all activity Met Intermittently  Pt will increase strength to maintain better postures with all activity Met Intermittently     Long Term Goals:  To be accomplished in 16-18 treatments:               Pt will complain of pain 2-3/10 with all activity Not MET  Pt will increase strength to complete all household chores and work duties without increased symptoms Progressing Toward  Pt will increase FOTO score by 9 points to meet [address] and improve their function Not MET (58/100 at RI)  Pt will decrease radicular symptoms by 75% to complete all activity Not MET  Pt will return to aquatics Progressing Toward  Pt will be able to complete all work tasks without increased symptoms Not MET           RECOMMENDATIONS:  [x]Discontinue therapy: []Patient has reached or is progressing toward set goals      []Patient is non-compliant or has abdicated      [x]Due to lack of appreciable progress towards set goals      []Bryan Hernandez, PT 8/31/21

## 2021-10-01 ENCOUNTER — OFFICE VISIT (OUTPATIENT)
Dept: NEUROLOGY | Age: 31
End: 2021-10-01
Payer: COMMERCIAL

## 2021-10-01 VITALS
BODY MASS INDEX: 34.72 KG/M2 | HEART RATE: 74 BPM | SYSTOLIC BLOOD PRESSURE: 134 MMHG | DIASTOLIC BLOOD PRESSURE: 72 MMHG | WEIGHT: 196 LBS | RESPIRATION RATE: 16 BRPM | OXYGEN SATURATION: 98 %

## 2021-10-01 DIAGNOSIS — R53.82 CHRONIC FATIGUE: ICD-10-CM

## 2021-10-01 DIAGNOSIS — G89.4 CHRONIC PAIN SYNDROME: ICD-10-CM

## 2021-10-01 DIAGNOSIS — R29.6 FREQUENT FALLS: ICD-10-CM

## 2021-10-01 DIAGNOSIS — M79.18 MYOFASCIAL MUSCLE PAIN: ICD-10-CM

## 2021-10-01 DIAGNOSIS — F98.8 ATTENTION DEFICIT DISORDER (ADD) WITHOUT HYPERACTIVITY: ICD-10-CM

## 2021-10-01 DIAGNOSIS — M79.7 FIBROMYALGIA: Primary | ICD-10-CM

## 2021-10-01 DIAGNOSIS — G52.1 GLOSSOPHARYNGEAL NEURALGIA: ICD-10-CM

## 2021-10-01 DIAGNOSIS — G50.0 TN (TRIGEMINAL NEURALGIA): ICD-10-CM

## 2021-10-01 DIAGNOSIS — R76.8 POSITIVE ANA (ANTINUCLEAR ANTIBODY): ICD-10-CM

## 2021-10-01 PROCEDURE — 99214 OFFICE O/P EST MOD 30 MIN: CPT | Performed by: PSYCHIATRY & NEUROLOGY

## 2021-10-01 RX ORDER — OXCARBAZEPINE 150 MG/1
150 TABLET, FILM COATED ORAL 2 TIMES DAILY
Qty: 60 TABLET | Refills: 1 | Status: SHIPPED | OUTPATIENT
Start: 2021-10-01 | End: 2021-10-29

## 2021-10-01 NOTE — PROGRESS NOTES
Neurology Progress Note    NAME:  Tamara Pacheco   :   1990   MRN:   [id number]     Date/Time:  10/1/2021  Subjective:   Tamara Pacheco is a 28 y.o. female here today for follow-up for headache, sharp pain on the right side of the head and face with generalized body pain and frequent fall, test results. Patient was accompanied by her aunt to the visit. Patient continues to complain of symptoms, she says that have not gotten much better. Patient is referred back to physical therapy  Patient says today she still having a lot of symptoms,  her legs continue to give out on her, lost feeling in the legs,  trigeminal neuralgia symptoms  with numbness and tingling sensation, pain on the right side of the tongue, right facial pain are still persisting. I will start patient on Trileptal 150 mg p.o. twice daily, as patient did not tolerate Tegretol. She noted that she is having pain in her bladder, I told patient that it is possible to be cystitis and she will follow up with her urologist and this may be the spectrum of fibromyalgia  EEG reviewed with patient was unremarkable  As previously stated, because of tendency to fall, persistent pain, memory difficulty  and difficulty focusing, it is pertinent that patient applies for short-term disability pending when her condition stabilizes. She says she still cannot stand for a while, it makes her very weak and causes her leg to buckle. Patient says she is still experiencing generalized pain, pain is worse with activity. She says she continues to have increasing numbness and tingling sensation mostly of the lower extremity. Patient still has  difficulty focusing and concentrating, she says with the symptoms lately she has been having a very difficult to complete any task. She says she still has pain behind her right eye, pain is sharp in nature as if it is coming from the back of the neck and this is associated with dizziness.    She says the right side of the face continues to be sensitive to touch. However the headache is unrelenting. .  With a constellation of patient's symptoms, it will be very difficult for her to continue with her job at this time  As patient symptoms has continued more than 6 months, she wishes to go for long term disability  Review of Systems - General ROS: positive for  - fatigue and sleep disturbance  Psychological ROS: positive for - anxiety and sleep disturbances  Ophthalmic ROS: positive for - blurry vision, decreased vision, double vision and photophobia  ENT ROS: positive for - headaches, tinnitus, vertigo and visual changes  Allergy and Immunology ROS: negative  Hematological and Lymphatic ROS: negative  Endocrine ROS: negative  Respiratory ROS: no cough, shortness of breath, or wheezing  Cardiovascular ROS: negative  Gastrointestinal ROS: no abdominal pain, change in bowel habits, or black or bloody stools  Genito-Urinary ROS: no dysuria, trouble voiding, or hematuria  Musculoskeletal ROS: positive for - muscular weakness  Neurological ROS: positive for - dizziness, headaches, numbness/tingling, visual changes and weakness  Dermatological ROS: negative          Medications reviewed:  Current Outpatient Medications   Medication Sig Dispense Refill    OXcarbazepine (TRILEPTAL) 150 mg tablet Take 1 Tablet by mouth two (2) times a day. 60 Tablet 1    lisdexamfetamine (Vyvanse) 30 mg capsule Take 1 Capsule by mouth every morning. Max Daily Amount: 30 mg. 30 Capsule 0    gabapentin (NEURONTIN) 300 mg capsule Take 1 Capsule by mouth three (3) times daily.  Max Daily Amount: 900 mg. 90 Capsule 3    busPIRone (BUSPAR) 5 mg tablet TAKE 1 TABLET BY MOUTH THREE TIMES DAILY WITH MEALS 90 Tablet 1    propranolol LA (INDERAL LA) 60 mg SR capsule TAKE 1 CAPSULE BY MOUTH EVERY NIGHT 30 Capsule 1    ergocalciferol (ERGOCALCIFEROL) 1,250 mcg (50,000 unit) capsule TAKE ONE CAPSULE BY MOUTH EVERY MONDAY AND THURSDAY 8 Capsule 2    buprenorphine (Butrans) 5 mcg/hour patch 1 Patch by TransDERmal route every seven (7) days. Max Daily Amount: 1 Patch. 4 Patch 3    lidocaine (LIDODERM) 5 % Apply patch to the back of the neck for 12 hours a day and remove for 12 hours a day. 30 Each 0    loratadine (Claritin) 10 mg tablet Claritin      Gaviscon Extra Strength 254-237.5 mg/5 mL susp TAKE 5 ML BY MOUTH TWICE DAILY AS NEEDED FOR HEARTBURN      ondansetron (Zofran ODT) 4 mg disintegrating tablet Take 1 Tablet by mouth every eight (8) hours as needed for Nausea. 10 Tablet 0    predniSONE (DELTASONE) 10 mg tablet Take 10 mg by mouth two (2) times a day. Take 1 tab p.o. tid x3days, 1 tab bid x5 days ,1 p.o.qd 30 Tablet 0    galcanezumab-gnlm (Emgality Syringe) 120 mg/mL syrg 120 mg by SubCUTAneous route every thirty (30) days. 1 mL 3    meclizine (ANTIVERT) 25 mg tablet Take 1 Tab by mouth three (3) times daily as needed for Dizziness. 30 Tab 1    ondansetron (Zofran ODT) 4 mg disintegrating tablet Take 1 Tab by mouth every eight (8) hours as needed for Nausea. 10 Tab 0    DULoxetine (CYMBALTA) 30 mg capsule Take 1 Cap by mouth daily. 30 Cap 1    cholecalciferol (Vitamin D3) 25 mcg (1,000 unit) cap Take 1,000 Units by mouth daily.  cyanocobalamin 1,000 mcg tablet Take 1,000 mcg by mouth daily.  famotidine (PEPCID) 40 mg tablet Take 40 mg by mouth daily.  polyethylene glycol (MIRALAX) 17 gram/dose powder Take 17 g by mouth daily. 510 g 0    ibuprofen (MOTRIN) 600 mg tablet Take 1 Tab by mouth three (3) times daily (with meals). 21 Tab 0    naloxone (NARCAN) 4 mg/actuation nasal spray Use 1 spray intranasally, then discard. Repeat with new spray every 2 min as needed for opioid overdose symptoms, alternating nostrils. 2 Each 0    medroxyPROGESTERone (PROVERA) 10 mg tablet Take 10 mg by mouth three (3) times daily.  dicyclomine HCl (BENTYL PO) Take  by mouth four (4) times daily.  Indications: As needed for pain      fluticasone propionate (FLONASE) 50 mcg/actuation nasal spray 2 Sprays by Both Nostrils route daily. Indications: As needed for allergies      multivitamin (MULTIPLE VITAMIN PO) Take  by mouth. Indications: Teresita fusion multi vitamin/folate 50 mg (2 gummies) by gyn dr Bragg Her pantoprazole (PROTONIX) 40 mg tablet Take 40 mg by mouth two (2) times a day.  sucralfate (CARAFATE) 100 mg/mL suspension Take  by mouth four (4) times daily.  albuterol (PROVENTIL VENTOLIN) 2.5 mg /3 mL (0.083 %) nebulizer solution 2.5 mg by Nebulization route once.  fluticasone-umeclidinium-vilanterol (Trelegy Ellipta) 100-62.5-25 mcg inhaler Take 1 Puff by inhalation daily. (Patient not taking: Reported on 7/19/2021)      naproxen sodium (ALEVE) 220 mg cap Take  by mouth. (Patient not taking: Reported on 7/19/2021)      loratadine (CLARITIN) 10 mg tablet Take 10 mg by mouth daily.  (Patient not taking: Reported on 10/1/2021)          Objective:   Vitals:  Vitals:    10/01/21 0814   BP: 134/72   Pulse: 74   Resp: 16   SpO2: 98%   Weight: 196 lb (88.9 kg)   PainSc:   8               Lab Data Reviewed:  Lab Results   Component Value Date/Time    WBC 14.4 (H) 06/07/2021 04:33 AM    HCT 45.4 06/07/2021 04:33 AM    HGB 15.1 06/07/2021 04:33 AM    PLATELET 967 22/86/3279 04:33 AM       Lab Results   Component Value Date/Time    Sodium 135 (L) 06/07/2021 04:33 AM    Potassium 4.6 06/07/2021 04:33 AM    Chloride 104 06/07/2021 04:33 AM    CO2 28 06/07/2021 04:33 AM    Glucose 119 (H) 06/07/2021 04:33 AM    BUN 19 06/07/2021 04:33 AM    Creatinine 0.99 06/07/2021 04:33 AM    Calcium 9.3 06/07/2021 04:33 AM       No components found for: TROPQUANT    No results found for: RASHAWN      No results found for: HBA1C, SLM9HTGD, LDF3HOWZ     Lab Results   Component Value Date/Time    Vitamin B12 482 12/14/2020 10:44 AM       No results found for: RASHAWN, ANARX, ANAIGG, XBANA    No results found for: CHOL, CHOLPOCT, CHOLX, CHLST, CHOLV, HDL, HDLPOC, HDLP, LDL, LDLCPOC, LDLC, DLDLP, VLDLC, VLDL, TGLX, TRIGL, TRIGP, TGLPOCT, CHHD, CHHDX      CT Results (recent):  Results from Hospital Encounter encounter on 06/07/21    CT ABD PELV WO CONT    Narrative  EXAM: CT ABD PELV WO CONT    INDICATION: right flank and right mid abd pain    COMPARISON: 2019    CONTRAST:  None. TECHNIQUE:  Thin axial images were obtained through the abdomen and pelvis. Coronal and  sagittal reformats were generated. Oral contrast was not administered. CT dose  reduction was achieved through use of a standardized protocol tailored for this  examination and automatic exposure control for dose modulation. The absence of intravenous contrast material reduces the sensitivity for  evaluation of the vasculature and solid organs. FINDINGS:  LOWER THORAX: No significant abnormality in the incidentally imaged lower chest.  LIVER: Hepatic steatosis. BILIARY TREE: Cholecystectomy. CBD is not dilated. SPLEEN: within normal limits. PANCREAS: No focal abnormality. ADRENALS: Unremarkable. KIDNEYS/URETERS: Right nephrolithiasis. No hydronephrosis. STOMACH: Unremarkable. SMALL BOWEL: No dilatation or wall thickening. COLON: No dilatation or wall thickening. APPENDIX: Normal.  PERITONEUM: No ascites or pneumoperitoneum. RETROPERITONEUM: No lymphadenopathy or aortic aneurysm. REPRODUCTIVE ORGANS: Normal.  URINARY BLADDER: No mass or calculus. BONES: No destructive bone lesion. ABDOMINAL WALL: No mass or hernia. ADDITIONAL COMMENTS: N/A    Impression  Right nephrolithiasis. MRI Results (recent):  Results from [place] encounter on 07/08/21    MRI LUMB SPINE WO CONT    Narrative  EXAM: MRI LUMB SPINE WO CONT    INDICATION: Radiculopathy, lumbar region    COMPARISON: None    TECHNIQUE: MR imaging of the lumbar spine was performed using the following  sequences: sagittal T1, T2, STIR;  axial T1, T2.    CONTRAST:  None. FINDINGS:    There is normal alignment of the lumbar spine.  Vertebral body heights are  maintained. Marrow signal is normal.    The conus medullaris terminates at . Signal and caliber of the distal spinal  cord are within normal limits. The paraspinal soft tissues are within normal limits. Lower thoracic spine: No herniation or stenosis. L1-L2: No herniation or stenosis. L2-L3: No herniation or stenosis. L3-L4: No herniation or stenosis. L4-L5: No herniation or stenosis. L5-S1: The disc space is mildly narrowed without disc desiccation. This is  likely congenital. No herniation or stenosis. Mild bilateral posterior facet  arthropathy. Impression  No significant spinal stenosis or neural foraminal narrowing. No acute process. IR Results (recent):  No results found for this or any previous visit. VAS/US Results (recent):  No results found for this or any previous visit. PHYSICAL EXAM:  General:    Alert, cooperative, no distress, appears stated age. Head:   Normocephalic, without obvious abnormality, atraumatic. Eyes:   Conjunctivae/corneas clear. PERRLA  Nose:  Nares normal. No drainage or sinus tenderness. Throat:    Lips, mucosa, and tongue normal.  No Thrush  Neck:  Supple, symmetrical,  no adenopathy, thyroid: non tender    no carotid bruit and no JVD. Severe paraspinal and occipitalis tenderness  Back:    Symmetric, diffuse tenderness. Lungs:   Clear to auscultation bilaterally. No Wheezing or Rhonchi. No rales. Chest wall:  No tenderness or deformity. No Accessory muscle use. Heart:   Regular rate and rhythm,  no murmur, rub or gallop. Abdomen:   Soft, non-tender. Not distended. Bowel sounds normal. No masses  Extremities: Extremities normal, atraumatic, No cyanosis. No edema. No clubbing  Skin:     Texture, turgor normal. No rashes or lesions. Not Jaundiced  Lymph nodes: Cervical, supraclavicular normal.  Psych:  Good insight. Depressed. Anxious. NEUROLOGICAL EXAM:  Appearance:   The patient is well developed, well nourished, provides a coherent history and is in no acute distress. Mental Status: Oriented to time, place and person. Mood and affect appropriate. Cranial Nerves:   Intact visual fields. Fundi are benign. OANH, EOM's full, no nystagmus, no ptosis. Facial sensation dysesthesia. Corneal reflexes are intact. Facial movement is symmetric. Hearing is normal bilaterally. Palate is midline with normal sternocleidomastoid and trapezius muscles are normal. Tongue is midline. Motor:  5-/5 strength in upper extremity and 4+/5 lower extremity proximal and distal muscles. Normal bulk and tone. No fasciculations. Reflexes:   Deep tendon reflexes 3+/4 and symmetrical.   Sensory:    Dysesthesia to touch, pinprick and vibration. Gait:   Slightly unsteady gait. Tremor:   No tremor noted. Cerebellar:  No cerebellar signs present. Neurovascular:  Normal heart sounds and regular rhythm, peripheral pulses intact, and no carotid bruits. Assesment  1. Fibromyalgia    - REFERRAL TO PHYSICAL THERAPY    2. Glossopharyngeal neuralgia  Trileptal    3. Chronic fatigue    - lisdexamfetamine (Vyvanse) 30 mg capsule; Take 1 Capsule by mouth every morning. Max Daily Amount: 30 mg. Dispense: 30 Capsule; Refill: 0    4. Chronic pain syndrome  Referred to pain management    5. Attention deficit disorder (ADD) without hyperactivity    - lisdexamfetamine (Vyvanse) 30 mg capsule; Take 1 Capsule by mouth every morning. Max Daily Amount: 30 mg. Dispense: 30 Capsule; Refill: 0    6. Myofascial muscle pain    - REFERRAL TO PHYSICAL THERAPY    7. Positive RASHAWN (antinuclear antibody)  Following rheumatology    8. TN (trigeminal neuralgia)  Trileptal  9. Frequent falls    - REFERRAL TO PHYSICAL THERAPY    ___________________________________________________  PLAN: Medication and plan discussed with patient and her aunt      ICD-10-CM ICD-9-CM    1.  Fibromyalgia  M79.7 729.1 REFERRAL TO PHYSICAL THERAPY   2. Glossopharyngeal neuralgia  G52.1 352.1 3. Chronic fatigue  R53.82 780.79 lisdexamfetamine (Vyvanse) 30 mg capsule   4. Chronic pain syndrome  G89.4 338.4    5. Attention deficit disorder (ADD) without hyperactivity  F98.8 314.00 lisdexamfetamine (Vyvanse) 30 mg capsule   6. Myofascial muscle pain  M79.18 729.1 REFERRAL TO PHYSICAL THERAPY   7. Positive RASHAWN (antinuclear antibody)  R76.8 795.79    8. TN (trigeminal neuralgia)  G50.0 350.1    9. Frequent falls  R29.6 V15.88 REFERRAL TO PHYSICAL THERAPY     Follow-up and Dispositions    · Return in about 2 months (around 12/1/2021).              ___________________________________________________    Attending Physician: Dennise Dunn MD

## 2021-10-05 RX ORDER — ERGOCALCIFEROL 1.25 MG/1
CAPSULE ORAL
Qty: 8 CAPSULE | Refills: 2 | Status: SHIPPED | OUTPATIENT
Start: 2021-10-05 | End: 2021-12-02

## 2021-10-15 NOTE — TELEPHONE ENCOUNTER
Future Appointments   Date Time Provider Latonya Osiris   12/27/2021  8:40 AM MD CARMEN Royal BS AMB                         Last Appointment My Department:  10/1/21    Please review and send in refill below if warranted.

## 2021-10-18 RX ORDER — RIMEGEPANT SULFATE 75 MG/75MG
75 TABLET, ORALLY DISINTEGRATING ORAL
Qty: 10 TABLET | Refills: 5 | Status: SHIPPED | OUTPATIENT
Start: 2021-10-18 | End: 2021-10-18

## 2021-10-27 RX ORDER — PROPRANOLOL HYDROCHLORIDE 60 MG/1
CAPSULE, EXTENDED RELEASE ORAL
Qty: 30 CAPSULE | Refills: 1 | Status: SHIPPED | OUTPATIENT
Start: 2021-10-27 | End: 2021-12-21

## 2021-10-29 ENCOUNTER — TELEPHONE (OUTPATIENT)
Dept: NEUROLOGY | Age: 31
End: 2021-10-29

## 2021-10-29 RX ORDER — OXCARBAZEPINE 150 MG/1
TABLET, FILM COATED ORAL
Qty: 60 TABLET | Refills: 1 | Status: SHIPPED | OUTPATIENT
Start: 2021-10-29 | End: 2021-12-24

## 2021-10-29 NOTE — TELEPHONE ENCOUNTER
RE: Emgality denied by [employer]. Letter forwarded to Dr. Pavithra Anderson for next steps, and copy given to  to scan into patient's chart.

## 2021-11-04 ENCOUNTER — OFFICE VISIT (OUTPATIENT)
Dept: NEUROLOGY | Age: 31
End: 2021-11-04
Payer: COMMERCIAL

## 2021-11-04 VITALS
OXYGEN SATURATION: 98 % | BODY MASS INDEX: 34.2 KG/M2 | DIASTOLIC BLOOD PRESSURE: 78 MMHG | WEIGHT: 193 LBS | HEART RATE: 78 BPM | HEIGHT: 63 IN | RESPIRATION RATE: 17 BRPM | SYSTOLIC BLOOD PRESSURE: 110 MMHG | TEMPERATURE: 98.2 F

## 2021-11-04 DIAGNOSIS — R76.8 POSITIVE ANA (ANTINUCLEAR ANTIBODY): ICD-10-CM

## 2021-11-04 DIAGNOSIS — R42 DIZZINESS: ICD-10-CM

## 2021-11-04 DIAGNOSIS — F98.8 ATTENTION DEFICIT DISORDER (ADD) WITHOUT HYPERACTIVITY: ICD-10-CM

## 2021-11-04 DIAGNOSIS — R20.2 PARESTHESIA: ICD-10-CM

## 2021-11-04 DIAGNOSIS — G50.0 TN (TRIGEMINAL NEURALGIA): ICD-10-CM

## 2021-11-04 DIAGNOSIS — M79.18 MYOFASCIAL MUSCLE PAIN: ICD-10-CM

## 2021-11-04 DIAGNOSIS — F41.1 GAD (GENERALIZED ANXIETY DISORDER): ICD-10-CM

## 2021-11-04 DIAGNOSIS — G43.109 MIGRAINE WITH VERTIGO: ICD-10-CM

## 2021-11-04 DIAGNOSIS — M79.7 FIBROMYALGIA: ICD-10-CM

## 2021-11-04 DIAGNOSIS — G52.1 GLOSSOPHARYNGEAL NEURALGIA: ICD-10-CM

## 2021-11-04 DIAGNOSIS — G89.4 CHRONIC PAIN SYNDROME: Primary | ICD-10-CM

## 2021-11-04 DIAGNOSIS — R53.82 CHRONIC FATIGUE: ICD-10-CM

## 2021-11-04 DIAGNOSIS — R26.9 GAIT DISORDER: ICD-10-CM

## 2021-11-04 DIAGNOSIS — R29.6 FREQUENT FALLS: ICD-10-CM

## 2021-11-04 PROCEDURE — 99214 OFFICE O/P EST MOD 30 MIN: CPT | Performed by: PSYCHIATRY & NEUROLOGY

## 2021-11-04 RX ORDER — PREDNISONE 20 MG/1
TABLET ORAL
Qty: 20 TABLET | Refills: 0 | Status: SHIPPED | OUTPATIENT
Start: 2021-11-04 | End: 2022-02-10 | Stop reason: SDUPTHER

## 2021-11-04 RX ORDER — ALPRAZOLAM 0.25 MG/1
0.25 TABLET ORAL 2 TIMES DAILY
Qty: 60 TABLET | Refills: 1 | Status: SHIPPED | OUTPATIENT
Start: 2021-11-04 | End: 2021-11-29 | Stop reason: SDUPTHER

## 2021-11-04 NOTE — PROGRESS NOTES
Chief Complaint   Patient presents with    Follow-up     patient states that she is having rashes around face and on the arms. states that she is having leg weakness and last night over three hours in the floor unable to stand due to leg weakness. has not felt well all week. ulcere in the back of the throat since mondya.

## 2021-11-04 NOTE — PROGRESS NOTES
Neurology Progress Note    NAME:  Kesha Hernandez   :   1990   MRN:   [id number]     Date/Time:  2021  Subjective:    Kesha Hernandez is a 28 y.o. female here today for follow-up for headache, sharp pain on the right side of the head and face with generalized body pain and frequent fall. Patient was accompanied by her  to the visit. Patient had to be worked in today because she said yesterday as she got home her legs gave out on her she fell and could not get up, she had to be on the floor for a while before her  came back from work and help her to get up. She said that her legs will suddenly become limp numb and give out on her, then as she was getting a little strong she started feeling tingling sensation in the leg going up and burning with pain. She noted that it has been experiencing redness over her face in butterfly distribution, they will come and go. She says she still having a lot of pain, time of interview and examination patient was in pain. She was not unable to continue with aqua therapy because the facility does not accept her insurance. She experiences or continue to experience right eye dryness with pain. She says she still has intermittent right facial pain. Patient lately noted soreness down her whole mouth to her throat making swallowing very painful. She is also experienced a lot of joint pain and back pain. Because of the patient's constellation of symptoms I will repeat her autoimmune work-up also I will include CBC with heavy metal.  Patient noted that she is always very weak, numbness and tingling sensation all over. She also continues to have  headache, she says Emgality appears to have by reducing the frequency. Headache is throbbing in nature, mostly frontal, frequency is variable. Headache associated with dizziness, blurry vision, occasional double vision, photophobia, phonophobia, nausea.   No aggravating  Advised patient to contact her insurance so that they can locate or find a facility with aqua therapy that participates with them so that she can resume or start aqua therapy as she will benefit from it. She is also having a tingling sensation with pain on the right side of her tongue, it comes and goes. Patient says she takes her medication, she pointed out that she is reacting to BuSpar, at this time, I will discontinue BuSpar and start patient on a low dose of Xanax 0.25 mg for anxiety disorder, this will be for a short period as we figure out what is going on with the patient. She noted that she is having pain in her bladder, I told patient that it is possible to be cystitis and she will follow up with her urologist and this may be the spectrum of fibromyalgia  As previously stated, because of tendency to fall, persistent pain, memory difficulty  and difficulty focusing, it is pertinent that patient applies for short-term disability pending when her condition stabilizes. She says she still cannot stand for a while, it makes her very weak and causes her leg to buckle. Patient says she continues to experience generalized pain, pain is worse with activity. Patient still has  difficulty focusing and concentrating, she says with the symptoms lately she has been having a very difficult to complete any task. She says she still has pain behind her right eye, pain is sharp in nature as if it is coming from the back of the neck and this is associated with dizziness. She says the right side of the face continues to be sensitive to touch. However the headache is unrelenting. .  Patient admits odynophagia denies dysphagia. On a scale of 1-10, patient rates her pain level to be between 8 and 9.   With a constellation of patient's symptoms, it will be very difficult for her to continue with her job at this time  Review of Systems - General ROS: positive for  - fatigue and sleep disturbance  Psychological ROS: positive for - anxiety and sleep disturbances  Ophthalmic ROS: positive for - blurry vision, decreased vision, double vision and photophobia  ENT ROS: positive for - headaches, tinnitus, vertigo and visual changes  Allergy and Immunology ROS: negative  Hematological and Lymphatic ROS: negative  Endocrine ROS: negative  Respiratory ROS: no cough, shortness of breath, or wheezing  Cardiovascular ROS: negative  Gastrointestinal ROS: no abdominal pain, change in bowel habits, or black or bloody stools  Genito-Urinary ROS: no dysuria, trouble voiding, or hematuria  Musculoskeletal ROS: positive for - muscular weakness  Neurological ROS: positive for - dizziness, headaches, numbness/tingling, visual changes and weakness  Dermatological ROS: negative        Medications reviewed:  Current Outpatient Medications   Medication Sig Dispense Refill    predniSONE (DELTASONE) 20 mg tablet Take 1 tab p.o.tid x3 days, 1 tab p.o.bid x4 days, 1 tab every day x3 days 20 Tablet 0    ALPRAZolam (XANAX) 0.25 mg tablet Take 1 Tablet by mouth two (2) times a day. Max Daily Amount: 0.5 mg. 60 Tablet 1    OXcarbazepine (TRILEPTAL) 150 mg tablet TAKE 1 TABLET BY MOUTH TWICE DAILY 60 Tablet 1    propranolol LA (INDERAL LA) 60 mg SR capsule TAKE 1 CAPSULE BY MOUTH EVERY NIGHT 30 Capsule 1    ergocalciferol (ERGOCALCIFEROL) 1,250 mcg (50,000 unit) capsule TAKE ONE CAPSULE BY MOUTH EVERY MONDAY AND THURSDAY 8 Capsule 2    lisdexamfetamine (Vyvanse) 30 mg capsule Take 1 Capsule by mouth every morning. Max Daily Amount: 30 mg. 30 Capsule 0    gabapentin (NEURONTIN) 300 mg capsule Take 1 Capsule by mouth three (3) times daily. Max Daily Amount: 900 mg. 90 Capsule 3    busPIRone (BUSPAR) 5 mg tablet TAKE 1 TABLET BY MOUTH THREE TIMES DAILY WITH MEALS 90 Tablet 1    buprenorphine (Butrans) 5 mcg/hour patch 1 Patch by TransDERmal route every seven (7) days. Max Daily Amount: 1 Patch.  4 Patch 3    lidocaine (LIDODERM) 5 % Apply patch to the back of the neck for 12 hours a day and remove for 12 hours a day. 30 Each 0    loratadine (Claritin) 10 mg tablet Claritin      Gaviscon Extra Strength 254-237.5 mg/5 mL susp TAKE 5 ML BY MOUTH TWICE DAILY AS NEEDED FOR HEARTBURN      ondansetron (Zofran ODT) 4 mg disintegrating tablet Take 1 Tablet by mouth every eight (8) hours as needed for Nausea. 10 Tablet 0    galcanezumab-gnlm (Emgality Syringe) 120 mg/mL syrg 120 mg by SubCUTAneous route every thirty (30) days. 1 mL 3    meclizine (ANTIVERT) 25 mg tablet Take 1 Tab by mouth three (3) times daily as needed for Dizziness. 30 Tab 1    ondansetron (Zofran ODT) 4 mg disintegrating tablet Take 1 Tab by mouth every eight (8) hours as needed for Nausea. 10 Tab 0    DULoxetine (CYMBALTA) 30 mg capsule Take 1 Cap by mouth daily. 30 Cap 1    cholecalciferol (Vitamin D3) 25 mcg (1,000 unit) cap Take 1,000 Units by mouth daily.  cyanocobalamin 1,000 mcg tablet Take 1,000 mcg by mouth daily.  famotidine (PEPCID) 40 mg tablet Take 40 mg by mouth daily.  naproxen sodium (ALEVE) 220 mg cap Take  by mouth.  polyethylene glycol (MIRALAX) 17 gram/dose powder Take 17 g by mouth daily. 510 g 0    ibuprofen (MOTRIN) 600 mg tablet Take 1 Tab by mouth three (3) times daily (with meals). 21 Tab 0    naloxone (NARCAN) 4 mg/actuation nasal spray Use 1 spray intranasally, then discard. Repeat with new spray every 2 min as needed for opioid overdose symptoms, alternating nostrils. 2 Each 0    medroxyPROGESTERone (PROVERA) 10 mg tablet Take 10 mg by mouth three (3) times daily.  dicyclomine HCl (BENTYL PO) Take  by mouth four (4) times daily. Indications: As needed for pain      fluticasone propionate (FLONASE) 50 mcg/actuation nasal spray 2 Sprays by Both Nostrils route daily. Indications: As needed for allergies      multivitamin (MULTIPLE VITAMIN PO) Take  by mouth.  Indications: Teresita fusion multi vitamin/folate 50 mg (2 gummies) by gyn dr Danii Banuelos loratadine (CLARITIN) 10 mg tablet Take 10 mg by mouth daily.  pantoprazole (PROTONIX) 40 mg tablet Take 40 mg by mouth two (2) times a day.  sucralfate (CARAFATE) 100 mg/mL suspension Take  by mouth four (4) times daily.  albuterol (PROVENTIL VENTOLIN) 2.5 mg /3 mL (0.083 %) nebulizer solution 2.5 mg by Nebulization route once.  fluticasone-umeclidinium-vilanterol (Trelegy Ellipta) 100-62.5-25 mcg inhaler Take 1 Puff by inhalation daily. (Patient not taking: Reported on 11/4/2021)          Objective:   Vitals:  Vitals:    11/04/21 1502   BP: 110/78   Pulse: 78   Resp: 17   Temp: 98.2 °F (36.8 °C)   TempSrc: Oral   SpO2: 98%   Weight: 193 lb (87.5 kg)   Height: 5' 3\" (1.6 m)   PainSc:   8   PainLoc: Generalized       Lab Data Reviewed:  Lab Results   Component Value Date/Time    WBC 14.4 (H) 06/07/2021 04:33 AM    HCT 45.4 06/07/2021 04:33 AM    HGB 15.1 06/07/2021 04:33 AM    PLATELET 820 62/77/9915 04:33 AM       Lab Results   Component Value Date/Time    Sodium 135 (L) 06/07/2021 04:33 AM    Potassium 4.6 06/07/2021 04:33 AM    Chloride 104 06/07/2021 04:33 AM    CO2 28 06/07/2021 04:33 AM    Glucose 119 (H) 06/07/2021 04:33 AM    BUN 19 06/07/2021 04:33 AM    Creatinine 0.99 06/07/2021 04:33 AM    Calcium 9.3 06/07/2021 04:33 AM       No components found for: TROPQUANT    No results found for: RASHAWN      No results found for: HBA1C, [id number], [id number], BHS4AHHL     Lab Results   Component Value Date/Time    Vitamin B12 482 12/14/2020 10:44 AM       No results found for: RASHAWN, ANARX, ANAIGG, XBANA    No results found for: CHOL, CHOLPOCT, CHOLX, CHLST, CHOLV, HDL, HDLPOC, HDLP, LDL, LDLCPOC, LDLC, DLDLP, VLDLC, VLDL, TGLX, TRIGL, TRIGP, TGLPOCT, CHHD, CHHDX      CT Results (recent):  Results from Hospital Encounter encounter on 06/07/21    CT ABD PELV WO CONT    Narrative  EXAM: CT ABD PELV WO CONT    INDICATION: right flank and right mid abd pain    COMPARISON: 2019    CONTRAST:  None.     TECHNIQUE:  Thin axial images were obtained through the abdomen and pelvis. Coronal and  sagittal reformats were generated. Oral contrast was not administered. CT dose  reduction was achieved through use of a standardized protocol tailored for this  examination and automatic exposure control for dose modulation. The absence of intravenous contrast material reduces the sensitivity for  evaluation of the vasculature and solid organs. FINDINGS:  LOWER THORAX: No significant abnormality in the incidentally imaged lower chest.  LIVER: Hepatic steatosis. BILIARY TREE: Cholecystectomy. CBD is not dilated. SPLEEN: within normal limits. PANCREAS: No focal abnormality. ADRENALS: Unremarkable. KIDNEYS/URETERS: Right nephrolithiasis. No hydronephrosis. STOMACH: Unremarkable. SMALL BOWEL: No dilatation or wall thickening. COLON: No dilatation or wall thickening. APPENDIX: Normal.  PERITONEUM: No ascites or pneumoperitoneum. RETROPERITONEUM: No lymphadenopathy or aortic aneurysm. REPRODUCTIVE ORGANS: Normal.  URINARY BLADDER: No mass or calculus. BONES: No destructive bone lesion. ABDOMINAL WALL: No mass or hernia. ADDITIONAL COMMENTS: N/A    Impression  Right nephrolithiasis. MRI Results (recent):  Results from [place] encounter on 07/08/21    MRI LUMB SPINE WO CONT    Narrative  EXAM: MRI LUMB SPINE WO CONT    INDICATION: Radiculopathy, lumbar region    COMPARISON: None    TECHNIQUE: MR imaging of the lumbar spine was performed using the following  sequences: sagittal T1, T2, STIR;  axial T1, T2.    CONTRAST:  None. FINDINGS:    There is normal alignment of the lumbar spine. Vertebral body heights are  maintained. Marrow signal is normal.    The conus medullaris terminates at . Signal and caliber of the distal spinal  cord are within normal limits. The paraspinal soft tissues are within normal limits. Lower thoracic spine: No herniation or stenosis. L1-L2: No herniation or stenosis.     L2-L3: No herniation or stenosis. L3-L4: No herniation or stenosis. L4-L5: No herniation or stenosis. L5-S1: The disc space is mildly narrowed without disc desiccation. This is  likely congenital. No herniation or stenosis. Mild bilateral posterior facet  arthropathy. Impression  No significant spinal stenosis or neural foraminal narrowing. No acute process. IR Results (recent):  No results found for this or any previous visit. VAS/US Results (recent):  No results found for this or any previous visit. PHYSICAL EXAM:  General:    Alert, cooperative, no distress, appears stated age. Head:   Normocephalic, without obvious abnormality, atraumatic. Eyes:   Conjunctivae/corneas clear. PERRLA  Nose:  Nares normal. No drainage or sinus tenderness. Throat:    Lips, mucosa, and tongue normal.  No Thrush  Neck:  Supple, symmetrical,  no adenopathy, thyroid: non tender    no carotid bruit and no JVD. Severe paraspinal and occipitalis tenderness  Back:    Symmetric, diffuse tenderness. Lungs:   Clear to auscultation bilaterally. No Wheezing or Rhonchi. No rales. Chest wall:  No tenderness or deformity. No Accessory muscle use. Heart:   Regular rate and rhythm,  no murmur, rub or gallop. Abdomen:   Soft, non-tender. Not distended. Bowel sounds normal. No masses  Extremities: Extremities normal, atraumatic, No cyanosis. No edema. No clubbing  Skin:     Texture, turgor normal. No rashes or lesions. Not Jaundiced  Lymph nodes: Cervical, supraclavicular normal.  Psych:  Good insight. Depressed. Anxious. NEUROLOGICAL EXAM:  Appearance: The patient is well developed, well nourished, provides a coherent history and is in no acute distress. Mental Status: Oriented to time, place and person. Mood and affect appropriate. Cranial Nerves:   Intact visual fields. Fundi are benign. OANH, EOM's full, no nystagmus, no ptosis. Facial sensation dysesthesia. Corneal reflexes are intact. Facial movement is symmetric. Hearing is normal bilaterally. Palate is midline with normal sternocleidomastoid and trapezius muscles are normal. Tongue is midline. Motor:  5-/5 strength in upper extremity and 4+/5 lower extremity proximal and distal muscles. Normal bulk and tone. No fasciculations. Reflexes:   Deep tendon reflexes 3+/4 and symmetrical.   Sensory:    Dysesthesia to touch, pinprick and vibration. Gait:   Slightly unsteady gait. Tremor:   No tremor noted. Cerebellar:  No cerebellar signs present. Neurovascular:  Normal heart sounds and regular rhythm, peripheral pulses intact, and no carotid bruits. Assesment  1. Chronic pain syndrome  Referred to pain management    2. TN (trigeminal neuralgia)  Tegretol    3. Migraine with vertigo  Continue Emgality    4. Positive RASHAWN (antinuclear antibody)    - RASHAWN, DIRECT, W/REFLEX  - RHEUMATOID FACTOR, QL; Future  - PROTEIN ELECTROPHORESIS W/ REFLX RENETTA; Future  - RHEUMATOID FACTOR, QL  - PROTEIN ELECTROPHORESIS W/ REFLX RENETTA    5. Myofascial muscle pain    - SED RATE (ESR); Future  - PROTEIN ELECTROPHORESIS W/ REFLX RENETTA; Future  - SED RATE (ESR)  - PROTEIN ELECTROPHORESIS W/ REFLX RENETTA    6. Chronic fatigue    - CBC WITH AUTOMATED DIFF    7. Paresthesia    - RASHAWN, DIRECT, W/REFLEX  - PROTEIN ELECTROPHORESIS W/ REFLX RENETTA; Future  - HEAVY METALS PROFILE, UR; Future  - CK  - CRP, HIGH SENSITIVITY  - PROTEIN ELECTROPHORESIS W/ REFLX RENETTA  - HEAVY METALS PROFILE, UR    8. Gait disorder    - CK  - CRP, HIGH SENSITIVITY    9. Dizziness    - HEAVY METALS PROFILE, UR; Future  - CK  - CRP, HIGH SENSITIVITY  - HEAVY METALS PROFILE, UR    10. Frequent falls  Physical therapy    11. Attention deficit disorder (ADD) without hyperactivity  Vyvanse  12. CARROL (generalized anxiety disorder)    - ALPRAZolam (XANAX) 0.25 mg tablet; Take 1 Tablet by mouth two (2) times a day. Max Daily Amount: 0.5 mg.  Dispense: 60 Tablet;  Refill: 1    ___________________________________________________  PLAN: Medication plan discussed with patient and her       ICD-10-CM ICD-9-CM    1. Chronic pain syndrome  G89.4 338.4    2. TN (trigeminal neuralgia)  G50.0 350.1    3. Migraine with vertigo  G43.109 346.80      780.4    4. Positive RASHAWN (antinuclear antibody)  R76.8 795.79 RASHAWN, DIRECT, W/REFLEX      RHEUMATOID FACTOR, QL      PROTEIN ELECTROPHORESIS W/ REFLX RENETTA      RHEUMATOID FACTOR, QL      PROTEIN ELECTROPHORESIS W/ REFLX RENETTA   5. Myofascial muscle pain  M79.18 729.1 SED RATE (ESR)      PROTEIN ELECTROPHORESIS W/ REFLX RENETTA      SED RATE (ESR)      PROTEIN ELECTROPHORESIS W/ REFLX RENETTA   6. Chronic fatigue  R53.82 780.79 CBC WITH AUTOMATED DIFF   7. Paresthesia  R20.2 782.0 RASHAWN, DIRECT, W/REFLEX      PROTEIN ELECTROPHORESIS W/ REFLX RENETTA      HEAVY METALS PROFILE, UR      CK      CRP, HIGH SENSITIVITY      PROTEIN ELECTROPHORESIS W/ REFLX RENETTA      HEAVY METALS PROFILE, UR   8. Gait disorder  R26.9 781.2 CK      CRP, HIGH SENSITIVITY   9. Dizziness  R42 780.4 HEAVY METALS PROFILE, UR      CK      CRP, HIGH SENSITIVITY      HEAVY METALS PROFILE, UR   10. Frequent falls  R29.6 V15.88    11. Attention deficit disorder (ADD) without hyperactivity  F98.8 314.00    12. CARROL (generalized anxiety disorder)  F41.1 300.02 ALPRAZolam (XANAX) 0.25 mg tablet   13.  Fibromyalgia  M79.7 729.1    14. Glossopharyngeal neuralgia  G52.1 352.1                ___________________________________________________    Attending Physician: Ricco Alvarez MD

## 2021-11-15 RX ORDER — BUSPIRONE HYDROCHLORIDE 5 MG/1
TABLET ORAL
Qty: 90 TABLET | Refills: 1 | Status: SHIPPED | OUTPATIENT
Start: 2021-11-15

## 2021-11-18 ENCOUNTER — TELEPHONE (OUTPATIENT)
Dept: NEUROLOGY | Age: 31
End: 2021-11-18

## 2021-11-22 NOTE — TELEPHONE ENCOUNTER
Hi,    I am a bit confused on what is being asked. We do not have samples. If the patient needs a prescription and prior authorization it may need to wait until Dr. Nazia Sahu is back next week.     Thanks

## 2021-11-23 DIAGNOSIS — M54.50 RIGHT-SIDED LOW BACK PAIN WITHOUT SCIATICA, UNSPECIFIED CHRONICITY: ICD-10-CM

## 2021-11-23 DIAGNOSIS — R53.82 CHRONIC FATIGUE: ICD-10-CM

## 2021-11-23 DIAGNOSIS — M54.16 LUMBAR RADICULOPATHY: ICD-10-CM

## 2021-11-23 DIAGNOSIS — F98.8 ATTENTION DEFICIT DISORDER (ADD) WITHOUT HYPERACTIVITY: ICD-10-CM

## 2021-11-23 DIAGNOSIS — F41.1 GAD (GENERALIZED ANXIETY DISORDER): ICD-10-CM

## 2021-11-23 DIAGNOSIS — G62.9 NEUROPATHY: ICD-10-CM

## 2021-11-24 NOTE — TELEPHONE ENCOUNTER
Patient is no longer requesting samples for [facility] AND [facility] at this time. She will f/u once Dr. Nehemiah Norwood returns.

## 2021-11-27 LAB
ALBUMIN SERPL ELPH-MCNC: 4.2 G/DL (ref 2.9–4.4)
ALBUMIN/GLOB SERPL: 1.7 {RATIO} (ref 0.7–1.7)
ALPHA1 GLOB SERPL ELPH-MCNC: 0.2 G/DL (ref 0–0.4)
ALPHA2 GLOB SERPL ELPH-MCNC: 0.6 G/DL (ref 0.4–1)
ANA SER QL: NEGATIVE
ARSENIC 24H UR-MCNC: 13 UG/L (ref 0–9)
ARSENIC 24H UR-MRATE: 12 UG/24 HR (ref 0–50)
ARSENIC, DMA: <5 UG/L
ARSENIC, INORGANIC: <10 UG/L
ARSENIC, MMA: <5 UG/L
ARSENIC, TOTAL TOXIC: <20 UG/L
ARSENIC/CREAT UR: 8 UG/G CREAT
B-GLOBULIN SERPL ELPH-MCNC: 0.9 G/DL (ref 0.7–1.3)
BASOPHILS # BLD AUTO: 0 X10E3/UL (ref 0–0.2)
BASOPHILS NFR BLD AUTO: 1 %
CK SERPL-CCNC: 70 U/L (ref 32–182)
CREAT UR-MCNC: 1.65 G/L (ref 0.3–3)
CRP SERPL HS-MCNC: 1.14 MG/L (ref 0–3)
EOSINOPHIL # BLD AUTO: 0.2 X10E3/UL (ref 0–0.4)
EOSINOPHIL NFR BLD AUTO: 4 %
ERYTHROCYTE [DISTWIDTH] IN BLOOD BY AUTOMATED COUNT: 12 % (ref 11.7–15.4)
ERYTHROCYTE [SEDIMENTATION RATE] IN BLOOD BY WESTERGREN METHOD: 2 MM/HR (ref 0–32)
GAMMA GLOB SERPL ELPH-MCNC: 0.9 G/DL (ref 0.4–1.8)
GLOBULIN SER CALC-MCNC: 2.5 G/DL (ref 2.2–3.9)
HCT VFR BLD AUTO: 41.1 % (ref 34–46.6)
HGB BLD-MCNC: 14 G/DL (ref 11.1–15.9)
IMM GRANULOCYTES # BLD AUTO: 0 X10E3/UL (ref 0–0.1)
IMM GRANULOCYTES NFR BLD AUTO: 0 %
LEAD 24H UR-MCNC: ABNORMAL UG/L (ref 0–49)
LYMPHOCYTES # BLD AUTO: 1.8 X10E3/UL (ref 0.7–3.1)
LYMPHOCYTES NFR BLD AUTO: 34 %
Lab: NORMAL
M PROTEIN SERPL ELPH-MCNC: NORMAL G/DL
MCH RBC QN AUTO: 31.2 PG (ref 26.6–33)
MCHC RBC AUTO-ENTMCNC: 34.1 G/DL (ref 31.5–35.7)
MCV RBC AUTO: 92 FL (ref 79–97)
MERCURY 24H UR-MCNC: ABNORMAL UG/L (ref 0–19)
MONOCYTES # BLD AUTO: 0.4 X10E3/UL (ref 0.1–0.9)
MONOCYTES NFR BLD AUTO: 8 %
NEUTROPHILS # BLD AUTO: 2.9 X10E3/UL (ref 1.4–7)
NEUTROPHILS NFR BLD AUTO: 53 %
PLATELET # BLD AUTO: 228 X10E3/UL (ref 150–450)
PLEASE NOTE, 011150: NORMAL
PROT PATTERN SERPL ELPH-IMP: NORMAL
PROT SERPL-MCNC: 6.7 G/DL (ref 6–8.5)
RBC # BLD AUTO: 4.49 X10E6/UL (ref 3.77–5.28)
RHEUMATOID FACT SERPL-ACNC: <10 IU/ML (ref 0–13.9)
WBC # BLD AUTO: 5.4 X10E3/UL (ref 3.4–10.8)

## 2021-11-29 DIAGNOSIS — F41.1 GAD (GENERALIZED ANXIETY DISORDER): ICD-10-CM

## 2021-11-29 DIAGNOSIS — R53.82 CHRONIC FATIGUE: ICD-10-CM

## 2021-11-29 DIAGNOSIS — F98.8 ATTENTION DEFICIT DISORDER (ADD) WITHOUT HYPERACTIVITY: ICD-10-CM

## 2021-11-30 RX ORDER — ALPRAZOLAM 0.25 MG/1
0.25 TABLET ORAL 2 TIMES DAILY
Qty: 60 TABLET | Refills: 1 | Status: SHIPPED | OUTPATIENT
Start: 2021-11-30 | End: 2022-08-29 | Stop reason: SDUPTHER

## 2021-11-30 RX ORDER — ONDANSETRON 4 MG/1
4 TABLET, ORALLY DISINTEGRATING ORAL
Qty: 10 TABLET | Refills: 0 | Status: SHIPPED | OUTPATIENT
Start: 2021-11-30 | End: 2021-12-27

## 2021-11-30 RX ORDER — MECLIZINE HYDROCHLORIDE 25 MG/1
25 TABLET ORAL
Qty: 30 TABLET | Refills: 1 | Status: SHIPPED | OUTPATIENT
Start: 2021-11-30

## 2021-12-02 RX ORDER — ERGOCALCIFEROL 1.25 MG/1
CAPSULE ORAL
Qty: 8 CAPSULE | Refills: 2 | Status: SHIPPED | OUTPATIENT
Start: 2021-12-02 | End: 2022-04-27 | Stop reason: SDUPTHER

## 2021-12-02 RX ORDER — GABAPENTIN 300 MG/1
300 CAPSULE ORAL 3 TIMES DAILY
Qty: 90 CAPSULE | Refills: 3 | Status: SHIPPED | OUTPATIENT
Start: 2021-12-02 | End: 2022-02-10 | Stop reason: DRUGHIGH

## 2021-12-02 RX ORDER — GALCANEZUMAB 120 MG/ML
INJECTION, SOLUTION SUBCUTANEOUS
Qty: 1 EACH | Refills: 3 | Status: SHIPPED | OUTPATIENT
Start: 2021-12-02 | End: 2022-04-05 | Stop reason: SDUPTHER

## 2021-12-02 RX ORDER — ALPRAZOLAM 0.25 MG/1
0.25 TABLET ORAL 2 TIMES DAILY
Qty: 60 TABLET | Refills: 1 | Status: SHIPPED | OUTPATIENT
Start: 2021-12-02 | End: 2022-02-10 | Stop reason: SDUPTHER

## 2021-12-21 RX ORDER — PROPRANOLOL HYDROCHLORIDE 60 MG/1
CAPSULE, EXTENDED RELEASE ORAL
Qty: 30 CAPSULE | Refills: 1 | Status: SHIPPED | OUTPATIENT
Start: 2021-12-21 | End: 2022-03-18 | Stop reason: SDUPTHER

## 2021-12-24 RX ORDER — OXCARBAZEPINE 150 MG/1
TABLET, FILM COATED ORAL
Qty: 60 TABLET | Refills: 1 | Status: SHIPPED | OUTPATIENT
Start: 2021-12-24 | End: 2022-03-18 | Stop reason: SDUPTHER

## 2021-12-27 DIAGNOSIS — M54.2 NECK PAIN: ICD-10-CM

## 2021-12-27 DIAGNOSIS — B02.29 PHN (POSTHERPETIC NEURALGIA): ICD-10-CM

## 2021-12-27 RX ORDER — LIDOCAINE 50 MG/G
PATCH TOPICAL
Qty: 30 PATCH | Refills: 2 | Status: SHIPPED | OUTPATIENT
Start: 2021-12-27

## 2021-12-27 RX ORDER — ONDANSETRON 4 MG/1
TABLET, ORALLY DISINTEGRATING ORAL
Qty: 10 TABLET | Refills: 0 | Status: SHIPPED | OUTPATIENT
Start: 2021-12-27 | End: 2022-07-07 | Stop reason: SDUPTHER

## 2022-01-04 ENCOUNTER — OFFICE VISIT (OUTPATIENT)
Dept: NEUROLOGY | Age: 32
End: 2022-01-04
Payer: COMMERCIAL

## 2022-01-04 VITALS
WEIGHT: 195 LBS | SYSTOLIC BLOOD PRESSURE: 116 MMHG | HEART RATE: 80 BPM | OXYGEN SATURATION: 99 % | BODY MASS INDEX: 34.55 KG/M2 | TEMPERATURE: 97.8 F | HEIGHT: 63 IN | DIASTOLIC BLOOD PRESSURE: 78 MMHG

## 2022-01-04 DIAGNOSIS — R29.6 FREQUENT FALLS: ICD-10-CM

## 2022-01-04 DIAGNOSIS — G37.9 DEMYELINATING DISEASE (HCC): ICD-10-CM

## 2022-01-04 DIAGNOSIS — G81.94 LEFT HEMIPARESIS (HCC): ICD-10-CM

## 2022-01-04 DIAGNOSIS — G50.0 TN (TRIGEMINAL NEURALGIA): ICD-10-CM

## 2022-01-04 DIAGNOSIS — G89.4 CHRONIC PAIN SYNDROME: ICD-10-CM

## 2022-01-04 DIAGNOSIS — R53.82 CHRONIC FATIGUE: ICD-10-CM

## 2022-01-04 DIAGNOSIS — G62.9 NEUROPATHY: ICD-10-CM

## 2022-01-04 DIAGNOSIS — G43.109 MIGRAINE WITH VERTIGO: ICD-10-CM

## 2022-01-04 DIAGNOSIS — G43.109 COMPLICATED MIGRAINE: ICD-10-CM

## 2022-01-04 DIAGNOSIS — M79.7 FIBROMYALGIA: ICD-10-CM

## 2022-01-04 DIAGNOSIS — R20.2 PARESTHESIA: ICD-10-CM

## 2022-01-04 DIAGNOSIS — F41.1 GAD (GENERALIZED ANXIETY DISORDER): ICD-10-CM

## 2022-01-04 DIAGNOSIS — F98.8 ATTENTION DEFICIT DISORDER (ADD) WITHOUT HYPERACTIVITY: ICD-10-CM

## 2022-01-04 DIAGNOSIS — R42 DIZZINESS: ICD-10-CM

## 2022-01-04 DIAGNOSIS — M79.18 MYOFASCIAL MUSCLE PAIN: ICD-10-CM

## 2022-01-04 DIAGNOSIS — G52.1 GLOSSOPHARYNGEAL NEURALGIA: Primary | ICD-10-CM

## 2022-01-04 PROCEDURE — 99214 OFFICE O/P EST MOD 30 MIN: CPT | Performed by: PSYCHIATRY & NEUROLOGY

## 2022-01-04 RX ORDER — RIMEGEPANT SULFATE 75 MG/75MG
TABLET, ORALLY DISINTEGRATING ORAL
COMMUNITY
Start: 2021-12-27 | End: 2022-04-05 | Stop reason: SDUPTHER

## 2022-01-04 RX ORDER — ATORVASTATIN CALCIUM 20 MG/1
20 TABLET, FILM COATED ORAL DAILY
COMMUNITY
Start: 2021-12-29

## 2022-01-04 NOTE — PROGRESS NOTES
Neurology Progress Note    NAME:  Kareem Quan   :   1990   MRN:   [id number]     Date/Time:  2022  Subjective:    Kareem Quan is a 28 y.o. female here today for follow-up for headache, sharp pain on the right side of the head and face with generalized body pain and frequent fall, test results. Patient says she has had 3 falls since last visit, she says her legs will feel tingling sensation, then get numb and give out on her. After which patient had difficulty getting up. She says sometimes she is able to hold onto something when she starts feeling the symptoms, she will not fall. She says the symptoms are unpredictable, variable, she has good days and bad days. Patient says she is currently in physical therapy with aqua therapy which appears to be helping, she says at times when she is in the pool, she will feel the sensation that results to  weakness of the legs, then she will hold onto the rail for a while until the phase passes. She says she continues to have a lot of pain which is all over the body. .    She says she still has intermittent right facial pain. She is still experiencing a lot of joint pain and back pain. Blood work reviewed with patient was unremarkable . George Mayorga She also continues to have  headache but she says Emgality appears to have by reducing the frequency. Headache is throbbing in nature, mostly frontal, frequency is variable. Headache associated with dizziness, blurry vision, occasional double vision, photophobia, phonophobia, nausea. No aggravating factor    She noted that she is still having pain in her bladder, she followed up with urologist and the urologist still felt that it is possible to be   spectrum of fibromyalgia  She says she still cannot stand for a while, it makes her very weak and causes her leg to buckle. Patient says she continues to experience generalized pain, pain is worse with activity.      Patient continues to have difficulty focusing and concentrating, she says with the symptoms lately she has been having a very difficult to complete any task. She noted that she has intermittent pain behind her right eye, pain is sharp in nature as if it is coming from the back of the neck and this is associated with dizziness. She says the right side of the face continues to be sensitive to touch. Patient says she was recently started on antilipid medication because of abnormal lipid profile. Patient admits odynophagia denies dysphagia. On a scale of 1-10, patient rates her pain level to be between 8 and 9. With a constellation of patient's symptoms,  I will obtain a follow-up MRI of the brain with and without gadolinium  Review of Systems - General ROS: positive for  - fatigue and sleep disturbance  Psychological ROS: positive for - anxiety and sleep disturbances  Ophthalmic ROS: positive for - blurry vision, decreased vision, double vision and photophobia  ENT ROS: positive for - headaches, tinnitus, vertigo and visual changes  Allergy and Immunology ROS: negative  Hematological and Lymphatic ROS: negative  Endocrine ROS: negative  Respiratory ROS: no cough, shortness of breath, or wheezing  Cardiovascular ROS: negative  Gastrointestinal ROS: no abdominal pain, change in bowel habits, or black or bloody stools  Genito-Urinary ROS: no dysuria, trouble voiding, or hematuria  Musculoskeletal ROS: positive for - muscular weakness  Neurological ROS: positive for - dizziness, headaches, numbness/tingling, visual changes and weakness  Dermatological ROS: negative            Medications reviewed:  Current Outpatient Medications   Medication Sig Dispense Refill    atorvastatin (LIPITOR) 20 mg tablet Take 20 mg by mouth daily.       Nurtec ODT 75 mg disintegrating tablet DISSOLVE 1 TABLET ON THE TONGUE 1 TIME FOR UP TO 1 DOSE AS NEEDED FOR MIGRAINE      ondansetron (ZOFRAN ODT) 4 mg disintegrating tablet DISSOLVE 1 TABLET ON THE TONGUE EVERY 8 HOURS AS NEEDED FOR NAUSEA 10 Tablet 0    lidocaine (LIDODERM) 5 % UNWRAP AND APPLY 1 PATCH TO THE SKIN ON BACK OF NECK FOR 12 HOURS PER DAY AND REMOVE FOR 12 HOURS PER DAY 30 Patch 2    OXcarbazepine (TRILEPTAL) 150 mg tablet TAKE 1 TABLET BY MOUTH TWICE DAILY 60 Tablet 1    propranolol LA (INDERAL LA) 60 mg SR capsule TAKE 1 CAPSULE BY MOUTH EVERY NIGHT 30 Capsule 1    lisdexamfetamine (Vyvanse) 30 mg capsule Take 1 Capsule by mouth every morning. Max Daily Amount: 30 mg. 30 Capsule 0    ALPRAZolam (XANAX) 0.25 mg tablet Take 1 Tablet by mouth two (2) times a day. Max Daily Amount: 0.5 mg. 60 Tablet 1    gabapentin (NEURONTIN) 300 mg capsule Take 1 Capsule by mouth three (3) times daily. Max Daily Amount: 900 mg. 90 Capsule 3    Emgality Syringe 120 mg/mL syrg INJECT 120MG SUBCUTANEOUSLY EVERY 30 DAYS 1 Each 3    ergocalciferol (ERGOCALCIFEROL) 1,250 mcg (50,000 unit) capsule TAKE ONE CAPSULE BY MOUTH EVERY MONDAY AND THURSDAY 8 Capsule 2    lisdexamfetamine (Vyvanse) 30 mg capsule Take 1 Capsule by mouth every morning. Max Daily Amount: 30 mg. 30 Capsule 0    meclizine (ANTIVERT) 25 mg tablet Take 1 Tablet by mouth three (3) times daily as needed for Dizziness. 30 Tablet 1    ALPRAZolam (XANAX) 0.25 mg tablet Take 1 Tablet by mouth two (2) times a day. Max Daily Amount: 0.5 mg. 60 Tablet 1    busPIRone (BUSPAR) 5 mg tablet TAKE 1 TABLET BY MOUTH THREE TIMES DAILY WITH MEALS 90 Tablet 1    buprenorphine (Butrans) 5 mcg/hour patch 1 Patch by TransDERmal route every seven (7) days. Max Daily Amount: 1 Patch. 4 Patch 3    loratadine (Claritin) 10 mg tablet Claritin      Gaviscon Extra Strength 254-237.5 mg/5 mL susp TAKE 5 ML BY MOUTH TWICE DAILY AS NEEDED FOR HEARTBURN      ondansetron (Zofran ODT) 4 mg disintegrating tablet Take 1 Tablet by mouth every eight (8) hours as needed for Nausea. 10 Tablet 0    DULoxetine (CYMBALTA) 30 mg capsule Take 1 Cap by mouth daily.  30 Cap 1    cholecalciferol (Vitamin D3) 25 mcg (1,000 unit) cap Take 1,000 Units by mouth daily.  cyanocobalamin 1,000 mcg tablet Take 1,000 mcg by mouth daily.  famotidine (PEPCID) 40 mg tablet Take 40 mg by mouth daily.  polyethylene glycol (MIRALAX) 17 gram/dose powder Take 17 g by mouth daily. 510 g 0    ibuprofen (MOTRIN) 600 mg tablet Take 1 Tab by mouth three (3) times daily (with meals). 21 Tab 0    naloxone (NARCAN) 4 mg/actuation nasal spray Use 1 spray intranasally, then discard. Repeat with new spray every 2 min as needed for opioid overdose symptoms, alternating nostrils. 2 Each 0    medroxyPROGESTERone (PROVERA) 10 mg tablet Take 10 mg by mouth three (3) times daily.  dicyclomine HCl (BENTYL PO) Take  by mouth four (4) times daily. Indications: As needed for pain      fluticasone propionate (FLONASE) 50 mcg/actuation nasal spray 2 Sprays by Both Nostrils route daily. Indications: As needed for allergies      multivitamin (MULTIPLE VITAMIN PO) Take  by mouth. Indications: Teresita fusion multi vitamin/folate 50 mg (2 gummies) by gyn dr     [facility] [place] pantoprazole (PROTONIX) 40 mg tablet Take 40 mg by mouth two (2) times a day.  sucralfate (CARAFATE) 100 mg/mL suspension Take  by mouth four (4) times daily.  albuterol (PROVENTIL VENTOLIN) 2.5 mg /3 mL (0.083 %) nebulizer solution 2.5 mg by Nebulization route once.  predniSONE (DELTASONE) 20 mg tablet Take 1 tab p.o.tid x3 days, 1 tab p.o.bid x4 days, 1 tab every day x3 days (Patient not taking: Reported on 1/4/2022) 20 Tablet 0    fluticasone-umeclidinium-vilanterol (Trelegy Ellipta) 100-62.5-25 mcg inhaler Take 1 Puff by inhalation daily. (Patient not taking: Reported on 11/4/2021)      naproxen sodium (ALEVE) 220 mg cap Take  by mouth. (Patient not taking: Reported on 1/4/2022)      loratadine (CLARITIN) 10 mg tablet Take 10 mg by mouth daily.  (Patient not taking: Reported on 1/4/2022)          Objective:   Vitals:  Vitals:    01/04/22 1112   BP: 116/78   Pulse: 80 Temp: 97.8 °F (36.6 °C)   TempSrc: Temporal   SpO2: 99%   Weight: 195 lb (88.5 kg)   Height: 5' 3\" (1.6 m)   PainSc:   5               Lab Data Reviewed:  Lab Results   Component Value Date/Time    WBC 5.4 11/09/2021 11:05 AM    HCT 41.1 11/09/2021 11:05 AM    HGB 14.0 11/09/2021 11:05 AM    PLATELET 326 48/95/8590 11:05 AM       Lab Results   Component Value Date/Time    Sodium 135 (L) 06/07/2021 04:33 AM    Potassium 4.6 06/07/2021 04:33 AM    Chloride 104 06/07/2021 04:33 AM    CO2 28 06/07/2021 04:33 AM    Glucose 119 (H) 06/07/2021 04:33 AM    BUN 19 06/07/2021 04:33 AM    Creatinine 0.99 06/07/2021 04:33 AM    Calcium 9.3 06/07/2021 04:33 AM       No components found for: TROPQUANT    No results found for: RASHAWN      No results found for: HBA1C, RCZ1QKTK, [id number]     Lab Results   Component Value Date/Time    Vitamin B12 482 12/14/2020 10:44 AM       No results found for: RASHAWN, ANARX, ANAIGG, XBANA    No results found for: CHOL, CHOLPOCT, CHOLX, CHLST, CHOLV, HDL, HDLPOC, HDLP, LDL, LDLCPOC, LDLC, DLDLP, VLDLC, VLDL, TGLX, TRIGL, TRIGP, TGLPOCT, CHHD, CHHDX      CT Results (recent):  Results from Hospital Encounter encounter on 06/07/21    CT ABD PELV WO CONT    Narrative  EXAM: CT ABD PELV WO CONT    INDICATION: right flank and right mid abd pain    COMPARISON: 2019    CONTRAST:  None. TECHNIQUE:  Thin axial images were obtained through the abdomen and pelvis. Coronal and  sagittal reformats were generated. Oral contrast was not administered. CT dose  reduction was achieved through use of a standardized protocol tailored for this  examination and automatic exposure control for dose modulation. The absence of intravenous contrast material reduces the sensitivity for  evaluation of the vasculature and solid organs. FINDINGS:  LOWER THORAX: No significant abnormality in the incidentally imaged lower chest.  LIVER: Hepatic steatosis. BILIARY TREE: Cholecystectomy. CBD is not dilated.   SPLEEN: within normal limits. PANCREAS: No focal abnormality. ADRENALS: Unremarkable. KIDNEYS/URETERS: Right nephrolithiasis. No hydronephrosis. STOMACH: Unremarkable. SMALL BOWEL: No dilatation or wall thickening. COLON: No dilatation or wall thickening. APPENDIX: Normal.  PERITONEUM: No ascites or pneumoperitoneum. RETROPERITONEUM: No lymphadenopathy or aortic aneurysm. REPRODUCTIVE ORGANS: Normal.  URINARY BLADDER: No mass or calculus. BONES: No destructive bone lesion. ABDOMINAL WALL: No mass or hernia. ADDITIONAL COMMENTS: N/A    Impression  Right nephrolithiasis. MRI Results (recent):  Results from [place] encounter on 07/08/21    MRI LUMB SPINE WO CONT    Narrative  EXAM: MRI LUMB SPINE WO CONT    INDICATION: Radiculopathy, lumbar region    COMPARISON: None    TECHNIQUE: MR imaging of the lumbar spine was performed using the following  sequences: sagittal T1, T2, STIR;  axial T1, T2.    CONTRAST:  None. FINDINGS:    There is normal alignment of the lumbar spine. Vertebral body heights are  maintained. Marrow signal is normal.    The conus medullaris terminates at . Signal and caliber of the distal spinal  cord are within normal limits. The paraspinal soft tissues are within normal limits. Lower thoracic spine: No herniation or stenosis. L1-L2: No herniation or stenosis. L2-L3: No herniation or stenosis. L3-L4: No herniation or stenosis. L4-L5: No herniation or stenosis. L5-S1: The disc space is mildly narrowed without disc desiccation. This is  likely congenital. No herniation or stenosis. Mild bilateral posterior facet  arthropathy. Impression  No significant spinal stenosis or neural foraminal narrowing. No acute process. IR Results (recent):  No results found for this or any previous visit. VAS/US Results (recent):  No results found for this or any previous visit. PHYSICAL EXAM:  General:    Alert, cooperative, no distress, appears stated age. Head:   Normocephalic, without obvious abnormality, atraumatic. Eyes:   Conjunctivae/corneas clear. PERRLA  Nose:  Nares normal. No drainage or sinus tenderness. Throat:    Lips, mucosa, and tongue normal.  No Thrush  Neck:  Supple, symmetrical,  no adenopathy, thyroid: non tender    no carotid bruit and no JVD. Severe paraspinal and occipitalis tenderness  Back:    Symmetric, diffuse tenderness. Lungs:   Clear to auscultation bilaterally. No Wheezing or Rhonchi. No rales. Chest wall:  No tenderness or deformity. No Accessory muscle use. Heart:   Regular rate and rhythm,  no murmur, rub or gallop. Abdomen:   Soft, non-tender. Not distended. Bowel sounds normal. No masses  Extremities: Extremities normal, atraumatic, No cyanosis. No edema. No clubbing  Skin:     Texture, turgor normal. No rashes or lesions. Not Jaundiced  Lymph nodes: Cervical, supraclavicular normal.  Psych:  Good insight. Depressed. Anxious. NEUROLOGICAL EXAM:  Appearance: The patient is well developed, well nourished, provides a coherent history and is in no acute distress. Mental Status: Oriented to time, place and person. Mood and affect appropriate. Cranial Nerves:   Intact visual fields. Fundi are benign. OANH, EOM's full, no nystagmus, no ptosis. Facial sensation dysesthesia. Corneal reflexes are intact. Facial movement is symmetric. Hearing is normal bilaterally. Palate is midline with normal sternocleidomastoid and trapezius muscles are normal. Tongue is midline. Motor:  5-/5 strength in upper extremity and 4+/5 lower extremity proximal and distal muscles. Normal bulk and tone. No fasciculations. Reflexes:   Deep tendon reflexes 3+/4 and symmetrical.   Sensory:    Dysesthesia to touch, pinprick and vibration. Gait:   Slightly unsteady gait. Tremor:   No tremor noted. Cerebellar:  No cerebellar signs present.    Neurovascular:  Normal heart sounds and regular rhythm, peripheral pulses intact, and no carotid bruits. Assesment  1. Glossopharyngeal neuralgia    - MRI BRAIN W WO CONT; Future    2. Frequent falls    - MRI BRAIN W WO CONT; Future    3. Migraine with vertigo    - MRI BRAIN W WO CONT; Future    4. Paresthesia    - MRI BRAIN W WO CONT; Future    5. Chronic fatigue  Continue management    6. Neuropathy  Continue management    7. TN (trigeminal neuralgia)    - MRI BRAIN W WO CONT; Future    8. Complicated migraine    - MRI BRAIN W WO CONT; Future    9. Dizziness    - MRI BRAIN W WO CONT; Future    10. Myofascial muscle pain  Continue physical therapy    11. Demyelinating disease ([address]    - MRI BRAIN W WO CONT; Future    12. Left hemiparesis ([address]  Continue physical therapy  13. Attention deficit disorder (ADD) without hyperactivity  Continue Vyvanse    14. Fibromyalgia  Following rheumatology  Neurontin  15. Chronic pain syndrome  Referred to pain management    16. CARROL (generalized anxiety disorder)  Referred to psychiatry    ___________________________________________________  PLAN: Patient and plan discussed with patient. ICD-10-CM ICD-9-CM    1. Glossopharyngeal neuralgia  G52.1 352.1 MRI BRAIN W WO CONT   2. Frequent falls  R29.6 V15.88 MRI BRAIN W WO CONT   3. Migraine with vertigo  G43.109 346.80 MRI BRAIN W WO CONT     780.4    4. Paresthesia  R20.2 782.0 MRI BRAIN W WO CONT   5. Chronic fatigue  R53.82 780.79    6. Neuropathy  G62.9 355.9    7. TN (trigeminal neuralgia)  G50.0 350.1 MRI BRAIN W WO CONT   8. Complicated migraine  W95.205 346.00 MRI BRAIN W WO CONT   9. Dizziness  R42 780.4 MRI BRAIN W WO CONT   10. Myofascial muscle pain  M79.18 729.1    11. Demyelinating disease ([address]  G37.9 341.9 MRI BRAIN W WO CONT   12. Left hemiparesis (HCC)  G81.94 342.90    13. Attention deficit disorder (ADD) without hyperactivity  F98.8 314.00    14. Fibromyalgia  M79.7 729.1    15. Chronic pain syndrome  G89.4 338.4    16.  CARROL (generalized anxiety disorder)  F41.1 300.02      Follow-up and Dispositions    · Return in about 3 months (around 4/4/2022).                  ___________________________________________________    Attending Physician: Henry Lisa MD

## 2022-01-04 NOTE — PATIENT INSTRUCTIONS
Office Policies     · Phone calls/patient messages:  Please allow up to 24 hours for someone in the office to contact you about your call or message. Be mindful your provider may be out of the office or your message may require further review. We encourage you to use [email or web address] for your messages as this is a faster, more efficient way to communicate with our office     · Medication Refills:  Prescription medications require up to 48 business hours to process. We encourage you to use [email or web address] for your refills. For controlled medications: Please allow up to 72 business hours to process. Certain medications may require you to  a written prescription at our office. NO narcotic/controlled medications will be prescribed after 4pm Monday through Friday or on weekends     · Form/Paperwork Completion:  We ask that you allow 7-14 business days. You may also download your forms to [email or web address] to have your doctor print off. Due to COVID-19, please note there may be a longer wait time than usual. Thank you.      If you have questions/concerns regarding your health maintenance, please reach out to your primary care physician

## 2022-01-31 DIAGNOSIS — M54.50 RIGHT-SIDED LOW BACK PAIN WITHOUT SCIATICA, UNSPECIFIED CHRONICITY: ICD-10-CM

## 2022-01-31 DIAGNOSIS — G62.9 NEUROPATHY: ICD-10-CM

## 2022-01-31 DIAGNOSIS — F98.8 ATTENTION DEFICIT DISORDER (ADD) WITHOUT HYPERACTIVITY: ICD-10-CM

## 2022-01-31 DIAGNOSIS — M54.16 LUMBAR RADICULOPATHY: ICD-10-CM

## 2022-01-31 DIAGNOSIS — R53.82 CHRONIC FATIGUE: ICD-10-CM

## 2022-01-31 RX ORDER — GABAPENTIN 300 MG/1
300 CAPSULE ORAL 3 TIMES DAILY
Qty: 90 CAPSULE | Refills: 3 | Status: CANCELLED | OUTPATIENT
Start: 2022-01-31

## 2022-01-31 NOTE — TELEPHONE ENCOUNTER
Requested Prescriptions     Pending Prescriptions Disp Refills    lisdexamfetamine (Vyvanse) 30 mg capsule 30 Capsule 0     Sig: Take 1 Capsule by mouth every morning. Max Daily Amount: 30 mg.    gabapentin (NEURONTIN) 300 mg capsule 90 Capsule 3     Sig: Take 1 Capsule by mouth three (3) times daily. Max Daily Amount: 900 mg. Last office visit: 1/4/2022    Last refill: 11/30/2021- Vyvanse      Next appointment: 4/5/2022     Please review and fill if warranted.

## 2022-02-10 ENCOUNTER — OFFICE VISIT (OUTPATIENT)
Dept: NEUROLOGY | Age: 32
End: 2022-02-10
Payer: COMMERCIAL

## 2022-02-10 VITALS
BODY MASS INDEX: 34.97 KG/M2 | DIASTOLIC BLOOD PRESSURE: 93 MMHG | OXYGEN SATURATION: 97 % | SYSTOLIC BLOOD PRESSURE: 107 MMHG | HEART RATE: 77 BPM | WEIGHT: 197.4 LBS | RESPIRATION RATE: 14 BRPM

## 2022-02-10 DIAGNOSIS — R26.9 GAIT DISORDER: ICD-10-CM

## 2022-02-10 DIAGNOSIS — G81.94 LEFT HEMIPARESIS (HCC): ICD-10-CM

## 2022-02-10 DIAGNOSIS — G43.109 COMPLICATED MIGRAINE: ICD-10-CM

## 2022-02-10 DIAGNOSIS — R29.6 FREQUENT FALLS: ICD-10-CM

## 2022-02-10 DIAGNOSIS — G83.14 MONOPARESIS OF LOWER EXTREMITY AFFECTING LEFT NONDOMINANT SIDE (HCC): ICD-10-CM

## 2022-02-10 DIAGNOSIS — G43.711 INTRACTABLE CHRONIC MIGRAINE WITHOUT AURA AND WITH STATUS MIGRAINOSUS: Primary | ICD-10-CM

## 2022-02-10 DIAGNOSIS — G37.9 DEMYELINATING DISEASE (HCC): ICD-10-CM

## 2022-02-10 DIAGNOSIS — M79.18 MYOFASCIAL MUSCLE PAIN: ICD-10-CM

## 2022-02-10 DIAGNOSIS — G43.109 MIGRAINE WITH VERTIGO: ICD-10-CM

## 2022-02-10 DIAGNOSIS — G89.4 CHRONIC PAIN SYNDROME: ICD-10-CM

## 2022-02-10 DIAGNOSIS — M79.7 FIBROMYALGIA: ICD-10-CM

## 2022-02-10 DIAGNOSIS — F41.1 GAD (GENERALIZED ANXIETY DISORDER): ICD-10-CM

## 2022-02-10 PROCEDURE — 99214 OFFICE O/P EST MOD 30 MIN: CPT | Performed by: PSYCHIATRY & NEUROLOGY

## 2022-02-10 RX ORDER — TRAMADOL HYDROCHLORIDE 50 MG/1
50 TABLET ORAL
Qty: 20 TABLET | Refills: 1 | Status: SHIPPED | OUTPATIENT
Start: 2022-02-10 | End: 2022-02-15

## 2022-02-10 RX ORDER — ALPRAZOLAM 0.25 MG/1
0.25 TABLET ORAL 2 TIMES DAILY
Qty: 60 TABLET | Refills: 3 | Status: SHIPPED | OUTPATIENT
Start: 2022-02-10 | End: 2022-03-18 | Stop reason: SDUPTHER

## 2022-02-10 RX ORDER — PREDNISONE 20 MG/1
TABLET ORAL
Qty: 20 TABLET | Refills: 0 | Status: SHIPPED | OUTPATIENT
Start: 2022-02-10

## 2022-02-10 RX ORDER — GABAPENTIN 600 MG/1
600 TABLET ORAL 3 TIMES DAILY
Qty: 90 TABLET | Refills: 2 | Status: SHIPPED | OUTPATIENT
Start: 2022-02-10 | End: 2022-03-18 | Stop reason: SDUPTHER

## 2022-02-10 NOTE — PROGRESS NOTES
Neurology Progress Note    NAME:  Lucy Hazel   :   1990   MRN:   [id number]     Date/Time:  2/10/2022  Subjective:     Lucy Hazel is a 28 y.o. female here today for follow-up for headache, sharp pain on the right side of the head and face with generalized body pain and frequent fall, other medical problems. Patient says she  continues to have falls because her legs gave out has so she  had at least 3 falls since last visit, she says her legs will feel tingling sensation, then get numb and give out on her and then she will have difficulty getting up. She says sometimes she is able to hold onto something when she starts feeling the symptoms so as not to fall. She says the symptoms are unpredictable, variable, she has good days and bad days. Patient says she had 2 weeks of sharp pain on the left lower back radiating down to the foot  Patient says she is currently in physical therapy with aqua therapy which appears to be helping, she says at times when she is in the pool, she will feel the sensation that results to  weakness of the legs, then she will hold onto the rail for a while until the phase passes. She says she continues to have a lot of pain which is all over the body. .    She says she still has intermittent right facial pain. She is still experiencing a lot of joint pain and back pain. Blood work reviewed with patient was unremarkable . Yobanilynne Knvg She also continues to have  headache but she says Emgality appears to have by reducing the frequency. Headache is throbbing in nature, mostly frontal, frequency is variable. Headache associated with dizziness, blurry vision, occasional double vision, photophobia, phonophobia, nausea.  No aggravating factor    She noted that she is still having pain in her bladder, she followed up with urologist and the urologist still felt that it is possible to be   spectrum of fibromyalgia  She says she still cannot stand for a while, it makes her very weak and causes her leg to buckle. Patient says she continues to experience generalized pain, pain is worse with activity. Patient continues to have difficulty focusing and concentrating, she says with the symptoms lately she has been having a very difficult to complete any task. She noted that she has intermittent pain behind her right eye, pain is sharp in nature as if it is coming from the back of the neck and this is associated with dizziness. She says the right side of the face continues to be sensitive to touch. Sometimes, patient experiences pain behind the eyes and her eyes will be blurry and bloodshot  Patient admits odynophagia denies dysphagia. .  With a constellation of patient's symptoms,  I will obtain a follow-up MRI of the brain with and without gadolinium  Review of Systems - General ROS: positive for  - fatigue and sleep disturbance  Psychological ROS: positive for - anxiety and sleep disturbances  Ophthalmic ROS: positive for - blurry vision, decreased vision, double vision and photophobia  ENT ROS: positive for - headaches, tinnitus, vertigo and visual changes  Allergy and Immunology ROS: negative  Hematological and Lymphatic ROS: negative  Endocrine ROS: negative  Respiratory ROS: no cough, shortness of breath, or wheezing  Cardiovascular ROS: negative  Gastrointestinal ROS: no abdominal pain, change in bowel habits, or black or bloody stools  Genito-Urinary ROS: no dysuria, trouble voiding, or hematuria  Musculoskeletal ROS: positive for - muscular weakness  Neurological ROS: positive for - dizziness, headaches, numbness/tingling, visual changes and weakness  Dermatological ROS: negative              Medications reviewed:  Current Outpatient Medications   Medication Sig Dispense Refill    gabapentin (NEURONTIN) 600 mg tablet Take 1 Tablet by mouth three (3) times daily. Max Daily Amount: 1,800 mg.  For Trigeminal Neuralgia 90 Tablet 2    predniSONE (DELTASONE) 20 mg tablet Take 1 tab p.o.tid x3 days, 1 tab p.o.bid x4 days, 1 tab every day x3 days 20 Tablet 0    ALPRAZolam (XANAX) 0.25 mg tablet Take 1 Tablet by mouth two (2) times a day. Max Daily Amount: 0.5 mg. 60 Tablet 3    lisdexamfetamine (Vyvanse) 30 mg capsule Take 1 Capsule by mouth every morning. Max Daily Amount: 30 mg. 30 Capsule 0    atorvastatin (LIPITOR) 20 mg tablet Take 20 mg by mouth daily.  Nurtec ODT 75 mg disintegrating tablet DISSOLVE 1 TABLET ON THE TONGUE 1 TIME FOR UP TO 1 DOSE AS NEEDED FOR MIGRAINE      ondansetron (ZOFRAN ODT) 4 mg disintegrating tablet DISSOLVE 1 TABLET ON THE TONGUE EVERY 8 HOURS AS NEEDED FOR NAUSEA 10 Tablet 0    lidocaine (LIDODERM) 5 % UNWRAP AND APPLY 1 PATCH TO THE SKIN ON BACK OF NECK FOR 12 HOURS PER DAY AND REMOVE FOR 12 HOURS PER DAY 30 Patch 2    OXcarbazepine (TRILEPTAL) 150 mg tablet TAKE 1 TABLET BY MOUTH TWICE DAILY 60 Tablet 1    propranolol LA (INDERAL LA) 60 mg SR capsule TAKE 1 CAPSULE BY MOUTH EVERY NIGHT 30 Capsule 1    Emgality Syringe 120 mg/mL syrg INJECT 120MG SUBCUTANEOUSLY EVERY 30 DAYS 1 Each 3    ergocalciferol (ERGOCALCIFEROL) 1,250 mcg (50,000 unit) capsule TAKE ONE CAPSULE BY MOUTH EVERY MONDAY AND THURSDAY 8 Capsule 2    lisdexamfetamine (Vyvanse) 30 mg capsule Take 1 Capsule by mouth every morning. Max Daily Amount: 30 mg. 30 Capsule 0    meclizine (ANTIVERT) 25 mg tablet Take 1 Tablet by mouth three (3) times daily as needed for Dizziness. 30 Tablet 1    ALPRAZolam (XANAX) 0.25 mg tablet Take 1 Tablet by mouth two (2) times a day. Max Daily Amount: 0.5 mg. 60 Tablet 1    busPIRone (BUSPAR) 5 mg tablet TAKE 1 TABLET BY MOUTH THREE TIMES DAILY WITH MEALS 90 Tablet 1    buprenorphine (Butrans) 5 mcg/hour patch 1 Patch by TransDERmal route every seven (7) days. Max Daily Amount: 1 Patch.  4 Patch 3    loratadine (Claritin) 10 mg tablet Claritin      Gaviscon Extra Strength 254-237.5 mg/5 mL susp TAKE 5 ML BY MOUTH TWICE DAILY AS NEEDED FOR HEARTBURN      ondansetron (Zofran ODT) 4 mg disintegrating tablet Take 1 Tablet by mouth every eight (8) hours as needed for Nausea. 10 Tablet 0    cholecalciferol (Vitamin D3) 25 mcg (1,000 unit) cap Take 1,000 Units by mouth daily.  cyanocobalamin 1,000 mcg tablet Take 1,000 mcg by mouth daily.  famotidine (PEPCID) 40 mg tablet Take 40 mg by mouth daily.  fluticasone-umeclidinium-vilanterol (Trelegy Ellipta) 100-62.5-25 mcg inhaler Take 1 Puff by inhalation daily.  polyethylene glycol (MIRALAX) 17 gram/dose powder Take 17 g by mouth daily. 510 g 0    ibuprofen (MOTRIN) 600 mg tablet Take 1 Tab by mouth three (3) times daily (with meals). 21 Tab 0    naloxone (NARCAN) 4 mg/actuation nasal spray Use 1 spray intranasally, then discard. Repeat with new spray every 2 min as needed for opioid overdose symptoms, alternating nostrils. 2 Each 0    medroxyPROGESTERone (PROVERA) 10 mg tablet Take 10 mg by mouth three (3) times daily.  fluticasone propionate (FLONASE) 50 mcg/actuation nasal spray 2 Sprays by Both Nostrils route daily. Indications: As needed for allergies      multivitamin (MULTIPLE VITAMIN PO) Take  by mouth. Indications: Teresita fusion multi vitamin/folate 50 mg (2 gummies) by gyn dr Danii Banuelos loratadine (CLARITIN) 10 mg tablet Take 10 mg by mouth daily.  pantoprazole (PROTONIX) 40 mg tablet Take 40 mg by mouth two (2) times a day.  sucralfate (CARAFATE) 100 mg/mL suspension Take  by mouth four (4) times daily.  albuterol (PROVENTIL VENTOLIN) 2.5 mg /3 mL (0.083 %) nebulizer solution 2.5 mg by Nebulization route once.  DULoxetine (CYMBALTA) 30 mg capsule Take 1 Cap by mouth daily. (Patient not taking: Reported on 2/10/2022) 30 Cap 1    naproxen sodium (ALEVE) 220 mg cap Take  by mouth. (Patient not taking: Reported on 1/4/2022)      dicyclomine HCl (BENTYL PO) Take  by mouth four (4) times daily.  Indications: As needed for pain (Patient not taking: Reported on 2/10/2022) Objective:   Vitals:  Vitals:    02/10/22 0858   BP: (!) 107/93   Pulse: 77   Resp: 14   SpO2: 97%   Weight: 197 lb 6.4 oz (89.5 kg)         Lab Data Reviewed:  Lab Results   Component Value Date/Time    WBC 5.4 11/09/2021 11:05 AM    HCT 41.1 11/09/2021 11:05 AM    HGB 14.0 11/09/2021 11:05 AM    PLATELET 214 56/48/0852 11:05 AM       Lab Results   Component Value Date/Time    Sodium 135 (L) 06/07/2021 04:33 AM    Potassium 4.6 06/07/2021 04:33 AM    Chloride 104 06/07/2021 04:33 AM    CO2 28 06/07/2021 04:33 AM    Glucose 119 (H) 06/07/2021 04:33 AM    BUN 19 06/07/2021 04:33 AM    Creatinine 0.99 06/07/2021 04:33 AM    Calcium 9.3 06/07/2021 04:33 AM       No components found for: TROPQUANT    No results found for: RASHAWN      No results found for: HBA1C, RHH1TFHF, QJZ3IWBM     Lab Results   Component Value Date/Time    Vitamin B12 482 12/14/2020 10:44 AM       No results found for: RASHAWN, ANARX, ANAIGG, XBANA    No results found for: CHOL, CHOLPOCT, CHOLX, CHLST, CHOLV, HDL, HDLPOC, HDLP, LDL, LDLCPOC, LDLC, DLDLP, VLDLC, VLDL, TGLX, TRIGL, TRIGP, TGLPOCT, CHHD, CHHDX      CT Results (recent):  Results from Hospital Encounter encounter on 06/07/21    CT ABD PELV WO CONT    Narrative  EXAM: CT ABD PELV WO CONT    INDICATION: right flank and right mid abd pain    COMPARISON: 2019    CONTRAST:  None. TECHNIQUE:  Thin axial images were obtained through the abdomen and pelvis. Coronal and  sagittal reformats were generated. Oral contrast was not administered. CT dose  reduction was achieved through use of a standardized protocol tailored for this  examination and automatic exposure control for dose modulation. The absence of intravenous contrast material reduces the sensitivity for  evaluation of the vasculature and solid organs. FINDINGS:  LOWER THORAX: No significant abnormality in the incidentally imaged lower chest.  LIVER: Hepatic steatosis. BILIARY TREE: Cholecystectomy. CBD is not dilated.   SPLEEN: within normal limits. PANCREAS: No focal abnormality. ADRENALS: Unremarkable. KIDNEYS/URETERS: Right nephrolithiasis. No hydronephrosis. STOMACH: Unremarkable. SMALL BOWEL: No dilatation or wall thickening. COLON: No dilatation or wall thickening. APPENDIX: Normal.  PERITONEUM: No ascites or pneumoperitoneum. RETROPERITONEUM: No lymphadenopathy or aortic aneurysm. REPRODUCTIVE ORGANS: Normal.  URINARY BLADDER: No mass or calculus. BONES: No destructive bone lesion. ABDOMINAL WALL: No mass or hernia. ADDITIONAL COMMENTS: N/A    Impression  Right nephrolithiasis. MRI Results (recent):  Results from [place] encounter on 07/08/21    MRI LUMB SPINE WO CONT    Narrative  EXAM: MRI LUMB SPINE WO CONT    INDICATION: Radiculopathy, lumbar region    COMPARISON: None    TECHNIQUE: MR imaging of the lumbar spine was performed using the following  sequences: sagittal T1, T2, STIR;  axial T1, T2.    CONTRAST:  None. FINDINGS:    There is normal alignment of the lumbar spine. Vertebral body heights are  maintained. Marrow signal is normal.    The conus medullaris terminates at . Signal and caliber of the distal spinal  cord are within normal limits. The paraspinal soft tissues are within normal limits. Lower thoracic spine: No herniation or stenosis. L1-L2: No herniation or stenosis. L2-L3: No herniation or stenosis. L3-L4: No herniation or stenosis. L4-L5: No herniation or stenosis. L5-S1: The disc space is mildly narrowed without disc desiccation. This is  likely congenital. No herniation or stenosis. Mild bilateral posterior facet  arthropathy. Impression  No significant spinal stenosis or neural foraminal narrowing. No acute process. IR Results (recent):  No results found for this or any previous visit. VAS/US Results (recent):  No results found for this or any previous visit. PHYSICAL EXAM:  General:    Alert, cooperative, no distress, appears stated age. Head:   Normocephalic, without obvious abnormality, atraumatic. Eyes:   Conjunctivae/corneas clear. PERRLA  Nose:  Nares normal. No drainage or sinus tenderness. Throat:    Lips, mucosa, and tongue normal.  No Thrush  Neck:  Supple, symmetrical,  no adenopathy, thyroid: non tender    no carotid bruit and no JVD. Severe paraspinal and occipitalis tenderness  Back:    Symmetric, diffuse tenderness. Lungs:   Clear to auscultation bilaterally. No Wheezing or Rhonchi. No rales. Chest wall:  No tenderness or deformity. No Accessory muscle use. Heart:   Regular rate and rhythm,  no murmur, rub or gallop. Abdomen:   Soft, non-tender. Not distended. Bowel sounds normal. No masses  Extremities: Extremities normal, atraumatic, No cyanosis. No edema. No clubbing  Skin:     Texture, turgor normal. No rashes or lesions. Not Jaundiced  Lymph nodes: Cervical, supraclavicular normal.  Psych:  Good insight. Depressed. Anxious. NEUROLOGICAL EXAM:  Appearance: The patient is well developed, well nourished, provides a coherent history and is in no acute distress. Mental Status: Oriented to time, place and person. Mood and affect appropriate. Cranial Nerves:   Intact visual fields. Fundi are benign. OANH, EOM's full, no nystagmus, no ptosis. Facial sensation dysesthesia. Corneal reflexes are intact. Facial movement is symmetric. Hearing is normal bilaterally. Palate is midline with normal sternocleidomastoid and trapezius muscles are normal. Tongue is midline. Motor:  5-/5 strength in upper extremity and 4+/5 lower extremity proximal and distal muscles. Normal bulk and tone. No fasciculations. Reflexes:   Deep tendon reflexes 3+/4 and symmetrical.   Sensory:    Dysesthesia to touch, pinprick and vibration. Gait:   Slightly unsteady gait. Tremor:   No tremor noted. Cerebellar:  No cerebellar signs present.    Neurovascular:  Normal heart sounds and regular rhythm, peripheral pulses intact, and no carotid bruits. Assesment  1. Intractable chronic migraine without aura and with status migrainosus  Emgality     2. Migraine with vertigo  Continue management    3. Frequent falls  Intermittent physical therapy    4. Monoparesis of lower extremity affecting left nondominant side (HCC)  Intermittent physical therapy    5. Fibromyalgia    - gabapentin (NEURONTIN) 600 mg tablet; Take 1 Tablet by mouth three (3) times daily. Max Daily Amount: 1,800 mg. For Trigeminal Neuralgia  Dispense: 90 Tablet; Refill: 2    6. Complicated migraine    - MRI BRAIN W WO CONT; Future    7. Gait disorder    - MRI BRAIN W WO CONT; Future    8. Left hemiparesis ([address]    - MRI BRAIN W WO CONT; Future    9. Chronic pain syndrome    - traMADoL (ULTRAM) 50 mg tablet; Take 1 Tablet by mouth every eight (8) hours as needed for Pain for up to 5 days. Max Daily Amount: 150 mg. Dispense: 20 Tablet; Refill: 1    10. Demyelinating disease (HCC)    - gabapentin (NEURONTIN) 600 mg tablet; Take 1 Tablet by mouth three (3) times daily. Max Daily Amount: 1,800 mg. For Trigeminal Neuralgia  Dispense: 90 Tablet; Refill: 2  - MRI BRAIN W WO CONT; Future    11. Myofascial muscle pain    - traMADoL (ULTRAM) 50 mg tablet; Take 1 Tablet by mouth every eight (8) hours as needed for Pain for up to 5 days. Max Daily Amount: 150 mg. Dispense: 20 Tablet; Refill: 1    12. CARROL (generalized anxiety disorder)    - ALPRAZolam (XANAX) 0.25 mg tablet; Take 1 Tablet by mouth two (2) times a day. Max Daily Amount: 0.5 mg.  Dispense: 60 Tablet; Refill: 3    ___________________________________________________  PLAN:      ICD-10-CM ICD-9-CM    1. Intractable chronic migraine without aura and with status migrainosus  G43.711 346.73    2. Migraine with vertigo  G43.109 346.80      780.4    3. Frequent falls  R29.6 V15.88    4. Monoparesis of lower extremity affecting left nondominant side (HCC)  G83.14 344.32    5.  Fibromyalgia  M79.7 729.1 gabapentin (NEURONTIN) 600 mg tablet   6. Complicated migraine  M91.527 346.00 MRI BRAIN W WO CONT   7. Gait disorder  R26.9 781.2 MRI BRAIN W WO CONT   8. Left hemiparesis ([facility])  G81.94 342.90 MRI BRAIN W WO CONT   9. Chronic pain syndrome  G89.4 338.4 traMADoL (ULTRAM) 50 mg tablet   10. Demyelinating disease ([facility])  G37.9 341.9 gabapentin (NEURONTIN) 600 mg tablet      MRI BRAIN W WO CONT   11. Myofascial muscle pain  M79.18 729.1 traMADoL (ULTRAM) 50 mg tablet   12.  CARROL (generalized anxiety disorder)  F41.1 300.02 ALPRAZolam (XANAX) 0.25 mg tablet             ___________________________________________________    Attending Physician: Jose Mills MD               2 weeks left sharp pain radiating down to the foot disturb pt  Legs ging out for upto 3hrs at oce  Headache- CGRP helping  No loc  Rt eye dll  Pain behind the eye at times

## 2022-02-10 NOTE — PROGRESS NOTES
Chief Complaint   Patient presents with    Follow-up     need to talk with Dr. Ramila Casey and getting a MRI of the eye per eye doctor,     Leg Problem     LLE i sin pain and pain travels to the heel of foot, RLE pain.      Pain (Chronic)     Visit Vitals  BP (!) 107/93   Pulse 77   Resp 14   Wt 197 lb 6.4 oz (89.5 kg)   SpO2 97%   BMI 34.97 kg/m²

## 2022-03-01 ENCOUNTER — TELEPHONE (OUTPATIENT)
Dept: NEUROLOGY | Age: 32
End: 2022-03-01

## 2022-03-01 DIAGNOSIS — G52.1 GLOSSOPHARYNGEAL NEURALGIA: ICD-10-CM

## 2022-03-01 DIAGNOSIS — G37.9 DEMYELINATING DISEASE (HCC): Primary | ICD-10-CM

## 2022-03-01 DIAGNOSIS — R42 DIZZINESS: ICD-10-CM

## 2022-03-01 DIAGNOSIS — R29.6 FREQUENT FALLS: ICD-10-CM

## 2022-03-01 DIAGNOSIS — G44.311 INTRACTABLE ACUTE POST-TRAUMATIC HEADACHE: ICD-10-CM

## 2022-03-01 NOTE — TELEPHONE ENCOUNTER
Patient would like an order written for her MRI to a [facility] facility. Pt would like a call once order is complete.  She is not sure of a facility that would take her insr

## 2022-03-02 ENCOUNTER — TELEPHONE (OUTPATIENT)
Dept: NEUROLOGY | Age: 32
End: 2022-03-02

## 2022-03-02 NOTE — TELEPHONE ENCOUNTER
Called and spoke with patient. Verified name/. Patient would like MRI done at [facility]. Stated I will forward to PA specialist. Patient verbalized understanding.

## 2022-03-02 NOTE — TELEPHONE ENCOUNTER
Pt wants Derek Rowan to know MRI order needs to go to [facility] not [facility]. [facility] in [place].  Please call with any questions [phone]

## 2022-03-14 ENCOUNTER — DOCUMENTATION ONLY (OUTPATIENT)
Dept: NEUROLOGY | Age: 32
End: 2022-03-14

## 2022-03-18 DIAGNOSIS — G37.9 DEMYELINATING DISEASE (HCC): ICD-10-CM

## 2022-03-18 DIAGNOSIS — M79.7 FIBROMYALGIA: ICD-10-CM

## 2022-03-18 DIAGNOSIS — R53.82 CHRONIC FATIGUE: ICD-10-CM

## 2022-03-18 DIAGNOSIS — F41.1 GAD (GENERALIZED ANXIETY DISORDER): ICD-10-CM

## 2022-03-18 DIAGNOSIS — F98.8 ATTENTION DEFICIT DISORDER (ADD) WITHOUT HYPERACTIVITY: ICD-10-CM

## 2022-03-19 PROBLEM — R09.02 OXYGEN DESATURATION: Status: ACTIVE | Noted: 2020-01-29

## 2022-03-20 PROBLEM — N23 RENAL COLIC ON LEFT SIDE: Status: ACTIVE | Noted: 2019-02-17

## 2022-03-20 PROBLEM — K82.8 BILIARY DYSKINESIA: Status: ACTIVE | Noted: 2020-01-29

## 2022-03-21 RX ORDER — PROPRANOLOL HYDROCHLORIDE 60 MG/1
60 CAPSULE, EXTENDED RELEASE ORAL DAILY
Qty: 30 CAPSULE | Refills: 1 | Status: SHIPPED | OUTPATIENT
Start: 2022-03-21 | End: 2022-07-07 | Stop reason: SDUPTHER

## 2022-03-21 RX ORDER — OXCARBAZEPINE 150 MG/1
150 TABLET, FILM COATED ORAL 2 TIMES DAILY
Qty: 60 TABLET | Refills: 1 | Status: SHIPPED | OUTPATIENT
Start: 2022-03-21 | End: 2022-05-25 | Stop reason: SDUPTHER

## 2022-03-21 RX ORDER — ALPRAZOLAM 0.25 MG/1
0.25 TABLET ORAL 2 TIMES DAILY
Qty: 60 TABLET | Refills: 3 | Status: SHIPPED | OUTPATIENT
Start: 2022-03-21 | End: 2022-07-07 | Stop reason: SDUPTHER

## 2022-03-21 RX ORDER — GABAPENTIN 600 MG/1
600 TABLET ORAL 3 TIMES DAILY
Qty: 90 TABLET | Refills: 2 | Status: SHIPPED | OUTPATIENT
Start: 2022-03-21 | End: 2022-07-07 | Stop reason: SDUPTHER

## 2022-03-23 ENCOUNTER — TELEPHONE (OUTPATIENT)
Dept: NEUROLOGY | Age: 32
End: 2022-03-23

## 2022-03-23 NOTE — TELEPHONE ENCOUNTER
MRI Brain w/w/o    CPT 88797      - Denied by [facility] at [phone]    Dr. Maravilla Reasons  - you may call [phone] and speak w/ one of their medical directors and reference Case # [id number] to try to get it approved. Please advise pt of status. Ph to call [address] [facility] (NPI [id number]) to advise of denial and/or approval - [phone]. If approved, fax office notes and order to [phone].

## 2022-03-28 ENCOUNTER — TELEPHONE (OUTPATIENT)
Dept: NEUROLOGY | Age: 32
End: 2022-03-28

## 2022-03-28 NOTE — TELEPHONE ENCOUNTER
I called and left a message that I have sent this over (via communications in the chart), but this might not be approved.   I asked for a call back if anything further is needed

## 2022-03-28 NOTE — TELEPHONE ENCOUNTER
Pt is having MRI this afternoon.  Need order faxed to [facility] in [place]    Please fax ASAP [phone]

## 2022-03-28 NOTE — LETTER
3/28/2022 1:26 PM    Ms.  [facility]  [address] [address]              Sincerely,      Otilia Valenzuela MD

## 2022-03-30 ENCOUNTER — TELEPHONE (OUTPATIENT)
Dept: NEUROLOGY | Age: 32
End: 2022-03-30

## 2022-03-30 NOTE — TELEPHONE ENCOUNTER
On 3/23, my documentation was sent to Dr. Ciro Reyes and shows the following:     MRI Brain w/w/o    CPT 72049      - Denied by [facility] at [phone]     Dr. Ciro Reyes  - you may call [phone] and speak w/ one of their medical directors and reference Case # [id number] to try to get it approved.        Please advise pt of status.       Ph to call [address] [facility] (NPI [id number]) to advise of denial and/or approval - [phone]. If approved, fax office notes and order to [phone].      (re-sent to Dr. Ciro Reyes today/3-30-22)

## 2022-04-05 ENCOUNTER — OFFICE VISIT (OUTPATIENT)
Dept: NEUROLOGY | Age: 32
End: 2022-04-05
Payer: COMMERCIAL

## 2022-04-05 VITALS
RESPIRATION RATE: 15 BRPM | WEIGHT: 198 LBS | DIASTOLIC BLOOD PRESSURE: 80 MMHG | BODY MASS INDEX: 35.07 KG/M2 | HEART RATE: 78 BPM | SYSTOLIC BLOOD PRESSURE: 121 MMHG | OXYGEN SATURATION: 97 %

## 2022-04-05 DIAGNOSIS — R26.9 GAIT DISORDER: ICD-10-CM

## 2022-04-05 DIAGNOSIS — F98.8 ATTENTION DEFICIT DISORDER (ADD) WITHOUT HYPERACTIVITY: ICD-10-CM

## 2022-04-05 DIAGNOSIS — R53.82 CHRONIC FATIGUE: ICD-10-CM

## 2022-04-05 DIAGNOSIS — R10.9 CHRONIC RIGHT FLANK PAIN: ICD-10-CM

## 2022-04-05 DIAGNOSIS — R20.2 PARESTHESIA: ICD-10-CM

## 2022-04-05 DIAGNOSIS — R29.898 WEAKNESS OF BOTH LOWER EXTREMITIES: ICD-10-CM

## 2022-04-05 DIAGNOSIS — M79.7 FIBROMYALGIA: ICD-10-CM

## 2022-04-05 DIAGNOSIS — M79.18 MYOFASCIAL MUSCLE PAIN: ICD-10-CM

## 2022-04-05 DIAGNOSIS — G89.29 CHRONIC RIGHT FLANK PAIN: ICD-10-CM

## 2022-04-05 DIAGNOSIS — G37.9 DEMYELINATING DISEASE (HCC): ICD-10-CM

## 2022-04-05 DIAGNOSIS — G43.711 CHRONIC MIGRAINE WITHOUT AURA, INTRACTABLE, WITH STATUS MIGRAINOSUS: Primary | ICD-10-CM

## 2022-04-05 DIAGNOSIS — B02.29 PHN (POSTHERPETIC NEURALGIA): ICD-10-CM

## 2022-04-05 DIAGNOSIS — R52 PAIN: ICD-10-CM

## 2022-04-05 DIAGNOSIS — G43.711 INTRACTABLE CHRONIC MIGRAINE WITHOUT AURA AND WITH STATUS MIGRAINOSUS: ICD-10-CM

## 2022-04-05 DIAGNOSIS — G81.94 LEFT HEMIPARESIS (HCC): ICD-10-CM

## 2022-04-05 DIAGNOSIS — G43.109 MIGRAINE WITH VERTIGO: ICD-10-CM

## 2022-04-05 DIAGNOSIS — R29.6 FREQUENT FALLS: ICD-10-CM

## 2022-04-05 PROCEDURE — 99214 OFFICE O/P EST MOD 30 MIN: CPT | Performed by: PSYCHIATRY & NEUROLOGY

## 2022-04-05 RX ORDER — GALCANEZUMAB 120 MG/ML
120 INJECTION, SOLUTION SUBCUTANEOUS
Qty: 1 EACH | Refills: 3 | Status: SHIPPED | OUTPATIENT
Start: 2022-04-05 | End: 2022-07-07 | Stop reason: SDUPTHER

## 2022-04-05 RX ORDER — RIMEGEPANT SULFATE 75 MG/75MG
TABLET, ORALLY DISINTEGRATING ORAL
Qty: 8 TABLET | Refills: 5 | Status: SHIPPED | OUTPATIENT
Start: 2022-04-05 | End: 2022-07-07 | Stop reason: SDUPTHER

## 2022-04-05 RX ORDER — GALCANEZUMAB 120 MG/ML
120 INJECTION, SOLUTION SUBCUTANEOUS ONCE
Qty: 1 EACH | Refills: 0 | Status: SHIPPED | COMMUNITY
Start: 2022-04-05 | End: 2022-04-05

## 2022-04-05 NOTE — PROGRESS NOTES
Chief Complaint   Patient presents with    Follow-up    Results    Medication Refill     Visit Vitals  /80   Pulse 78   Resp 15   Wt 198 lb (89.8 kg)   SpO2 97%   BMI 35.07 kg/m²

## 2022-04-05 NOTE — PROGRESS NOTES
Neurology Progress Note    NAME:  Kailee Ireland   :   1990   MRN:   [id number]     Date/Time:  2022  Subjective:    Kailee Ireland is a 28 y.o. female here today for follow-up for headache, sharp pain on the right side of the head and face with generalized body pain and frequent fall, other medical problems. Patient says she continues to have intermittent right facial numbness  Patient says she she is still having falls because her legs gave out has so she  had at least 2  falls since last visit, she says her legs will feel tingling sensation, then get numb and give out on her and then she will have difficulty getting up. She says sometimes she is able to hold onto something when she starts feeling the symptoms so as not to fall. She says the symptoms are unpredictable, variable, she has good days and bad days. Patient says she is still in physical therapy with aqua therapy which appears to be helping, she says at times when she is in the pool, she will feel the sensation that results to  weakness of the legs, then she will hold onto the rail for a while until the phase passes. She says she continues to have a lot of pain which is all over the body. .      She is still experiencing a lot of joint pain and back pain. MRI brain reviewed with patient unremarkable for any new lesion. .  She also continues to have  headache but she says Emgality appears to have by reducing the frequency. Headache is throbbing in nature, mostly frontal, frequency is variable. Headache associated with dizziness, blurry vision, occasional double vision, photophobia, phonophobia, nausea. No aggravating factor    She noted that she is still having pain in her bladder, she followed up with urologist and the urologist still felt that it is possible to be   spectrum of fibromyalgia  She says she still cannot stand for a while, it makes her very weak and causes her leg to buckle.   Patient says she continues to experience generalized pain, pain is worse with activity. Patient continues to have difficulty focusing and concentrating, she says with the symptoms lately she has been having a very difficult to complete any task. She noted that she has intermittent pain behind her right eye, pain is sharp in nature as if it is coming from the back of the neck and this is associated with dizziness. She says the right side of the face continues to be sensitive to touch. Sometimes, patient experiences pain behind the eyes and her eyes will be blurry and bloodshot  Patient admits odynophagia denies dysphagia. .  Review of Systems - General ROS: positive for  - fatigue and sleep disturbance  Psychological ROS: positive for - anxiety and sleep disturbances  Ophthalmic ROS: positive for - blurry vision, decreased vision, double vision and photophobia  ENT ROS: positive for - headaches, tinnitus, vertigo and visual changes  Allergy and Immunology ROS: negative  Hematological and Lymphatic ROS: negative  Endocrine ROS: negative  Respiratory ROS: no cough, shortness of breath, or wheezing  Cardiovascular ROS: negative  Gastrointestinal ROS: no abdominal pain, change in bowel habits, or black or bloody stools  Genito-Urinary ROS: no dysuria, trouble voiding, or hematuria  Musculoskeletal ROS: positive for - muscular weakness  Neurological ROS: positive for - dizziness, headaches, numbness/tingling, visual changes and weakness  Dermatological ROS: negative              Current Outpatient Medications   Medication Sig Dispense Refill    lisdexamfetamine (Vyvanse) 30 mg capsule Take 1 Capsule by mouth every morning. Max Daily Amount: 30 mg. 30 Capsule 0    Nurtec ODT 75 mg disintegrating tablet DISSOLVE 1 TABLET ON THE TONGUE 1 TIME FOR UP TO 1 DOSE AS NEEDED FOR MIGRAINE 8 Tablet 5    galcanezumab-gnlm (Emgality Syringe) 120 mg/mL syrg 120 mg by SubCUTAneous route every thirty (30) days.  1 Each 3    OXcarbazepine (TRILEPTAL) 150 mg tablet Take 1 Tablet by mouth two (2) times a day. 60 Tablet 1    propranolol LA (INDERAL LA) 60 mg SR capsule Take 1 Capsule by mouth daily. 30 Capsule 1    gabapentin (NEURONTIN) 600 mg tablet Take 1 Tablet by mouth three (3) times daily. Max Daily Amount: 1,800 mg. For Trigeminal Neuralgia 90 Tablet 2    ALPRAZolam (XANAX) 0.25 mg tablet Take 1 Tablet by mouth two (2) times a day. Max Daily Amount: 0.5 mg. 60 Tablet 3    predniSONE (DELTASONE) 20 mg tablet Take 1 tab p.o.tid x3 days, 1 tab p.o.bid x4 days, 1 tab every day x3 days 20 Tablet 0    lisdexamfetamine (Vyvanse) 30 mg capsule Take 1 Capsule by mouth every morning. Max Daily Amount: 30 mg. 30 Capsule 0    atorvastatin (LIPITOR) 20 mg tablet Take 20 mg by mouth daily.  ondansetron (ZOFRAN ODT) 4 mg disintegrating tablet DISSOLVE 1 TABLET ON THE TONGUE EVERY 8 HOURS AS NEEDED FOR NAUSEA 10 Tablet 0    lidocaine (LIDODERM) 5 % UNWRAP AND APPLY 1 PATCH TO THE SKIN ON BACK OF NECK FOR 12 HOURS PER DAY AND REMOVE FOR 12 HOURS PER DAY 30 Patch 2    ergocalciferol (ERGOCALCIFEROL) 1,250 mcg (50,000 unit) capsule TAKE ONE CAPSULE BY MOUTH EVERY MONDAY AND THURSDAY 8 Capsule 2    meclizine (ANTIVERT) 25 mg tablet Take 1 Tablet by mouth three (3) times daily as needed for Dizziness. 30 Tablet 1    ALPRAZolam (XANAX) 0.25 mg tablet Take 1 Tablet by mouth two (2) times a day. Max Daily Amount: 0.5 mg. 60 Tablet 1    busPIRone (BUSPAR) 5 mg tablet TAKE 1 TABLET BY MOUTH THREE TIMES DAILY WITH MEALS 90 Tablet 1    Gaviscon Extra Strength 254-237.5 mg/5 mL susp TAKE 5 ML BY MOUTH TWICE DAILY AS NEEDED FOR HEARTBURN      ondansetron (Zofran ODT) 4 mg disintegrating tablet Take 1 Tablet by mouth every eight (8) hours as needed for Nausea. 10 Tablet 0    DULoxetine (CYMBALTA) 30 mg capsule Take 1 Cap by mouth daily. 30 Cap 1    cholecalciferol (Vitamin D3) 25 mcg (1,000 unit) cap Take 1,000 Units by mouth daily.       cyanocobalamin 1,000 mcg tablet Take 1,000 mcg by mouth daily.  famotidine (PEPCID) 40 mg tablet Take 40 mg by mouth daily.  fluticasone-umeclidinium-vilanterol (Trelegy Ellipta) 100-62.5-25 mcg inhaler Take 1 Puff by inhalation daily.  naproxen sodium (ALEVE) 220 mg cap Take  by mouth.  polyethylene glycol (MIRALAX) 17 gram/dose powder Take 17 g by mouth daily. 510 g 0    ibuprofen (MOTRIN) 600 mg tablet Take 1 Tab by mouth three (3) times daily (with meals). 21 Tab 0    medroxyPROGESTERone (PROVERA) 10 mg tablet Take 10 mg by mouth three (3) times daily.  dicyclomine HCl (BENTYL PO) Take  by mouth four (4) times daily. Indications: As needed for pain      fluticasone propionate (FLONASE) 50 mcg/actuation nasal spray 2 Sprays by Both Nostrils route daily. Indications: As needed for allergies      multivitamin (MULTIPLE VITAMIN PO) Take  by mouth. Indications: Teresita fusion multi vitamin/folate 50 mg (2 gummies) by gyn dr Chen loratadine (CLARITIN) 10 mg tablet Take 10 mg by mouth daily.  pantoprazole (PROTONIX) 40 mg tablet Take 40 mg by mouth two (2) times a day.  sucralfate (CARAFATE) 100 mg/mL suspension Take  by mouth four (4) times daily.  albuterol (PROVENTIL VENTOLIN) 2.5 mg /3 mL (0.083 %) nebulizer solution 2.5 mg by Nebulization route once.       loratadine (Claritin) 10 mg tablet Claritin          Objective:   Vitals:  Vitals:    04/05/22 1011   BP: 121/80   Pulse: 78   Resp: 15   SpO2: 97%   Weight: 198 lb (89.8 kg)               Lab Data Reviewed:  Lab Results   Component Value Date/Time    WBC 5.4 11/09/2021 11:05 AM    HCT 41.1 11/09/2021 11:05 AM    HGB 14.0 11/09/2021 11:05 AM    PLATELET 911 53/04/1595 11:05 AM       Lab Results   Component Value Date/Time    Sodium 135 (L) 06/07/2021 04:33 AM    Potassium 4.6 06/07/2021 04:33 AM    Chloride 104 06/07/2021 04:33 AM    CO2 28 06/07/2021 04:33 AM    Glucose 119 (H) 06/07/2021 04:33 AM    BUN 19 06/07/2021 04:33 AM    Creatinine 0.99 06/07/2021 04:33 AM Calcium 9.3 06/07/2021 04:33 AM       No components found for: TROPQUANT    No results found for: RASHAWN      No results found for: HBA1C, PAG0AIDB, OGT9LGFW     Lab Results   Component Value Date/Time    Vitamin B12 482 12/14/2020 10:44 AM       No results found for: RASHAWN, ANARX, ANAIGG, XBANA    No results found for: CHOL, CHOLPOCT, CHOLX, CHLST, CHOLV, HDL, HDLPOC, HDLP, LDL, LDLCPOC, LDLC, DLDLP, VLDLC, VLDL, TGLX, TRIGL, TRIGP, TGLPOCT, CHHD, CHHDX      CT Results (recent):  Results from Hospital Encounter encounter on 06/07/21    CT ABD PELV WO CONT    Narrative  EXAM: CT ABD PELV WO CONT    INDICATION: right flank and right mid abd pain    COMPARISON: 2019    CONTRAST:  None. TECHNIQUE:  Thin axial images were obtained through the abdomen and pelvis. Coronal and  sagittal reformats were generated. Oral contrast was not administered. CT dose  reduction was achieved through use of a standardized protocol tailored for this  examination and automatic exposure control for dose modulation. The absence of intravenous contrast material reduces the sensitivity for  evaluation of the vasculature and solid organs. FINDINGS:  LOWER THORAX: No significant abnormality in the incidentally imaged lower chest.  LIVER: Hepatic steatosis. BILIARY TREE: Cholecystectomy. CBD is not dilated. SPLEEN: within normal limits. PANCREAS: No focal abnormality. ADRENALS: Unremarkable. KIDNEYS/URETERS: Right nephrolithiasis. No hydronephrosis. STOMACH: Unremarkable. SMALL BOWEL: No dilatation or wall thickening. COLON: No dilatation or wall thickening. APPENDIX: Normal.  PERITONEUM: No ascites or pneumoperitoneum. RETROPERITONEUM: No lymphadenopathy or aortic aneurysm. REPRODUCTIVE ORGANS: Normal.  URINARY BLADDER: No mass or calculus. BONES: No destructive bone lesion. ABDOMINAL WALL: No mass or hernia. ADDITIONAL COMMENTS: N/A    Impression  Right nephrolithiasis.       MRI Results (recent):  [place] [facility] [place] on [place]08[place]21    MRI LUMB SPINE WO CONT    Narrative  EXAM: MRI LUMB SPINE WO CONT    INDICATION: Radiculopathy, lumbar region    COMPARISON: None    TECHNIQUE: MR imaging of the lumbar spine was performed using the following  sequences: sagittal T1, T2, STIR;  axial T1, T2.    CONTRAST:  None. FINDINGS:    There is normal alignment of the lumbar spine. Vertebral body heights are  maintained. Marrow signal is normal.    The conus medullaris terminates at . Signal and caliber of the distal spinal  cord are within normal limits. The paraspinal soft tissues are within normal limits. Lower thoracic spine: No herniation or stenosis. L1-L2: No herniation or stenosis. L2-L3: No herniation or stenosis. L3-L4: No herniation or stenosis. L4-L5: No herniation or stenosis. L5-S1: The disc space is mildly narrowed without disc desiccation. This is  likely congenital. No herniation or stenosis. Mild bilateral posterior facet  arthropathy. Impression  No significant spinal stenosis or neural foraminal narrowing. No acute process. IR Results (recent):  No results found for this or any previous visit. VAS/US Results (recent):  No results found for this or any previous visit. PHYSICAL EXAM:  General:    Alert, cooperative, no distress, appears stated age. Head:   Normocephalic, without obvious abnormality, atraumatic. Eyes:   Conjunctivae/corneas clear. PERRLA  Nose:  Nares normal. No drainage or sinus tenderness. Throat:    Lips, mucosa, and tongue normal.  No Thrush  Neck:  Supple, symmetrical,  no adenopathy, thyroid: non tender    no carotid bruit and no JVD. Severe paraspinal and occipitalis tenderness  Back:    Symmetric, diffuse tenderness. Lungs:   Clear to auscultation bilaterally. No Wheezing or Rhonchi. No rales. Chest wall:  No tenderness or deformity. No Accessory muscle use. Heart:   Regular rate and rhythm,  no murmur, rub or gallop.   Abdomen: Soft, non-tender. Not distended. Bowel sounds normal. No masses  Extremities: Extremities normal, atraumatic, No cyanosis. No edema. No clubbing  Skin:     Texture, turgor normal. No rashes or lesions. Not Jaundiced  Lymph nodes: Cervical, supraclavicular normal.  Psych:  Good insight. Depressed. Anxious. NEUROLOGICAL EXAM:  Appearance: The patient is well developed, well nourished, provides a coherent history and is in no acute distress. Mental Status: Oriented to time, place and person. Mood and affect appropriate. Cranial Nerves:   Intact visual fields. Fundi are benign. OANH, EOM's full, no nystagmus, no ptosis. Facial sensation dysesthesia. Corneal reflexes are intact. Facial movement is symmetric. Hearing is normal bilaterally. Palate is midline with normal sternocleidomastoid and trapezius muscles are normal. Tongue is midline. Motor:  5-/5 strength in upper extremity and 4+/5 lower extremity proximal and distal muscles. Normal bulk and tone. No fasciculations. Reflexes:   Deep tendon reflexes 3+/4 and symmetrical.   Sensory:    Dysesthesia to touch, pinprick and vibration. Gait:   Slightly unsteady gait. Tremor:   No tremor noted. Cerebellar:  No cerebellar signs present. Neurovascular:  Normal heart sounds and regular rhythm, peripheral pulses intact, and no carotid bruits. Assesment  1. Chronic migraine without aura, intractable, with status migrainosus  Emgality    2. Intractable chronic migraine without aura and with status migrainosus  Nurtec    3. Fibromyalgia  Referred to rheumatology    4. Chronic fatigue    - lisdexamfetamine (Vyvanse) 30 mg capsule; Take 1 Capsule by mouth every morning. Max Daily Amount: 30 mg. Dispense: 30 Capsule; Refill: 0    5. Attention deficit disorder (ADD) without hyperactivity    - lisdexamfetamine (Vyvanse) 30 mg capsule; Take 1 Capsule by mouth every morning. Max Daily Amount: 30 mg. Dispense: 30 Capsule; Refill: 0    6.  Migraine with vertigo  Emgality    7. Frequent falls  Physical therapy    8. Weakness of both lower extremities  Intermittent physical therapy    9. Gait disorder  Intermittent physical therapy    10. Left hemiparesis (HCC)  Intermittent physical therapy  11. Demyelinating disease ([address]  Stable    12. Paresthesia  Stable    13. PHN (postherpetic neuralgia)  Continue management    14. Myofascial muscle pain  Intermittent physical therapy    15. Pain  Will refer to pain management    16. Chronic right flank pain  Following GI    ___________________________________________________  PLAN: Medication and plan discussed with patient      ICD-10-CM ICD-9-CM    1. Chronic migraine without aura, intractable, with status migrainosus  G43.711 346.73    2. Intractable chronic migraine without aura and with status migrainosus  G43.711 346.73    3. Fibromyalgia  M79.7 729.1    4. Chronic fatigue  R53.82 780.79 lisdexamfetamine (Vyvanse) 30 mg capsule   5. Attention deficit disorder (ADD) without hyperactivity  F98.8 314.00 lisdexamfetamine (Vyvanse) 30 mg capsule   6. Migraine with vertigo  G43.109 346.80      780.4    7. Frequent falls  R29.6 V15.88    8. Weakness of both lower extremities  R29.898 729.89    9. Gait disorder  R26.9 781.2    10. Left hemiparesis (HCC)  G81.94 342.90    11. Demyelinating disease (HCC)  G37.9 341.9    12. Paresthesia  R20.2 782.0    13. PHN (postherpetic neuralgia)  B02.29 053.19    14. Myofascial muscle pain  M79.18 729.1    15. Pain  R52 780.96    16. Chronic right flank pain  R10.9 789.09     G89.29 338.29      Follow-up and Dispositions    · Return in about 3 months (around 7/5/2022).                ___________________________________________________    Attending Physician: Pilo Powers MD

## 2022-04-27 DIAGNOSIS — F98.8 ATTENTION DEFICIT DISORDER (ADD) WITHOUT HYPERACTIVITY: ICD-10-CM

## 2022-04-27 DIAGNOSIS — R53.82 CHRONIC FATIGUE: ICD-10-CM

## 2022-04-27 RX ORDER — RIMEGEPANT SULFATE 75 MG/75MG
TABLET, ORALLY DISINTEGRATING ORAL
Qty: 8 TABLET | Refills: 5 | Status: CANCELLED | OUTPATIENT
Start: 2022-04-27

## 2022-05-02 RX ORDER — ERGOCALCIFEROL 1.25 MG/1
50000 CAPSULE ORAL
Qty: 8 CAPSULE | Refills: 2 | Status: SHIPPED | OUTPATIENT
Start: 2022-05-02 | End: 2022-08-15 | Stop reason: SDUPTHER

## 2022-05-03 ENCOUNTER — TELEPHONE (OUTPATIENT)
Dept: NEUROLOGY | Age: 32
End: 2022-05-03

## 2022-05-03 NOTE — TELEPHONE ENCOUNTER
Vyvanse PA submitted to [employer]   30 for 30 d    Approved  today  PA Case: [id number], Status: Approved, Coverage Starts on: 5/3/2022 12:00:00 AM, Coverage Ends on: 5/3/2023 12:00:00 AM.      Nurtec PA submitted  (8) for 30. Patient has tried two triptans - Rizatriptan and Sumatriptan (requirement by insurance). PA Case: [id number], Status: Approved, Coverage Starts on: 5/3/2022 12:00:00 AM, Coverage Ends on: 5/3/2023 12:00:00 AM.    Faxed both approvals to [employer].

## 2022-05-27 RX ORDER — OXCARBAZEPINE 150 MG/1
150 TABLET, FILM COATED ORAL 2 TIMES DAILY
Qty: 60 TABLET | Refills: 1 | Status: SHIPPED | OUTPATIENT
Start: 2022-05-27 | End: 2022-07-07 | Stop reason: SDUPTHER

## 2022-07-07 ENCOUNTER — OFFICE VISIT (OUTPATIENT)
Dept: NEUROLOGY | Age: 32
End: 2022-07-07
Payer: COMMERCIAL

## 2022-07-07 VITALS
DIASTOLIC BLOOD PRESSURE: 78 MMHG | RESPIRATION RATE: 18 BRPM | BODY MASS INDEX: 35.68 KG/M2 | WEIGHT: 201.4 LBS | OXYGEN SATURATION: 98 % | SYSTOLIC BLOOD PRESSURE: 102 MMHG | HEIGHT: 63 IN | TEMPERATURE: 97.6 F | HEART RATE: 80 BPM

## 2022-07-07 DIAGNOSIS — G43.109 MIGRAINE WITH VERTIGO: ICD-10-CM

## 2022-07-07 DIAGNOSIS — R53.82 CHRONIC FATIGUE: ICD-10-CM

## 2022-07-07 DIAGNOSIS — F98.8 ATTENTION DEFICIT DISORDER (ADD) WITHOUT HYPERACTIVITY: ICD-10-CM

## 2022-07-07 DIAGNOSIS — R20.2 PARESTHESIA: ICD-10-CM

## 2022-07-07 DIAGNOSIS — F41.1 GAD (GENERALIZED ANXIETY DISORDER): ICD-10-CM

## 2022-07-07 DIAGNOSIS — R29.898 WEAKNESS OF BOTH LOWER EXTREMITIES: ICD-10-CM

## 2022-07-07 DIAGNOSIS — G43.711 INTRACTABLE CHRONIC MIGRAINE WITHOUT AURA AND WITH STATUS MIGRAINOSUS: ICD-10-CM

## 2022-07-07 DIAGNOSIS — G50.0 TN (TRIGEMINAL NEURALGIA): ICD-10-CM

## 2022-07-07 DIAGNOSIS — G37.9 DEMYELINATING DISEASE (HCC): ICD-10-CM

## 2022-07-07 DIAGNOSIS — R29.6 FREQUENT FALLS: ICD-10-CM

## 2022-07-07 DIAGNOSIS — G62.9 SMALL FIBER NEUROPATHY: ICD-10-CM

## 2022-07-07 DIAGNOSIS — G43.711 CHRONIC MIGRAINE WITHOUT AURA, INTRACTABLE, WITH STATUS MIGRAINOSUS: Primary | ICD-10-CM

## 2022-07-07 DIAGNOSIS — M79.18 MYOFASCIAL MUSCLE PAIN: ICD-10-CM

## 2022-07-07 DIAGNOSIS — M79.7 FIBROMYALGIA: ICD-10-CM

## 2022-07-07 PROCEDURE — 99214 OFFICE O/P EST MOD 30 MIN: CPT | Performed by: PSYCHIATRY & NEUROLOGY

## 2022-07-07 RX ORDER — OXCARBAZEPINE 150 MG/1
150 TABLET, FILM COATED ORAL 2 TIMES DAILY
Qty: 60 TABLET | Refills: 1 | Status: SHIPPED | OUTPATIENT
Start: 2022-07-07 | End: 2022-08-03

## 2022-07-07 RX ORDER — GALCANEZUMAB 120 MG/ML
120 INJECTION, SOLUTION SUBCUTANEOUS
Qty: 1 EACH | Refills: 3 | Status: SHIPPED | OUTPATIENT
Start: 2022-07-07

## 2022-07-07 RX ORDER — GABAPENTIN 600 MG/1
600 TABLET ORAL 3 TIMES DAILY
Qty: 90 TABLET | Refills: 4 | Status: SHIPPED | OUTPATIENT
Start: 2022-07-07

## 2022-07-07 RX ORDER — PROPRANOLOL HYDROCHLORIDE 60 MG/1
60 CAPSULE, EXTENDED RELEASE ORAL DAILY
Qty: 30 CAPSULE | Refills: 1 | Status: SHIPPED | OUTPATIENT
Start: 2022-07-07 | End: 2022-08-25

## 2022-07-07 RX ORDER — RIMEGEPANT SULFATE 75 MG/75MG
TABLET, ORALLY DISINTEGRATING ORAL
Qty: 8 TABLET | Refills: 5 | Status: SHIPPED | OUTPATIENT
Start: 2022-07-07 | End: 2022-10-11 | Stop reason: SDUPTHER

## 2022-07-07 NOTE — PROGRESS NOTES
Neurology Progress Note    NAME:  Ze Barriga   :   1990   MRN:   [id number]     Date/Time:  2022  Subjective:    Ze Barriga is a 33 y.o. female here today for follow-up for headache, sharp pain on the right side of the head and face with generalized body pain and frequent fall, other medical problems, test results. Patient says she continues to have intermittent right facial numbness  Patient says she still has the sensation in her legs and occasionally falls because her legs gave out on her, she says her legs will feel tingling sensation, then get numb and give out on her and then  difficulty getting up. She says sometimes she is able to hold onto something when she starts feeling the symptoms so as not to fall. She says the symptoms are unpredictable, variable, she has good days and bad days. Patient says she continues with  with aqua therapy which appears to be helping, she says at times when she is in the pool, she will feel the sensation that results to  weakness of the legs, then she will hold onto the rail for a while until the phase passes. She says she continues to have a lot of pain which is all over the body. .      She is also experiencing a lot of joint pain and back pain. MRI brain reviewed with patient unremarkable for any new lesion. .  She also continues to have  headache but she says Emgality appears to have by reducing the frequency. Headache is throbbing in nature, mostly frontal, frequency is variable. Headache associated with dizziness, blurry vision, occasional double vision, photophobia, phonophobia, nausea. No aggravating factor    She noted that she is still having pain in her bladder, she followed up with urologist and the urologist still felt that it is possible to be   spectrum of fibromyalgia  Patient noted that she still cannot stand for a while, it makes her very weak and causes her leg to buckle.   Patient says she continues to experience generalized pain, pain is worse with activity. Patient continues to have difficulty focusing and concentrating, she says with the symptoms lately she has been having a very difficult to complete any task. She noted that she has intermittent pain behind her right eye, pain is sharp in nature as if it is coming from the back of the neck and this is associated with dizziness. She says the right side of the face continues to be sensitive to touch. Sometimes, patient experiences pain behind the eyes and her eyes will be blurry and bloodshot  Patient admits odynophagia denies dysphagia. .  With symptoms of disorder sensation and pain, normal EMG/nerve conduction study, we may be dealing with small fiber neuropathy. I will order for skin biopsy to evaluate for small fiber neuropathy. Review of Systems - General ROS: positive for  - fatigue and sleep disturbance  Psychological ROS: positive for - anxiety and sleep disturbances  Ophthalmic ROS: positive for - blurry vision, decreased vision, double vision and photophobia  ENT ROS: positive for - headaches, tinnitus, vertigo and visual changes  Allergy and Immunology ROS: negative  Hematological and Lymphatic ROS: negative  Endocrine ROS: negative  Respiratory ROS: no cough, shortness of breath, or wheezing  Cardiovascular ROS: negative  Gastrointestinal ROS: no abdominal pain, change in bowel habits, or black or bloody stools  Genito-Urinary ROS: no dysuria, trouble voiding, or hematuria  Musculoskeletal ROS: positive for - muscular weakness  Neurological ROS: positive for - dizziness, headaches, numbness/tingling, visual changes and weakness  Dermatological ROS: negative        Medications reviewed:  Current Outpatient Medications   Medication Sig Dispense Refill    galcanezumab-gnlm (Emgality Syringe) 120 mg/mL syrg 120 mg by SubCUTAneous route every thirty (30) days.  1 Each 3    Nurtec ODT 75 mg disintegrating tablet DISSOLVE 1 TABLET ON THE TONGUE 1 TIME FOR UP TO 1 DOSE AS NEEDED FOR MIGRAINE 8 Tablet 5    propranolol LA (INDERAL LA) 60 mg SR capsule Take 1 Capsule by mouth daily. 30 Capsule 1    OXcarbazepine (TRILEPTAL) 150 mg tablet Take 1 Tablet by mouth two (2) times a day. 60 Tablet 1    gabapentin (NEURONTIN) 600 mg tablet Take 1 Tablet by mouth three (3) times daily. Max Daily Amount: 1,800 mg. For Trigeminal Neuralgia 90 Tablet 4    ergocalciferol (ERGOCALCIFEROL) 1,250 mcg (50,000 unit) capsule Take 1 Capsule by mouth every seven (7) days. 8 Capsule 2    lisdexamfetamine (Vyvanse) 30 mg capsule Take 1 Capsule by mouth every morning. Max Daily Amount: 30 mg. 30 Capsule 0    atorvastatin (LIPITOR) 20 mg tablet Take 20 mg by mouth daily.  lidocaine (LIDODERM) 5 % UNWRAP AND APPLY 1 PATCH TO THE SKIN ON BACK OF NECK FOR 12 HOURS PER DAY AND REMOVE FOR 12 HOURS PER DAY 30 Patch 2    meclizine (ANTIVERT) 25 mg tablet Take 1 Tablet by mouth three (3) times daily as needed for Dizziness. 30 Tablet 1    ALPRAZolam (XANAX) 0.25 mg tablet Take 1 Tablet by mouth two (2) times a day. Max Daily Amount: 0.5 mg. 60 Tablet 1    Gaviscon Extra Strength 254-237.5 mg/5 mL susp TAKE 5 ML BY MOUTH TWICE DAILY AS NEEDED FOR HEARTBURN      ondansetron (Zofran ODT) 4 mg disintegrating tablet Take 1 Tablet by mouth every eight (8) hours as needed for Nausea. 10 Tablet 0    famotidine (PEPCID) 40 mg tablet Take 40 mg by mouth daily.  fluticasone-umeclidinium-vilanterol (Trelegy Ellipta) 100-62.5-25 mcg inhaler Take 1 Puff by inhalation daily.  naproxen sodium (ALEVE) 220 mg cap Take  by mouth.  polyethylene glycol (MIRALAX) 17 gram/dose powder Take 17 g by mouth daily. 510 g 0    ibuprofen (MOTRIN) 600 mg tablet Take 1 Tab by mouth three (3) times daily (with meals). 21 Tab 0    medroxyPROGESTERone (PROVERA) 10 mg tablet Take 10 mg by mouth three (3) times daily.  dicyclomine HCl (BENTYL PO) Take  by mouth four (4) times daily.  Indications: As needed for pain  fluticasone propionate (FLONASE) 50 mcg/actuation nasal spray 2 Sprays by Both Nostrils route daily. Indications: As needed for allergies      multivitamin (MULTIPLE VITAMIN PO) Take  by mouth. Indications: Teresita fusion multi vitamin/folate 50 mg (2 gummies) by gyn dr Stew Bowers loratadine (CLARITIN) 10 mg tablet Take 10 mg by mouth daily.  pantoprazole (PROTONIX) 40 mg tablet Take 40 mg by mouth two (2) times a day.  sucralfate (CARAFATE) 100 mg/mL suspension Take  by mouth four (4) times daily.  albuterol (PROVENTIL VENTOLIN) 2.5 mg /3 mL (0.083 %) nebulizer solution 2.5 mg by Nebulization route once.  predniSONE (DELTASONE) 20 mg tablet Take 1 tab p.o.tid x3 days, 1 tab p.o.bid x4 days, 1 tab every day x3 days (Patient not taking: Reported on 7/7/2022) 20 Tablet 0    busPIRone (BUSPAR) 5 mg tablet TAKE 1 TABLET BY MOUTH THREE TIMES DAILY WITH MEALS (Patient not taking: Reported on 7/7/2022) 90 Tablet 1    DULoxetine (CYMBALTA) 30 mg capsule Take 1 Cap by mouth daily. (Patient not taking: Reported on 7/7/2022) 30 Cap 1    cholecalciferol (Vitamin D3) 25 mcg (1,000 unit) cap Take 1,000 Units by mouth daily. (Patient not taking: Reported on 7/7/2022)      cyanocobalamin 1,000 mcg tablet Take 1,000 mcg by mouth daily.  (Patient not taking: Reported on 7/7/2022)          Objective:   Vitals:  Vitals:    07/07/22 1059   BP: 102/78   Pulse: 80   Resp: 18   Temp: 97.6 °F (36.4 °C)   SpO2: 98%   Weight: 201 lb 6.4 oz (91.4 kg)   Height: 5' 3\" (1.6 m)   PainSc:   6   PainLoc: Head               Lab Data Reviewed:  Lab Results   Component Value Date/Time    WBC 5.4 11/09/2021 11:05 AM    HCT 41.1 11/09/2021 11:05 AM    HGB 14.0 11/09/2021 11:05 AM    PLATELET 493 15/84/2992 11:05 AM       Lab Results   Component Value Date/Time    Sodium 135 (L) 06/07/2021 04:33 AM    Potassium 4.6 06/07/2021 04:33 AM    Chloride 104 06/07/2021 04:33 AM    CO2 28 06/07/2021 04:33 AM    Glucose 119 (H) 06/07/2021 04:33 AM    BUN 19 06/07/2021 04:33 AM    Creatinine 0.99 06/07/2021 04:33 AM    Calcium 9.3 06/07/2021 04:33 AM       No components found for: TROPQUANT    No results found for: RASHAWN      No results found for: HBA1C, JGY5CAAU, RCK6EGYU     Lab Results   Component Value Date/Time    Vitamin B12 482 12/14/2020 10:44 AM       No results found for: RASHAWN, ANARX, ANAIGG, XBANA    No results found for: CHOL, CHOLPOCT, CHOLX, CHLST, CHOLV, HDL, HDLPOC, HDLP, LDL, LDLCPOC, LDLC, DLDLP, VLDLC, VLDL, TGLX, TRIGL, TRIGP, TGLPOCT, CHHD, CHHDX      CT Results (recent):  Results from Hospital Encounter encounter on 06/07/21    CT ABD PELV WO CONT    Narrative  EXAM: CT ABD PELV WO CONT    INDICATION: right flank and right mid abd pain    COMPARISON: 2019    CONTRAST:  None. TECHNIQUE:  Thin axial images were obtained through the abdomen and pelvis. Coronal and  sagittal reformats were generated. Oral contrast was not administered. CT dose  reduction was achieved through use of a standardized protocol tailored for this  examination and automatic exposure control for dose modulation. The absence of intravenous contrast material reduces the sensitivity for  evaluation of the vasculature and solid organs. FINDINGS:  LOWER THORAX: No significant abnormality in the incidentally imaged lower chest.  LIVER: Hepatic steatosis. BILIARY TREE: Cholecystectomy. CBD is not dilated. SPLEEN: within normal limits. PANCREAS: No focal abnormality. ADRENALS: Unremarkable. KIDNEYS/URETERS: Right nephrolithiasis. No hydronephrosis. STOMACH: Unremarkable. SMALL BOWEL: No dilatation or wall thickening. COLON: No dilatation or wall thickening. APPENDIX: Normal.  PERITONEUM: No ascites or pneumoperitoneum. RETROPERITONEUM: No lymphadenopathy or aortic aneurysm. REPRODUCTIVE ORGANS: Normal.  URINARY BLADDER: No mass or calculus. BONES: No destructive bone lesion. ABDOMINAL WALL: No mass or hernia.   ADDITIONAL COMMENTS: N/A    Impression  Right nephrolithiasis. MRI Results (recent):  Results from [place] encounter on 07/08/21    MRI LUMB SPINE WO CONT    Narrative  EXAM: MRI LUMB SPINE WO CONT    INDICATION: Radiculopathy, lumbar region    COMPARISON: None    TECHNIQUE: MR imaging of the lumbar spine was performed using the following  sequences: sagittal T1, T2, STIR;  axial T1, T2.    CONTRAST:  None. FINDINGS:    There is normal alignment of the lumbar spine. Vertebral body heights are  maintained. Marrow signal is normal.    The conus medullaris terminates at . Signal and caliber of the distal spinal  cord are within normal limits. The paraspinal soft tissues are within normal limits. Lower thoracic spine: No herniation or stenosis. L1-L2: No herniation or stenosis. L2-L3: No herniation or stenosis. L3-L4: No herniation or stenosis. L4-L5: No herniation or stenosis. L5-S1: The disc space is mildly narrowed without disc desiccation. This is  likely congenital. No herniation or stenosis. Mild bilateral posterior facet  arthropathy. Impression  No significant spinal stenosis or neural foraminal narrowing. No acute process. IR Results (recent):  No results found for this or any previous visit. VAS/US Results (recent):  No results found for this or any previous visit. PHYSICAL EXAM:  General:    Alert, cooperative, no distress, appears stated age. Head:   Normocephalic, without obvious abnormality, atraumatic. Eyes:   Conjunctivae/corneas clear. PERRLA  Nose:  Nares normal. No drainage or sinus tenderness. Throat:    Lips, mucosa, and tongue normal.  No Thrush  Neck:  Supple, symmetrical,  no adenopathy, thyroid: non tender    no carotid bruit and no JVD. Severe paraspinal and occipitalis tenderness  Back:    Symmetric, diffuse tenderness. Lungs:   Clear to auscultation bilaterally. No Wheezing or Rhonchi. No rales. Chest wall:  No tenderness or deformity.  No Accessory muscle use. Heart:   Regular rate and rhythm,  no murmur, rub or gallop. Abdomen:   Soft, non-tender. Not distended. Bowel sounds normal. No masses  Extremities: Extremities normal, atraumatic, No cyanosis. No edema. No clubbing  Skin:     Texture, turgor normal. No rashes or lesions. Not Jaundiced  Lymph nodes: Cervical, supraclavicular normal.  Psych:  Good insight. Depressed. Anxious. NEUROLOGICAL EXAM:  Appearance: The patient is well developed, well nourished, provides a coherent history and is in no acute distress. Mental Status: Oriented to time, place and person. Mood and affect appropriate. Cranial Nerves:   Intact visual fields. Fundi are benign. OANH, EOM's full, no nystagmus, no ptosis. Facial sensation dysesthesia. Corneal reflexes are intact. Facial movement is symmetric. Hearing is normal bilaterally. Palate is midline with normal sternocleidomastoid and trapezius muscles are normal. Tongue is midline. Motor:  5-/5 strength in upper extremity and 4+/5 lower extremity proximal and distal muscles. Normal bulk and tone. No fasciculations. Reflexes:   Deep tendon reflexes 3+/4 and symmetrical.   Sensory:    Dysesthesia to touch, pinprick and vibration. Gait:   Slightly unsteady gait. Tremor:   No tremor noted. Cerebellar:  No cerebellar signs present. Neurovascular:  Normal heart sounds and regular rhythm, peripheral pulses intact, and no carotid bruits. Assesment  1. Chronic migraine without aura, intractable, with status migrainosus  Emgality    2. Intractable chronic migraine without aura and with status migrainosus  Nurtec    3. Migraine with vertigo  Emgality    4. Fibromyalgia    - gabapentin (NEURONTIN) 600 mg tablet; Take 1 Tablet by mouth three (3) times daily. Max Daily Amount: 1,800 mg. For Trigeminal Neuralgia  Dispense: 90 Tablet; Refill: 4    5. Attention deficit disorder (ADD) without hyperactivity    6. Chronic fatigue  Physical therapy    7. Weakness of both lower extremities  Intermittent physical therapy    8. Frequent falls  Intermittent physical therapy    9. Small fiber neuropathy    - REFERRAL TO NEUROLOGY    10. CARROL (generalized anxiety disorder)  Referred to psychiatry/psychology    11. Paresthesia    - REFERRAL TO NEUROLOGY    12. TN (trigeminal neuralgia)  Neurontin    13. Myofascial muscle pain  Zanaflex    14. Demyelinating disease (HCC)    - gabapentin (NEURONTIN) 600 mg tablet; Take 1 Tablet by mouth three (3) times daily. Max Daily Amount: 1,800 mg. For Trigeminal Neuralgia  Dispense: 90 Tablet; Refill: 4    ___________________________________________________  PLAN: Medication and plan discussed with patient      ICD-10-CM ICD-9-CM    1. Chronic migraine without aura, intractable, with status migrainosus  G43.711 346.73    2. Intractable chronic migraine without aura and with status migrainosus  G43.711 346.73    3. Migraine with vertigo  G43.109 346.80      780.4    4. Fibromyalgia  M79.7 729.1 gabapentin (NEURONTIN) 600 mg tablet   5. Attention deficit disorder (ADD) without hyperactivity  F98.8 314.00    6. Chronic fatigue  R53.82 780.79    7. Weakness of both lower extremities  R29.898 729.89    8. Frequent falls  R29.6 V15.88 CANCELED: REFERRAL TO NEUROLOGY   9. Small fiber neuropathy  G62.9 356.9 REFERRAL TO NEUROLOGY   10. CARROL (generalized anxiety disorder)  F41.1 300.02    11. Paresthesia  R20.2 782.0 REFERRAL TO NEUROLOGY      CANCELED: REFERRAL TO NEUROLOGY   12. TN (trigeminal neuralgia)  G50.0 350.1    13. Myofascial muscle pain  M79.18 729.1 CANCELED: REFERRAL TO NEUROLOGY   14. Demyelinating disease (HCC)  G37.9 341.9 gabapentin (NEURONTIN) 600 mg tablet     Follow-up and Dispositions    · Return in about 3 months (around 10/7/2022).             :    ___________________________________________________    Attending Physician: Karri Das MD

## 2022-08-03 RX ORDER — OXCARBAZEPINE 150 MG/1
TABLET, FILM COATED ORAL
Qty: 60 TABLET | Refills: 1 | Status: SHIPPED | OUTPATIENT
Start: 2022-08-03 | End: 2022-09-01

## 2022-08-08 ENCOUNTER — OFFICE VISIT (OUTPATIENT)
Dept: NEUROLOGY | Age: 32
End: 2022-08-08
Payer: COMMERCIAL

## 2022-08-08 DIAGNOSIS — G62.9 SMALL FIBER NEUROPATHY: Primary | ICD-10-CM

## 2022-08-08 PROCEDURE — 11104 PUNCH BX SKIN SINGLE LESION: CPT | Performed by: PSYCHIATRY & NEUROLOGY

## 2022-08-08 PROCEDURE — 11105 PUNCH BX SKIN EA SEP/ADDL: CPT | Performed by: PSYCHIATRY & NEUROLOGY

## 2022-08-08 NOTE — PROGRESS NOTES
Neurology Note    Patient ID:  Peterson Stone  [id number]  89 y.o.  1990      Date of Consultation:  August 8, 2022        Assessment and Plan:  The patient is a pleasant 20-year-old female who was referred for possible small fiber neuropathy with a request for a skin biopsy        PERFORMING PHYSICIAN: Fidelina Yeager DO FAAN  PROCEDURE:  Skin Punch Biopsy      ICD-10 Code: Polyneuropathy, unspecified  G62.9     Medications/ Supplies:   Skin-punch biopsy kit from [employer] including: skin punch biopsy tool, povidone/ iodine prep pad, alcohol prep pad   1% Lidocaine used for anesthesia. 3 mL syringe, 30G 1/2\" needle    The procedure and potential complications were explained to the patient, and verbal consent was obtained. A timeout was performed    The skin was cleansed with the betadine swabs over the:      - Right Calf (10 centimeters proximal to the lat malleolus)  - Right proximal thigh      A total of 1.5 cc of 1% lidocaine was used at each biopsy site. A skin punch biopsy was performed at the distal site with the provided biopsy tool, and the biopsy was placed into container labeled distal sample. The process was repeated at the proximal sites and the biopsies were placed into the appropriate containers. The containers were labeled with the patient names, lids tightened and placed on ice in the supplied styrofoam box and sent by [email or web address] to the lab. The biopsy sites were cleaned, pressure held, and then bandages applied. Blood loss was minimal.  The patient tolerated the procedure well. There were no immediate or obvious complications.   The results will be forwarded to the referring doctor to discuss with the patient.      ___________________  Fidelina Yeager DO FAAN               Subjective: I am here for a skin biopsy       History of Present Illness:   Peterson Stone is a 33 y.o. female who was referred by  for sensory disturbance with a concern for possible small fiber neuropathy. She has diffuse pain in both her upper and lower extremity. A skin biopsy was requested. Past Medical History:   Diagnosis Date    Acid reflux disease with ulcer     Calculus of kidney     Constipation     GERD (gastroesophageal reflux disease)     Headache     PCOS (polycystic ovarian syndrome)     Seasonal asthma     Stomach pain     Vertigo         Past Surgical History:   Procedure Laterality Date    HX CHOLECYSTECTOMY  01/29/2020    Laparoscopic cholecystectomy w/cholangiogram    HX CHOLECYSTECTOMY      HX ENDOSCOPY      HX UROLOGICAL Left 02/18/2019    STEFANY/Dr Saida Bryant        Family History   Problem Relation Age of Onset    Diabetes Mother     Hypertension Mother     Diabetes Father     Arthritis-rheumatoid Father     Heart Disease Maternal Grandmother     Diabetes Maternal Grandmother     Hypertension Maternal Grandmother     Cancer Maternal Grandmother         lymphoma    Heart Disease Maternal Grandfather     Diabetes Maternal Grandfather     Cancer Maternal Grandfather         prostate/bone    Heart Disease Paternal Grandmother     Diabetes Paternal Grandmother     Arthritis-rheumatoid Paternal Grandmother     Heart Disease Paternal Grandfather     Diabetes Paternal Grandfather     Gout Paternal Grandfather     Cancer Paternal Cousin         T-cell leukemia    Cancer Paternal Cousin         kidney    Cancer Maternal Great Grandmother         ovarian    Lupus Paternal Aunt         Social History     Tobacco Use    Smoking status: Never    Smokeless tobacco: Never   Substance Use Topics    Alcohol use: No        No Known Allergies     Prior to Admission medications    Medication Sig Start Date End Date Taking? Authorizing Provider   OXcarbazepine (TRILEPTAL) 150 mg tablet TAKE 1 TABLET BY MOUTH TWO TIMES A DAY. 8/3/22   Edward No MD   galcanezumab-gnlm (Emgality Syringe) 120 mg/mL syrg 120 mg by SubCUTAneous route every thirty (30) days.  7/7/22   Edward No MD   [facility] 75 mg disintegrating tablet DISSOLVE 1 TABLET ON THE TONGUE 1 TIME FOR UP TO 1 DOSE AS NEEDED FOR MIGRAINE 7/7/22   Edward No MD   propranolol LA (INDERAL LA) 60 mg SR capsule Take 1 Capsule by mouth daily. 7/7/22   Edward No MD   gabapentin (NEURONTIN) 600 mg tablet Take 1 Tablet by mouth three (3) times daily. Max Daily Amount: 1,800 mg. For Trigeminal Neuralgia 7/7/22   Edward No MD   ergocalciferol (ERGOCALCIFEROL) 1,250 mcg (50,000 unit) capsule Take 1 Capsule by mouth every seven (7) days. 5/2/22   Edward No MD   predniSONE (DELTASONE) 20 mg tablet Take 1 tab p.o.tid x3 days, 1 tab p.o.bid x4 days, 1 tab every day x3 days  Patient not taking: Reported on 7/7/2022 2/10/22   Edward No MD   lisdexamfetamine (Vyvanse) 30 mg capsule Take 1 Capsule by mouth every morning. Max Daily Amount: 30 mg. 2/2/22   Edward No MD   atorvastatin (LIPITOR) 20 mg tablet Take 20 mg by mouth daily. 12/29/21   Provider, Historical   lidocaine (LIDODERM) 5 % UNWRAP AND APPLY 1 PATCH TO THE SKIN ON BACK OF NECK FOR 12 HOURS PER DAY AND REMOVE FOR 12 HOURS PER DAY 12/27/21   Edward No MD   meclizine (ANTIVERT) 25 mg tablet Take 1 Tablet by mouth three (3) times daily as needed for Dizziness. 11/30/21   Nelly Payan MD   ALPRAZolam Ann-Marie Bowie) 0.25 mg tablet Take 1 Tablet by mouth two (2) times a day. Max Daily Amount: 0.5 mg. 11/30/21   Nelly Payan MD   busPIRone (BUSPAR) 5 mg tablet TAKE 1 TABLET BY MOUTH THREE TIMES DAILY WITH MEALS  Patient not taking: Reported on 7/7/2022 11/15/21   Edward No MD   Gaviscon Extra Strength 254-237.5 mg/5 mL susp TAKE 5 ML BY MOUTH TWICE DAILY AS NEEDED FOR HEARTBURN 5/5/21   Provider, Historical   ondansetron (Zofran ODT) 4 mg disintegrating tablet Take 1 Tablet by mouth every eight (8) hours as needed for Nausea. 6/7/21   Lois Huerta MD   DULoxetine (CYMBALTA) 30 mg capsule Take 1 Cap by mouth daily.   Patient not taking: Reported on 7/7/2022 4/29/21   Edward No MD   cholecalciferol (Vitamin D3) 25 mcg (1,000 unit) cap Take 1,000 Units by mouth daily. Patient not taking: Reported on 7/7/2022    Provider, Historical   cyanocobalamin 1,000 mcg tablet Take 1,000 mcg by mouth daily. Patient not taking: Reported on 7/7/2022    Provider, Historical   famotidine (PEPCID) 40 mg tablet Take 40 mg by mouth daily. Provider, Historical   fluticasone-umeclidinium-vilanterol (Trelegy Ellipta) 100-62.5-25 mcg inhaler Take 1 Puff by inhalation daily. Provider, Historical   naproxen sodium (ALEVE) 220 mg cap Take  by mouth. Provider, Historical   polyethylene glycol (MIRALAX) 17 gram/dose powder Take 17 g by mouth daily. 1/29/20   Branden Marcelino MD   ibuprofen (MOTRIN) 600 mg tablet Take 1 Tab by mouth three (3) times daily (with meals). 1/29/20   Branden Marcelino MD   medroxyPROGESTERone (PROVERA) 10 mg tablet Take 10 mg by mouth three (3) times daily. Provider, Historical   dicyclomine HCl (BENTYL PO) Take  by mouth four (4) times daily. Indications: As needed for pain    Provider, Historical   fluticasone propionate (FLONASE) 50 mcg/actuation nasal spray 2 Sprays by Both Nostrils route daily. Indications: As needed for allergies    Provider, Historical   multivitamin (MULTIPLE VITAMIN PO) Take  by mouth. Indications: Teresita fusion multi vitamin/folate 50 mg (2 gummies) by gyn dr    Provider, Historical   loratadine (CLARITIN) 10 mg tablet Take 10 mg by mouth daily. Provider, Historical   pantoprazole (PROTONIX) 40 mg tablet Take 40 mg by mouth two (2) times a day. Provider, Historical   sucralfate (CARAFATE) 100 mg/mL suspension Take  by mouth four (4) times daily. Provider, Historical   albuterol (PROVENTIL VENTOLIN) 2.5 mg /3 mL (0.083 %) nebulizer solution 2.5 mg by Nebulization route once. Other, MD Nate       Review of Systems:    General, constitutional: diffuse pain.    Eyes, vision: negative  Ears, nose, throat: negative  Cardiovascular, heart: negative  Respiratory: negative  Gastrointestinal: negative  Genitourinary: negative  Musculoskeletal: negative  Skin and integumentary: negative  Psychiatric: negative  Endocrine: negative  Neurological: negative, except for HPI  Hematologic/lymphatic: negative  Allergy/immunology: negative    Objective: There were no vitals taken for this visit. Physical Exam:      General:  appears well nourished in no acute distress  Neck: no carotid bruits  Lungs: clear to auscultation  Heart:  no murmurs, regular rate  Lower extremity: peripheral pulses palpable and no edema  Skin: intact    Labs:     Lab Results   Component Value Date/Time    Sodium 135 (L) 06/07/2021 04:33 AM    Potassium 4.6 06/07/2021 04:33 AM    Chloride 104 06/07/2021 04:33 AM    Glucose 119 (H) 06/07/2021 04:33 AM    BUN 19 06/07/2021 04:33 AM    Creatinine 0.99 06/07/2021 04:33 AM    Calcium 9.3 06/07/2021 04:33 AM    WBC 5.4 11/09/2021 11:05 AM    HCT 41.1 11/09/2021 11:05 AM    HGB 14.0 11/09/2021 11:05 AM    PLATELET 360 77/57/2648 11:05 AM       Imaging:    Results from Hospital Encounter encounter on 07/08/21    MRI LUMB SPINE WO CONT    Narrative  EXAM: MRI LUMB SPINE WO CONT    INDICATION: Radiculopathy, lumbar region    COMPARISON: None    TECHNIQUE: MR imaging of the lumbar spine was performed using the following  sequences: sagittal T1, T2, STIR;  axial T1, T2.    CONTRAST:  None. FINDINGS:    There is normal alignment of the lumbar spine. Vertebral body heights are  maintained. Marrow signal is normal.    The conus medullaris terminates at . Signal and caliber of the distal spinal  cord are within normal limits. The paraspinal soft tissues are within normal limits. Lower thoracic spine: No herniation or stenosis. L1-L2: No herniation or stenosis. L2-L3: No herniation or stenosis. L3-L4: No herniation or stenosis. L4-L5: No herniation or stenosis.     L5-S1: The disc space is mildly narrowed without disc desiccation. This is  likely congenital. No herniation or stenosis. Mild bilateral posterior facet  arthropathy. Impression  No significant spinal stenosis or neural foraminal narrowing. No acute process. Results from [place] encounter on 06/07/21    CT ABD PELV WO CONT    Narrative  EXAM: CT ABD PELV WO CONT    INDICATION: right flank and right mid abd pain    COMPARISON: 2019    CONTRAST:  None. TECHNIQUE:  Thin axial images were obtained through the abdomen and pelvis. Coronal and  sagittal reformats were generated. Oral contrast was not administered. CT dose  reduction was achieved through use of a standardized protocol tailored for this  examination and automatic exposure control for dose modulation. The absence of intravenous contrast material reduces the sensitivity for  evaluation of the vasculature and solid organs. FINDINGS:  LOWER THORAX: No significant abnormality in the incidentally imaged lower chest.  LIVER: Hepatic steatosis. BILIARY TREE: Cholecystectomy. CBD is not dilated. SPLEEN: within normal limits. PANCREAS: No focal abnormality. ADRENALS: Unremarkable. KIDNEYS/URETERS: Right nephrolithiasis. No hydronephrosis. STOMACH: Unremarkable. SMALL BOWEL: No dilatation or wall thickening. COLON: No dilatation or wall thickening. APPENDIX: Normal.  PERITONEUM: No ascites or pneumoperitoneum. RETROPERITONEUM: No lymphadenopathy or aortic aneurysm. REPRODUCTIVE ORGANS: Normal.  URINARY BLADDER: No mass or calculus. BONES: No destructive bone lesion. ABDOMINAL WALL: No mass or hernia. ADDITIONAL COMMENTS: N/A    Impression  Right nephrolithiasis.              Patient Active Problem List   Diagnosis Code    Renal colic on left side Q62    Biliary dyskinesia K82.8    Oxygen desaturation R09.02                   Signed By:  Gita Tinajero DO FAAN    August 8, 2022

## 2022-08-08 NOTE — LETTER
8/8/2022    Patient: Shane Cota   YOB: 1990   Date of Visit: 8/8/2022     Sawyer Serna MD  [place] [address]  Via Fax: [phone]     Rhys Du MD  [address] [place] [address]    Dear MD Rhys Hanna MD,      Thank you for referring Ms. Shane Cota to [address] [place] [address] for evaluation. My notes for this consultation are attached. If you have questions, please do not hesitate to call me. I look forward to following your patient along with you.       Sincerely,    Benson Javed, DO

## 2022-08-15 DIAGNOSIS — F41.1 GAD (GENERALIZED ANXIETY DISORDER): ICD-10-CM

## 2022-08-15 RX ORDER — ERGOCALCIFEROL 1.25 MG/1
50000 CAPSULE ORAL
Qty: 8 CAPSULE | Refills: 0 | Status: SHIPPED | OUTPATIENT
Start: 2022-08-15

## 2022-08-15 RX ORDER — ALPRAZOLAM 0.25 MG/1
0.25 TABLET ORAL 2 TIMES DAILY
Qty: 60 TABLET | Refills: 1 | OUTPATIENT
Start: 2022-08-15

## 2022-08-15 NOTE — TELEPHONE ENCOUNTER
Requested Prescriptions     Pending Prescriptions Disp Refills    ergocalciferol (ERGOCALCIFEROL) 1,250 mcg (50,000 unit) capsule 8 Capsule 0     Sig: Take 1 Capsule by mouth every seven (7) days.      Last refill 5/2/22  Last vitamin D level 6/21  Last visit 7/7/22  Next visit 10/22

## 2022-08-25 RX ORDER — PROPRANOLOL HYDROCHLORIDE 60 MG/1
CAPSULE, EXTENDED RELEASE ORAL
Qty: 30 CAPSULE | Refills: 1 | Status: SHIPPED | OUTPATIENT
Start: 2022-08-25 | End: 2022-10-12

## 2022-08-29 DIAGNOSIS — F41.1 GAD (GENERALIZED ANXIETY DISORDER): ICD-10-CM

## 2022-08-30 RX ORDER — ALPRAZOLAM 0.25 MG/1
0.25 TABLET ORAL 2 TIMES DAILY
Qty: 60 TABLET | Refills: 1 | Status: SHIPPED | OUTPATIENT
Start: 2022-08-30 | End: 2022-10-11 | Stop reason: SDUPTHER

## 2022-09-01 RX ORDER — OXCARBAZEPINE 150 MG/1
TABLET, FILM COATED ORAL
Qty: 60 TABLET | Refills: 1 | Status: SHIPPED | OUTPATIENT
Start: 2022-09-01

## 2022-09-28 ENCOUNTER — DOCUMENTATION ONLY (OUTPATIENT)
Dept: HEMATOLOGY | Age: 32
End: 2022-09-28

## 2022-09-28 ENCOUNTER — TRANSCRIBE ORDER (OUTPATIENT)
Dept: SCHEDULING | Age: 32
End: 2022-09-28

## 2022-09-28 DIAGNOSIS — R10.11 RUQ ABDOMINAL PAIN: ICD-10-CM

## 2022-09-28 DIAGNOSIS — K76.0 NON-ALCOHOLIC FATTY LIVER DISEASE: Primary | ICD-10-CM

## 2022-09-28 DIAGNOSIS — K58.9 IRRITABLE BOWEL SYNDROME (IBS): ICD-10-CM

## 2022-09-28 NOTE — PROGRESS NOTES
Received referral for F/S only. Patient's insurance [employer] beginning in [place] is not accepted in this office. LM notifying patient of the above and if she still wants test done she would be responsible for the entire bill + other costs incurred. SELF PAY Copay amount due at OV $276.00 for fibroscan + $100.00 for OV.  Patient advised to call office if she wishes to schedule test.

## 2022-10-11 ENCOUNTER — OFFICE VISIT (OUTPATIENT)
Dept: NEUROLOGY | Age: 32
End: 2022-10-11

## 2022-10-11 VITALS
HEIGHT: 63 IN | RESPIRATION RATE: 17 BRPM | WEIGHT: 204 LBS | BODY MASS INDEX: 36.14 KG/M2 | SYSTOLIC BLOOD PRESSURE: 111 MMHG | TEMPERATURE: 97.8 F | DIASTOLIC BLOOD PRESSURE: 70 MMHG | HEART RATE: 77 BPM

## 2022-10-11 DIAGNOSIS — M79.7 FIBROMYALGIA: ICD-10-CM

## 2022-10-11 DIAGNOSIS — G62.9 SMALL FIBER NEUROPATHY: Primary | ICD-10-CM

## 2022-10-11 DIAGNOSIS — G50.0 TN (TRIGEMINAL NEURALGIA): ICD-10-CM

## 2022-10-11 DIAGNOSIS — G43.109 MIGRAINE WITH VERTIGO: ICD-10-CM

## 2022-10-11 DIAGNOSIS — R53.82 CHRONIC FATIGUE: ICD-10-CM

## 2022-10-11 DIAGNOSIS — G90.1 DYSAUTONOMIA (HCC): ICD-10-CM

## 2022-10-11 DIAGNOSIS — G43.711 INTRACTABLE CHRONIC MIGRAINE WITHOUT AURA AND WITH STATUS MIGRAINOSUS: ICD-10-CM

## 2022-10-11 DIAGNOSIS — M79.18 MYOFASCIAL MUSCLE PAIN: ICD-10-CM

## 2022-10-11 DIAGNOSIS — F41.1 GAD (GENERALIZED ANXIETY DISORDER): ICD-10-CM

## 2022-10-11 DIAGNOSIS — R29.6 FREQUENT FALLS: ICD-10-CM

## 2022-10-11 DIAGNOSIS — R42 DIZZINESS: ICD-10-CM

## 2022-10-11 DIAGNOSIS — G43.711 CHRONIC MIGRAINE WITHOUT AURA, INTRACTABLE, WITH STATUS MIGRAINOSUS: ICD-10-CM

## 2022-10-11 RX ORDER — ALPRAZOLAM 0.25 MG/1
0.25 TABLET ORAL 2 TIMES DAILY
Qty: 60 TABLET | Refills: 1 | Status: SHIPPED | OUTPATIENT
Start: 2022-10-11

## 2022-10-11 RX ORDER — RIMEGEPANT SULFATE 75 MG/75MG
TABLET, ORALLY DISINTEGRATING ORAL
Qty: 8 TABLET | Refills: 5 | Status: SHIPPED | OUTPATIENT
Start: 2022-10-11

## 2022-10-11 NOTE — PROGRESS NOTES
Chief Complaint   Patient presents with    Follow-up     CARROL    1. Have you been to the ER, urgent care clinic since your last visit? No  Hospitalized since your last visit?  no    2. Have you seen or consulted any other health care providers outside of the [address] [place] [address] since your last visit? Include any pap smears or colon screening. Patient received a paper copy of weaning schedule for xanax (Medications). Patient was instructed that Dr. Skyler Bal will be exiting the ministry on November 30, 2022. Patient was also given a paper copy of alternative locations that they can contact, concerning their pain management needs, as [facility] will not be prescribing these medications.

## 2022-10-12 RX ORDER — PROPRANOLOL HYDROCHLORIDE 60 MG/1
CAPSULE, EXTENDED RELEASE ORAL
Qty: 30 CAPSULE | Refills: 1 | Status: SHIPPED | OUTPATIENT
Start: 2022-10-12

## 2022-10-14 NOTE — TELEPHONE ENCOUNTER
Requested Prescriptions     Pending Prescriptions Disp Refills    ergocalciferol (ERGOCALCIFEROL) 1,250 mcg (50,000 unit) capsule 8 Capsule 0     Sig: Take 1 Capsule by mouth every seven (7) days.      Last fill 8/15/22    Last vit D level 42.1 6/15/21     Last visit Dr Talita Webster 10/11/22    Next visit Dr Norbert Zhang 2/21/23

## 2022-10-17 RX ORDER — ERGOCALCIFEROL 1.25 MG/1
50000 CAPSULE ORAL
Qty: 8 CAPSULE | Refills: 0 | OUTPATIENT
Start: 2022-10-17

## 2022-10-24 ENCOUNTER — TELEPHONE (OUTPATIENT)
Dept: NEUROLOGY | Age: 32
End: 2022-10-24

## 2022-10-25 RX ORDER — GLUCOSAMINE SULFATE 1500 MG
1000 POWDER IN PACKET (EA) ORAL DAILY
Status: CANCELLED | OUTPATIENT
Start: 2022-10-25

## 2022-10-27 DIAGNOSIS — E55.9 VITAMIN D DEFICIENCY: Primary | ICD-10-CM

## 2022-10-27 NOTE — TELEPHONE ENCOUNTER
Called and spoke with patient. Verified name/. Relayed she needed her level checked, will mail this to her. She verbalized understanding. Patient noted not taking Amitriptyline anymore. She is taking 1 600mg Gabapentin TID.

## 2023-01-11 ENCOUNTER — TELEPHONE (OUTPATIENT)
Dept: NEUROLOGY | Age: 33
End: 2023-01-11

## 2023-01-11 NOTE — TELEPHONE ENCOUNTER
Pt has appt on 2/21 with Dr. Sam Cowart. Is looking to be seen sooner. Apparently Dr. Mary Castillo never filled out some paperwork for her disability at her last appt and now she is at risk of having her disability pulled. Knows she has to be seen in order to get papers filled out. Wants to know if we can get her in sooner. Please call.  [phone]

## 2023-01-12 NOTE — TELEPHONE ENCOUNTER
Verified patient with 2 identifiers   Advised per Dr Torres Bring we can fit her in on 1/24/23 at 1:40 pm. Patient states that would work.

## 2023-01-22 NOTE — PROGRESS NOTES
Rendering provider is unavailable to complete documentation for this encounter. This note is being closed administratively.     Lorena Yang MD

## 2023-01-24 ENCOUNTER — OFFICE VISIT (OUTPATIENT)
Dept: NEUROLOGY | Age: 33
End: 2023-01-24
Payer: COMMERCIAL

## 2023-01-24 VITALS
OXYGEN SATURATION: 97 % | DIASTOLIC BLOOD PRESSURE: 84 MMHG | WEIGHT: 200 LBS | HEART RATE: 77 BPM | SYSTOLIC BLOOD PRESSURE: 116 MMHG | BODY MASS INDEX: 35.44 KG/M2 | TEMPERATURE: 97.8 F | RESPIRATION RATE: 18 BRPM | HEIGHT: 63 IN

## 2023-01-24 DIAGNOSIS — G62.9 SMALL FIBER NEUROPATHY: Primary | ICD-10-CM

## 2023-01-24 DIAGNOSIS — F41.1 GAD (GENERALIZED ANXIETY DISORDER): ICD-10-CM

## 2023-01-24 PROCEDURE — 99215 OFFICE O/P EST HI 40 MIN: CPT | Performed by: INTERNAL MEDICINE

## 2023-01-24 RX ORDER — TRAMADOL HYDROCHLORIDE 50 MG/1
50 TABLET ORAL
COMMUNITY

## 2023-01-24 RX ORDER — PROPRANOLOL HYDROCHLORIDE 60 MG/1
60 CAPSULE, EXTENDED RELEASE ORAL DAILY
Qty: 120 CAPSULE | Refills: 3 | Status: SHIPPED | OUTPATIENT
Start: 2023-01-24

## 2023-01-24 NOTE — PROGRESS NOTES
Chief Complaint   Patient presents with    Follow-up     Former patient of Dr Sri Ybarra - migraines -neuropathy - lose feelings in legs - \" will be paralyzed for three hours\" - on and off -not a pattern    C/o right eye twitching and right sided pain      Is looking to have disability forms filled out     1. Have you been to the ER, urgent care clinic since your last visit? Hospitalized since your last visit? Yes ER in [facility] with kidney stone 11/2022- and [facility] on 11/30/22 for extraction of kidney stone     2. Have you seen or consulted any other health care providers outside of the [address] [place] [address] since your last visit? Include any pap smears or colon screening.   Yes see above and   Daria Conde - Uro/gyn

## 2023-01-24 NOTE — PROGRESS NOTES
Neurology Note    Patient ID:  Saige El  [id number]  56 y.o.  1990      Date of Consultation:  January 24, 2023      Assessment and Plan:  58-year-old female presenting with chronic migraine headaches and small fiber neuropathy, as well as a constellation of multiple vague symptoms with extensive work-up and no clear cause, diagnosis or treatment. Patient is here today to have disability paperwork filled out however does state that she wishes to return back to work as she has significant anxiety and depression revolving around not being able to work. She has no clear focal deficits on exam.  It is unclear to me why she was on disability for the last 2 years. I did discuss with her that at this time, I do not feel that she has any neurological disability that would prevent her from having a job. She does have chronic pain and for this, I would like to refer her to pain management. I would also like for her to see a neurologist for second opinion for these episodes that she describes of paralysis in her lower extremity which lasts for several hours and then return back to baseline, without any clear trigger. I will refer her to [facility] neurology to be evaluated in a multidisciplinary fashion. Cannot completely rule out a functional neurological disorder. Continue to taper off of xanax - should do 0.25 every other day for 2 weeks, then stop. Did discuss that the patient should have a psychiatrist for better medication management for her anxiety. She will work on getting someone for this. Problem was discussed at length with the patient. We reviewed the results of the study in detail. We also discussed prognosis and treatment options. The patient had opportunity today to ask all questions, expressed understanding of the instructions provided, and agreed with the plan of treatment. I spent 60 minutes providing care to this patient with >50% of the time spent counseling.          History of Present Illness:   Deysi Carballo is a 33 y.o. female with history of migraine headaches, PCOS, anxiety, chronic pain syndrome presenting for follow-up for completion of disability paperwork. The patient was a former patient of Dr. Tanner Arita. She states that prior to his departure, he told her that he would continue to fill out her disability paperwork which she has been filling out for the last 2 years without any clear understanding as to what is the disability. She has had MRI of her brain which was completely normal.  She has also had EEGs to evaluate for possible epilepsy however this was also completely unremarkable. She does have a history of chronic migraines for which she is on Nurtec and she feels that this is very effective. She does tell me that she has a lot of pain which has not been well managed. She is on Xanax and there were plans from Dr. Tanner Arita to eventually taper off of this medication. Currently she is on 0.25 mg once daily. She states that this is prescribed to her for anxiety. She has also been prescribed buspirone which she tried but she says made her feel \"weird\". She has had a skin biopsy for which she states came back with diagnosis of small fiber neuropathy. I do not have the biopsy results for my review. Dr. Tanner Arita reports that in his notes from April, the patient had episodes of having frequent falls where her legs would give out. She would feel a tingling sensation which would lead to numbness and she would have difficulty getting up. She says that the symptoms were unpredictable, variable. These occur at random and has no clear trigger or pattern. Apparently she has not been working for the last 2 years due to these spells. She states that she was undergoing care by rheumatology for \"inflammation\" but was constantly sent to different doctors and referred to Dr. Tanner Arita to help with her symptoms.      Past Medical History:   Diagnosis Date    Acid reflux disease with ulcer     Calculus of kidney     Constipation     GERD (gastroesophageal reflux disease)     Headache     PCOS (polycystic ovarian syndrome)     Seasonal asthma     Stomach pain     Vertigo         Past Surgical History:   Procedure Laterality Date    HX CHOLECYSTECTOMY  01/29/2020    Laparoscopic cholecystectomy w/cholangiogram    HX CHOLECYSTECTOMY      HX ENDOSCOPY      HX UROLOGICAL Left 02/18/2019    STEFANY/Dr Fabian Vigil        Family History   Problem Relation Age of Onset    Diabetes Mother     Hypertension Mother     Diabetes Father     Arthritis-rheumatoid Father     Heart Disease Maternal Grandmother     Diabetes Maternal Grandmother     Hypertension Maternal Grandmother     Cancer Maternal Grandmother         lymphoma    Heart Disease Maternal Grandfather     Diabetes Maternal Grandfather     Cancer Maternal Grandfather         prostate/bone    Heart Disease Paternal Grandmother     Diabetes Paternal Grandmother     Arthritis-rheumatoid Paternal Grandmother     Heart Disease Paternal Grandfather     Diabetes Paternal Grandfather     Gout Paternal Grandfather     Cancer Paternal Cousin         T-cell leukemia    Cancer Paternal Cousin         kidney    Cancer Maternal Great Grandmother         ovarian    Lupus Paternal Aunt         Social History     Tobacco Use    Smoking status: Never    Smokeless tobacco: Never   Substance Use Topics    Alcohol use: No        No Known Allergies     Prior to Admission medications    Medication Sig Start Date End Date Taking? Authorizing Provider   traMADoL (ULTRAM) 50 mg tablet Take 50 mg by mouth every six (6) hours as needed for Pain. Yes Provider, Historical   propranolol LA (INDERAL LA) 60 mg SR capsule Take 1 Capsule by mouth daily.  1/24/23  Yes Evi Vu DO Nurtec ODT 75 mg disintegrating tablet DISSOLVE 1 TABLET ON THE TONGUE 1 TIME FOR UP TO 1 DOSE AS NEEDED FOR MIGRAINE 10/11/22  Yes Edward No MD   ALPRAZolam (XANAX) 0.25 mg tablet Take 1 Tablet by mouth two (2) times a day. Max Daily Amount: 0.5 mg. 10/11/22  Yes Edward No MD   OXcarbazepine (TRILEPTAL) 150 mg tablet TAKE 1 TABLET BY MOUTH TWICE A DAY 9/1/22  Yes Edward No MD   galcanezumab-gnlm (Emgality Syringe) 120 mg/mL syrg 120 mg by SubCUTAneous route every thirty (30) days. 7/7/22  Yes Edward No MD   gabapentin (NEURONTIN) 600 mg tablet Take 1 Tablet by mouth three (3) times daily. Max Daily Amount: 1,800 mg. For Trigeminal Neuralgia 7/7/22  Yes Edward No MD   atorvastatin (LIPITOR) 20 mg tablet Take 20 mg by mouth daily. 12/29/21  Yes Provider, Historical   lidocaine (LIDODERM) 5 % UNWRAP AND APPLY 1 PATCH TO THE SKIN ON BACK OF NECK FOR 12 HOURS PER DAY AND REMOVE FOR 12 HOURS PER DAY 12/27/21  Yes Edward No MD   meclizine (ANTIVERT) 25 mg tablet Take 1 Tablet by mouth three (3) times daily as needed for Dizziness. 11/30/21  Yes Leida Patel MD   Gaviscon Extra Strength 254-237.5 mg/5 mL susp TAKE 5 ML BY MOUTH TWICE DAILY AS NEEDED FOR HEARTBURN 5/5/21  Yes Provider, Historical   ondansetron (Zofran ODT) 4 mg disintegrating tablet Take 1 Tablet by mouth every eight (8) hours as needed for Nausea. 6/7/21  Yes Lois Huerta MD   famotidine (PEPCID) 40 mg tablet Take 40 mg by mouth daily. Yes Provider, Historical   medroxyPROGESTERone (PROVERA) 10 mg tablet Take 10 mg by mouth three (3) times daily. Yes Provider, Historical   dicyclomine HCl (BENTYL PO) Take  by mouth four (4) times daily. Indications: As needed for pain   Yes Provider, Historical   fluticasone propionate (FLONASE) 50 mcg/actuation nasal spray 2 Sprays by Both Nostrils route daily. Indications: As needed for allergies   Yes Provider, Historical   multivitamin (MULTIPLE VITAMIN PO) Take  by mouth. Indications: Teresita fusion multi vitamin/folate 50 mg (2 gummies) by gyn dr   Yes Provider, Historical   loratadine (CLARITIN) 10 mg tablet Take 10 mg by mouth daily.    Yes Provider, Historical pantoprazole (PROTONIX) 40 mg tablet Take 40 mg by mouth two (2) times a day. Yes Provider, Historical   albuterol (PROVENTIL VENTOLIN) 2.5 mg /3 mL (0.083 %) nebulizer solution 2.5 mg by Nebulization route once. Yes Other, MD Nate   ergocalciferol (ERGOCALCIFEROL) 1,250 mcg (50,000 unit) capsule Take 1 Capsule by mouth every seven (7) days. Patient not taking: Reported on 1/24/2023 8/15/22   Sierra Bonilla NP   predniSONE (DELTASONE) 20 mg tablet Take 1 tab p.o.tid x3 days, 1 tab p.o.bid x4 days, 1 tab every day x3 days  Patient not taking: No sig reported 2/10/22   Edward No MD   lisdexamfetamine (Vyvanse) 30 mg capsule Take 1 Capsule by mouth every morning. Max Daily Amount: 30 mg. Patient not taking: No sig reported 2/2/22   Edward No MD   busPIRone (BUSPAR) 5 mg tablet TAKE 1 TABLET BY MOUTH THREE TIMES DAILY WITH MEALS  Patient not taking: No sig reported 11/15/21   Edward No MD   DULoxetine (CYMBALTA) 30 mg capsule Take 1 Cap by mouth daily. Patient not taking: No sig reported 4/29/21   Edward No MD   cholecalciferol (VITAMIN D3) 25 mcg (1,000 unit) cap Take 1,000 Units by mouth daily. Patient not taking: Reported on 1/24/2023    Provider, Historical   cyanocobalamin 1,000 mcg tablet Take 1,000 mcg by mouth daily. Patient not taking: No sig reported    Provider, Historical   fluticasone-umeclidinium-vilanterol (Trelegy Ellipta) 100-62.5-25 mcg inhaler Take 1 Puff by inhalation daily. Patient not taking: Reported on 1/24/2023    Provider, Historical   naproxen sodium 220 mg cap Take  by mouth. Patient not taking: No sig reported    Provider, Historical   polyethylene glycol (MIRALAX) 17 gram/dose powder Take 17 g by mouth daily. Patient not taking: No sig reported 1/29/20   Haley Gilmore MD   ibuprofen (MOTRIN) 600 mg tablet Take 1 Tab by mouth three (3) times daily (with meals).   Patient not taking: Reported on 10/11/2022 1/29/20   Haley Gilmore MD sucralfate (CARAFATE) 100 mg/mL suspension Take  by mouth four (4) times daily. Patient not taking: No sig reported    Provider, Historical       Review of Systems:    General, constitutional: negative  Eyes, vision: negative  Ears, nose, throat: negative  Cardiovascular, heart: negative  Respiratory: negative  Gastrointestinal: negative  Genitourinary: negative  Musculoskeletal: negative  Skin and integumentary: negative  Psychiatric: negative  Endocrine: negative  Neurological: negative, except for HPI  Hematologic/lymphatic: negative  Allergy/immunology: negative    []Unable to obtain  ROS due to  []mental status change  []sedated   []intubated    Objective:     Visit Vitals  /84 (BP 1 Location: Left upper arm, BP Patient Position: Sitting, BP Cuff Size: Large adult)   Pulse 77   Temp 97.8 °F (36.6 °C) (Temporal)   Resp 18   Ht 5' 3\" (1.6 m)   Wt 200 lb (90.7 kg)   SpO2 97%   BMI 35.43 kg/m²       Physical Exam:  General:  appears well nourished in no acute distress  Neck: no obvious deformity or masses  Lungs: comfortable on room air  Heart:  well-perfused   Lower extremity: no edema  Skin: intact    Neurological exam:  Awake, alert, oriented to person, place and time  Recent and remote memory were normal  Attention and concentration were intact  Language was intact. There was no aphasia  Speech: no dysarthria  Fund of knowledge was preserved    Cranial nerves:   II-XII were tested    PERRRLA  Visual fields were full to finger counting   EOMI, no evidence of nystagmus  Facial sensation:  normal and symmetric  Facial motor: normal and symmetric  Hearing intact  SCM strength intact  Tongue: midline without fasciculations    Motor: Tone normal in upper and lower extremities     Strength testing:   deltoid triceps biceps Wrist ext. Wrist flex. intrinsics Hip flex. Hip ext. Knee ext.   Knee flex Dorsi flex Plantar flex   Right 5 5 5 5 5 5 5 NT 5 5 5 5   Left 5 5 5 5 5 5 5 NT 5 5 5 5     Sensory:  Intact to LT throughout     Reflexes:    Right Left  Biceps  2 2  Triceps  2 2  Brachiorad. 2 2  Patella  2 2  Achilles 2 2    Plantar response:  flexor bilaterally    Cerebellar testing:  no tremor apparent, finger/nose and rapid alternating movements were intact      Gait: steady. Labs:     Lab Results   Component Value Date/Time    Sodium 135 (L) 06/07/2021 04:33 AM    Potassium 4.6 06/07/2021 04:33 AM    Chloride 104 06/07/2021 04:33 AM    Glucose 119 (H) 06/07/2021 04:33 AM    BUN 19 06/07/2021 04:33 AM    Creatinine 0.99 06/07/2021 04:33 AM    Calcium 9.3 06/07/2021 04:33 AM    WBC 5.4 11/09/2021 11:05 AM    HCT 41.1 11/09/2021 11:05 AM    HGB 14.0 11/09/2021 11:05 AM    PLATELET 681 17/93/6307 11:05 AM       Imaging:    Results from Hospital Encounter encounter on 07/08/21    MRI LUMB SPINE WO CONT    Narrative  EXAM: MRI LUMB SPINE WO CONT    INDICATION: Radiculopathy, lumbar region    COMPARISON: None    TECHNIQUE: MR imaging of the lumbar spine was performed using the following  sequences: sagittal T1, T2, STIR;  axial T1, T2.    CONTRAST:  None. FINDINGS:    There is normal alignment of the lumbar spine. Vertebral body heights are  maintained. Marrow signal is normal.    The conus medullaris terminates at . Signal and caliber of the distal spinal  cord are within normal limits. The paraspinal soft tissues are within normal limits. Lower thoracic spine: No herniation or stenosis. L1-L2: No herniation or stenosis. L2-L3: No herniation or stenosis. L3-L4: No herniation or stenosis. L4-L5: No herniation or stenosis. L5-S1: The disc space is mildly narrowed without disc desiccation. This is  likely congenital. No herniation or stenosis. Mild bilateral posterior facet  arthropathy. Impression  No significant spinal stenosis or neural foraminal narrowing. No acute process.       Results from [place] encounter on 06/07/21    CT ABD PELV WO CONT    Narrative  EXAM: CT ABD PELV WO CONT    INDICATION: right flank and right mid abd pain    COMPARISON: 2019    CONTRAST:  None. TECHNIQUE:  Thin axial images were obtained through the abdomen and pelvis. Coronal and  sagittal reformats were generated. Oral contrast was not administered. CT dose  reduction was achieved through use of a standardized protocol tailored for this  examination and automatic exposure control for dose modulation. The absence of intravenous contrast material reduces the sensitivity for  evaluation of the vasculature and solid organs. FINDINGS:  LOWER THORAX: No significant abnormality in the incidentally imaged lower chest.  LIVER: Hepatic steatosis. BILIARY TREE: Cholecystectomy. CBD is not dilated. SPLEEN: within normal limits. PANCREAS: No focal abnormality. ADRENALS: Unremarkable. KIDNEYS/URETERS: Right nephrolithiasis. No hydronephrosis. STOMACH: Unremarkable. SMALL BOWEL: No dilatation or wall thickening. COLON: No dilatation or wall thickening. APPENDIX: Normal.  PERITONEUM: No ascites or pneumoperitoneum. RETROPERITONEUM: No lymphadenopathy or aortic aneurysm. REPRODUCTIVE ORGANS: Normal.  URINARY BLADDER: No mass or calculus. BONES: No destructive bone lesion. ABDOMINAL WALL: No mass or hernia. ADDITIONAL COMMENTS: N/A    Impression  Right nephrolithiasis.              Patient Active Problem List   Diagnosis Code    Renal colic on left side T15    Biliary dyskinesia K82.8    Oxygen desaturation R09.02                   Signed By:   Susy Pearce DO  Neurophysiology      January 24, 2023

## 2023-01-25 ENCOUNTER — TELEPHONE (OUTPATIENT)
Dept: NEUROLOGY | Age: 33
End: 2023-01-25

## 2023-01-25 NOTE — TELEPHONE ENCOUNTER
Faxed completed and dated forms to the [facility] ( return to work date as 2/1/23). Including OV notes from 4/5/22 to present. Received confirmation that fax went through successfully. Put in to be scanned.

## 2023-02-07 ENCOUNTER — TELEPHONE (OUTPATIENT)
Dept: NEUROLOGY | Age: 33
End: 2023-02-07

## 2023-02-07 NOTE — TELEPHONE ENCOUNTER
Faxed referral to [facility] at [phone]   Received confirmation that fax went through successfully. Put in to be scanned.

## 2023-02-07 NOTE — TELEPHONE ENCOUNTER
Pt is trying to set up appt with [facility] neuro at [place]. They won't let her schedule until they get referral from us.  Please fax referral to [phone]

## 2023-03-08 ENCOUNTER — TRANSCRIBE ORDER (OUTPATIENT)
Dept: SCHEDULING | Age: 33
End: 2023-03-08

## 2023-03-08 DIAGNOSIS — R10.11 RUQ PAIN: Primary | ICD-10-CM

## 2023-03-08 DIAGNOSIS — R14.0 BLOATING: ICD-10-CM

## 2023-03-08 DIAGNOSIS — K58.9 IRRITABLE BOWEL SYNDROME: ICD-10-CM

## 2023-03-08 DIAGNOSIS — R11.0 NAUSEA: ICD-10-CM

## 2023-03-08 DIAGNOSIS — R10.31 RLQ ABDOMINAL PAIN: ICD-10-CM

## 2023-03-08 DIAGNOSIS — N83.209 OVARIAN CYST: ICD-10-CM

## 2023-03-08 DIAGNOSIS — K75.81 NASH (NONALCOHOLIC STEATOHEPATITIS): ICD-10-CM

## 2023-05-17 ENCOUNTER — HOSPITAL ENCOUNTER (OUTPATIENT)
Facility: HOSPITAL | Age: 33
Discharge: HOME | End: 2023-05-20
Payer: COMMERCIAL

## 2023-05-17 DIAGNOSIS — K21.00 GASTROESOPHAGEAL REFLUX DISEASE WITH ESOPHAGITIS WITHOUT HEMORRHAGE: ICD-10-CM

## 2023-05-17 DIAGNOSIS — K76.0 FATTY METAMORPHOSIS OF LIVER: ICD-10-CM

## 2023-05-17 DIAGNOSIS — K58.9 IRRITABLE BOWEL SYNDROME, UNSPECIFIED TYPE: ICD-10-CM

## 2023-05-17 DIAGNOSIS — R10.9 STOMACH ACHE: ICD-10-CM

## 2023-05-17 PROCEDURE — 74018 RADEX ABDOMEN 1 VIEW: CPT

## (undated) DEVICE — TROCAR: Brand: KII® SLEEVE

## (undated) DEVICE — Device

## (undated) DEVICE — SYR 20ML LL STRL LF --

## (undated) DEVICE — KIT,1200CC CANISTER,3/16"X6' TUBING: Brand: MEDLINE INDUSTRIES, INC.

## (undated) DEVICE — LAPAROSCOPIC TROCAR SLEEVE/SINGLE USE: Brand: KII® OPTICAL ACCESS SYSTEM

## (undated) DEVICE — CLICKLINE SCISSORS INSERT: Brand: CLICKLINE

## (undated) DEVICE — OPEN-END URETERAL CATHETER: Brand: COOK

## (undated) DEVICE — SYR 10ML LUER LOK 1/5ML GRAD --

## (undated) DEVICE — STERILE POLYISOPRENE POWDER-FREE SURGICAL GLOVES: Brand: PROTEXIS

## (undated) DEVICE — GOWN,SIRUS,NONRNF,SETINSLV,2XL,18/CS: Brand: MEDLINE

## (undated) DEVICE — TISSUE RETRIEVAL SYSTEM: Brand: INZII RETRIEVAL SYSTEM

## (undated) DEVICE — 3000CC GUARDIAN II: Brand: GUARDIAN

## (undated) DEVICE — APPLIER CLP M L L11.4IN DIA10MM ENDOSCP ROT MULT FOR LIG

## (undated) DEVICE — SOLUTION IRRIG 1000ML H2O STRL BLT

## (undated) DEVICE — SOLUTION SCRB 4OZ 10% PVP I POVIDONE IOD TOP PAINT EXIDINE

## (undated) DEVICE — SUTURE ETHLN SZ 2-0 L18IN NONABSORBABLE BLK L19MM PS-2 PRIM 593H

## (undated) DEVICE — ELECTRODE ES 36CM LAP FLAT L HK COAT DISP CLEANCOAT

## (undated) DEVICE — DEVON™ KNEE AND BODY STRAP 60" X 3" (1.5 M X 7.6 CM): Brand: DEVON

## (undated) DEVICE — INFECTION CONTROL KIT SYS

## (undated) DEVICE — GUIDEWIRE URO L150CM DIA0.035IN STD NIT HYDRPHLC STR TIP

## (undated) DEVICE — REM POLYHESIVE ADULT PATIENT RETURN ELECTRODE: Brand: VALLEYLAB

## (undated) DEVICE — TOWEL SURG W17XL27IN STD BLU COT NONFENESTRATED PREWASHED

## (undated) DEVICE — STRAP,POSITIONING,KNEE/BODY,FOAM,4X60": Brand: MEDLINE

## (undated) DEVICE — SOLUTION IV 500ML 0.9% SOD CHL FLX CONT

## (undated) DEVICE — KIT CHOLGM POLYUR W/ KARLAN BLLN CATH 4FR L60CM 5MM INTRO

## (undated) DEVICE — HANDLE LT SNAP ON ULT DURABLE LENS FOR TRUMPF ALC DISPOSABLE

## (undated) DEVICE — GOWN,PLEAT,SPECIALTY,XL,STRL: Brand: MEDLINE

## (undated) DEVICE — JELLY,LUBE,STERILE,FLIP TOP,TUBE,4-OZ: Brand: MEDLINE

## (undated) DEVICE — BAG COLLECTION FLD OR-TBL NS --

## (undated) DEVICE — DRAPE SHT 3 QTR PROXIMA 53X77 --

## (undated) DEVICE — SUTURE SZ 0 27IN 5/8 CIR UR-6  TAPER PT VIOLET ABSRB VICRYL J603H

## (undated) DEVICE — DERMABOND SKIN ADH 0.7ML -- DERMABOND ADVANCED 12/BX

## (undated) DEVICE — SOLUTION IRRIG 3000ML 0.9% SOD CHL FLX CONT 0797208] ICU MEDICAL INC]

## (undated) DEVICE — CATH URETH INTMIT ROB 12FR FUN -- CONVERT TO ITEM 151713

## (undated) DEVICE — Z DISCONTINUED NO SUB IDED SET EXTN W/ 4 W STPCOCK M SPIN LOK 36IN

## (undated) DEVICE — SUTURE MCRYL SZ 4-0 L27IN ABSRB UD L19MM PS-2 1/2 CIR PRIM Y426H

## (undated) DEVICE — TROCAR: Brand: KII FIOS FIRST ENTRY

## (undated) DEVICE — CYSTOSCOPY PACK: Brand: CONVERTORS

## (undated) DEVICE — GDWIRE UROL STR 150CM FLX TP -- BX/5 SENSOR

## (undated) DEVICE — KENDALL SCD EXPRESS SLEEVES, KNEE LENGTH, MEDIUM: Brand: KENDALL SCD

## (undated) DEVICE — SOLUTION IRRIGATION H2O 0797305] ICU MEDICAL INC]

## (undated) DEVICE — RESERVOIR,SUCTION,100CC,SILICONE: Brand: MEDLINE

## (undated) DEVICE — TUBING IRRIG L77IN DIA0.241IN L BOR FOR CYSTO W/ NVENT

## (undated) DEVICE — DRAPE XR C ARM 41X74IN LF --

## (undated) DEVICE — MEDI-VAC NON-CONDUCTIVE SUCTION TUBING: Brand: CARDINAL HEALTH

## (undated) DEVICE — NEEDLE HYPO 22GA L1.5IN BLK S STL HUB POLYPR SHLD REG BVL

## (undated) DEVICE — (D)PREP SKN CHLRAPRP APPL 26ML -- CONVERT TO ITEM 371833

## (undated) DEVICE — SKIN MARKER,REGULAR TIP WITH RULER AND LABELS: Brand: DEVON

## (undated) DEVICE — BLADE ASSEMB CLP HAIR FINE --

## (undated) DEVICE — SURGICAL PROCEDURE KIT GEN LAPAROSCOPY LF

## (undated) DEVICE — GARMENT,MEDLINE,DVT,INT,CALF,MED, GEN2: Brand: MEDLINE